# Patient Record
Sex: MALE | Race: WHITE | Employment: UNEMPLOYED | URBAN - METROPOLITAN AREA
[De-identification: names, ages, dates, MRNs, and addresses within clinical notes are randomized per-mention and may not be internally consistent; named-entity substitution may affect disease eponyms.]

---

## 2019-03-08 ENCOUNTER — HOSPITAL ENCOUNTER (INPATIENT)
Age: 24
LOS: 5 days | Discharge: HOME | DRG: 885 | End: 2019-03-13
Attending: EMERGENCY MEDICINE | Admitting: PSYCHIATRY & NEUROLOGY
Payer: COMMERCIAL

## 2019-03-08 VITALS
WEIGHT: 140 LBS | HEIGHT: 73 IN | DIASTOLIC BLOOD PRESSURE: 83 MMHG | BODY MASS INDEX: 18.55 KG/M2 | OXYGEN SATURATION: 99 % | TEMPERATURE: 98.8 F | HEART RATE: 115 BPM | RESPIRATION RATE: 16 BRPM | SYSTOLIC BLOOD PRESSURE: 139 MMHG

## 2019-03-08 DIAGNOSIS — F48.9 MENTAL HEALTH PROBLEM: Primary | ICD-10-CM

## 2019-03-08 PROBLEM — F29 PSYCHOSIS (HCC): Status: ACTIVE | Noted: 2019-03-08

## 2019-03-08 LAB
ACETAMINOPHEN LEVEL: <5 MCG/ML (ref 10–30)
ALBUMIN SERPL-MCNC: 4.9 G/DL (ref 3.5–5.2)
ALP BLD-CCNC: 83 U/L (ref 40–129)
ALT SERPL-CCNC: 46 U/L (ref 0–40)
AMPHETAMINE SCREEN, URINE: POSITIVE
ANION GAP SERPL CALCULATED.3IONS-SCNC: 14 MMOL/L (ref 7–16)
AST SERPL-CCNC: 23 U/L (ref 0–39)
BARBITURATE SCREEN URINE: NOT DETECTED
BASOPHILS ABSOLUTE: 0.06 E9/L (ref 0–0.2)
BASOPHILS RELATIVE PERCENT: 0.5 % (ref 0–2)
BENZODIAZEPINE SCREEN, URINE: NOT DETECTED
BILIRUB SERPL-MCNC: 0.4 MG/DL (ref 0–1.2)
BILIRUBIN URINE: NEGATIVE
BLOOD, URINE: NEGATIVE
BUN BLDV-MCNC: 15 MG/DL (ref 6–20)
CALCIUM SERPL-MCNC: 9.5 MG/DL (ref 8.6–10.2)
CANNABINOID SCREEN URINE: POSITIVE
CHLORIDE BLD-SCNC: 100 MMOL/L (ref 98–107)
CLARITY: CLEAR
CO2: 26 MMOL/L (ref 22–29)
COCAINE METABOLITE SCREEN URINE: POSITIVE
COLOR: YELLOW
CREAT SERPL-MCNC: 0.9 MG/DL (ref 0.7–1.2)
EOSINOPHILS ABSOLUTE: 0.52 E9/L (ref 0.05–0.5)
EOSINOPHILS RELATIVE PERCENT: 4.5 % (ref 0–6)
ETHANOL: <10 MG/DL (ref 0–0.08)
GFR AFRICAN AMERICAN: >60
GFR NON-AFRICAN AMERICAN: >60 ML/MIN/1.73
GLUCOSE BLD-MCNC: 119 MG/DL (ref 74–99)
GLUCOSE URINE: NEGATIVE MG/DL
HCT VFR BLD CALC: 48.1 % (ref 37–54)
HEMOGLOBIN: 16.3 G/DL (ref 12.5–16.5)
IMMATURE GRANULOCYTES #: 0.1 E9/L
IMMATURE GRANULOCYTES %: 0.9 % (ref 0–5)
KETONES, URINE: NEGATIVE MG/DL
LEUKOCYTE ESTERASE, URINE: NEGATIVE
LYMPHOCYTES ABSOLUTE: 3.1 E9/L (ref 1.5–4)
LYMPHOCYTES RELATIVE PERCENT: 27.1 % (ref 20–42)
MCH RBC QN AUTO: 31.6 PG (ref 26–35)
MCHC RBC AUTO-ENTMCNC: 33.9 % (ref 32–34.5)
MCV RBC AUTO: 93.2 FL (ref 80–99.9)
METHADONE SCREEN, URINE: NOT DETECTED
MONOCYTES ABSOLUTE: 0.99 E9/L (ref 0.1–0.95)
MONOCYTES RELATIVE PERCENT: 8.7 % (ref 2–12)
NEUTROPHILS ABSOLUTE: 6.67 E9/L (ref 1.8–7.3)
NEUTROPHILS RELATIVE PERCENT: 58.3 % (ref 43–80)
NITRITE, URINE: NEGATIVE
OPIATE SCREEN URINE: NOT DETECTED
PDW BLD-RTO: 13.1 FL (ref 11.5–15)
PH UA: 7 (ref 5–9)
PHENCYCLIDINE SCREEN URINE: NOT DETECTED
PLATELET # BLD: 326 E9/L (ref 130–450)
PMV BLD AUTO: 9.9 FL (ref 7–12)
POTASSIUM SERPL-SCNC: 3.2 MMOL/L (ref 3.5–5)
PROPOXYPHENE SCREEN: NOT DETECTED
PROTEIN UA: NEGATIVE MG/DL
RBC # BLD: 5.16 E12/L (ref 3.8–5.8)
SALICYLATE, SERUM: <0.3 MG/DL (ref 0–30)
SODIUM BLD-SCNC: 140 MMOL/L (ref 132–146)
SPECIFIC GRAVITY UA: 1.01 (ref 1–1.03)
T4 FREE: 1.28 NG/DL (ref 0.93–1.7)
TOTAL PROTEIN: 7.8 G/DL (ref 6.4–8.3)
TRICYCLIC ANTIDEPRESSANTS SCREEN SERUM: NEGATIVE NG/ML
TSH SERPL DL<=0.05 MIU/L-ACNC: 1.22 UIU/ML (ref 0.27–4.2)
UROBILINOGEN, URINE: 0.2 E.U./DL
WBC # BLD: 11.4 E9/L (ref 4.5–11.5)

## 2019-03-08 PROCEDURE — 1240000000 HC EMOTIONAL WELLNESS R&B

## 2019-03-08 PROCEDURE — 6370000000 HC RX 637 (ALT 250 FOR IP): Performed by: PSYCHIATRY & NEUROLOGY

## 2019-03-08 PROCEDURE — 6360000002 HC RX W HCPCS: Performed by: PSYCHIATRY & NEUROLOGY

## 2019-03-08 PROCEDURE — 80307 DRUG TEST PRSMV CHEM ANLYZR: CPT

## 2019-03-08 PROCEDURE — 81003 URINALYSIS AUTO W/O SCOPE: CPT

## 2019-03-08 PROCEDURE — 85025 COMPLETE CBC W/AUTO DIFF WBC: CPT

## 2019-03-08 PROCEDURE — G0480 DRUG TEST DEF 1-7 CLASSES: HCPCS

## 2019-03-08 PROCEDURE — 6360000002 HC RX W HCPCS

## 2019-03-08 PROCEDURE — 80053 COMPREHEN METABOLIC PANEL: CPT

## 2019-03-08 PROCEDURE — 84443 ASSAY THYROID STIM HORMONE: CPT

## 2019-03-08 PROCEDURE — 99285 EMERGENCY DEPT VISIT HI MDM: CPT

## 2019-03-08 PROCEDURE — 84439 ASSAY OF FREE THYROXINE: CPT

## 2019-03-08 PROCEDURE — 36415 COLL VENOUS BLD VENIPUNCTURE: CPT

## 2019-03-08 RX ORDER — LORAZEPAM 2 MG/ML
2 INJECTION INTRAMUSCULAR EVERY 6 HOURS PRN
Status: DISCONTINUED | OUTPATIENT
Start: 2019-03-08 | End: 2019-03-13 | Stop reason: HOSPADM

## 2019-03-08 RX ORDER — NICOTINE 21 MG/24HR
1 PATCH, TRANSDERMAL 24 HOURS TRANSDERMAL DAILY
Status: DISCONTINUED | OUTPATIENT
Start: 2019-03-08 | End: 2019-03-13 | Stop reason: HOSPADM

## 2019-03-08 RX ORDER — LORAZEPAM 2 MG/ML
INJECTION INTRAMUSCULAR
Status: COMPLETED
Start: 2019-03-08 | End: 2019-03-08

## 2019-03-08 RX ORDER — ACETAMINOPHEN 325 MG/1
650 TABLET ORAL EVERY 4 HOURS PRN
Status: DISCONTINUED | OUTPATIENT
Start: 2019-03-08 | End: 2019-03-13 | Stop reason: HOSPADM

## 2019-03-08 RX ORDER — MAGNESIUM HYDROXIDE/ALUMINUM HYDROXICE/SIMETHICONE 120; 1200; 1200 MG/30ML; MG/30ML; MG/30ML
30 SUSPENSION ORAL PRN
Status: DISCONTINUED | OUTPATIENT
Start: 2019-03-08 | End: 2019-03-13 | Stop reason: HOSPADM

## 2019-03-08 RX ORDER — HYDROXYZINE PAMOATE 50 MG/1
50 CAPSULE ORAL EVERY 6 HOURS PRN
Status: DISCONTINUED | OUTPATIENT
Start: 2019-03-08 | End: 2019-03-13 | Stop reason: HOSPADM

## 2019-03-08 RX ORDER — TRAZODONE HYDROCHLORIDE 50 MG/1
50 TABLET ORAL NIGHTLY PRN
Status: DISCONTINUED | OUTPATIENT
Start: 2019-03-08 | End: 2019-03-13 | Stop reason: HOSPADM

## 2019-03-08 RX ORDER — OLANZAPINE 10 MG/1
10 TABLET ORAL
Status: DISPENSED | OUTPATIENT
Start: 2019-03-08 | End: 2019-03-08

## 2019-03-08 RX ORDER — BENZTROPINE MESYLATE 1 MG/ML
2 INJECTION INTRAMUSCULAR; INTRAVENOUS 2 TIMES DAILY PRN
Status: DISCONTINUED | OUTPATIENT
Start: 2019-03-08 | End: 2019-03-13 | Stop reason: HOSPADM

## 2019-03-08 RX ORDER — HALOPERIDOL 5 MG/ML
10 INJECTION INTRAMUSCULAR EVERY 6 HOURS PRN
Status: DISCONTINUED | OUTPATIENT
Start: 2019-03-08 | End: 2019-03-13 | Stop reason: HOSPADM

## 2019-03-08 RX ADMIN — LORAZEPAM 2 MG: 2 INJECTION INTRAMUSCULAR; INTRAVENOUS at 23:56

## 2019-03-08 RX ADMIN — BENZTROPINE MESYLATE 2 MG: 1 INJECTION INTRAMUSCULAR; INTRAVENOUS at 23:54

## 2019-03-08 RX ADMIN — HALOPERIDOL LACTATE 10 MG: 5 INJECTION, SOLUTION INTRAMUSCULAR at 23:55

## 2019-03-08 RX ADMIN — ACETAMINOPHEN 650 MG: 325 TABLET, FILM COATED ORAL at 21:09

## 2019-03-08 ASSESSMENT — SLEEP AND FATIGUE QUESTIONNAIRES
SLEEP PATTERN: DIFFICULTY FALLING ASLEEP
DIFFICULTY ARISING: YES
DO YOU HAVE DIFFICULTY SLEEPING: YES
RESTFUL SLEEP: NO
DIFFICULTY STAYING ASLEEP: YES
AVERAGE NUMBER OF SLEEP HOURS: 3
DO YOU USE A SLEEP AID: YES
DIFFICULTY FALLING ASLEEP: YES

## 2019-03-08 ASSESSMENT — ENCOUNTER SYMPTOMS
SHORTNESS OF BREATH: 0
COUGH: 0
BACK PAIN: 0
ABDOMINAL PAIN: 0

## 2019-03-08 ASSESSMENT — PAIN SCALES - GENERAL: PAINLEVEL_OUTOF10: 8

## 2019-03-08 ASSESSMENT — LIFESTYLE VARIABLES: HISTORY_ALCOHOL_USE: NO

## 2019-03-09 PROBLEM — F29 PSYCHOSIS (HCC): Status: RESOLVED | Noted: 2019-03-08 | Resolved: 2019-03-09

## 2019-03-09 PROBLEM — F23 ACUTE PSYCHOSIS (HCC): Status: ACTIVE | Noted: 2019-03-09

## 2019-03-09 PROCEDURE — 99222 1ST HOSP IP/OBS MODERATE 55: CPT | Performed by: NURSE PRACTITIONER

## 2019-03-09 PROCEDURE — 1240000000 HC EMOTIONAL WELLNESS R&B

## 2019-03-09 RX ORDER — DIVALPROEX SODIUM 250 MG/1
250 TABLET, DELAYED RELEASE ORAL EVERY 8 HOURS SCHEDULED
Status: DISCONTINUED | OUTPATIENT
Start: 2019-03-09 | End: 2019-03-11

## 2019-03-10 PROCEDURE — 99231 SBSQ HOSP IP/OBS SF/LOW 25: CPT | Performed by: NURSE PRACTITIONER

## 2019-03-10 PROCEDURE — 6360000002 HC RX W HCPCS: Performed by: PSYCHIATRY & NEUROLOGY

## 2019-03-10 PROCEDURE — 1240000000 HC EMOTIONAL WELLNESS R&B

## 2019-03-10 RX ORDER — PALIPERIDONE 3 MG/1
3 TABLET, EXTENDED RELEASE ORAL DAILY
Status: DISCONTINUED | OUTPATIENT
Start: 2019-03-10 | End: 2019-03-13 | Stop reason: HOSPADM

## 2019-03-10 RX ADMIN — BENZTROPINE MESYLATE 2 MG: 1 INJECTION INTRAMUSCULAR; INTRAVENOUS at 20:20

## 2019-03-10 RX ADMIN — HALOPERIDOL LACTATE 10 MG: 5 INJECTION, SOLUTION INTRAMUSCULAR at 20:20

## 2019-03-10 RX ADMIN — LORAZEPAM 2 MG: 2 INJECTION INTRAMUSCULAR; INTRAVENOUS at 20:20

## 2019-03-10 ASSESSMENT — SLEEP AND FATIGUE QUESTIONNAIRES
DIFFICULTY STAYING ASLEEP: YES
DO YOU USE A SLEEP AID: YES
DIFFICULTY ARISING: YES
SLEEP PATTERN: DIFFICULTY FALLING ASLEEP
RESTFUL SLEEP: NO
DIFFICULTY FALLING ASLEEP: YES
DO YOU HAVE DIFFICULTY SLEEPING: YES
AVERAGE NUMBER OF SLEEP HOURS: 3

## 2019-03-11 PROCEDURE — 1240000000 HC EMOTIONAL WELLNESS R&B

## 2019-03-11 PROCEDURE — 6370000000 HC RX 637 (ALT 250 FOR IP): Performed by: PSYCHIATRY & NEUROLOGY

## 2019-03-11 PROCEDURE — 99231 SBSQ HOSP IP/OBS SF/LOW 25: CPT | Performed by: NURSE PRACTITIONER

## 2019-03-11 PROCEDURE — 6360000002 HC RX W HCPCS: Performed by: PSYCHIATRY & NEUROLOGY

## 2019-03-11 RX ORDER — DIVALPROEX SODIUM 250 MG/1
500 TABLET, DELAYED RELEASE ORAL 3 TIMES DAILY
Status: DISCONTINUED | OUTPATIENT
Start: 2019-03-11 | End: 2019-03-13 | Stop reason: HOSPADM

## 2019-03-11 RX ADMIN — LORAZEPAM 2 MG: 2 INJECTION INTRAMUSCULAR; INTRAVENOUS at 23:45

## 2019-03-11 RX ADMIN — TRAZODONE HYDROCHLORIDE 50 MG: 50 TABLET ORAL at 21:41

## 2019-03-11 RX ADMIN — HALOPERIDOL LACTATE 10 MG: 5 INJECTION, SOLUTION INTRAMUSCULAR at 23:45

## 2019-03-11 RX ADMIN — BENZTROPINE MESYLATE 2 MG: 1 INJECTION INTRAMUSCULAR; INTRAVENOUS at 23:45

## 2019-03-12 PROCEDURE — 1240000000 HC EMOTIONAL WELLNESS R&B

## 2019-03-12 PROCEDURE — 6360000002 HC RX W HCPCS: Performed by: PSYCHIATRY & NEUROLOGY

## 2019-03-12 PROCEDURE — 99231 SBSQ HOSP IP/OBS SF/LOW 25: CPT | Performed by: NURSE PRACTITIONER

## 2019-03-12 RX ADMIN — LORAZEPAM 2 MG: 2 INJECTION INTRAMUSCULAR; INTRAVENOUS at 16:07

## 2019-03-12 RX ADMIN — HALOPERIDOL LACTATE 10 MG: 5 INJECTION, SOLUTION INTRAMUSCULAR at 16:07

## 2019-03-12 ASSESSMENT — PAIN SCALES - GENERAL
PAINLEVEL_OUTOF10: 0
PAINLEVEL_OUTOF10: 0

## 2019-03-13 LAB
EKG ATRIAL RATE: 114 BPM
EKG P AXIS: 78 DEGREES
EKG P-R INTERVAL: 172 MS
EKG Q-T INTERVAL: 308 MS
EKG QRS DURATION: 82 MS
EKG QTC CALCULATION (BAZETT): 424 MS
EKG R AXIS: 121 DEGREES
EKG T AXIS: 60 DEGREES
EKG VENTRICULAR RATE: 114 BPM

## 2019-03-13 PROCEDURE — 99238 HOSP IP/OBS DSCHRG MGMT 30/<: CPT | Performed by: NURSE PRACTITIONER

## 2019-03-13 ASSESSMENT — PAIN SCALES - GENERAL
PAINLEVEL_OUTOF10: 0
PAINLEVEL_OUTOF10: 0

## 2019-03-15 LAB — COCAINE, CONFIRM, URINE: >1000 NG/ML

## 2019-03-16 LAB
AMPHETAMINES, URINE: 131 NG/ML
CANNABINOIDS CONF, URINE: >500 NG/ML
METHAMPHETAMINE, URINE: 5615 NG/ML
METHYLENEDIOXYAMPHETAMINE, UR: <200 NG/ML
METHYLENEDIOXYETHYLAMPHETAMINE, UR: <200 NG/ML
METHYLENEDIOXYMETHAMPHETAMINE, UR: <200 NG/ML

## 2019-04-18 ENCOUNTER — HOSPITAL ENCOUNTER (OUTPATIENT)
Age: 24
Discharge: HOME | End: 2019-04-18

## 2019-04-18 LAB
ALBUMIN SERPL-MCNC: 4.7 G/DL (ref 3.5–5.2)
ALP BLD-CCNC: 80 U/L (ref 40–129)
ALT SERPL-CCNC: 33 U/L (ref 0–40)
AMPHETAMINE SCREEN, URINE: NOT DETECTED
ANION GAP SERPL CALCULATED.3IONS-SCNC: 12 MMOL/L (ref 7–16)
AST SERPL-CCNC: 20 U/L (ref 0–39)
BARBITURATE SCREEN URINE: NOT DETECTED
BASOPHILS ABSOLUTE: 0.07 E9/L (ref 0–0.2)
BASOPHILS RELATIVE PERCENT: 0.7 % (ref 0–2)
BENZODIAZEPINE SCREEN, URINE: NOT DETECTED
BILIRUB SERPL-MCNC: 0.5 MG/DL (ref 0–1.2)
BILIRUBIN DIRECT: <0.2 MG/DL (ref 0–0.3)
BILIRUBIN, INDIRECT: NORMAL MG/DL (ref 0–1)
BUN BLDV-MCNC: 12 MG/DL (ref 6–20)
CALCIUM SERPL-MCNC: 9.4 MG/DL (ref 8.6–10.2)
CANNABINOID SCREEN URINE: NOT DETECTED
CHLORIDE BLD-SCNC: 104 MMOL/L (ref 98–107)
CHOLESTEROL, TOTAL: 163 MG/DL (ref 0–199)
CO2: 26 MMOL/L (ref 22–29)
COCAINE METABOLITE SCREEN URINE: NOT DETECTED
CREAT SERPL-MCNC: 0.8 MG/DL (ref 0.7–1.2)
EOSINOPHILS ABSOLUTE: 0.8 E9/L (ref 0.05–0.5)
EOSINOPHILS RELATIVE PERCENT: 8.3 % (ref 0–6)
GFR AFRICAN AMERICAN: >60
GFR NON-AFRICAN AMERICAN: >60 ML/MIN/1.73
GLUCOSE BLD-MCNC: 91 MG/DL (ref 74–99)
HCT VFR BLD CALC: 45.2 % (ref 37–54)
HDLC SERPL-MCNC: 54 MG/DL
HEMOGLOBIN: 14.9 G/DL (ref 12.5–16.5)
IMMATURE GRANULOCYTES #: 0.08 E9/L
IMMATURE GRANULOCYTES %: 0.8 % (ref 0–5)
LDL CHOLESTEROL CALCULATED: 89 MG/DL (ref 0–99)
LYMPHOCYTES ABSOLUTE: 2.59 E9/L (ref 1.5–4)
LYMPHOCYTES RELATIVE PERCENT: 27 % (ref 20–42)
MCH RBC QN AUTO: 32 PG (ref 26–35)
MCHC RBC AUTO-ENTMCNC: 33 % (ref 32–34.5)
MCV RBC AUTO: 97 FL (ref 80–99.9)
METHADONE SCREEN, URINE: NOT DETECTED
MONOCYTES ABSOLUTE: 0.82 E9/L (ref 0.1–0.95)
MONOCYTES RELATIVE PERCENT: 8.6 % (ref 2–12)
NEUTROPHILS ABSOLUTE: 5.23 E9/L (ref 1.8–7.3)
NEUTROPHILS RELATIVE PERCENT: 54.6 % (ref 43–80)
OPIATE SCREEN URINE: NOT DETECTED
PDW BLD-RTO: 13.4 FL (ref 11.5–15)
PHENCYCLIDINE SCREEN URINE: NOT DETECTED
PHOSPHORUS: 3.2 MG/DL (ref 2.5–4.5)
PLATELET # BLD: 247 E9/L (ref 130–450)
PMV BLD AUTO: 10.1 FL (ref 7–12)
POTASSIUM SERPL-SCNC: 4.2 MMOL/L (ref 3.5–5)
PROPOXYPHENE SCREEN: NOT DETECTED
RBC # BLD: 4.66 E12/L (ref 3.8–5.8)
SODIUM BLD-SCNC: 142 MMOL/L (ref 132–146)
T4 FREE: 1.21 NG/DL (ref 0.93–1.7)
TOTAL PROTEIN: 7.1 G/DL (ref 6.4–8.3)
TRIGL SERPL-MCNC: 98 MG/DL (ref 0–149)
TSH SERPL DL<=0.05 MIU/L-ACNC: 1.24 UIU/ML (ref 0.27–4.2)
VLDLC SERPL CALC-MCNC: 20 MG/DL
WBC # BLD: 9.6 E9/L (ref 4.5–11.5)

## 2019-04-18 PROCEDURE — 84155 ASSAY OF PROTEIN SERUM: CPT

## 2019-04-18 PROCEDURE — 84443 ASSAY THYROID STIM HORMONE: CPT

## 2019-04-18 PROCEDURE — 82247 BILIRUBIN TOTAL: CPT

## 2019-04-18 PROCEDURE — 84075 ASSAY ALKALINE PHOSPHATASE: CPT

## 2019-04-18 PROCEDURE — 80069 RENAL FUNCTION PANEL: CPT

## 2019-04-18 PROCEDURE — 85025 COMPLETE CBC W/AUTO DIFF WBC: CPT

## 2019-04-18 PROCEDURE — 84439 ASSAY OF FREE THYROXINE: CPT

## 2019-04-18 PROCEDURE — 80307 DRUG TEST PRSMV CHEM ANLYZR: CPT

## 2019-04-18 PROCEDURE — 82248 BILIRUBIN DIRECT: CPT

## 2019-04-18 PROCEDURE — 80061 LIPID PANEL: CPT

## 2019-04-18 PROCEDURE — 84450 TRANSFERASE (AST) (SGOT): CPT

## 2019-04-18 PROCEDURE — 36415 COLL VENOUS BLD VENIPUNCTURE: CPT

## 2019-04-18 PROCEDURE — 84460 ALANINE AMINO (ALT) (SGPT): CPT

## 2019-07-30 ENCOUNTER — APPOINTMENT (OUTPATIENT)
Dept: ULTRASOUND IMAGING | Age: 24
End: 2019-07-30
Payer: COMMERCIAL

## 2019-07-30 ENCOUNTER — APPOINTMENT (OUTPATIENT)
Dept: CT IMAGING | Age: 24
End: 2019-07-30
Payer: COMMERCIAL

## 2019-07-30 ENCOUNTER — HOSPITAL ENCOUNTER (EMERGENCY)
Age: 24
Discharge: HOME | End: 2019-07-30
Attending: EMERGENCY MEDICINE
Payer: COMMERCIAL

## 2019-07-30 VITALS
RESPIRATION RATE: 16 BRPM | DIASTOLIC BLOOD PRESSURE: 85 MMHG | HEIGHT: 74 IN | BODY MASS INDEX: 23.1 KG/M2 | SYSTOLIC BLOOD PRESSURE: 140 MMHG | HEART RATE: 94 BPM | WEIGHT: 180 LBS | TEMPERATURE: 98.3 F | OXYGEN SATURATION: 97 %

## 2019-07-30 DIAGNOSIS — R10.11 RIGHT UPPER QUADRANT ABDOMINAL PAIN: Primary | ICD-10-CM

## 2019-07-30 DIAGNOSIS — R11.2 NAUSEA VOMITING AND DIARRHEA: ICD-10-CM

## 2019-07-30 DIAGNOSIS — R19.7 NAUSEA VOMITING AND DIARRHEA: ICD-10-CM

## 2019-07-30 LAB
ALBUMIN SERPL-MCNC: 5.1 G/DL (ref 3.5–5.2)
ALP BLD-CCNC: 71 U/L (ref 40–129)
ALT SERPL-CCNC: 27 U/L (ref 0–40)
ANION GAP SERPL CALCULATED.3IONS-SCNC: 16 MMOL/L (ref 7–16)
AST SERPL-CCNC: 18 U/L (ref 0–39)
BASOPHILS ABSOLUTE: 0.08 E9/L (ref 0–0.2)
BASOPHILS RELATIVE PERCENT: 0.7 % (ref 0–2)
BILIRUB SERPL-MCNC: 0.6 MG/DL (ref 0–1.2)
BILIRUBIN URINE: NEGATIVE
BLOOD, URINE: NEGATIVE
BUN BLDV-MCNC: 10 MG/DL (ref 6–20)
CALCIUM SERPL-MCNC: 10.1 MG/DL (ref 8.6–10.2)
CHLORIDE BLD-SCNC: 102 MMOL/L (ref 98–107)
CLARITY: CLEAR
CO2: 21 MMOL/L (ref 22–29)
COLOR: YELLOW
CREAT SERPL-MCNC: 0.9 MG/DL (ref 0.7–1.2)
EOSINOPHILS ABSOLUTE: 0.21 E9/L (ref 0.05–0.5)
EOSINOPHILS RELATIVE PERCENT: 1.7 % (ref 0–6)
GFR AFRICAN AMERICAN: >60
GFR NON-AFRICAN AMERICAN: >60 ML/MIN/1.73
GLUCOSE BLD-MCNC: 87 MG/DL (ref 74–99)
GLUCOSE URINE: NEGATIVE MG/DL
HCT VFR BLD CALC: 50.1 % (ref 37–54)
HEMOGLOBIN: 17.3 G/DL (ref 12.5–16.5)
IMMATURE GRANULOCYTES #: 0.06 E9/L
IMMATURE GRANULOCYTES %: 0.5 % (ref 0–5)
KETONES, URINE: >=80 MG/DL
LACTIC ACID: 1.4 MMOL/L (ref 0.5–2.2)
LEUKOCYTE ESTERASE, URINE: NEGATIVE
LIPASE: 19 U/L (ref 13–60)
LYMPHOCYTES ABSOLUTE: 2.41 E9/L (ref 1.5–4)
LYMPHOCYTES RELATIVE PERCENT: 19.7 % (ref 20–42)
MCH RBC QN AUTO: 31.5 PG (ref 26–35)
MCHC RBC AUTO-ENTMCNC: 34.5 % (ref 32–34.5)
MCV RBC AUTO: 91.1 FL (ref 80–99.9)
MONOCYTES ABSOLUTE: 0.95 E9/L (ref 0.1–0.95)
MONOCYTES RELATIVE PERCENT: 7.8 % (ref 2–12)
NEUTROPHILS ABSOLUTE: 8.53 E9/L (ref 1.8–7.3)
NEUTROPHILS RELATIVE PERCENT: 69.6 % (ref 43–80)
NITRITE, URINE: NEGATIVE
PDW BLD-RTO: 11.9 FL (ref 11.5–15)
PH UA: 7 (ref 5–9)
PLATELET # BLD: 342 E9/L (ref 130–450)
PMV BLD AUTO: 10.2 FL (ref 7–12)
POTASSIUM REFLEX MAGNESIUM: 3.9 MMOL/L (ref 3.5–5)
PROTEIN UA: NEGATIVE MG/DL
RBC # BLD: 5.5 E12/L (ref 3.8–5.8)
SODIUM BLD-SCNC: 139 MMOL/L (ref 132–146)
SPECIFIC GRAVITY UA: 1.02 (ref 1–1.03)
TOTAL PROTEIN: 8.1 G/DL (ref 6.4–8.3)
UROBILINOGEN, URINE: 0.2 E.U./DL
WBC # BLD: 12.2 E9/L (ref 4.5–11.5)

## 2019-07-30 PROCEDURE — 96365 THER/PROPH/DIAG IV INF INIT: CPT

## 2019-07-30 PROCEDURE — 83690 ASSAY OF LIPASE: CPT

## 2019-07-30 PROCEDURE — 99284 EMERGENCY DEPT VISIT MOD MDM: CPT

## 2019-07-30 PROCEDURE — 80053 COMPREHEN METABOLIC PANEL: CPT

## 2019-07-30 PROCEDURE — 96366 THER/PROPH/DIAG IV INF ADDON: CPT

## 2019-07-30 PROCEDURE — 83605 ASSAY OF LACTIC ACID: CPT

## 2019-07-30 PROCEDURE — 2580000003 HC RX 258: Performed by: EMERGENCY MEDICINE

## 2019-07-30 PROCEDURE — 96375 TX/PRO/DX INJ NEW DRUG ADDON: CPT

## 2019-07-30 PROCEDURE — 6360000004 HC RX CONTRAST MEDICATION: Performed by: RADIOLOGY

## 2019-07-30 PROCEDURE — 76705 ECHO EXAM OF ABDOMEN: CPT

## 2019-07-30 PROCEDURE — 81003 URINALYSIS AUTO W/O SCOPE: CPT

## 2019-07-30 PROCEDURE — 2500000003 HC RX 250 WO HCPCS: Performed by: EMERGENCY MEDICINE

## 2019-07-30 PROCEDURE — 74177 CT ABD & PELVIS W/CONTRAST: CPT

## 2019-07-30 PROCEDURE — 85025 COMPLETE CBC W/AUTO DIFF WBC: CPT

## 2019-07-30 PROCEDURE — 96376 TX/PRO/DX INJ SAME DRUG ADON: CPT

## 2019-07-30 PROCEDURE — 6360000002 HC RX W HCPCS: Performed by: EMERGENCY MEDICINE

## 2019-07-30 RX ORDER — ONDANSETRON 2 MG/ML
4 INJECTION INTRAMUSCULAR; INTRAVENOUS ONCE
Status: COMPLETED | OUTPATIENT
Start: 2019-07-30 | End: 2019-07-30

## 2019-07-30 RX ORDER — FAMOTIDINE 20 MG/1
20 TABLET, FILM COATED ORAL 2 TIMES DAILY
Qty: 28 TABLET | Refills: 0 | Status: SHIPPED | OUTPATIENT
Start: 2019-07-30 | End: 2019-08-02 | Stop reason: ALTCHOICE

## 2019-07-30 RX ORDER — ONDANSETRON 2 MG/ML
4 INJECTION INTRAMUSCULAR; INTRAVENOUS ONCE
Status: DISCONTINUED | OUTPATIENT
Start: 2019-07-30 | End: 2019-07-30

## 2019-07-30 RX ORDER — PROMETHAZINE HYDROCHLORIDE 25 MG/ML
12.5 INJECTION, SOLUTION INTRAMUSCULAR; INTRAVENOUS ONCE
Status: DISCONTINUED | OUTPATIENT
Start: 2019-07-30 | End: 2019-07-30 | Stop reason: HOSPADM

## 2019-07-30 RX ORDER — SODIUM CHLORIDE 0.9 % (FLUSH) 0.9 %
SYRINGE (ML) INJECTION
Status: DISCONTINUED
Start: 2019-07-30 | End: 2019-07-30 | Stop reason: HOSPADM

## 2019-07-30 RX ORDER — MORPHINE SULFATE 4 MG/ML
8 INJECTION, SOLUTION INTRAMUSCULAR; INTRAVENOUS ONCE
Status: COMPLETED | OUTPATIENT
Start: 2019-07-30 | End: 2019-07-30

## 2019-07-30 RX ORDER — ONDANSETRON 4 MG/1
8 TABLET, ORALLY DISINTEGRATING ORAL EVERY 8 HOURS PRN
Qty: 20 TABLET | Refills: 0 | Status: SHIPPED | OUTPATIENT
Start: 2019-07-30 | End: 2019-11-06 | Stop reason: ALTCHOICE

## 2019-07-30 RX ORDER — FENTANYL CITRATE 50 UG/ML
50 INJECTION, SOLUTION INTRAMUSCULAR; INTRAVENOUS ONCE
Status: DISCONTINUED | OUTPATIENT
Start: 2019-07-30 | End: 2019-07-30

## 2019-07-30 RX ORDER — 0.9 % SODIUM CHLORIDE 0.9 %
1000 INTRAVENOUS SOLUTION INTRAVENOUS ONCE
Status: COMPLETED | OUTPATIENT
Start: 2019-07-30 | End: 2019-07-30

## 2019-07-30 RX ADMIN — IOPAMIDOL 110 ML: 755 INJECTION, SOLUTION INTRAVENOUS at 17:12

## 2019-07-30 RX ADMIN — MORPHINE SULFATE 4 MG: 4 INJECTION INTRAVENOUS at 17:29

## 2019-07-30 RX ADMIN — ONDANSETRON 4 MG: 2 INJECTION INTRAMUSCULAR; INTRAVENOUS at 17:28

## 2019-07-30 RX ADMIN — FAMOTIDINE 20 MG: 10 INJECTION, SOLUTION INTRAVENOUS at 15:44

## 2019-07-30 RX ADMIN — ONDANSETRON 4 MG: 2 INJECTION INTRAMUSCULAR; INTRAVENOUS at 15:44

## 2019-07-30 RX ADMIN — MORPHINE SULFATE 4 MG: 4 INJECTION INTRAVENOUS at 15:44

## 2019-07-30 RX ADMIN — SODIUM CHLORIDE 1000 ML: 9 INJECTION, SOLUTION INTRAVENOUS at 15:44

## 2019-07-30 ASSESSMENT — PAIN SCALES - GENERAL
PAINLEVEL_OUTOF10: 2
PAINLEVEL_OUTOF10: 5
PAINLEVEL_OUTOF10: 5
PAINLEVEL_OUTOF10: 2

## 2019-07-30 ASSESSMENT — ENCOUNTER SYMPTOMS
EYE REDNESS: 0
DIARRHEA: 1
SHORTNESS OF BREATH: 0
EYE PAIN: 0
ABDOMINAL PAIN: 1
EYE DISCHARGE: 0
VOMITING: 1
BACK PAIN: 0
SORE THROAT: 0
COUGH: 0
SINUS PRESSURE: 0
NAUSEA: 1
WHEEZING: 0

## 2019-07-30 ASSESSMENT — PAIN - FUNCTIONAL ASSESSMENT: PAIN_FUNCTIONAL_ASSESSMENT: PREVENTS OR INTERFERES SOME ACTIVE ACTIVITIES AND ADLS

## 2019-07-30 ASSESSMENT — PAIN DESCRIPTION - LOCATION: LOCATION: ABDOMEN

## 2019-07-30 ASSESSMENT — PAIN DESCRIPTION - FREQUENCY: FREQUENCY: INTERMITTENT

## 2019-07-30 ASSESSMENT — PAIN DESCRIPTION - ORIENTATION: ORIENTATION: RIGHT;LEFT;LOWER;UPPER

## 2019-07-30 ASSESSMENT — PAIN DESCRIPTION - PAIN TYPE: TYPE: ACUTE PAIN

## 2019-07-30 ASSESSMENT — PAIN DESCRIPTION - DESCRIPTORS: DESCRIPTORS: SHARP;PRESSURE

## 2019-07-30 NOTE — ED NOTES
Reviewed discharge instructions with patient, discussed medications and addressed all patient and family questions/concerns. Pt verbalizes understanding.        Amy Carrizales RN  07/30/19 5047

## 2019-07-30 NOTE — ED PROVIDER NOTES
42-year-old male presented to the emergency department with primary complaint of abdominal pain nausea, and vomiting. Patient states symptoms been present for the last month, however progressively worse over the last 3 days. Emesis is nonbloody and nonbilious in nature, and contains the contents of previous meals. Abdominal pain is sharp and achy in nature, 8 out of 10 in severity, located in the right upper quadrant, however noted diffusely. This abdominal pain is slightly better with Tylenol, worse with emesis. The pain does not radiate. He notes occasional subjective chills denies fevers. He also notes associated foul-smelling floaty diarrhea which has been progressively worse since onset of symptoms. Abdominal surgical history is negative. Patient denies any drugs, alcohol. The history is provided by the patient. Review of Systems   Constitutional: Positive for chills. Negative for fever. HENT: Negative for ear pain, sinus pressure and sore throat. Eyes: Negative for pain, discharge and redness. Respiratory: Negative for cough, shortness of breath and wheezing. Cardiovascular: Negative for chest pain. Gastrointestinal: Positive for abdominal pain, diarrhea, nausea and vomiting. Genitourinary: Negative for dysuria and frequency. Musculoskeletal: Negative for arthralgias and back pain. Skin: Negative for rash and wound. Neurological: Negative for weakness and headaches. Hematological: Negative for adenopathy. All other systems reviewed and are negative. Physical Exam   Constitutional: He is oriented to person, place, and time. He appears well-developed and well-nourished. Laying supine, appears fatigued   HENT:   Head: Normocephalic and atraumatic. Right Ear: External ear normal.   Left Ear: External ear normal.   Tongue is dry, notable for some mild fasciculations   Eyes: Pupils are equal, round, and reactive to light. Neck: Normal range of motion.  Neck and agree with the past medical, family and social history unless otherwise noted. I have discussed this patient in detail with the resident and provided the instruction and education regarding the evidence-based evaluation and treatment of right upper quadrant abdominal pain and diarrhea. History: 10year-old male presents emergency department with abdominal pain and diarrhea for the last 3 days. States and states it has worsened over the last 3 days. Became intolerable today prompting him to come to the evaluation. He does state improvement with bowel movements but still states the right upper quadrant is exquisitely tender. Denies fevers or chills states no chest pain or shortness of breath no problems urinating. My findings: Boris Love is a 21 y.o. male whom is in moderate distress. Physical exam reveals generalized abdominal pain but worse right upper quadrant tenderness with positive Edmonds sign on my examination. Otherwise reassuring vitals reassuring otherwise physical exam with non-tachycardic heart no murmurs or rubs, lungs clear to auscultation bilaterally. My plan: Symptomatic and supportive care. Labs, RUQ ultrasound    Electronically signed by Elver Cohen DO on 7/30/19 at 4:03 PM          [CF]   5921 Spoke with patient, and patient's parents discussed findings and negative ultrasound. Patient states his nausea, as well as pain is returning. Will attempt intramuscular Phenergan, and fentanyl. CT imaging ordered to evaluate for any other acute intra-abdominal process. [KS]      ED Course User Index  [CF] Elver Cohen DO  [KS] Gissel Small DO         5:55 PM  I have spoken with the patient and discussed todays results, in addition to providing specific details for the plan of care and counseling regarding the diagnosis and prognosis. Their questions are answered at this time and they are agreeable with the plan.  I discussed at length with them reasons for immediate

## 2019-08-02 ENCOUNTER — OFFICE VISIT (OUTPATIENT)
Dept: HEMATOLOGY | Age: 24
End: 2019-08-02
Payer: COMMERCIAL

## 2019-08-02 VITALS
OXYGEN SATURATION: 97 % | SYSTOLIC BLOOD PRESSURE: 116 MMHG | BODY MASS INDEX: 22.1 KG/M2 | HEIGHT: 74 IN | TEMPERATURE: 98.7 F | WEIGHT: 172.2 LBS | DIASTOLIC BLOOD PRESSURE: 71 MMHG | HEART RATE: 119 BPM

## 2019-08-02 DIAGNOSIS — R10.13 EPIGASTRIC PAIN: ICD-10-CM

## 2019-08-02 DIAGNOSIS — R19.7 DIARRHEA, UNSPECIFIED TYPE: ICD-10-CM

## 2019-08-02 DIAGNOSIS — R11.14 BILIOUS VOMITING WITH NAUSEA: Primary | ICD-10-CM

## 2019-08-02 PROCEDURE — 99204 OFFICE O/P NEW MOD 45 MIN: CPT | Performed by: TRANSPLANT SURGERY

## 2019-08-02 RX ORDER — PANTOPRAZOLE SODIUM 40 MG/1
40 TABLET, DELAYED RELEASE ORAL
Qty: 60 TABLET | Refills: 3 | Status: SHIPPED | OUTPATIENT
Start: 2019-08-02 | End: 2019-11-06 | Stop reason: ALTCHOICE

## 2019-08-02 ASSESSMENT — ENCOUNTER SYMPTOMS
EYE PAIN: 0
ABDOMINAL PAIN: 1
VOMITING: 1
BLOOD IN STOOL: 0
DIARRHEA: 1
NAUSEA: 1
SHORTNESS OF BREATH: 0
BACK PAIN: 0
PHOTOPHOBIA: 0
EYE DISCHARGE: 0
CONSTIPATION: 0

## 2019-08-02 NOTE — PROGRESS NOTES
Pulse 119   Temp 98.7 °F (37.1 °C) (Oral)   Ht 6' 2\" (1.88 m)   Wt 172 lb 3.2 oz (78.1 kg)   SpO2 97%   BMI 22.11 kg/m²       PSYCH: mood and affect normal, alert and oriented x 3  CONSTITUTIONAL: No apparent distress, comfortable  EYES: Sclera white, pupils equal round and reactive to light  ENMT:  Hearing normal, trachea midline, ears externally intact  LYMPH: no lympadenopathy in neck. Nolympadenopathy in groins  RESP: Breath sounds were clear and equal with no rales, wheezes, or rhonchi. Respiratory effort was normal with no retractions or use of accessory muscles. CV: Heart soundswere normal with a regular rate and rhythm. No pedal edema  GI/ Abdomen: Soft, nondistended, nontender, no guarding, no peritoneal signs     MSK: no clubbing/ no cyanosis/ gaitnormal       Assessment/Plan:  Epigastric pain, nausea, emesis, diarrhea - concerns for a gastric ulcer  - stop pepcid and start protonix bid  - discussed staying away from acidic foods  - informed consent: Patient and family were made aware of the risks, benefits, alternatives, and complications of a Esophagogastroduodenoscopy with possible biopsy and polypectomy   and wish to proceed with surgery. 39 Minutes of which greater than 50% was spent counseling or coordinating his care. Thank you for the consultation allowing me to take part in Mr. Tonja Roe care. Please send a copy of my note to Dr. Rajat Leon.  Leslie Garcia M.D.  8/2/2019  7:52 AM

## 2019-08-06 ENCOUNTER — TELEPHONE (OUTPATIENT)
Dept: HEMATOLOGY | Age: 24
End: 2019-08-06

## 2019-08-13 RX ORDER — PEDIATRIC MULTIVITAMIN NO.17
TABLET,CHEWABLE ORAL
COMMUNITY
End: 2019-11-06 | Stop reason: ALTCHOICE

## 2019-08-13 RX ORDER — LANOLIN ALCOHOL/MO/W.PET/CERES
3 CREAM (GRAM) TOPICAL NIGHTLY
COMMUNITY
End: 2019-11-06 | Stop reason: ALTCHOICE

## 2019-08-13 RX ORDER — PRAZOSIN HYDROCHLORIDE 2 MG/1
1 CAPSULE ORAL NIGHTLY
COMMUNITY
Start: 2019-07-25 | End: 2019-11-06 | Stop reason: ALTCHOICE

## 2019-08-13 NOTE — PROGRESS NOTES
make up or hair products on the day of surgery    [x] If not already done, you can expect a call from registration    [x] You can expect a call the business day prior to procedure to notify you if your arrival time changes    [x] If you receive a survey after surgery we would greatly appreciate your comments    [] Parent/guardian of a minor must accompany their child and remain on the premises  the entire time they are under our care     [] Pediatric patients may bring favorite toy, blanket or comfort item with them    [] A caregiver or family member must remain with the patient during their stay if they are mentally handicapped, have dementia, disoriented or unable to use a call light or would be a safety concern if left unattended    [x] Please notify surgeon if you develop any illness between now and time of surgery (cold, cough, sore throat, fever, nausea, vomiting) or any signs of infections  including skin, wounds, and dental.    [x]  The Outpatient Pharmacy is available to fill your prescription here on your day of surgery, ask your preop nurse for details    [] Other instructions  EDUCATIONAL MATERIALS PROVIDED:    [] PAT Preoperative Education Packet/Booklet     [] Medication List    [] Fluoroscopy Information Pamphlet    [] Transfusion bracelet applied with instructions    [] Joint replacement video reviewed    [] Shower with soap, lather and rinse well, and use CHG wipes provided the evening before surgery as instructed

## 2019-08-19 ENCOUNTER — HOSPITAL ENCOUNTER (OUTPATIENT)
Age: 24
Setting detail: OUTPATIENT SURGERY
Discharge: HOME | End: 2019-08-19
Attending: TRANSPLANT SURGERY | Admitting: TRANSPLANT SURGERY
Payer: COMMERCIAL

## 2019-08-19 ENCOUNTER — ANESTHESIA EVENT (OUTPATIENT)
Dept: ENDOSCOPY | Age: 24
End: 2019-08-19
Payer: COMMERCIAL

## 2019-08-19 ENCOUNTER — ANESTHESIA (OUTPATIENT)
Dept: ENDOSCOPY | Age: 24
End: 2019-08-19
Payer: COMMERCIAL

## 2019-08-19 VITALS
RESPIRATION RATE: 25 BRPM | DIASTOLIC BLOOD PRESSURE: 59 MMHG | OXYGEN SATURATION: 98 % | SYSTOLIC BLOOD PRESSURE: 101 MMHG

## 2019-08-19 VITALS
WEIGHT: 175 LBS | HEIGHT: 74 IN | SYSTOLIC BLOOD PRESSURE: 121 MMHG | BODY MASS INDEX: 22.46 KG/M2 | HEART RATE: 83 BPM | DIASTOLIC BLOOD PRESSURE: 66 MMHG | TEMPERATURE: 98 F | OXYGEN SATURATION: 97 % | RESPIRATION RATE: 20 BRPM

## 2019-08-19 PROBLEM — K29.70 GASTRITIS: Status: ACTIVE | Noted: 2019-08-19

## 2019-08-19 PROCEDURE — 3609012400 HC EGD TRANSORAL BIOPSY SINGLE/MULTIPLE: Performed by: TRANSPLANT SURGERY

## 2019-08-19 PROCEDURE — 6360000002 HC RX W HCPCS: Performed by: NURSE ANESTHETIST, CERTIFIED REGISTERED

## 2019-08-19 PROCEDURE — 7100000010 HC PHASE II RECOVERY - FIRST 15 MIN: Performed by: TRANSPLANT SURGERY

## 2019-08-19 PROCEDURE — 2709999900 HC NON-CHARGEABLE SUPPLY: Performed by: TRANSPLANT SURGERY

## 2019-08-19 PROCEDURE — 88305 TISSUE EXAM BY PATHOLOGIST: CPT

## 2019-08-19 PROCEDURE — 88342 IMHCHEM/IMCYTCHM 1ST ANTB: CPT

## 2019-08-19 PROCEDURE — 2580000003 HC RX 258: Performed by: NURSE ANESTHETIST, CERTIFIED REGISTERED

## 2019-08-19 PROCEDURE — 3700000000 HC ANESTHESIA ATTENDED CARE: Performed by: TRANSPLANT SURGERY

## 2019-08-19 PROCEDURE — 43239 EGD BIOPSY SINGLE/MULTIPLE: CPT | Performed by: TRANSPLANT SURGERY

## 2019-08-19 PROCEDURE — 7100000011 HC PHASE II RECOVERY - ADDTL 15 MIN: Performed by: TRANSPLANT SURGERY

## 2019-08-19 RX ORDER — SODIUM CHLORIDE 0.9 % (FLUSH) 0.9 %
10 SYRINGE (ML) INJECTION EVERY 12 HOURS SCHEDULED
Status: DISCONTINUED | OUTPATIENT
Start: 2019-08-19 | End: 2019-08-19 | Stop reason: HOSPADM

## 2019-08-19 RX ORDER — SODIUM CHLORIDE 0.9 % (FLUSH) 0.9 %
10 SYRINGE (ML) INJECTION PRN
Status: DISCONTINUED | OUTPATIENT
Start: 2019-08-19 | End: 2019-08-19 | Stop reason: HOSPADM

## 2019-08-19 RX ORDER — MIDAZOLAM HYDROCHLORIDE 1 MG/ML
INJECTION INTRAMUSCULAR; INTRAVENOUS PRN
Status: DISCONTINUED | OUTPATIENT
Start: 2019-08-19 | End: 2019-08-20 | Stop reason: SDUPTHER

## 2019-08-19 RX ORDER — SODIUM CHLORIDE 9 MG/ML
INJECTION, SOLUTION INTRAVENOUS CONTINUOUS PRN
Status: DISCONTINUED | OUTPATIENT
Start: 2019-08-19 | End: 2019-08-20 | Stop reason: SDUPTHER

## 2019-08-19 RX ORDER — PROPOFOL 10 MG/ML
INJECTION, EMULSION INTRAVENOUS PRN
Status: DISCONTINUED | OUTPATIENT
Start: 2019-08-19 | End: 2019-08-20 | Stop reason: SDUPTHER

## 2019-08-19 RX ADMIN — PROPOFOL 280 MG: 10 INJECTION, EMULSION INTRAVENOUS at 14:00

## 2019-08-19 RX ADMIN — MIDAZOLAM HYDROCHLORIDE 2 MG: 1 INJECTION, SOLUTION INTRAMUSCULAR; INTRAVENOUS at 13:59

## 2019-08-19 RX ADMIN — SODIUM CHLORIDE: 9 INJECTION, SOLUTION INTRAVENOUS at 13:30

## 2019-08-19 ASSESSMENT — PAIN SCALES - GENERAL
PAINLEVEL_OUTOF10: 0
PAINLEVEL_OUTOF10: 0

## 2019-08-19 ASSESSMENT — PAIN - FUNCTIONAL ASSESSMENT: PAIN_FUNCTIONAL_ASSESSMENT: 0-10

## 2019-08-19 ASSESSMENT — LIFESTYLE VARIABLES: SMOKING_STATUS: 1

## 2019-08-19 NOTE — H&P
Not on file   Occupational History    Not on file   Social Needs    Financial resource strain: Not on file    Food insecurity:       Worry: Not on file       Inability: Not on file    Transportation needs:       Medical: Not on file       Non-medical: Not on file   Tobacco Use    Smoking status: Current Some Day Smoker    Smokeless tobacco: Never Used   Substance and Sexual Activity    Alcohol use: No    Drug use: Yes       Types: Marijuana    Sexual activity: Not on file   Lifestyle    Physical activity:       Days per week: Not on file       Minutes per session: Not on file    Stress: Not on file   Relationships    Social connections:       Talks on phone: Not on file       Gets together: Not on file       Attends Caodaism service: Not on file       Active member of club or organization: Not on file       Attends meetings of clubs or organizations: Not on file       Relationship status: Not on file    Intimate partner violence:       Fear of current or ex partner: Not on file       Emotionally abused: Not on file       Physically abused: Not on file       Forced sexual activity: Not on file   Other Topics Concern    Not on file   Social History Narrative     ** Merged History Encounter **                  ROS:   Review of Systems   Constitutional: Negative for chills, diaphoresis and fever. HENT: Negative for congestion, ear discharge, ear pain, hearing loss, nosebleeds and tinnitus. Eyes: Negative for photophobia, pain and discharge. Respiratory: Negative for shortness of breath. Cardiovascular: Negative for palpitations and leg swelling. Gastrointestinal: Positive for abdominal pain, diarrhea, nausea and vomiting. Negative for blood in stool and constipation. Endocrine: Negative for polydipsia. Genitourinary: Negative for frequency, hematuria and urgency. Musculoskeletal: Negative for back pain and neck pain. Skin: Negative for rash.    Allergic/Immunologic: Negative for

## 2019-11-06 ENCOUNTER — OFFICE VISIT (OUTPATIENT)
Dept: FAMILY MEDICINE CLINIC | Age: 24
End: 2019-11-06
Payer: COMMERCIAL

## 2019-11-06 VITALS
HEART RATE: 115 BPM | SYSTOLIC BLOOD PRESSURE: 116 MMHG | OXYGEN SATURATION: 98 % | BODY MASS INDEX: 19.97 KG/M2 | TEMPERATURE: 99.2 F | WEIGHT: 155.6 LBS | HEIGHT: 74 IN | DIASTOLIC BLOOD PRESSURE: 74 MMHG | RESPIRATION RATE: 20 BRPM

## 2019-11-06 DIAGNOSIS — J02.9 SORE THROAT: ICD-10-CM

## 2019-11-06 DIAGNOSIS — R68.89 FLU-LIKE SYMPTOMS: ICD-10-CM

## 2019-11-06 DIAGNOSIS — M79.10 MYALGIA: Primary | ICD-10-CM

## 2019-11-06 LAB
INFLUENZA A ANTIBODY: NEGATIVE
INFLUENZA B ANTIBODY: NEGATIVE
S PYO AG THROAT QL: NORMAL

## 2019-11-06 PROCEDURE — 87804 INFLUENZA ASSAY W/OPTIC: CPT | Performed by: PHYSICIAN ASSISTANT

## 2019-11-06 PROCEDURE — 99213 OFFICE O/P EST LOW 20 MIN: CPT | Performed by: PHYSICIAN ASSISTANT

## 2019-11-06 PROCEDURE — 87880 STREP A ASSAY W/OPTIC: CPT | Performed by: PHYSICIAN ASSISTANT

## 2019-11-06 RX ORDER — ONDANSETRON 4 MG/1
4 TABLET, ORALLY DISINTEGRATING ORAL 3 TIMES DAILY PRN
Qty: 21 TABLET | Refills: 0 | Status: SHIPPED
Start: 2019-11-06 | End: 2020-05-25

## 2019-11-06 ASSESSMENT — ENCOUNTER SYMPTOMS
SORE THROAT: 1
VOMITING: 0
ABDOMINAL PAIN: 0
NAUSEA: 1
SHORTNESS OF BREATH: 0
COUGH: 1
PHOTOPHOBIA: 0
DIARRHEA: 0
BACK PAIN: 0

## 2019-11-07 ENCOUNTER — APPOINTMENT (OUTPATIENT)
Dept: GENERAL RADIOLOGY | Age: 24
DRG: 871 | End: 2019-11-07
Payer: COMMERCIAL

## 2019-11-07 ENCOUNTER — HOSPITAL ENCOUNTER (INPATIENT)
Age: 24
LOS: 7 days | Discharge: HOME | DRG: 871 | End: 2019-11-14
Attending: EMERGENCY MEDICINE | Admitting: INTERNAL MEDICINE
Payer: COMMERCIAL

## 2019-11-07 ENCOUNTER — HOSPITAL ENCOUNTER (EMERGENCY)
Age: 24
Discharge: HOME | DRG: 871 | End: 2019-11-07
Attending: EMERGENCY MEDICINE
Payer: COMMERCIAL

## 2019-11-07 VITALS
HEART RATE: 88 BPM | TEMPERATURE: 98.9 F | RESPIRATION RATE: 16 BRPM | WEIGHT: 160 LBS | BODY MASS INDEX: 20.53 KG/M2 | SYSTOLIC BLOOD PRESSURE: 112 MMHG | HEIGHT: 74 IN | OXYGEN SATURATION: 99 % | DIASTOLIC BLOOD PRESSURE: 74 MMHG

## 2019-11-07 DIAGNOSIS — J18.9 PNEUMONIA DUE TO ORGANISM: Primary | ICD-10-CM

## 2019-11-07 DIAGNOSIS — K29.00 ACUTE SUPERFICIAL GASTRITIS WITHOUT HEMORRHAGE: Primary | ICD-10-CM

## 2019-11-07 PROBLEM — F19.10 POLYSUBSTANCE ABUSE (HCC): Status: ACTIVE | Noted: 2019-11-07

## 2019-11-07 PROBLEM — J96.01 ACUTE RESPIRATORY FAILURE WITH HYPOXIA (HCC): Status: ACTIVE | Noted: 2019-11-07

## 2019-11-07 LAB
ALBUMIN SERPL-MCNC: 3.5 G/DL (ref 3.5–5.2)
ALP BLD-CCNC: 94 U/L (ref 40–129)
ALT SERPL-CCNC: 51 U/L (ref 0–40)
ANION GAP SERPL CALCULATED.3IONS-SCNC: 16 MMOL/L (ref 7–16)
AST SERPL-CCNC: 43 U/L (ref 0–39)
BASOPHILS ABSOLUTE: 0 E9/L (ref 0–0.2)
BASOPHILS RELATIVE PERCENT: 0 % (ref 0–2)
BILIRUB SERPL-MCNC: 0.7 MG/DL (ref 0–1.2)
BILIRUBIN URINE: NEGATIVE
BLOOD, URINE: NEGATIVE
BUN BLDV-MCNC: 7 MG/DL (ref 6–20)
CALCIUM SERPL-MCNC: 8.7 MG/DL (ref 8.6–10.2)
CHLORIDE BLD-SCNC: 96 MMOL/L (ref 98–107)
CLARITY: CLEAR
CO2: 23 MMOL/L (ref 22–29)
COLOR: YELLOW
CREAT SERPL-MCNC: 1 MG/DL (ref 0.7–1.2)
DOHLE BODIES: ABNORMAL
EOSINOPHILS ABSOLUTE: 0 E9/L (ref 0.05–0.5)
EOSINOPHILS RELATIVE PERCENT: 0 % (ref 0–6)
GFR AFRICAN AMERICAN: >60
GFR NON-AFRICAN AMERICAN: >60 ML/MIN/1.73
GLUCOSE BLD-MCNC: 132 MG/DL (ref 74–99)
GLUCOSE URINE: NEGATIVE MG/DL
HCT VFR BLD CALC: 36.2 % (ref 37–54)
HEMOGLOBIN: 12.2 G/DL (ref 12.5–16.5)
KETONES, URINE: 40 MG/DL
LACTIC ACID: 1.2 MMOL/L (ref 0.5–2.2)
LEUKOCYTE ESTERASE, URINE: NEGATIVE
LIPASE: 7 U/L (ref 13–60)
LYMPHOCYTES ABSOLUTE: 0.39 E9/L (ref 1.5–4)
LYMPHOCYTES RELATIVE PERCENT: 1.7 % (ref 20–42)
MAGNESIUM: 1.7 MG/DL (ref 1.6–2.6)
MCH RBC QN AUTO: 30.8 PG (ref 26–35)
MCHC RBC AUTO-ENTMCNC: 33.7 % (ref 32–34.5)
MCV RBC AUTO: 91.4 FL (ref 80–99.9)
MONO TEST: NEGATIVE
MONOCYTES ABSOLUTE: 0.2 E9/L (ref 0.1–0.95)
MONOCYTES RELATIVE PERCENT: 0.9 % (ref 2–12)
NEUTROPHILS ABSOLUTE: 18.92 E9/L (ref 1.8–7.3)
NEUTROPHILS RELATIVE PERCENT: 97.4 % (ref 43–80)
NITRITE, URINE: NEGATIVE
NUCLEATED RED BLOOD CELLS: 0 /100 WBC
PDW BLD-RTO: 12.2 FL (ref 11.5–15)
PH UA: 6 (ref 5–9)
PLATELET # BLD: 263 E9/L (ref 130–450)
PMV BLD AUTO: 10.7 FL (ref 7–12)
POTASSIUM REFLEX MAGNESIUM: 3.2 MMOL/L (ref 3.5–5)
PROTEIN UA: 100 MG/DL
RBC # BLD: 3.96 E12/L (ref 3.8–5.8)
RBC # BLD: NORMAL 10*6/UL
SODIUM BLD-SCNC: 135 MMOL/L (ref 132–146)
SPECIFIC GRAVITY UA: 1.02 (ref 1–1.03)
TOTAL PROTEIN: 7.4 G/DL (ref 6.4–8.3)
UROBILINOGEN, URINE: 0.2 E.U./DL
WBC # BLD: 19.5 E9/L (ref 4.5–11.5)

## 2019-11-07 PROCEDURE — 80053 COMPREHEN METABOLIC PANEL: CPT

## 2019-11-07 PROCEDURE — 87070 CULTURE OTHR SPECIMN AEROBIC: CPT

## 2019-11-07 PROCEDURE — 6360000002 HC RX W HCPCS: Performed by: EMERGENCY MEDICINE

## 2019-11-07 PROCEDURE — 96361 HYDRATE IV INFUSION ADD-ON: CPT

## 2019-11-07 PROCEDURE — 1200000000 HC SEMI PRIVATE

## 2019-11-07 PROCEDURE — 2580000003 HC RX 258: Performed by: INTERNAL MEDICINE

## 2019-11-07 PROCEDURE — 2580000003 HC RX 258: Performed by: EMERGENCY MEDICINE

## 2019-11-07 PROCEDURE — 6370000000 HC RX 637 (ALT 250 FOR IP): Performed by: INTERNAL MEDICINE

## 2019-11-07 PROCEDURE — 81001 URINALYSIS AUTO W/SCOPE: CPT

## 2019-11-07 PROCEDURE — 2500000003 HC RX 250 WO HCPCS: Performed by: EMERGENCY MEDICINE

## 2019-11-07 PROCEDURE — 96375 TX/PRO/DX INJ NEW DRUG ADDON: CPT

## 2019-11-07 PROCEDURE — 99222 1ST HOSP IP/OBS MODERATE 55: CPT | Performed by: INTERNAL MEDICINE

## 2019-11-07 PROCEDURE — 96374 THER/PROPH/DIAG INJ IV PUSH: CPT

## 2019-11-07 PROCEDURE — 83735 ASSAY OF MAGNESIUM: CPT

## 2019-11-07 PROCEDURE — 87450 HC DIRECT STREP B ANTIGEN: CPT

## 2019-11-07 PROCEDURE — 93005 ELECTROCARDIOGRAM TRACING: CPT | Performed by: EMERGENCY MEDICINE

## 2019-11-07 PROCEDURE — 80307 DRUG TEST PRSMV CHEM ANLYZR: CPT

## 2019-11-07 PROCEDURE — 0100U HC RESPIRPTHGN MULT REV TRANS & AMP PRB TECH 21 TRGT: CPT

## 2019-11-07 PROCEDURE — 6370000000 HC RX 637 (ALT 250 FOR IP): Performed by: EMERGENCY MEDICINE

## 2019-11-07 PROCEDURE — 83690 ASSAY OF LIPASE: CPT

## 2019-11-07 PROCEDURE — 96365 THER/PROPH/DIAG IV INF INIT: CPT

## 2019-11-07 PROCEDURE — 86308 HETEROPHILE ANTIBODY SCREEN: CPT

## 2019-11-07 PROCEDURE — 99285 EMERGENCY DEPT VISIT HI MDM: CPT

## 2019-11-07 PROCEDURE — 83605 ASSAY OF LACTIC ACID: CPT

## 2019-11-07 PROCEDURE — 6360000002 HC RX W HCPCS: Performed by: INTERNAL MEDICINE

## 2019-11-07 PROCEDURE — 99283 EMERGENCY DEPT VISIT LOW MDM: CPT

## 2019-11-07 PROCEDURE — 71045 X-RAY EXAM CHEST 1 VIEW: CPT

## 2019-11-07 PROCEDURE — 85025 COMPLETE CBC W/AUTO DIFF WBC: CPT

## 2019-11-07 PROCEDURE — 87206 SMEAR FLUORESCENT/ACID STAI: CPT

## 2019-11-07 RX ORDER — SODIUM CHLORIDE 0.9 % (FLUSH) 0.9 %
10 SYRINGE (ML) INJECTION PRN
Status: DISCONTINUED | OUTPATIENT
Start: 2019-11-07 | End: 2019-11-14 | Stop reason: HOSPADM

## 2019-11-07 RX ORDER — KETOROLAC TROMETHAMINE 30 MG/ML
15 INJECTION, SOLUTION INTRAMUSCULAR; INTRAVENOUS ONCE
Status: COMPLETED | OUTPATIENT
Start: 2019-11-07 | End: 2019-11-07

## 2019-11-07 RX ORDER — SUCRALFATE 1 G/1
1 TABLET ORAL 4 TIMES DAILY
Qty: 120 TABLET | Refills: 3 | Status: ON HOLD | OUTPATIENT
Start: 2019-11-07 | End: 2019-12-17 | Stop reason: HOSPADM

## 2019-11-07 RX ORDER — POTASSIUM CHLORIDE 20 MEQ/1
40 TABLET, EXTENDED RELEASE ORAL ONCE
Status: COMPLETED | OUTPATIENT
Start: 2019-11-07 | End: 2019-11-07

## 2019-11-07 RX ORDER — KETOROLAC TROMETHAMINE 30 MG/ML
15 INJECTION, SOLUTION INTRAMUSCULAR; INTRAVENOUS EVERY 6 HOURS
Status: COMPLETED | OUTPATIENT
Start: 2019-11-08 | End: 2019-11-08

## 2019-11-07 RX ORDER — LANOLIN ALCOHOL/MO/W.PET/CERES
3 CREAM (GRAM) TOPICAL NIGHTLY PRN
Status: DISCONTINUED | OUTPATIENT
Start: 2019-11-07 | End: 2019-11-14 | Stop reason: HOSPADM

## 2019-11-07 RX ORDER — LANOLIN ALCOHOL/MO/W.PET/CERES
5 CREAM (GRAM) TOPICAL NIGHTLY PRN
Status: ON HOLD | COMMUNITY
End: 2020-09-21 | Stop reason: HOSPADM

## 2019-11-07 RX ORDER — METHYLPREDNISOLONE SODIUM SUCCINATE 40 MG/ML
40 INJECTION, POWDER, LYOPHILIZED, FOR SOLUTION INTRAMUSCULAR; INTRAVENOUS ONCE
Status: COMPLETED | OUTPATIENT
Start: 2019-11-07 | End: 2019-11-07

## 2019-11-07 RX ORDER — 0.9 % SODIUM CHLORIDE 0.9 %
2000 INTRAVENOUS SOLUTION INTRAVENOUS ONCE
Status: COMPLETED | OUTPATIENT
Start: 2019-11-07 | End: 2019-11-07

## 2019-11-07 RX ORDER — ONDANSETRON 2 MG/ML
4 INJECTION INTRAMUSCULAR; INTRAVENOUS ONCE
Status: COMPLETED | OUTPATIENT
Start: 2019-11-07 | End: 2019-11-07

## 2019-11-07 RX ORDER — IPRATROPIUM BROMIDE AND ALBUTEROL SULFATE 2.5; .5 MG/3ML; MG/3ML
1 SOLUTION RESPIRATORY (INHALATION) EVERY 4 HOURS PRN
Status: DISCONTINUED | OUTPATIENT
Start: 2019-11-07 | End: 2019-11-14 | Stop reason: HOSPADM

## 2019-11-07 RX ORDER — 0.9 % SODIUM CHLORIDE 0.9 %
1000 INTRAVENOUS SOLUTION INTRAVENOUS ONCE
Status: COMPLETED | OUTPATIENT
Start: 2019-11-07 | End: 2019-11-08

## 2019-11-07 RX ORDER — 0.9 % SODIUM CHLORIDE 0.9 %
30 INTRAVENOUS SOLUTION INTRAVENOUS ONCE
Status: COMPLETED | OUTPATIENT
Start: 2019-11-07 | End: 2019-11-07

## 2019-11-07 RX ORDER — FAMOTIDINE 20 MG/1
20 TABLET, FILM COATED ORAL 2 TIMES DAILY
Qty: 14 TABLET | Refills: 0 | Status: ON HOLD | OUTPATIENT
Start: 2019-11-07 | End: 2019-12-17 | Stop reason: HOSPADM

## 2019-11-07 RX ORDER — DOXYCYCLINE HYCLATE 100 MG/1
100 CAPSULE ORAL EVERY 12 HOURS SCHEDULED
Status: DISCONTINUED | OUTPATIENT
Start: 2019-11-08 | End: 2019-11-09

## 2019-11-07 RX ORDER — ONDANSETRON 2 MG/ML
4 INJECTION INTRAMUSCULAR; INTRAVENOUS EVERY 6 HOURS PRN
Status: DISCONTINUED | OUTPATIENT
Start: 2019-11-07 | End: 2019-11-14 | Stop reason: HOSPADM

## 2019-11-07 RX ORDER — SODIUM CHLORIDE 0.9 % (FLUSH) 0.9 %
10 SYRINGE (ML) INJECTION EVERY 12 HOURS SCHEDULED
Status: DISCONTINUED | OUTPATIENT
Start: 2019-11-07 | End: 2019-11-14 | Stop reason: HOSPADM

## 2019-11-07 RX ORDER — ACETAMINOPHEN 325 MG/1
650 TABLET ORAL EVERY 4 HOURS PRN
Status: DISCONTINUED | OUTPATIENT
Start: 2019-11-07 | End: 2019-11-14 | Stop reason: HOSPADM

## 2019-11-07 RX ORDER — ACETAMINOPHEN 500 MG
1000 TABLET ORAL ONCE
Status: COMPLETED | OUTPATIENT
Start: 2019-11-07 | End: 2019-11-07

## 2019-11-07 RX ADMIN — FAMOTIDINE 20 MG: 10 INJECTION, SOLUTION INTRAVENOUS at 04:14

## 2019-11-07 RX ADMIN — KETOROLAC TROMETHAMINE 15 MG: 30 INJECTION, SOLUTION INTRAMUSCULAR; INTRAVENOUS at 20:13

## 2019-11-07 RX ADMIN — DOXYCYCLINE 100 MG: 100 INJECTION, POWDER, LYOPHILIZED, FOR SOLUTION INTRAVENOUS at 21:45

## 2019-11-07 RX ADMIN — SODIUM CHLORIDE 2109 ML: 9 INJECTION, SOLUTION INTRAVENOUS at 20:13

## 2019-11-07 RX ADMIN — ACETAMINOPHEN 1000 MG: 500 TABLET ORAL at 20:13

## 2019-11-07 RX ADMIN — SODIUM CHLORIDE 2000 ML: 9 INJECTION, SOLUTION INTRAVENOUS at 04:10

## 2019-11-07 RX ADMIN — KETOROLAC TROMETHAMINE 15 MG: 30 INJECTION, SOLUTION INTRAMUSCULAR; INTRAVENOUS at 04:14

## 2019-11-07 RX ADMIN — Medication 10 ML: at 22:33

## 2019-11-07 RX ADMIN — SODIUM CHLORIDE 1000 ML: 9 INJECTION, SOLUTION INTRAVENOUS at 22:35

## 2019-11-07 RX ADMIN — CEFTRIAXONE 2 G: 2 INJECTION, POWDER, FOR SOLUTION INTRAMUSCULAR; INTRAVENOUS at 20:37

## 2019-11-07 RX ADMIN — METHYLPREDNISOLONE SODIUM SUCCINATE 40 MG: 40 INJECTION, POWDER, LYOPHILIZED, FOR SOLUTION INTRAMUSCULAR; INTRAVENOUS at 22:35

## 2019-11-07 RX ADMIN — ONDANSETRON 4 MG: 2 INJECTION INTRAMUSCULAR; INTRAVENOUS at 04:12

## 2019-11-07 RX ADMIN — MELATONIN 3 MG ORAL TABLET 3 MG: 3 TABLET ORAL at 22:35

## 2019-11-07 RX ADMIN — ONDANSETRON 4 MG: 2 INJECTION INTRAMUSCULAR; INTRAVENOUS at 20:13

## 2019-11-07 RX ADMIN — POTASSIUM CHLORIDE 40 MEQ: 20 TABLET, EXTENDED RELEASE ORAL at 22:33

## 2019-11-07 ASSESSMENT — PAIN DESCRIPTION - FREQUENCY: FREQUENCY: INTERMITTENT

## 2019-11-07 ASSESSMENT — PAIN SCALES - GENERAL
PAINLEVEL_OUTOF10: 10
PAINLEVEL_OUTOF10: 9
PAINLEVEL_OUTOF10: 7
PAINLEVEL_OUTOF10: 6
PAINLEVEL_OUTOF10: 10
PAINLEVEL_OUTOF10: 6

## 2019-11-07 ASSESSMENT — PAIN DESCRIPTION - PAIN TYPE
TYPE: ACUTE PAIN

## 2019-11-07 ASSESSMENT — PAIN DESCRIPTION - PROGRESSION
CLINICAL_PROGRESSION: GRADUALLY IMPROVING
CLINICAL_PROGRESSION: NOT CHANGED

## 2019-11-07 ASSESSMENT — PAIN DESCRIPTION - LOCATION
LOCATION: ABDOMEN
LOCATION: GENERALIZED
LOCATION: GENERALIZED
LOCATION: ABDOMEN

## 2019-11-07 ASSESSMENT — PAIN DESCRIPTION - DESCRIPTORS: DESCRIPTORS: ACHING;DISCOMFORT

## 2019-11-07 ASSESSMENT — PAIN DESCRIPTION - ONSET: ONSET: ON-GOING

## 2019-11-08 ENCOUNTER — APPOINTMENT (OUTPATIENT)
Dept: CT IMAGING | Age: 24
DRG: 871 | End: 2019-11-08
Payer: COMMERCIAL

## 2019-11-08 ENCOUNTER — ANESTHESIA EVENT (OUTPATIENT)
Dept: ICU | Age: 24
End: 2019-11-08

## 2019-11-08 ENCOUNTER — APPOINTMENT (OUTPATIENT)
Dept: GENERAL RADIOLOGY | Age: 24
DRG: 871 | End: 2019-11-08
Payer: COMMERCIAL

## 2019-11-08 PROBLEM — K75.9 HEPATITIS: Status: ACTIVE | Noted: 2019-11-08

## 2019-11-08 PROBLEM — F23 ACUTE PSYCHOSIS (HCC): Status: RESOLVED | Noted: 2019-03-09 | Resolved: 2019-11-08

## 2019-11-08 LAB
AADO2: 215.2 MMHG
AADO2: 216.3 MMHG
AADO2: 563.9 MMHG
ADENOVIRUS BY PCR: NOT DETECTED
ALBUMIN SERPL-MCNC: 3.2 G/DL (ref 3.5–5.2)
ALBUMIN SERPL-MCNC: 3.3 G/DL (ref 3.5–5.2)
ALP BLD-CCNC: 130 U/L (ref 40–129)
ALP BLD-CCNC: 96 U/L (ref 40–129)
ALT SERPL-CCNC: 43 U/L (ref 0–40)
ALT SERPL-CCNC: 53 U/L (ref 0–40)
AMPHETAMINE SCREEN, URINE: NOT DETECTED
ANION GAP SERPL CALCULATED.3IONS-SCNC: 13 MMOL/L (ref 7–16)
ANION GAP SERPL CALCULATED.3IONS-SCNC: 14 MMOL/L (ref 7–16)
AST SERPL-CCNC: 38 U/L (ref 0–39)
AST SERPL-CCNC: 48 U/L (ref 0–39)
B.E.: -1.4 MMOL/L (ref -3–3)
B.E.: 0.1 MMOL/L (ref -3–3)
B.E.: 0.2 MMOL/L (ref -3–3)
BACTERIA: ABNORMAL /HPF
BARBITURATE SCREEN URINE: NOT DETECTED
BASOPHILS ABSOLUTE: 0 E9/L (ref 0–0.2)
BASOPHILS ABSOLUTE: 0.04 E9/L (ref 0–0.2)
BASOPHILS RELATIVE PERCENT: 0 % (ref 0–2)
BASOPHILS RELATIVE PERCENT: 0.2 % (ref 0–2)
BENZODIAZEPINE SCREEN, URINE: NOT DETECTED
BILIRUB SERPL-MCNC: 0.4 MG/DL (ref 0–1.2)
BILIRUB SERPL-MCNC: 0.5 MG/DL (ref 0–1.2)
BORDETELLA PARAPERTUSSIS BY PCR: NOT DETECTED
BORDETELLA PERTUSSIS BY PCR: NOT DETECTED
BUN BLDV-MCNC: 11 MG/DL (ref 6–20)
BUN BLDV-MCNC: 7 MG/DL (ref 6–20)
BURR CELLS: ABNORMAL
C-REACTIVE PROTEIN: 32.2 MG/DL (ref 0–0.4)
CALCIUM SERPL-MCNC: 8.2 MG/DL (ref 8.6–10.2)
CALCIUM SERPL-MCNC: 8.6 MG/DL (ref 8.6–10.2)
CANNABINOID SCREEN URINE: POSITIVE
CHLAMYDOPHILIA PNEUMONIAE BY PCR: NOT DETECTED
CHLORIDE BLD-SCNC: 100 MMOL/L (ref 98–107)
CHLORIDE BLD-SCNC: 101 MMOL/L (ref 98–107)
CO2: 23 MMOL/L (ref 22–29)
CO2: 23 MMOL/L (ref 22–29)
COCAINE METABOLITE SCREEN URINE: NOT DETECTED
COHB: 0.2 % (ref 0–1.5)
COHB: 0.5 % (ref 0–1.5)
COHB: 0.7 % (ref 0–1.5)
CORONAVIRUS 229E BY PCR: NOT DETECTED
CORONAVIRUS HKU1 BY PCR: NOT DETECTED
CORONAVIRUS NL63 BY PCR: NOT DETECTED
CORONAVIRUS OC43 BY PCR: NOT DETECTED
CREAT SERPL-MCNC: 0.8 MG/DL (ref 0.7–1.2)
CREAT SERPL-MCNC: 0.8 MG/DL (ref 0.7–1.2)
CRITICAL: ABNORMAL
DATE ANALYZED: ABNORMAL
DATE OF COLLECTION: ABNORMAL
DOHLE BODIES: ABNORMAL
EKG ATRIAL RATE: 123 BPM
EKG P AXIS: 50 DEGREES
EKG P-R INTERVAL: 154 MS
EKG Q-T INTERVAL: 302 MS
EKG QRS DURATION: 82 MS
EKG QTC CALCULATION (BAZETT): 432 MS
EKG R AXIS: 122 DEGREES
EKG T AXIS: 15 DEGREES
EKG VENTRICULAR RATE: 123 BPM
EOSINOPHILS ABSOLUTE: 0 E9/L (ref 0.05–0.5)
EOSINOPHILS ABSOLUTE: 0 E9/L (ref 0.05–0.5)
EOSINOPHILS RELATIVE PERCENT: 0 % (ref 0–6)
EOSINOPHILS RELATIVE PERCENT: 0 % (ref 0–6)
EPITHELIAL CELLS, UA: ABNORMAL /HPF
FENTANYL SCREEN, URINE: NOT DETECTED
FIO2: 100 %
FIO2: 100 %
FIO2: 60 %
GFR AFRICAN AMERICAN: >60
GFR AFRICAN AMERICAN: >60
GFR NON-AFRICAN AMERICAN: >60 ML/MIN/1.73
GFR NON-AFRICAN AMERICAN: >60 ML/MIN/1.73
GLUCOSE BLD-MCNC: 142 MG/DL (ref 74–99)
GLUCOSE BLD-MCNC: 166 MG/DL (ref 74–99)
HCO3: 23 MMOL/L (ref 22–26)
HCO3: 23.3 MMOL/L (ref 22–26)
HCO3: 25.3 MMOL/L (ref 22–26)
HCT VFR BLD CALC: 35.1 % (ref 37–54)
HCT VFR BLD CALC: 37.7 % (ref 37–54)
HEMOGLOBIN: 12 G/DL (ref 12.5–16.5)
HEMOGLOBIN: 12.6 G/DL (ref 12.5–16.5)
HHB: 0.2 % (ref 0–5)
HHB: 1.1 % (ref 0–5)
HHB: 2.3 % (ref 0–5)
HUMAN METAPNEUMOVIRUS BY PCR: NOT DETECTED
HUMAN RHINOVIRUS/ENTEROVIRUS BY PCR: NOT DETECTED
IMMATURE GRANULOCYTES #: 0.14 E9/L
IMMATURE GRANULOCYTES %: 0.7 % (ref 0–5)
INFLUENZA A BY PCR: NOT DETECTED
INFLUENZA B BY PCR: NOT DETECTED
L. PNEUMOPHILA SEROGP 1 UR AG: NORMAL
LAB: ABNORMAL
LACTIC ACID: 1.1 MMOL/L (ref 0.5–2.2)
LYMPHOCYTES ABSOLUTE: 0.34 E9/L (ref 1.5–4)
LYMPHOCYTES ABSOLUTE: 1.35 E9/L (ref 1.5–4)
LYMPHOCYTES RELATIVE PERCENT: 1.7 % (ref 20–42)
LYMPHOCYTES RELATIVE PERCENT: 5 % (ref 20–42)
Lab: ABNORMAL
MAGNESIUM: 2 MG/DL (ref 1.6–2.6)
MCH RBC QN AUTO: 31.5 PG (ref 26–35)
MCH RBC QN AUTO: 31.6 PG (ref 26–35)
MCHC RBC AUTO-ENTMCNC: 33.4 % (ref 32–34.5)
MCHC RBC AUTO-ENTMCNC: 34.2 % (ref 32–34.5)
MCV RBC AUTO: 92.4 FL (ref 80–99.9)
MCV RBC AUTO: 94.3 FL (ref 80–99.9)
METER GLUCOSE: 165 MG/DL (ref 74–99)
METHADONE SCREEN, URINE: NOT DETECTED
METHB: 0.2 % (ref 0–1.5)
METHB: 0.3 % (ref 0–1.5)
METHB: 0.4 % (ref 0–1.5)
MODE: ABNORMAL
MODE: AC
MODE: AC
MONOCYTES ABSOLUTE: 0.14 E9/L (ref 0.1–0.95)
MONOCYTES ABSOLUTE: 0.54 E9/L (ref 0.1–0.95)
MONOCYTES RELATIVE PERCENT: 0.7 % (ref 2–12)
MONOCYTES RELATIVE PERCENT: 2 % (ref 2–12)
MUCUS: PRESENT
MYCOPLASMA PNEUMONIAE BY PCR: NOT DETECTED
NEUTROPHILS ABSOLUTE: 18.98 E9/L (ref 1.8–7.3)
NEUTROPHILS ABSOLUTE: 25.11 E9/L (ref 1.8–7.3)
NEUTROPHILS RELATIVE PERCENT: 93 % (ref 43–80)
NEUTROPHILS RELATIVE PERCENT: 96.7 % (ref 43–80)
O2 CONTENT: 16.3 ML/DL
O2 CONTENT: 17.4 ML/DL
O2 CONTENT: 18.1 ML/DL
O2 SATURATION: 97.7 % (ref 92–98.5)
O2 SATURATION: 98.9 % (ref 92–98.5)
O2 SATURATION: 99.8 % (ref 92–98.5)
O2HB: 96.8 % (ref 94–97)
O2HB: 98.3 % (ref 94–97)
O2HB: 99 % (ref 94–97)
OPERATOR ID: 366
OPERATOR ID: 366
OPERATOR ID: ABNORMAL
OPIATE SCREEN URINE: NOT DETECTED
OXYCODONE URINE: NOT DETECTED
PARAINFLUENZA VIRUS 1 BY PCR: NOT DETECTED
PARAINFLUENZA VIRUS 2 BY PCR: NOT DETECTED
PARAINFLUENZA VIRUS 3 BY PCR: NOT DETECTED
PARAINFLUENZA VIRUS 4 BY PCR: NOT DETECTED
PATIENT TEMP: 37 C
PCO2: 31.7 MMHG (ref 35–45)
PCO2: 39.4 MMHG (ref 35–45)
PCO2: 43.6 MMHG (ref 35–45)
PDW BLD-RTO: 12.3 FL (ref 11.5–15)
PDW BLD-RTO: 12.6 FL (ref 11.5–15)
PEEP/CPAP: 10 CMH2O
PEEP/CPAP: 12 CMH2O
PFO2: 0.92 MMHG/%
PFO2: 2.57 MMHG/%
PFO2: 4.28 MMHG/%
PH BLOOD GAS: 7.38 (ref 7.35–7.45)
PH BLOOD GAS: 7.39 (ref 7.35–7.45)
PH BLOOD GAS: 7.48 (ref 7.35–7.45)
PHENCYCLIDINE SCREEN URINE: NOT DETECTED
PHOSPHORUS: 1.4 MG/DL (ref 2.5–4.5)
PLATELET # BLD: 259 E9/L (ref 130–450)
PLATELET # BLD: 283 E9/L (ref 130–450)
PMV BLD AUTO: 10.5 FL (ref 7–12)
PMV BLD AUTO: 10.6 FL (ref 7–12)
PO2: 154.3 MMHG (ref 60–100)
PO2: 428.1 MMHG (ref 60–100)
PO2: 92.4 MMHG (ref 60–100)
POIKILOCYTES: ABNORMAL
POTASSIUM SERPL-SCNC: 3.4 MMOL/L (ref 3.5–5)
POTASSIUM SERPL-SCNC: 4.1 MMOL/L (ref 3.5–5)
PROCALCITONIN: 0.32 NG/ML (ref 0–0.08)
RBC # BLD: 3.8 E12/L (ref 3.8–5.8)
RBC # BLD: 4 E12/L (ref 3.8–5.8)
RBC # BLD: NORMAL 10*6/UL
RBC UA: ABNORMAL /HPF (ref 0–2)
RESPIRATORY SYNCYTIAL VIRUS BY PCR: NOT DETECTED
RI(T): 139 %
RI(T): 51 %
RI(T): 610 %
RR MECHANICAL: 16 B/MIN
RR MECHANICAL: 16 B/MIN
SEDIMENTATION RATE, ERYTHROCYTE: 110 MM/HR (ref 0–15)
SODIUM BLD-SCNC: 136 MMOL/L (ref 132–146)
SODIUM BLD-SCNC: 138 MMOL/L (ref 132–146)
SOURCE, BLOOD GAS: ABNORMAL
STREP PNEUMONIAE ANTIGEN, URINE: NORMAL
THB: 11.6 G/DL (ref 11.5–16.5)
THB: 12.2 G/DL (ref 11.5–16.5)
THB: 12.7 G/DL (ref 11.5–16.5)
TIME ANALYZED: 1505
TIME ANALYZED: 1746
TIME ANALYZED: 1957
TOTAL PROTEIN: 7 G/DL (ref 6.4–8.3)
TOTAL PROTEIN: 7.2 G/DL (ref 6.4–8.3)
VT MECHANICAL: 450 ML
VT MECHANICAL: 450 ML
WBC # BLD: 19.6 E9/L (ref 4.5–11.5)
WBC # BLD: 27 E9/L (ref 4.5–11.5)
WBC UA: ABNORMAL /HPF (ref 0–5)

## 2019-11-08 PROCEDURE — 93010 ELECTROCARDIOGRAM REPORT: CPT | Performed by: INTERNAL MEDICINE

## 2019-11-08 PROCEDURE — 82103 ALPHA-1-ANTITRYPSIN TOTAL: CPT

## 2019-11-08 PROCEDURE — 82962 GLUCOSE BLOOD TEST: CPT

## 2019-11-08 PROCEDURE — 2500000003 HC RX 250 WO HCPCS

## 2019-11-08 PROCEDURE — 83605 ASSAY OF LACTIC ACID: CPT

## 2019-11-08 PROCEDURE — 82104 ALPHA-1-ANTITRYPSIN PHENO: CPT

## 2019-11-08 PROCEDURE — 86703 HIV-1/HIV-2 1 RESULT ANTBDY: CPT

## 2019-11-08 PROCEDURE — 6360000002 HC RX W HCPCS: Performed by: INTERNAL MEDICINE

## 2019-11-08 PROCEDURE — 84100 ASSAY OF PHOSPHORUS: CPT

## 2019-11-08 PROCEDURE — 82805 BLOOD GASES W/O2 SATURATION: CPT

## 2019-11-08 PROCEDURE — 94664 DEMO&/EVAL PT USE INHALER: CPT

## 2019-11-08 PROCEDURE — 85651 RBC SED RATE NONAUTOMATED: CPT

## 2019-11-08 PROCEDURE — 80053 COMPREHEN METABOLIC PANEL: CPT

## 2019-11-08 PROCEDURE — 0BH18EZ INSERTION OF ENDOTRACHEAL AIRWAY INTO TRACHEA, VIA NATURAL OR ARTIFICIAL OPENING ENDOSCOPIC: ICD-10-PCS | Performed by: INTERNAL MEDICINE

## 2019-11-08 PROCEDURE — 2500000003 HC RX 250 WO HCPCS: Performed by: STUDENT IN AN ORGANIZED HEALTH CARE EDUCATION/TRAINING PROGRAM

## 2019-11-08 PROCEDURE — 36415 COLL VENOUS BLD VENIPUNCTURE: CPT

## 2019-11-08 PROCEDURE — 99291 CRITICAL CARE FIRST HOUR: CPT | Performed by: INTERNAL MEDICINE

## 2019-11-08 PROCEDURE — 71045 X-RAY EXAM CHEST 1 VIEW: CPT

## 2019-11-08 PROCEDURE — 87040 BLOOD CULTURE FOR BACTERIA: CPT

## 2019-11-08 PROCEDURE — 83735 ASSAY OF MAGNESIUM: CPT

## 2019-11-08 PROCEDURE — 6360000002 HC RX W HCPCS

## 2019-11-08 PROCEDURE — 2580000003 HC RX 258: Performed by: INTERNAL MEDICINE

## 2019-11-08 PROCEDURE — 86140 C-REACTIVE PROTEIN: CPT

## 2019-11-08 PROCEDURE — 85025 COMPLETE CBC W/AUTO DIFF WBC: CPT

## 2019-11-08 PROCEDURE — 94640 AIRWAY INHALATION TREATMENT: CPT

## 2019-11-08 PROCEDURE — 6370000000 HC RX 637 (ALT 250 FOR IP): Performed by: INTERNAL MEDICINE

## 2019-11-08 PROCEDURE — 87081 CULTURE SCREEN ONLY: CPT

## 2019-11-08 PROCEDURE — 5A1945Z RESPIRATORY VENTILATION, 24-96 CONSECUTIVE HOURS: ICD-10-PCS | Performed by: INTERNAL MEDICINE

## 2019-11-08 PROCEDURE — 71275 CT ANGIOGRAPHY CHEST: CPT

## 2019-11-08 PROCEDURE — 2500000003 HC RX 250 WO HCPCS: Performed by: INTERNAL MEDICINE

## 2019-11-08 PROCEDURE — 2000000000 HC ICU R&B

## 2019-11-08 PROCEDURE — 84145 PROCALCITONIN (PCT): CPT

## 2019-11-08 PROCEDURE — 6360000004 HC RX CONTRAST MEDICATION: Performed by: RADIOLOGY

## 2019-11-08 PROCEDURE — 94002 VENT MGMT INPAT INIT DAY: CPT

## 2019-11-08 PROCEDURE — 6360000002 HC RX W HCPCS: Performed by: STUDENT IN AN ORGANIZED HEALTH CARE EDUCATION/TRAINING PROGRAM

## 2019-11-08 RX ORDER — PROPOFOL 10 MG/ML
10 INJECTION, EMULSION INTRAVENOUS
Status: DISCONTINUED | OUTPATIENT
Start: 2019-11-08 | End: 2019-11-11

## 2019-11-08 RX ORDER — ETOMIDATE 2 MG/ML
INJECTION INTRAVENOUS
Status: DISPENSED
Start: 2019-11-08 | End: 2019-11-09

## 2019-11-08 RX ORDER — MAGNESIUM SULFATE 1 G/100ML
1 INJECTION INTRAVENOUS PRN
Status: DISCONTINUED | OUTPATIENT
Start: 2019-11-08 | End: 2019-11-11

## 2019-11-08 RX ORDER — LORAZEPAM 2 MG/ML
0.5 INJECTION INTRAMUSCULAR EVERY 8 HOURS PRN
Status: DISCONTINUED | OUTPATIENT
Start: 2019-11-08 | End: 2019-11-14 | Stop reason: HOSPADM

## 2019-11-08 RX ORDER — PROPOFOL 10 MG/ML
INJECTION, EMULSION INTRAVENOUS
Status: COMPLETED
Start: 2019-11-08 | End: 2019-11-08

## 2019-11-08 RX ORDER — ETOMIDATE 2 MG/ML
INJECTION INTRAVENOUS
Status: COMPLETED
Start: 2019-11-08 | End: 2019-11-08

## 2019-11-08 RX ORDER — SODIUM CHLORIDE 0.9 % (FLUSH) 0.9 %
10 SYRINGE (ML) INJECTION PRN
Status: DISCONTINUED | OUTPATIENT
Start: 2019-11-08 | End: 2019-11-08 | Stop reason: SDUPTHER

## 2019-11-08 RX ORDER — FENTANYL CITRATE 50 UG/ML
25 INJECTION, SOLUTION INTRAMUSCULAR; INTRAVENOUS ONCE
Status: COMPLETED | OUTPATIENT
Start: 2019-11-08 | End: 2019-11-08

## 2019-11-08 RX ORDER — PANTOPRAZOLE SODIUM 40 MG/10ML
40 INJECTION, POWDER, LYOPHILIZED, FOR SOLUTION INTRAVENOUS DAILY
Status: DISCONTINUED | OUTPATIENT
Start: 2019-11-09 | End: 2019-11-11

## 2019-11-08 RX ORDER — MIDAZOLAM HYDROCHLORIDE 1 MG/ML
INJECTION INTRAMUSCULAR; INTRAVENOUS
Status: DISPENSED
Start: 2019-11-08 | End: 2019-11-09

## 2019-11-08 RX ORDER — ETOMIDATE 2 MG/ML
20 INJECTION INTRAVENOUS ONCE
Status: COMPLETED | OUTPATIENT
Start: 2019-11-08 | End: 2019-11-08

## 2019-11-08 RX ORDER — FENTANYL CITRATE 50 UG/ML
50 INJECTION, SOLUTION INTRAMUSCULAR; INTRAVENOUS ONCE
Status: COMPLETED | OUTPATIENT
Start: 2019-11-08 | End: 2019-11-08

## 2019-11-08 RX ORDER — METHYLPREDNISOLONE SODIUM SUCCINATE 40 MG/ML
40 INJECTION, POWDER, LYOPHILIZED, FOR SOLUTION INTRAMUSCULAR; INTRAVENOUS EVERY 8 HOURS
Status: DISCONTINUED | OUTPATIENT
Start: 2019-11-08 | End: 2019-11-08

## 2019-11-08 RX ORDER — POTASSIUM CHLORIDE 7.45 MG/ML
10 INJECTION INTRAVENOUS PRN
Status: DISCONTINUED | OUTPATIENT
Start: 2019-11-08 | End: 2019-11-11

## 2019-11-08 RX ORDER — VECURONIUM BROMIDE 1 MG/ML
INJECTION, POWDER, LYOPHILIZED, FOR SOLUTION INTRAVENOUS
Status: DISPENSED
Start: 2019-11-08 | End: 2019-11-09

## 2019-11-08 RX ORDER — SUCCINYLCHOLINE CHLORIDE 20 MG/ML
INJECTION INTRAMUSCULAR; INTRAVENOUS
Status: DISPENSED
Start: 2019-11-08 | End: 2019-11-09

## 2019-11-08 RX ORDER — MIDAZOLAM HYDROCHLORIDE 1 MG/ML
4 INJECTION INTRAMUSCULAR; INTRAVENOUS ONCE
Status: COMPLETED | OUTPATIENT
Start: 2019-11-08 | End: 2019-11-08

## 2019-11-08 RX ORDER — LORAZEPAM 2 MG/ML
0.5 INJECTION INTRAMUSCULAR EVERY 6 HOURS PRN
Status: DISCONTINUED | OUTPATIENT
Start: 2019-11-08 | End: 2019-11-08

## 2019-11-08 RX ORDER — METHYLPREDNISOLONE SODIUM SUCCINATE 125 MG/2ML
60 INJECTION, POWDER, LYOPHILIZED, FOR SOLUTION INTRAMUSCULAR; INTRAVENOUS EVERY 6 HOURS
Status: DISCONTINUED | OUTPATIENT
Start: 2019-11-08 | End: 2019-11-12

## 2019-11-08 RX ORDER — LEVOFLOXACIN 5 MG/ML
750 INJECTION, SOLUTION INTRAVENOUS EVERY 24 HOURS
Status: DISCONTINUED | OUTPATIENT
Start: 2019-11-08 | End: 2019-11-09

## 2019-11-08 RX ORDER — VECURONIUM BROMIDE 1 MG/ML
10 INJECTION, POWDER, LYOPHILIZED, FOR SOLUTION INTRAVENOUS ONCE
Status: COMPLETED | OUTPATIENT
Start: 2019-11-08 | End: 2019-11-08

## 2019-11-08 RX ORDER — SUCCINYLCHOLINE CHLORIDE 20 MG/ML
70 INJECTION INTRAMUSCULAR; INTRAVENOUS ONCE
Status: COMPLETED | OUTPATIENT
Start: 2019-11-08 | End: 2019-11-08

## 2019-11-08 RX ORDER — MIDAZOLAM HYDROCHLORIDE 1 MG/ML
2 INJECTION INTRAMUSCULAR; INTRAVENOUS ONCE
Status: COMPLETED | OUTPATIENT
Start: 2019-11-08 | End: 2019-11-08

## 2019-11-08 RX ORDER — FENTANYL CITRATE 50 UG/ML
INJECTION, SOLUTION INTRAMUSCULAR; INTRAVENOUS
Status: DISPENSED
Start: 2019-11-08 | End: 2019-11-09

## 2019-11-08 RX ORDER — SODIUM CHLORIDE, SODIUM LACTATE, POTASSIUM CHLORIDE, CALCIUM CHLORIDE 600; 310; 30; 20 MG/100ML; MG/100ML; MG/100ML; MG/100ML
INJECTION, SOLUTION INTRAVENOUS CONTINUOUS
Status: DISCONTINUED | OUTPATIENT
Start: 2019-11-08 | End: 2019-11-12

## 2019-11-08 RX ADMIN — ETOMIDATE 20 MG: 2 INJECTION INTRAVENOUS at 17:43

## 2019-11-08 RX ADMIN — ETOMIDATE 20 MG: 2 INJECTION, SOLUTION INTRAVENOUS at 17:43

## 2019-11-08 RX ADMIN — PROPOFOL 30 MCG/KG/MIN: 10 INJECTION, EMULSION INTRAVENOUS at 17:48

## 2019-11-08 RX ADMIN — IPRATROPIUM BROMIDE AND ALBUTEROL SULFATE 1 AMPULE: .5; 3 SOLUTION RESPIRATORY (INHALATION) at 02:07

## 2019-11-08 RX ADMIN — Medication 10 ML: at 21:23

## 2019-11-08 RX ADMIN — KETOROLAC TROMETHAMINE 15 MG: 30 INJECTION, SOLUTION INTRAMUSCULAR; INTRAVENOUS at 00:21

## 2019-11-08 RX ADMIN — FENTANYL CITRATE 25 MCG: 50 INJECTION, SOLUTION INTRAMUSCULAR; INTRAVENOUS at 17:45

## 2019-11-08 RX ADMIN — SODIUM CHLORIDE, POTASSIUM CHLORIDE, SODIUM LACTATE AND CALCIUM CHLORIDE: 600; 310; 30; 20 INJECTION, SOLUTION INTRAVENOUS at 16:23

## 2019-11-08 RX ADMIN — METHYLPREDNISOLONE SODIUM SUCCINATE 60 MG: 125 INJECTION, POWDER, LYOPHILIZED, FOR SOLUTION INTRAMUSCULAR; INTRAVENOUS at 21:23

## 2019-11-08 RX ADMIN — KETOROLAC TROMETHAMINE 15 MG: 30 INJECTION, SOLUTION INTRAMUSCULAR; INTRAVENOUS at 06:28

## 2019-11-08 RX ADMIN — MIDAZOLAM 2 MG/HR: 5 INJECTION INTRAMUSCULAR; INTRAVENOUS at 17:48

## 2019-11-08 RX ADMIN — SUCCINYLCHOLINE CHLORIDE 70 MG: 20 INJECTION, SOLUTION INTRAMUSCULAR; INTRAVENOUS at 17:44

## 2019-11-08 RX ADMIN — SODIUM CHLORIDE 2 MCG/KG/MIN: 9 INJECTION, SOLUTION INTRAVENOUS at 18:32

## 2019-11-08 RX ADMIN — IOPAMIDOL 85 ML: 755 INJECTION, SOLUTION INTRAVENOUS at 15:57

## 2019-11-08 RX ADMIN — Medication 10 ML: at 08:01

## 2019-11-08 RX ADMIN — POTASSIUM CHLORIDE 10 MEQ: 7.46 INJECTION, SOLUTION INTRAVENOUS at 22:35

## 2019-11-08 RX ADMIN — PROPOFOL 40 MCG/KG/MIN: 10 INJECTION, EMULSION INTRAVENOUS at 20:34

## 2019-11-08 RX ADMIN — METHYLPREDNISOLONE SODIUM SUCCINATE 40 MG: 40 INJECTION, POWDER, LYOPHILIZED, FOR SOLUTION INTRAMUSCULAR; INTRAVENOUS at 14:51

## 2019-11-08 RX ADMIN — VECURONIUM BROMIDE 10 MG: 1 INJECTION, POWDER, LYOPHILIZED, FOR SOLUTION INTRAVENOUS at 17:46

## 2019-11-08 RX ADMIN — MIDAZOLAM HYDROCHLORIDE 2 MG: 1 INJECTION, SOLUTION INTRAMUSCULAR; INTRAVENOUS at 17:47

## 2019-11-08 RX ADMIN — FENTANYL CITRATE 50 MCG: 50 INJECTION, SOLUTION INTRAMUSCULAR; INTRAVENOUS at 17:45

## 2019-11-08 RX ADMIN — DOXYCYCLINE HYCLATE 100 MG: 100 CAPSULE ORAL at 08:00

## 2019-11-08 RX ADMIN — POTASSIUM CHLORIDE 10 MEQ: 7.46 INJECTION, SOLUTION INTRAVENOUS at 20:39

## 2019-11-08 RX ADMIN — MIDAZOLAM HYDROCHLORIDE 4 MG: 1 INJECTION, SOLUTION INTRAMUSCULAR; INTRAVENOUS at 17:47

## 2019-11-08 RX ADMIN — LEVOFLOXACIN 750 MG: 5 INJECTION, SOLUTION INTRAVENOUS at 18:45

## 2019-11-08 RX ADMIN — SODIUM PHOSPHATE, MONOBASIC, MONOHYDRATE 23.64 MMOL: 276; 142 INJECTION, SOLUTION INTRAVENOUS at 20:40

## 2019-11-08 RX ADMIN — LORAZEPAM 0.5 MG: 2 INJECTION, SOLUTION INTRAMUSCULAR; INTRAVENOUS at 16:23

## 2019-11-08 RX ADMIN — Medication 50 MCG/HR: at 17:47

## 2019-11-08 RX ADMIN — Medication 200 MCG/HR: at 22:12

## 2019-11-08 RX ADMIN — POTASSIUM CHLORIDE 10 MEQ: 7.46 INJECTION, SOLUTION INTRAVENOUS at 21:48

## 2019-11-08 ASSESSMENT — PULMONARY FUNCTION TESTS
PIF_VALUE: 30
PIF_VALUE: 22
PIF_VALUE: 32
PIF_VALUE: 22
PIF_VALUE: 28
PIF_VALUE: 30
PIF_VALUE: 24
PIF_VALUE: 29
PIF_VALUE: 29

## 2019-11-08 ASSESSMENT — PAIN - FUNCTIONAL ASSESSMENT: PAIN_FUNCTIONAL_ASSESSMENT: PREVENTS OR INTERFERES SOME ACTIVE ACTIVITIES AND ADLS

## 2019-11-08 ASSESSMENT — PAIN DESCRIPTION - LOCATION
LOCATION: GENERALIZED
LOCATION: GENERALIZED

## 2019-11-08 ASSESSMENT — PAIN SCALES - GENERAL
PAINLEVEL_OUTOF10: 0
PAINLEVEL_OUTOF10: 0
PAINLEVEL_OUTOF10: 6
PAINLEVEL_OUTOF10: 7
PAINLEVEL_OUTOF10: 0
PAINLEVEL_OUTOF10: 6
PAINLEVEL_OUTOF10: 0

## 2019-11-08 ASSESSMENT — PAIN DESCRIPTION - PAIN TYPE
TYPE: ACUTE PAIN
TYPE: ACUTE PAIN

## 2019-11-08 ASSESSMENT — PAIN DESCRIPTION - PROGRESSION: CLINICAL_PROGRESSION: NOT CHANGED

## 2019-11-08 ASSESSMENT — PAIN DESCRIPTION - ONSET
ONSET: ON-GOING
ONSET: ON-GOING

## 2019-11-08 ASSESSMENT — LIFESTYLE VARIABLES: SMOKING_STATUS: 1

## 2019-11-08 ASSESSMENT — PAIN DESCRIPTION - DESCRIPTORS
DESCRIPTORS: DISCOMFORT
DESCRIPTORS: ACHING;DISCOMFORT

## 2019-11-08 ASSESSMENT — PAIN DESCRIPTION - FREQUENCY
FREQUENCY: CONTINUOUS
FREQUENCY: CONTINUOUS

## 2019-11-09 ENCOUNTER — APPOINTMENT (OUTPATIENT)
Dept: GENERAL RADIOLOGY | Age: 24
DRG: 871 | End: 2019-11-09
Payer: COMMERCIAL

## 2019-11-09 ENCOUNTER — ANESTHESIA (OUTPATIENT)
Dept: ICU | Age: 24
End: 2019-11-09

## 2019-11-09 LAB
AADO2: 187 MMHG
ANION GAP SERPL CALCULATED.3IONS-SCNC: 9 MMOL/L (ref 7–16)
B.E.: 3.7 MMOL/L (ref -3–3)
BASOPHILS ABSOLUTE: 0.04 E9/L (ref 0–0.2)
BASOPHILS RELATIVE PERCENT: 0.2 % (ref 0–2)
BUN BLDV-MCNC: 9 MG/DL (ref 6–20)
BURR CELLS: ABNORMAL
CALCIUM SERPL-MCNC: 8.5 MG/DL (ref 8.6–10.2)
CHLORIDE BLD-SCNC: 104 MMOL/L (ref 98–107)
CO2: 27 MMOL/L (ref 22–29)
COHB: 0.4 % (ref 0–1.5)
CREAT SERPL-MCNC: 0.6 MG/DL (ref 0.7–1.2)
CRITICAL: ABNORMAL
DATE ANALYZED: ABNORMAL
DATE OF COLLECTION: ABNORMAL
EOSINOPHILS ABSOLUTE: 0.01 E9/L (ref 0.05–0.5)
EOSINOPHILS RELATIVE PERCENT: 0 % (ref 0–6)
FIO2: 50 %
GFR AFRICAN AMERICAN: >60
GFR NON-AFRICAN AMERICAN: >60 ML/MIN/1.73
GLUCOSE BLD-MCNC: 170 MG/DL (ref 74–99)
HCO3: 30.3 MMOL/L (ref 22–26)
HCT VFR BLD CALC: 30.9 % (ref 37–54)
HEMOGLOBIN: 10.3 G/DL (ref 12.5–16.5)
HHB: 3 % (ref 0–5)
IMMATURE GRANULOCYTES #: 0.16 E9/L
IMMATURE GRANULOCYTES %: 0.6 % (ref 0–5)
LAB: ABNORMAL
LYMPHOCYTES ABSOLUTE: 0.53 E9/L (ref 1.5–4)
LYMPHOCYTES RELATIVE PERCENT: 2 % (ref 20–42)
Lab: ABNORMAL
MAGNESIUM: 2.4 MG/DL (ref 1.6–2.6)
MCH RBC QN AUTO: 32 PG (ref 26–35)
MCHC RBC AUTO-ENTMCNC: 33.3 % (ref 32–34.5)
MCV RBC AUTO: 96 FL (ref 80–99.9)
METER GLUCOSE: 126 MG/DL (ref 74–99)
METER GLUCOSE: 132 MG/DL (ref 74–99)
METHB: 0.2 % (ref 0–1.5)
MODE: AC
MONOCYTES ABSOLUTE: 0.27 E9/L (ref 0.1–0.95)
MONOCYTES RELATIVE PERCENT: 1 % (ref 2–12)
NEUTROPHILS ABSOLUTE: 24.94 E9/L (ref 1.8–7.3)
NEUTROPHILS RELATIVE PERCENT: 96.2 % (ref 43–80)
O2 CONTENT: 15.7 ML/DL
O2 SATURATION: 97 % (ref 92–98.5)
O2HB: 96.4 % (ref 94–97)
OPERATOR ID: 1661
PATIENT TEMP: 37 C
PCO2: 56.1 MMHG (ref 35–45)
PDW BLD-RTO: 12.7 FL (ref 11.5–15)
PEEP/CPAP: 10 CMH2O
PFO2: 1.88 MMHG/%
PH BLOOD GAS: 7.35 (ref 7.35–7.45)
PHOSPHORUS: 3.7 MG/DL (ref 2.5–4.5)
PLATELET # BLD: 261 E9/L (ref 130–450)
PMV BLD AUTO: 10.6 FL (ref 7–12)
PO2: 93.9 MMHG (ref 60–100)
POIKILOCYTES: ABNORMAL
POTASSIUM REFLEX MAGNESIUM: 4.7 MMOL/L (ref 3.5–5)
RBC # BLD: 3.22 E12/L (ref 3.8–5.8)
RI(T): 199 %
RR MECHANICAL: 16 B/MIN
SODIUM BLD-SCNC: 140 MMOL/L (ref 132–146)
SOURCE, BLOOD GAS: ABNORMAL
THB: 11.5 G/DL (ref 11.5–16.5)
TIME ANALYZED: 526
VT MECHANICAL: 450 ML
WBC # BLD: 26 E9/L (ref 4.5–11.5)

## 2019-11-09 PROCEDURE — 85025 COMPLETE CBC W/AUTO DIFF WBC: CPT

## 2019-11-09 PROCEDURE — 83735 ASSAY OF MAGNESIUM: CPT

## 2019-11-09 PROCEDURE — 36415 COLL VENOUS BLD VENIPUNCTURE: CPT

## 2019-11-09 PROCEDURE — 71045 X-RAY EXAM CHEST 1 VIEW: CPT

## 2019-11-09 PROCEDURE — 6370000000 HC RX 637 (ALT 250 FOR IP)

## 2019-11-09 PROCEDURE — 6360000002 HC RX W HCPCS: Performed by: INTERNAL MEDICINE

## 2019-11-09 PROCEDURE — 2709999900 HC NON-CHARGEABLE SUPPLY: Performed by: INTERNAL MEDICINE

## 2019-11-09 PROCEDURE — 87281 PNEUMOCYSTIS CARINII AG IF: CPT

## 2019-11-09 PROCEDURE — C9113 INJ PANTOPRAZOLE SODIUM, VIA: HCPCS | Performed by: STUDENT IN AN ORGANIZED HEALTH CARE EDUCATION/TRAINING PROGRAM

## 2019-11-09 PROCEDURE — 88112 CYTOPATH CELL ENHANCE TECH: CPT

## 2019-11-09 PROCEDURE — 2500000003 HC RX 250 WO HCPCS: Performed by: INTERNAL MEDICINE

## 2019-11-09 PROCEDURE — 87070 CULTURE OTHR SPECIMN AEROBIC: CPT

## 2019-11-09 PROCEDURE — 87015 SPECIMEN INFECT AGNT CONCNTJ: CPT

## 2019-11-09 PROCEDURE — 80048 BASIC METABOLIC PNL TOTAL CA: CPT

## 2019-11-09 PROCEDURE — 87206 SMEAR FLUORESCENT/ACID STAI: CPT

## 2019-11-09 PROCEDURE — 87205 SMEAR GRAM STAIN: CPT

## 2019-11-09 PROCEDURE — 6360000002 HC RX W HCPCS: Performed by: STUDENT IN AN ORGANIZED HEALTH CARE EDUCATION/TRAINING PROGRAM

## 2019-11-09 PROCEDURE — 87116 MYCOBACTERIA CULTURE: CPT

## 2019-11-09 PROCEDURE — 2000000000 HC ICU R&B

## 2019-11-09 PROCEDURE — 2580000003 HC RX 258: Performed by: INTERNAL MEDICINE

## 2019-11-09 PROCEDURE — 2500000003 HC RX 250 WO HCPCS: Performed by: STUDENT IN AN ORGANIZED HEALTH CARE EDUCATION/TRAINING PROGRAM

## 2019-11-09 PROCEDURE — 82962 GLUCOSE BLOOD TEST: CPT

## 2019-11-09 PROCEDURE — 6370000000 HC RX 637 (ALT 250 FOR IP): Performed by: INTERNAL MEDICINE

## 2019-11-09 PROCEDURE — 87102 FUNGUS ISOLATION CULTURE: CPT

## 2019-11-09 PROCEDURE — 89051 BODY FLUID CELL COUNT: CPT

## 2019-11-09 PROCEDURE — 0B9H8ZX DRAINAGE OF LUNG LINGULA, VIA NATURAL OR ARTIFICIAL OPENING ENDOSCOPIC, DIAGNOSTIC: ICD-10-PCS | Performed by: INTERNAL MEDICINE

## 2019-11-09 PROCEDURE — 94003 VENT MGMT INPAT SUBQ DAY: CPT

## 2019-11-09 PROCEDURE — 84100 ASSAY OF PHOSPHORUS: CPT

## 2019-11-09 PROCEDURE — 82805 BLOOD GASES W/O2 SATURATION: CPT

## 2019-11-09 PROCEDURE — 99233 SBSQ HOSP IP/OBS HIGH 50: CPT | Performed by: INTERNAL MEDICINE

## 2019-11-09 PROCEDURE — 2580000003 HC RX 258: Performed by: STUDENT IN AN ORGANIZED HEALTH CARE EDUCATION/TRAINING PROGRAM

## 2019-11-09 PROCEDURE — 3609010800 HC BRONCHOSCOPY ALVEOLAR LAVAGE: Performed by: INTERNAL MEDICINE

## 2019-11-09 PROCEDURE — 51702 INSERT TEMP BLADDER CATH: CPT

## 2019-11-09 RX ORDER — DIMETHICONE, OXYBENZONE, AND PADIMATE O 2; 2.5; 6.6 G/100G; G/100G; G/100G
STICK TOPICAL
Status: COMPLETED
Start: 2019-11-09 | End: 2019-11-09

## 2019-11-09 RX ORDER — CHLORHEXIDINE GLUCONATE 0.12 MG/ML
15 RINSE ORAL 2 TIMES DAILY
Status: DISCONTINUED | OUTPATIENT
Start: 2019-11-09 | End: 2019-11-11

## 2019-11-09 RX ADMIN — CHLORHEXIDINE GLUCONATE 15 ML: 1.2 RINSE ORAL at 07:47

## 2019-11-09 RX ADMIN — PIPERACILLIN SODIUM AND TAZOBACTAM SODIUM 3.38 G: 3; .375 INJECTION, POWDER, LYOPHILIZED, FOR SOLUTION INTRAVENOUS at 20:30

## 2019-11-09 RX ADMIN — SODIUM CHLORIDE 3 MCG/KG/MIN: 9 INJECTION, SOLUTION INTRAVENOUS at 08:07

## 2019-11-09 RX ADMIN — METHYLPREDNISOLONE SODIUM SUCCINATE 60 MG: 125 INJECTION, POWDER, LYOPHILIZED, FOR SOLUTION INTRAMUSCULAR; INTRAVENOUS at 20:30

## 2019-11-09 RX ADMIN — POTASSIUM CHLORIDE 10 MEQ: 7.46 INJECTION, SOLUTION INTRAVENOUS at 00:00

## 2019-11-09 RX ADMIN — Medication 200 MCG/HR: at 12:32

## 2019-11-09 RX ADMIN — Medication 200 MCG/HR: at 21:00

## 2019-11-09 RX ADMIN — PIPERACILLIN SODIUM AND TAZOBACTAM SODIUM 3.38 G: 3; .375 INJECTION, POWDER, LYOPHILIZED, FOR SOLUTION INTRAVENOUS at 12:36

## 2019-11-09 RX ADMIN — PROPOFOL 40 MCG/KG/MIN: 10 INJECTION, EMULSION INTRAVENOUS at 13:41

## 2019-11-09 RX ADMIN — Medication 200 MCG/HR: at 07:04

## 2019-11-09 RX ADMIN — SODIUM CHLORIDE, POTASSIUM CHLORIDE, SODIUM LACTATE AND CALCIUM CHLORIDE: 600; 310; 30; 20 INJECTION, SOLUTION INTRAVENOUS at 16:58

## 2019-11-09 RX ADMIN — Medication 10 ML: at 07:49

## 2019-11-09 RX ADMIN — SODIUM CHLORIDE 3.5 MCG/KG/MIN: 9 INJECTION, SOLUTION INTRAVENOUS at 22:22

## 2019-11-09 RX ADMIN — Medication 10 ML: at 20:30

## 2019-11-09 RX ADMIN — Medication 200 MCG/HR: at 14:50

## 2019-11-09 RX ADMIN — Medication 200 MCG/HR: at 18:10

## 2019-11-09 RX ADMIN — Medication: at 11:33

## 2019-11-09 RX ADMIN — ENOXAPARIN SODIUM 40 MG: 40 INJECTION SUBCUTANEOUS at 07:48

## 2019-11-09 RX ADMIN — Medication 200 MCG/HR: at 09:45

## 2019-11-09 RX ADMIN — PANTOPRAZOLE SODIUM 40 MG: 40 INJECTION, POWDER, LYOPHILIZED, FOR SOLUTION INTRAVENOUS at 07:48

## 2019-11-09 RX ADMIN — Medication 175 MCG/HR: at 04:00

## 2019-11-09 RX ADMIN — MIDAZOLAM 3 MG/HR: 5 INJECTION INTRAMUSCULAR; INTRAVENOUS at 16:57

## 2019-11-09 RX ADMIN — PROPOFOL 40 MCG/KG/MIN: 10 INJECTION, EMULSION INTRAVENOUS at 06:02

## 2019-11-09 RX ADMIN — Medication 175 MCG/HR: at 01:30

## 2019-11-09 RX ADMIN — METHYLPREDNISOLONE SODIUM SUCCINATE 60 MG: 125 INJECTION, POWDER, LYOPHILIZED, FOR SOLUTION INTRAMUSCULAR; INTRAVENOUS at 07:47

## 2019-11-09 RX ADMIN — PROPOFOL 40 MCG/KG/MIN: 10 INJECTION, EMULSION INTRAVENOUS at 08:09

## 2019-11-09 RX ADMIN — CHLORHEXIDINE GLUCONATE 15 ML: 1.2 RINSE ORAL at 20:30

## 2019-11-09 RX ADMIN — Medication 200 MCG/HR: at 23:44

## 2019-11-09 RX ADMIN — METHYLPREDNISOLONE SODIUM SUCCINATE 60 MG: 125 INJECTION, POWDER, LYOPHILIZED, FOR SOLUTION INTRAMUSCULAR; INTRAVENOUS at 03:00

## 2019-11-09 RX ADMIN — PROPOFOL 40 MCG/KG/MIN: 10 INJECTION, EMULSION INTRAVENOUS at 21:16

## 2019-11-09 RX ADMIN — METHYLPREDNISOLONE SODIUM SUCCINATE 60 MG: 125 INJECTION, POWDER, LYOPHILIZED, FOR SOLUTION INTRAMUSCULAR; INTRAVENOUS at 15:00

## 2019-11-09 ASSESSMENT — PAIN SCALES - GENERAL
PAINLEVEL_OUTOF10: 0

## 2019-11-09 ASSESSMENT — PULMONARY FUNCTION TESTS
PIF_VALUE: 23
PIF_VALUE: 21
PIF_VALUE: 19
PIF_VALUE: 23
PIF_VALUE: 21
PIF_VALUE: 22
PIF_VALUE: 23
PIF_VALUE: 22
PIF_VALUE: 22
PIF_VALUE: 21
PIF_VALUE: 22
PIF_VALUE: 22
PIF_VALUE: 21
PIF_VALUE: 20
PIF_VALUE: 21
PIF_VALUE: 21
PIF_VALUE: 22
PIF_VALUE: 22
PIF_VALUE: 20
PIF_VALUE: 21
PIF_VALUE: 23
PIF_VALUE: 21
PIF_VALUE: 22
PIF_VALUE: 21
PIF_VALUE: 21
PIF_VALUE: 20
PIF_VALUE: 21
PIF_VALUE: 21
PIF_VALUE: 23
PIF_VALUE: 22
PIF_VALUE: 22

## 2019-11-10 ENCOUNTER — APPOINTMENT (OUTPATIENT)
Dept: GENERAL RADIOLOGY | Age: 24
DRG: 871 | End: 2019-11-10
Payer: COMMERCIAL

## 2019-11-10 LAB
AADO2: 118.5 MMHG
ANION GAP SERPL CALCULATED.3IONS-SCNC: 9 MMOL/L (ref 7–16)
APPEARANCE FLUID: NORMAL
B.E.: 4.4 MMOL/L (ref -3–3)
BASOPHILS ABSOLUTE: 0.05 E9/L (ref 0–0.2)
BASOPHILS RELATIVE PERCENT: 0.2 % (ref 0–2)
BUN BLDV-MCNC: 13 MG/DL (ref 6–20)
CALCIUM SERPL-MCNC: 8.5 MG/DL (ref 8.6–10.2)
CELL COUNT FLUID TYPE: NORMAL
CHLORIDE BLD-SCNC: 106 MMOL/L (ref 98–107)
CO2: 28 MMOL/L (ref 22–29)
COHB: 0.5 % (ref 0–1.5)
COLOR FLUID: NORMAL
CREAT SERPL-MCNC: 0.7 MG/DL (ref 0.7–1.2)
CRITICAL: ABNORMAL
CULTURE, RESPIRATORY: NORMAL
DATE ANALYZED: ABNORMAL
DATE OF COLLECTION: ABNORMAL
EOSINOPHIL FLUID: 1 %
EOSINOPHILS ABSOLUTE: 0.01 E9/L (ref 0.05–0.5)
EOSINOPHILS RELATIVE PERCENT: 0 % (ref 0–6)
FIO2: 40 %
GFR AFRICAN AMERICAN: >60
GFR NON-AFRICAN AMERICAN: >60 ML/MIN/1.73
GLUCOSE BLD-MCNC: 152 MG/DL (ref 74–99)
HCO3: 30.3 MMOL/L (ref 22–26)
HCT VFR BLD CALC: 32 % (ref 37–54)
HEMOGLOBIN: 10.3 G/DL (ref 12.5–16.5)
HHB: 2.5 % (ref 0–5)
IMMATURE GRANULOCYTES #: 0.26 E9/L
IMMATURE GRANULOCYTES %: 0.9 % (ref 0–5)
LAB: ABNORMAL
LYMPHOCYTES ABSOLUTE: 0.76 E9/L (ref 1.5–4)
LYMPHOCYTES RELATIVE PERCENT: 2.7 % (ref 20–42)
LYMPHOCYTES, BODY FLUID: 3 %
Lab: ABNORMAL
MAGNESIUM: 2.5 MG/DL (ref 1.6–2.6)
MCH RBC QN AUTO: 31.7 PG (ref 26–35)
MCHC RBC AUTO-ENTMCNC: 32.2 % (ref 32–34.5)
MCV RBC AUTO: 98.5 FL (ref 80–99.9)
METER GLUCOSE: 136 MG/DL (ref 74–99)
METER GLUCOSE: 138 MG/DL (ref 74–99)
METER GLUCOSE: 148 MG/DL (ref 74–99)
METHB: 0 % (ref 0–1.5)
MODE: AC
MONOCYTE, FLUID: 12 %
MONOCYTES ABSOLUTE: 0.38 E9/L (ref 0.1–0.95)
MONOCYTES RELATIVE PERCENT: 1.4 % (ref 2–12)
MRSA CULTURE ONLY: NORMAL
NEUTROPHIL, FLUID: 84 %
NEUTROPHILS ABSOLUTE: 26.25 E9/L (ref 1.8–7.3)
NEUTROPHILS RELATIVE PERCENT: 94.8 % (ref 43–80)
NUCLEATED CELLS FLUID: 916 /UL
O2 CONTENT: 15.9 ML/DL
O2 SATURATION: 97.5 % (ref 92–98.5)
O2HB: 97 % (ref 94–97)
OPERATOR ID: 2485
PATIENT TEMP: 37 C
PCO2: 51.4 MMHG (ref 35–45)
PDW BLD-RTO: 13.1 FL (ref 11.5–15)
PEEP/CPAP: 8 CMH2O
PFO2: 2.44 MMHG/%
PH BLOOD GAS: 7.39 (ref 7.35–7.45)
PHOSPHORUS: 3.9 MG/DL (ref 2.5–4.5)
PLATELET # BLD: 325 E9/L (ref 130–450)
PMV BLD AUTO: 10.6 FL (ref 7–12)
PNEUMOCYSTIS DFA: NORMAL
PO2: 97.6 MMHG (ref 60–100)
POTASSIUM REFLEX MAGNESIUM: 4.6 MMOL/L (ref 3.5–5)
RBC # BLD: 3.25 E12/L (ref 3.8–5.8)
RBC FLUID: <2000 /UL
RI(T): 121 %
RR MECHANICAL: 16 B/MIN
SMEAR, RESPIRATORY: NORMAL
SODIUM BLD-SCNC: 143 MMOL/L (ref 132–146)
SOURCE, BLOOD GAS: ABNORMAL
THB: 11.6 G/DL (ref 11.5–16.5)
TIME ANALYZED: 542
VT MECHANICAL: 450 ML
WBC # BLD: 27.7 E9/L (ref 4.5–11.5)

## 2019-11-10 PROCEDURE — 2000000000 HC ICU R&B

## 2019-11-10 PROCEDURE — 80048 BASIC METABOLIC PNL TOTAL CA: CPT

## 2019-11-10 PROCEDURE — C9113 INJ PANTOPRAZOLE SODIUM, VIA: HCPCS | Performed by: STUDENT IN AN ORGANIZED HEALTH CARE EDUCATION/TRAINING PROGRAM

## 2019-11-10 PROCEDURE — 6370000000 HC RX 637 (ALT 250 FOR IP)

## 2019-11-10 PROCEDURE — 83735 ASSAY OF MAGNESIUM: CPT

## 2019-11-10 PROCEDURE — 6360000002 HC RX W HCPCS: Performed by: INTERNAL MEDICINE

## 2019-11-10 PROCEDURE — 82805 BLOOD GASES W/O2 SATURATION: CPT

## 2019-11-10 PROCEDURE — 82962 GLUCOSE BLOOD TEST: CPT

## 2019-11-10 PROCEDURE — 2580000003 HC RX 258: Performed by: INTERNAL MEDICINE

## 2019-11-10 PROCEDURE — 99233 SBSQ HOSP IP/OBS HIGH 50: CPT | Performed by: INTERNAL MEDICINE

## 2019-11-10 PROCEDURE — 2500000003 HC RX 250 WO HCPCS: Performed by: STUDENT IN AN ORGANIZED HEALTH CARE EDUCATION/TRAINING PROGRAM

## 2019-11-10 PROCEDURE — 85025 COMPLETE CBC W/AUTO DIFF WBC: CPT

## 2019-11-10 PROCEDURE — 36415 COLL VENOUS BLD VENIPUNCTURE: CPT

## 2019-11-10 PROCEDURE — 94003 VENT MGMT INPAT SUBQ DAY: CPT

## 2019-11-10 PROCEDURE — 6370000000 HC RX 637 (ALT 250 FOR IP): Performed by: INTERNAL MEDICINE

## 2019-11-10 PROCEDURE — 6360000002 HC RX W HCPCS: Performed by: STUDENT IN AN ORGANIZED HEALTH CARE EDUCATION/TRAINING PROGRAM

## 2019-11-10 PROCEDURE — 84100 ASSAY OF PHOSPHORUS: CPT

## 2019-11-10 PROCEDURE — 71045 X-RAY EXAM CHEST 1 VIEW: CPT

## 2019-11-10 RX ORDER — DIMETHICONE, OXYBENZONE, AND PADIMATE O 2; 2.5; 6.6 G/100G; G/100G; G/100G
STICK TOPICAL
Status: COMPLETED
Start: 2019-11-10 | End: 2019-11-10

## 2019-11-10 RX ADMIN — PIPERACILLIN SODIUM AND TAZOBACTAM SODIUM 3.38 G: 3; .375 INJECTION, POWDER, LYOPHILIZED, FOR SOLUTION INTRAVENOUS at 12:21

## 2019-11-10 RX ADMIN — METHYLPREDNISOLONE SODIUM SUCCINATE 60 MG: 125 INJECTION, POWDER, LYOPHILIZED, FOR SOLUTION INTRAMUSCULAR; INTRAVENOUS at 02:23

## 2019-11-10 RX ADMIN — Medication 10 ML: at 20:14

## 2019-11-10 RX ADMIN — PROPOFOL 45 MCG/KG/MIN: 10 INJECTION, EMULSION INTRAVENOUS at 02:23

## 2019-11-10 RX ADMIN — Medication 200 MCG/HR: at 02:14

## 2019-11-10 RX ADMIN — PROPOFOL 45 MCG/KG/MIN: 10 INJECTION, EMULSION INTRAVENOUS at 08:06

## 2019-11-10 RX ADMIN — PANTOPRAZOLE SODIUM 40 MG: 40 INJECTION, POWDER, LYOPHILIZED, FOR SOLUTION INTRAVENOUS at 08:07

## 2019-11-10 RX ADMIN — INSULIN LISPRO 1 UNITS: 100 INJECTION, SOLUTION INTRAVENOUS; SUBCUTANEOUS at 00:36

## 2019-11-10 RX ADMIN — Medication 200 MCG/HR: at 08:05

## 2019-11-10 RX ADMIN — INSULIN LISPRO 1 UNITS: 100 INJECTION, SOLUTION INTRAVENOUS; SUBCUTANEOUS at 06:51

## 2019-11-10 RX ADMIN — CHLORHEXIDINE GLUCONATE 15 ML: 1.2 RINSE ORAL at 08:06

## 2019-11-10 RX ADMIN — METHYLPREDNISOLONE SODIUM SUCCINATE 60 MG: 125 INJECTION, POWDER, LYOPHILIZED, FOR SOLUTION INTRAMUSCULAR; INTRAVENOUS at 15:12

## 2019-11-10 RX ADMIN — METHYLPREDNISOLONE SODIUM SUCCINATE 60 MG: 125 INJECTION, POWDER, LYOPHILIZED, FOR SOLUTION INTRAMUSCULAR; INTRAVENOUS at 08:07

## 2019-11-10 RX ADMIN — Medication 200 MCG/HR: at 04:30

## 2019-11-10 RX ADMIN — Medication 10 ML: at 08:07

## 2019-11-10 RX ADMIN — SODIUM CHLORIDE, POTASSIUM CHLORIDE, SODIUM LACTATE AND CALCIUM CHLORIDE: 600; 310; 30; 20 INJECTION, SOLUTION INTRAVENOUS at 20:16

## 2019-11-10 RX ADMIN — Medication: at 15:16

## 2019-11-10 RX ADMIN — PIPERACILLIN SODIUM AND TAZOBACTAM SODIUM 3.38 G: 3; .375 INJECTION, POWDER, LYOPHILIZED, FOR SOLUTION INTRAVENOUS at 20:15

## 2019-11-10 RX ADMIN — ENOXAPARIN SODIUM 40 MG: 40 INJECTION SUBCUTANEOUS at 08:06

## 2019-11-10 RX ADMIN — Medication 200 MCG/HR: at 10:43

## 2019-11-10 RX ADMIN — METHYLPREDNISOLONE SODIUM SUCCINATE 60 MG: 125 INJECTION, POWDER, LYOPHILIZED, FOR SOLUTION INTRAMUSCULAR; INTRAVENOUS at 20:14

## 2019-11-10 RX ADMIN — PIPERACILLIN SODIUM AND TAZOBACTAM SODIUM 3.38 G: 3; .375 INJECTION, POWDER, LYOPHILIZED, FOR SOLUTION INTRAVENOUS at 04:30

## 2019-11-10 ASSESSMENT — PULMONARY FUNCTION TESTS
PIF_VALUE: 20
PIF_VALUE: 21
PIF_VALUE: 20
PIF_VALUE: 20
PIF_VALUE: 21
PIF_VALUE: 21
PIF_VALUE: 20
PIF_VALUE: 21
PIF_VALUE: 20
PIF_VALUE: 20
PIF_VALUE: 19
PIF_VALUE: 21

## 2019-11-10 ASSESSMENT — PAIN SCALES - GENERAL
PAINLEVEL_OUTOF10: 0

## 2019-11-11 ENCOUNTER — APPOINTMENT (OUTPATIENT)
Dept: GENERAL RADIOLOGY | Age: 24
DRG: 871 | End: 2019-11-11
Payer: COMMERCIAL

## 2019-11-11 LAB
ANION GAP SERPL CALCULATED.3IONS-SCNC: 11 MMOL/L (ref 7–16)
B.E.: 4.3 MMOL/L (ref -3–3)
BASOPHILS ABSOLUTE: 0.22 E9/L (ref 0–0.2)
BASOPHILS RELATIVE PERCENT: 0.8 % (ref 0–2)
BUN BLDV-MCNC: 13 MG/DL (ref 6–20)
CALCIUM SERPL-MCNC: 8.6 MG/DL (ref 8.6–10.2)
CHLORIDE BLD-SCNC: 100 MMOL/L (ref 98–107)
CO2: 28 MMOL/L (ref 22–29)
COHB: 0.6 % (ref 0–1.5)
CREAT SERPL-MCNC: 0.6 MG/DL (ref 0.7–1.2)
CRITICAL: ABNORMAL
CULTURE, RESPIRATORY: NORMAL
DATE ANALYZED: ABNORMAL
DATE OF COLLECTION: ABNORMAL
EOSINOPHILS ABSOLUTE: 0 E9/L (ref 0.05–0.5)
EOSINOPHILS RELATIVE PERCENT: 0 % (ref 0–6)
GFR AFRICAN AMERICAN: >60
GFR NON-AFRICAN AMERICAN: >60 ML/MIN/1.73
GLUCOSE BLD-MCNC: 164 MG/DL (ref 74–99)
HCO3: 27.7 MMOL/L (ref 22–26)
HCT VFR BLD CALC: 34.2 % (ref 37–54)
HEMOGLOBIN: 11.2 G/DL (ref 12.5–16.5)
HHB: 3.1 % (ref 0–5)
LAB: ABNORMAL
LYMPHOCYTES ABSOLUTE: 0.81 E9/L (ref 1.5–4)
LYMPHOCYTES RELATIVE PERCENT: 2.6 % (ref 20–42)
Lab: ABNORMAL
MAGNESIUM: 2.3 MG/DL (ref 1.6–2.6)
MCH RBC QN AUTO: 31.4 PG (ref 26–35)
MCHC RBC AUTO-ENTMCNC: 32.7 % (ref 32–34.5)
MCV RBC AUTO: 95.8 FL (ref 80–99.9)
METER GLUCOSE: 151 MG/DL (ref 74–99)
METHB: 0.2 % (ref 0–1.5)
MODE: ABNORMAL
MONOCYTES ABSOLUTE: 0.27 E9/L (ref 0.1–0.95)
MONOCYTES RELATIVE PERCENT: 0.9 % (ref 2–12)
NEUTROPHILS ABSOLUTE: 25.82 E9/L (ref 1.8–7.3)
NEUTROPHILS RELATIVE PERCENT: 95.7 % (ref 43–80)
NUCLEATED RED BLOOD CELLS: 0 /100 WBC
O2 CONTENT: 16.4 ML/DL
O2 SATURATION: 96.9 % (ref 92–98.5)
O2HB: 96.1 % (ref 94–97)
OPERATOR ID: 2485
PATIENT TEMP: 37 C
PCO2: 37.2 MMHG (ref 35–45)
PDW BLD-RTO: 12.6 FL (ref 11.5–15)
PH BLOOD GAS: 7.49 (ref 7.35–7.45)
PHOSPHORUS: 2.4 MG/DL (ref 2.5–4.5)
PLATELET # BLD: 367 E9/L (ref 130–450)
PMV BLD AUTO: 10.2 FL (ref 7–12)
PO2: 82.2 MMHG (ref 60–100)
POTASSIUM REFLEX MAGNESIUM: 3.6 MMOL/L (ref 3.5–5)
RBC # BLD: 3.57 E12/L (ref 3.8–5.8)
SMEAR, RESPIRATORY: NORMAL
SODIUM BLD-SCNC: 139 MMOL/L (ref 132–146)
SOURCE, BLOOD GAS: ABNORMAL
THB: 12.1 G/DL (ref 11.5–16.5)
TIME ANALYZED: 529
TOXIC GRANULATION: ABNORMAL
WBC # BLD: 26.9 E9/L (ref 4.5–11.5)

## 2019-11-11 PROCEDURE — 6370000000 HC RX 637 (ALT 250 FOR IP): Performed by: INTERNAL MEDICINE

## 2019-11-11 PROCEDURE — 82805 BLOOD GASES W/O2 SATURATION: CPT

## 2019-11-11 PROCEDURE — C9113 INJ PANTOPRAZOLE SODIUM, VIA: HCPCS | Performed by: INTERNAL MEDICINE

## 2019-11-11 PROCEDURE — 84100 ASSAY OF PHOSPHORUS: CPT

## 2019-11-11 PROCEDURE — 2580000003 HC RX 258: Performed by: INTERNAL MEDICINE

## 2019-11-11 PROCEDURE — 80048 BASIC METABOLIC PNL TOTAL CA: CPT

## 2019-11-11 PROCEDURE — 6360000002 HC RX W HCPCS: Performed by: INTERNAL MEDICINE

## 2019-11-11 PROCEDURE — 82962 GLUCOSE BLOOD TEST: CPT

## 2019-11-11 PROCEDURE — 85025 COMPLETE CBC W/AUTO DIFF WBC: CPT

## 2019-11-11 PROCEDURE — 71045 X-RAY EXAM CHEST 1 VIEW: CPT

## 2019-11-11 PROCEDURE — 94640 AIRWAY INHALATION TREATMENT: CPT

## 2019-11-11 PROCEDURE — 2000000000 HC ICU R&B

## 2019-11-11 PROCEDURE — 94669 MECHANICAL CHEST WALL OSCILL: CPT

## 2019-11-11 PROCEDURE — 2500000003 HC RX 250 WO HCPCS: Performed by: INTERNAL MEDICINE

## 2019-11-11 PROCEDURE — 83735 ASSAY OF MAGNESIUM: CPT

## 2019-11-11 PROCEDURE — 2580000003 HC RX 258

## 2019-11-11 PROCEDURE — 2700000000 HC OXYGEN THERAPY PER DAY

## 2019-11-11 PROCEDURE — 36415 COLL VENOUS BLD VENIPUNCTURE: CPT

## 2019-11-11 PROCEDURE — 99233 SBSQ HOSP IP/OBS HIGH 50: CPT | Performed by: INTERNAL MEDICINE

## 2019-11-11 RX ORDER — ACETAMINOPHEN 160 MG/5ML
650 SOLUTION ORAL EVERY 6 HOURS PRN
Status: DISCONTINUED | OUTPATIENT
Start: 2019-11-11 | End: 2019-11-14 | Stop reason: HOSPADM

## 2019-11-11 RX ORDER — PANTOPRAZOLE SODIUM 40 MG/10ML
40 INJECTION, POWDER, LYOPHILIZED, FOR SOLUTION INTRAVENOUS DAILY
Status: DISCONTINUED | OUTPATIENT
Start: 2019-11-11 | End: 2019-11-12

## 2019-11-11 RX ORDER — ALBUTEROL SULFATE 2.5 MG/3ML
2.5 SOLUTION RESPIRATORY (INHALATION) 4 TIMES DAILY
Status: DISCONTINUED | OUTPATIENT
Start: 2019-11-11 | End: 2019-11-14 | Stop reason: HOSPADM

## 2019-11-11 RX ORDER — PANTOPRAZOLE SODIUM 40 MG/1
40 TABLET, DELAYED RELEASE ORAL
Status: DISCONTINUED | OUTPATIENT
Start: 2019-11-11 | End: 2019-11-11

## 2019-11-11 RX ORDER — POTASSIUM CHLORIDE 20 MEQ/1
40 TABLET, EXTENDED RELEASE ORAL ONCE
Status: DISCONTINUED | OUTPATIENT
Start: 2019-11-11 | End: 2019-11-11

## 2019-11-11 RX ADMIN — ALBUTEROL SULFATE 2.5 MG: 2.5 SOLUTION RESPIRATORY (INHALATION) at 11:51

## 2019-11-11 RX ADMIN — METHYLPREDNISOLONE SODIUM SUCCINATE 60 MG: 125 INJECTION, POWDER, LYOPHILIZED, FOR SOLUTION INTRAMUSCULAR; INTRAVENOUS at 17:41

## 2019-11-11 RX ADMIN — PIPERACILLIN SODIUM AND TAZOBACTAM SODIUM 3.38 G: 3; .375 INJECTION, POWDER, LYOPHILIZED, FOR SOLUTION INTRAVENOUS at 12:52

## 2019-11-11 RX ADMIN — ENOXAPARIN SODIUM 40 MG: 40 INJECTION SUBCUTANEOUS at 10:30

## 2019-11-11 RX ADMIN — SODIUM CHLORIDE, POTASSIUM CHLORIDE, SODIUM LACTATE AND CALCIUM CHLORIDE: 600; 310; 30; 20 INJECTION, SOLUTION INTRAVENOUS at 21:09

## 2019-11-11 RX ADMIN — MELATONIN 3 MG ORAL TABLET 3 MG: 3 TABLET ORAL at 20:57

## 2019-11-11 RX ADMIN — PIPERACILLIN SODIUM AND TAZOBACTAM SODIUM 3.38 G: 3; .375 INJECTION, POWDER, LYOPHILIZED, FOR SOLUTION INTRAVENOUS at 20:57

## 2019-11-11 RX ADMIN — ACETAMINOPHEN 650 MG: 650 SOLUTION ORAL at 19:02

## 2019-11-11 RX ADMIN — Medication 10 ML: at 10:31

## 2019-11-11 RX ADMIN — PANTOPRAZOLE SODIUM 40 MG: 40 INJECTION, POWDER, LYOPHILIZED, FOR SOLUTION INTRAVENOUS at 12:52

## 2019-11-11 RX ADMIN — PIPERACILLIN SODIUM AND TAZOBACTAM SODIUM 3.38 G: 3; .375 INJECTION, POWDER, LYOPHILIZED, FOR SOLUTION INTRAVENOUS at 04:25

## 2019-11-11 RX ADMIN — ALBUTEROL SULFATE 2.5 MG: 2.5 SOLUTION RESPIRATORY (INHALATION) at 20:27

## 2019-11-11 RX ADMIN — ACETAMINOPHEN 650 MG: 650 SOLUTION ORAL at 01:45

## 2019-11-11 RX ADMIN — METHYLPREDNISOLONE SODIUM SUCCINATE 60 MG: 125 INJECTION, POWDER, LYOPHILIZED, FOR SOLUTION INTRAMUSCULAR; INTRAVENOUS at 20:58

## 2019-11-11 RX ADMIN — WATER 2 ML: 1 INJECTION INTRAMUSCULAR; INTRAVENOUS; SUBCUTANEOUS at 20:58

## 2019-11-11 RX ADMIN — METHYLPREDNISOLONE SODIUM SUCCINATE 60 MG: 125 INJECTION, POWDER, LYOPHILIZED, FOR SOLUTION INTRAMUSCULAR; INTRAVENOUS at 03:16

## 2019-11-11 RX ADMIN — METHYLPREDNISOLONE SODIUM SUCCINATE 60 MG: 125 INJECTION, POWDER, LYOPHILIZED, FOR SOLUTION INTRAMUSCULAR; INTRAVENOUS at 10:31

## 2019-11-11 RX ADMIN — INSULIN LISPRO 1 UNITS: 100 INJECTION, SOLUTION INTRAVENOUS; SUBCUTANEOUS at 06:26

## 2019-11-11 RX ADMIN — INSULIN LISPRO 1 UNITS: 100 INJECTION, SOLUTION INTRAVENOUS; SUBCUTANEOUS at 00:40

## 2019-11-11 RX ADMIN — Medication 10 ML: at 20:58

## 2019-11-11 RX ADMIN — ALBUTEROL SULFATE 2.5 MG: 2.5 SOLUTION RESPIRATORY (INHALATION) at 16:04

## 2019-11-11 RX ADMIN — POTASSIUM PHOSPHATE, MONOBASIC AND POTASSIUM PHOSPHATE, DIBASIC 15 MMOL: 224; 236 INJECTION, SOLUTION, CONCENTRATE INTRAVENOUS at 12:52

## 2019-11-11 RX ADMIN — SODIUM CHLORIDE, POTASSIUM CHLORIDE, SODIUM LACTATE AND CALCIUM CHLORIDE: 600; 310; 30; 20 INJECTION, SOLUTION INTRAVENOUS at 10:30

## 2019-11-11 ASSESSMENT — PAIN SCALES - GENERAL
PAINLEVEL_OUTOF10: 0

## 2019-11-12 ENCOUNTER — APPOINTMENT (OUTPATIENT)
Dept: GENERAL RADIOLOGY | Age: 24
DRG: 871 | End: 2019-11-12
Payer: COMMERCIAL

## 2019-11-12 LAB
ALPHA-1 ANTITRYPSIN PHENOTYPE: ABNORMAL
ALPHA-1 ANTITRYPSIN: 358 MG/DL (ref 90–200)
ANION GAP SERPL CALCULATED.3IONS-SCNC: 12 MMOL/L (ref 7–16)
BASOPHILS ABSOLUTE: 0.05 E9/L (ref 0–0.2)
BASOPHILS RELATIVE PERCENT: 0.2 % (ref 0–2)
BUN BLDV-MCNC: 12 MG/DL (ref 6–20)
CALCIUM SERPL-MCNC: 8.5 MG/DL (ref 8.6–10.2)
CHLORIDE BLD-SCNC: 103 MMOL/L (ref 98–107)
CO2: 25 MMOL/L (ref 22–29)
CREAT SERPL-MCNC: 0.6 MG/DL (ref 0.7–1.2)
EOSINOPHILS ABSOLUTE: 0.04 E9/L (ref 0.05–0.5)
EOSINOPHILS RELATIVE PERCENT: 0.2 % (ref 0–6)
GFR AFRICAN AMERICAN: >60
GFR NON-AFRICAN AMERICAN: >60 ML/MIN/1.73
GLUCOSE BLD-MCNC: 174 MG/DL (ref 74–99)
HCT VFR BLD CALC: 34.6 % (ref 37–54)
HEMOGLOBIN: 11.4 G/DL (ref 12.5–16.5)
HYPOCHROMIA: ABNORMAL
IMMATURE GRANULOCYTES #: 0.96 E9/L
IMMATURE GRANULOCYTES %: 4.2 % (ref 0–5)
LYMPHOCYTES ABSOLUTE: 0.85 E9/L (ref 1.5–4)
LYMPHOCYTES RELATIVE PERCENT: 3.7 % (ref 20–42)
MAGNESIUM: 2.4 MG/DL (ref 1.6–2.6)
MCH RBC QN AUTO: 31 PG (ref 26–35)
MCHC RBC AUTO-ENTMCNC: 32.9 % (ref 32–34.5)
MCV RBC AUTO: 94 FL (ref 80–99.9)
MONOCYTES ABSOLUTE: 0.22 E9/L (ref 0.1–0.95)
MONOCYTES RELATIVE PERCENT: 1 % (ref 2–12)
NEUTROPHILS ABSOLUTE: 20.93 E9/L (ref 1.8–7.3)
NEUTROPHILS RELATIVE PERCENT: 90.7 % (ref 43–80)
PDW BLD-RTO: 12.5 FL (ref 11.5–15)
PHOSPHORUS: 3.2 MG/DL (ref 2.5–4.5)
PLATELET # BLD: 453 E9/L (ref 130–450)
PMV BLD AUTO: 10.2 FL (ref 7–12)
POLYCHROMASIA: ABNORMAL
POTASSIUM SERPL-SCNC: 4.1 MMOL/L (ref 3.5–5)
RBC # BLD: 3.68 E12/L (ref 3.8–5.8)
SODIUM BLD-SCNC: 140 MMOL/L (ref 132–146)
WBC # BLD: 23.1 E9/L (ref 4.5–11.5)

## 2019-11-12 PROCEDURE — 84100 ASSAY OF PHOSPHORUS: CPT

## 2019-11-12 PROCEDURE — 94669 MECHANICAL CHEST WALL OSCILL: CPT

## 2019-11-12 PROCEDURE — 2700000000 HC OXYGEN THERAPY PER DAY

## 2019-11-12 PROCEDURE — 6360000002 HC RX W HCPCS: Performed by: INTERNAL MEDICINE

## 2019-11-12 PROCEDURE — C9113 INJ PANTOPRAZOLE SODIUM, VIA: HCPCS | Performed by: INTERNAL MEDICINE

## 2019-11-12 PROCEDURE — 2580000003 HC RX 258: Performed by: INTERNAL MEDICINE

## 2019-11-12 PROCEDURE — 80048 BASIC METABOLIC PNL TOTAL CA: CPT

## 2019-11-12 PROCEDURE — 36415 COLL VENOUS BLD VENIPUNCTURE: CPT

## 2019-11-12 PROCEDURE — 85025 COMPLETE CBC W/AUTO DIFF WBC: CPT

## 2019-11-12 PROCEDURE — 2580000003 HC RX 258

## 2019-11-12 PROCEDURE — 94640 AIRWAY INHALATION TREATMENT: CPT

## 2019-11-12 PROCEDURE — 94761 N-INVAS EAR/PLS OXIMETRY MLT: CPT

## 2019-11-12 PROCEDURE — 71045 X-RAY EXAM CHEST 1 VIEW: CPT

## 2019-11-12 PROCEDURE — 83735 ASSAY OF MAGNESIUM: CPT

## 2019-11-12 PROCEDURE — 1200000000 HC SEMI PRIVATE

## 2019-11-12 PROCEDURE — 99232 SBSQ HOSP IP/OBS MODERATE 35: CPT | Performed by: INTERNAL MEDICINE

## 2019-11-12 RX ORDER — METHYLPREDNISOLONE SODIUM SUCCINATE 40 MG/ML
40 INJECTION, POWDER, LYOPHILIZED, FOR SOLUTION INTRAMUSCULAR; INTRAVENOUS EVERY 6 HOURS
Status: DISCONTINUED | OUTPATIENT
Start: 2019-11-12 | End: 2019-11-13

## 2019-11-12 RX ORDER — PANTOPRAZOLE SODIUM 40 MG/1
40 TABLET, DELAYED RELEASE ORAL
Status: DISCONTINUED | OUTPATIENT
Start: 2019-11-13 | End: 2019-11-14 | Stop reason: HOSPADM

## 2019-11-12 RX ADMIN — WATER 2 ML: 1 INJECTION INTRAMUSCULAR; INTRAVENOUS; SUBCUTANEOUS at 03:56

## 2019-11-12 RX ADMIN — ALBUTEROL SULFATE 2.5 MG: 2.5 SOLUTION RESPIRATORY (INHALATION) at 12:40

## 2019-11-12 RX ADMIN — PIPERACILLIN SODIUM AND TAZOBACTAM SODIUM 3.38 G: 3; .375 INJECTION, POWDER, LYOPHILIZED, FOR SOLUTION INTRAVENOUS at 04:06

## 2019-11-12 RX ADMIN — Medication 10 ML: at 21:00

## 2019-11-12 RX ADMIN — METHYLPREDNISOLONE SODIUM SUCCINATE 40 MG: 40 INJECTION, POWDER, LYOPHILIZED, FOR SOLUTION INTRAMUSCULAR; INTRAVENOUS at 14:27

## 2019-11-12 RX ADMIN — PIPERACILLIN SODIUM AND TAZOBACTAM SODIUM 3.38 G: 3; .375 INJECTION, POWDER, LYOPHILIZED, FOR SOLUTION INTRAVENOUS at 21:41

## 2019-11-12 RX ADMIN — PANTOPRAZOLE SODIUM 40 MG: 40 INJECTION, POWDER, LYOPHILIZED, FOR SOLUTION INTRAVENOUS at 08:11

## 2019-11-12 RX ADMIN — METHYLPREDNISOLONE SODIUM SUCCINATE 40 MG: 40 INJECTION, POWDER, LYOPHILIZED, FOR SOLUTION INTRAMUSCULAR; INTRAVENOUS at 21:41

## 2019-11-12 RX ADMIN — ENOXAPARIN SODIUM 40 MG: 40 INJECTION SUBCUTANEOUS at 08:11

## 2019-11-12 RX ADMIN — ALBUTEROL SULFATE 2.5 MG: 2.5 SOLUTION RESPIRATORY (INHALATION) at 09:11

## 2019-11-12 RX ADMIN — WATER 2 ML: 1 INJECTION INTRAMUSCULAR; INTRAVENOUS; SUBCUTANEOUS at 08:13

## 2019-11-12 RX ADMIN — METHYLPREDNISOLONE SODIUM SUCCINATE 60 MG: 125 INJECTION, POWDER, LYOPHILIZED, FOR SOLUTION INTRAMUSCULAR; INTRAVENOUS at 03:56

## 2019-11-12 RX ADMIN — ALBUTEROL SULFATE 2.5 MG: 2.5 SOLUTION RESPIRATORY (INHALATION) at 22:00

## 2019-11-12 RX ADMIN — PIPERACILLIN SODIUM AND TAZOBACTAM SODIUM 3.38 G: 3; .375 INJECTION, POWDER, LYOPHILIZED, FOR SOLUTION INTRAVENOUS at 13:30

## 2019-11-12 RX ADMIN — METHYLPREDNISOLONE SODIUM SUCCINATE 60 MG: 125 INJECTION, POWDER, LYOPHILIZED, FOR SOLUTION INTRAMUSCULAR; INTRAVENOUS at 08:11

## 2019-11-12 RX ADMIN — ALBUTEROL SULFATE 2.5 MG: 2.5 SOLUTION RESPIRATORY (INHALATION) at 17:12

## 2019-11-12 RX ADMIN — Medication 10 ML: at 08:11

## 2019-11-12 ASSESSMENT — PAIN SCALES - GENERAL
PAINLEVEL_OUTOF10: 0

## 2019-11-13 ENCOUNTER — APPOINTMENT (OUTPATIENT)
Dept: GENERAL RADIOLOGY | Age: 24
DRG: 871 | End: 2019-11-13
Payer: COMMERCIAL

## 2019-11-13 PROBLEM — E43 SEVERE PROTEIN-CALORIE MALNUTRITION (HCC): Chronic | Status: ACTIVE | Noted: 2019-11-13

## 2019-11-13 LAB
ANION GAP SERPL CALCULATED.3IONS-SCNC: 11 MMOL/L (ref 7–16)
BASOPHILS ABSOLUTE: 0.07 E9/L (ref 0–0.2)
BASOPHILS RELATIVE PERCENT: 0.3 % (ref 0–2)
BLOOD CULTURE, ROUTINE: NORMAL
BUN BLDV-MCNC: 12 MG/DL (ref 6–20)
CALCIUM SERPL-MCNC: 8.8 MG/DL (ref 8.6–10.2)
CHLORIDE BLD-SCNC: 101 MMOL/L (ref 98–107)
CO2: 23 MMOL/L (ref 22–29)
CREAT SERPL-MCNC: 0.6 MG/DL (ref 0.7–1.2)
CULTURE, BLOOD 2: NORMAL
EOSINOPHILS ABSOLUTE: 0.13 E9/L (ref 0.05–0.5)
EOSINOPHILS RELATIVE PERCENT: 0.6 % (ref 0–6)
GFR AFRICAN AMERICAN: >60
GFR NON-AFRICAN AMERICAN: >60 ML/MIN/1.73
GLUCOSE BLD-MCNC: 153 MG/DL (ref 74–99)
HCT VFR BLD CALC: 35.6 % (ref 37–54)
HEMOGLOBIN: 12 G/DL (ref 12.5–16.5)
IMMATURE GRANULOCYTES #: 1.06 E9/L
IMMATURE GRANULOCYTES %: 4.6 % (ref 0–5)
LYMPHOCYTES ABSOLUTE: 1.31 E9/L (ref 1.5–4)
LYMPHOCYTES RELATIVE PERCENT: 5.7 % (ref 20–42)
MAGNESIUM: 2.2 MG/DL (ref 1.6–2.6)
MCH RBC QN AUTO: 31.3 PG (ref 26–35)
MCHC RBC AUTO-ENTMCNC: 33.7 % (ref 32–34.5)
MCV RBC AUTO: 92.7 FL (ref 80–99.9)
MONOCYTES ABSOLUTE: 0.48 E9/L (ref 0.1–0.95)
MONOCYTES RELATIVE PERCENT: 2.1 % (ref 2–12)
NEUTROPHILS ABSOLUTE: 20.12 E9/L (ref 1.8–7.3)
NEUTROPHILS RELATIVE PERCENT: 86.7 % (ref 43–80)
PDW BLD-RTO: 12.4 FL (ref 11.5–15)
PHOSPHORUS: 4 MG/DL (ref 2.5–4.5)
PLATELET # BLD: 537 E9/L (ref 130–450)
PMV BLD AUTO: 9.9 FL (ref 7–12)
POTASSIUM SERPL-SCNC: 4.1 MMOL/L (ref 3.5–5)
RBC # BLD: 3.84 E12/L (ref 3.8–5.8)
SODIUM BLD-SCNC: 135 MMOL/L (ref 132–146)
WBC # BLD: 23.2 E9/L (ref 4.5–11.5)

## 2019-11-13 PROCEDURE — 6360000002 HC RX W HCPCS: Performed by: INTERNAL MEDICINE

## 2019-11-13 PROCEDURE — 2700000000 HC OXYGEN THERAPY PER DAY

## 2019-11-13 PROCEDURE — 2580000003 HC RX 258: Performed by: INTERNAL MEDICINE

## 2019-11-13 PROCEDURE — 71045 X-RAY EXAM CHEST 1 VIEW: CPT

## 2019-11-13 PROCEDURE — 83735 ASSAY OF MAGNESIUM: CPT

## 2019-11-13 PROCEDURE — 94761 N-INVAS EAR/PLS OXIMETRY MLT: CPT

## 2019-11-13 PROCEDURE — 36415 COLL VENOUS BLD VENIPUNCTURE: CPT

## 2019-11-13 PROCEDURE — 99232 SBSQ HOSP IP/OBS MODERATE 35: CPT | Performed by: INTERNAL MEDICINE

## 2019-11-13 PROCEDURE — 1200000000 HC SEMI PRIVATE

## 2019-11-13 PROCEDURE — 6360000002 HC RX W HCPCS: Performed by: NURSE PRACTITIONER

## 2019-11-13 PROCEDURE — 80048 BASIC METABOLIC PNL TOTAL CA: CPT

## 2019-11-13 PROCEDURE — 6370000000 HC RX 637 (ALT 250 FOR IP): Performed by: INTERNAL MEDICINE

## 2019-11-13 PROCEDURE — 94669 MECHANICAL CHEST WALL OSCILL: CPT

## 2019-11-13 PROCEDURE — 85025 COMPLETE CBC W/AUTO DIFF WBC: CPT

## 2019-11-13 PROCEDURE — 94640 AIRWAY INHALATION TREATMENT: CPT

## 2019-11-13 PROCEDURE — 84100 ASSAY OF PHOSPHORUS: CPT

## 2019-11-13 PROCEDURE — APPSS30 APP SPLIT SHARED TIME 16-30 MINUTES: Performed by: NURSE PRACTITIONER

## 2019-11-13 RX ORDER — METHYLPREDNISOLONE SODIUM SUCCINATE 40 MG/ML
40 INJECTION, POWDER, LYOPHILIZED, FOR SOLUTION INTRAMUSCULAR; INTRAVENOUS EVERY 8 HOURS
Status: DISCONTINUED | OUTPATIENT
Start: 2019-11-13 | End: 2019-11-14

## 2019-11-13 RX ADMIN — ENOXAPARIN SODIUM 40 MG: 40 INJECTION SUBCUTANEOUS at 08:15

## 2019-11-13 RX ADMIN — METHYLPREDNISOLONE SODIUM SUCCINATE 40 MG: 40 INJECTION, POWDER, LYOPHILIZED, FOR SOLUTION INTRAMUSCULAR; INTRAVENOUS at 08:15

## 2019-11-13 RX ADMIN — PANTOPRAZOLE SODIUM 40 MG: 40 TABLET, DELAYED RELEASE ORAL at 06:45

## 2019-11-13 RX ADMIN — PIPERACILLIN SODIUM AND TAZOBACTAM SODIUM 3.38 G: 3; .375 INJECTION, POWDER, LYOPHILIZED, FOR SOLUTION INTRAVENOUS at 04:51

## 2019-11-13 RX ADMIN — ALBUTEROL SULFATE 2.5 MG: 2.5 SOLUTION RESPIRATORY (INHALATION) at 12:11

## 2019-11-13 RX ADMIN — ALBUTEROL SULFATE 2.5 MG: 2.5 SOLUTION RESPIRATORY (INHALATION) at 19:10

## 2019-11-13 RX ADMIN — Medication 10 ML: at 20:40

## 2019-11-13 RX ADMIN — ALBUTEROL SULFATE 2.5 MG: 2.5 SOLUTION RESPIRATORY (INHALATION) at 08:36

## 2019-11-13 RX ADMIN — PIPERACILLIN SODIUM AND TAZOBACTAM SODIUM 3.38 G: 3; .375 INJECTION, POWDER, LYOPHILIZED, FOR SOLUTION INTRAVENOUS at 12:27

## 2019-11-13 RX ADMIN — METHYLPREDNISOLONE SODIUM SUCCINATE 40 MG: 40 INJECTION, POWDER, LYOPHILIZED, FOR SOLUTION INTRAMUSCULAR; INTRAVENOUS at 15:46

## 2019-11-13 RX ADMIN — METHYLPREDNISOLONE SODIUM SUCCINATE 40 MG: 40 INJECTION, POWDER, LYOPHILIZED, FOR SOLUTION INTRAMUSCULAR; INTRAVENOUS at 03:37

## 2019-11-13 RX ADMIN — Medication 10 ML: at 08:15

## 2019-11-13 RX ADMIN — PIPERACILLIN SODIUM AND TAZOBACTAM SODIUM 3.38 G: 3; .375 INJECTION, POWDER, LYOPHILIZED, FOR SOLUTION INTRAVENOUS at 20:40

## 2019-11-13 RX ADMIN — ALBUTEROL SULFATE 2.5 MG: 2.5 SOLUTION RESPIRATORY (INHALATION) at 15:15

## 2019-11-13 ASSESSMENT — PAIN SCALES - GENERAL
PAINLEVEL_OUTOF10: 0
PAINLEVEL_OUTOF10: 0

## 2019-11-14 VITALS
RESPIRATION RATE: 18 BRPM | OXYGEN SATURATION: 92 % | HEIGHT: 74 IN | TEMPERATURE: 97.9 F | WEIGHT: 150 LBS | HEART RATE: 89 BPM | BODY MASS INDEX: 19.25 KG/M2 | SYSTOLIC BLOOD PRESSURE: 125 MMHG | DIASTOLIC BLOOD PRESSURE: 76 MMHG

## 2019-11-14 LAB
ANION GAP SERPL CALCULATED.3IONS-SCNC: 12 MMOL/L (ref 7–16)
ATYPICAL LYMPHOCYTE RELATIVE PERCENT: 0.9 % (ref 0–4)
BASOPHILS ABSOLUTE: 0 E9/L (ref 0–0.2)
BASOPHILS RELATIVE PERCENT: 0 % (ref 0–2)
BUN BLDV-MCNC: 15 MG/DL (ref 6–20)
CALCIUM SERPL-MCNC: 9.1 MG/DL (ref 8.6–10.2)
CHLORIDE BLD-SCNC: 102 MMOL/L (ref 98–107)
CO2: 22 MMOL/L (ref 22–29)
CREAT SERPL-MCNC: 0.8 MG/DL (ref 0.7–1.2)
EOSINOPHILS ABSOLUTE: 0.22 E9/L (ref 0.05–0.5)
EOSINOPHILS RELATIVE PERCENT: 0.9 % (ref 0–6)
GFR AFRICAN AMERICAN: >60
GFR NON-AFRICAN AMERICAN: >60 ML/MIN/1.73
GLUCOSE BLD-MCNC: 130 MG/DL (ref 74–99)
HCT VFR BLD CALC: 43.2 % (ref 37–54)
HEMOGLOBIN: 14.1 G/DL (ref 12.5–16.5)
LYMPHOCYTES ABSOLUTE: 1.25 E9/L (ref 1.5–4)
LYMPHOCYTES RELATIVE PERCENT: 4.3 % (ref 20–42)
MAGNESIUM: 2.5 MG/DL (ref 1.6–2.6)
MCH RBC QN AUTO: 30.5 PG (ref 26–35)
MCHC RBC AUTO-ENTMCNC: 32.6 % (ref 32–34.5)
MCV RBC AUTO: 93.5 FL (ref 80–99.9)
MONOCYTES ABSOLUTE: 1 E9/L (ref 0.1–0.95)
MONOCYTES RELATIVE PERCENT: 3.5 % (ref 2–12)
NEUTROPHILS ABSOLUTE: 22.41 E9/L (ref 1.8–7.3)
NEUTROPHILS RELATIVE PERCENT: 90.4 % (ref 43–80)
NUCLEATED RED BLOOD CELLS: 0 /100 WBC
PDW BLD-RTO: 12.5 FL (ref 11.5–15)
PHOSPHORUS: 4.2 MG/DL (ref 2.5–4.5)
PLATELET # BLD: 683 E9/L (ref 130–450)
PMV BLD AUTO: 9.8 FL (ref 7–12)
POLYCHROMASIA: ABNORMAL
POTASSIUM SERPL-SCNC: 4.5 MMOL/L (ref 3.5–5)
RBC # BLD: 4.62 E12/L (ref 3.8–5.8)
SODIUM BLD-SCNC: 136 MMOL/L (ref 132–146)
WBC # BLD: 24.9 E9/L (ref 4.5–11.5)

## 2019-11-14 PROCEDURE — 99239 HOSP IP/OBS DSCHRG MGMT >30: CPT | Performed by: INTERNAL MEDICINE

## 2019-11-14 PROCEDURE — 6370000000 HC RX 637 (ALT 250 FOR IP): Performed by: INTERNAL MEDICINE

## 2019-11-14 PROCEDURE — 6360000002 HC RX W HCPCS: Performed by: INTERNAL MEDICINE

## 2019-11-14 PROCEDURE — 80048 BASIC METABOLIC PNL TOTAL CA: CPT

## 2019-11-14 PROCEDURE — APPSS30 APP SPLIT SHARED TIME 16-30 MINUTES: Performed by: NURSE PRACTITIONER

## 2019-11-14 PROCEDURE — 94669 MECHANICAL CHEST WALL OSCILL: CPT

## 2019-11-14 PROCEDURE — 85025 COMPLETE CBC W/AUTO DIFF WBC: CPT

## 2019-11-14 PROCEDURE — 2700000000 HC OXYGEN THERAPY PER DAY

## 2019-11-14 PROCEDURE — 94640 AIRWAY INHALATION TREATMENT: CPT

## 2019-11-14 PROCEDURE — 36415 COLL VENOUS BLD VENIPUNCTURE: CPT

## 2019-11-14 PROCEDURE — 2580000003 HC RX 258: Performed by: INTERNAL MEDICINE

## 2019-11-14 PROCEDURE — 83735 ASSAY OF MAGNESIUM: CPT

## 2019-11-14 PROCEDURE — 84100 ASSAY OF PHOSPHORUS: CPT

## 2019-11-14 PROCEDURE — 6360000002 HC RX W HCPCS: Performed by: NURSE PRACTITIONER

## 2019-11-14 PROCEDURE — APPSS45 APP SPLIT SHARED TIME 31-45 MINUTES: Performed by: NURSE PRACTITIONER

## 2019-11-14 RX ORDER — ALBUTEROL SULFATE 90 UG/1
2 AEROSOL, METERED RESPIRATORY (INHALATION) EVERY 6 HOURS PRN
Qty: 1 INHALER | Refills: 0 | Status: SHIPPED | OUTPATIENT
Start: 2019-11-14 | End: 2020-05-25

## 2019-11-14 RX ORDER — PREDNISONE 10 MG/1
40 TABLET ORAL DAILY
Qty: 12 TABLET | Refills: 0 | Status: SHIPPED | OUTPATIENT
Start: 2019-11-14 | End: 2019-11-17

## 2019-11-14 RX ORDER — AMOXICILLIN AND CLAVULANATE POTASSIUM 875; 125 MG/1; MG/1
1 TABLET, FILM COATED ORAL 2 TIMES DAILY
Qty: 14 TABLET | Refills: 0 | Status: CANCELLED | OUTPATIENT
Start: 2019-11-14 | End: 2019-11-19

## 2019-11-14 RX ORDER — LEVOFLOXACIN 500 MG/1
500 TABLET, FILM COATED ORAL DAILY
Qty: 7 TABLET | Refills: 0 | Status: SHIPPED | OUTPATIENT
Start: 2019-11-14 | End: 2019-11-19

## 2019-11-14 RX ORDER — METHYLPREDNISOLONE SODIUM SUCCINATE 40 MG/ML
40 INJECTION, POWDER, LYOPHILIZED, FOR SOLUTION INTRAMUSCULAR; INTRAVENOUS EVERY 12 HOURS
Status: DISCONTINUED | OUTPATIENT
Start: 2019-11-14 | End: 2019-11-14 | Stop reason: HOSPADM

## 2019-11-14 RX ADMIN — PANTOPRAZOLE SODIUM 40 MG: 40 TABLET, DELAYED RELEASE ORAL at 05:43

## 2019-11-14 RX ADMIN — METHYLPREDNISOLONE SODIUM SUCCINATE 40 MG: 40 INJECTION, POWDER, LYOPHILIZED, FOR SOLUTION INTRAMUSCULAR; INTRAVENOUS at 00:22

## 2019-11-14 RX ADMIN — ALBUTEROL SULFATE 2.5 MG: 2.5 SOLUTION RESPIRATORY (INHALATION) at 11:40

## 2019-11-14 RX ADMIN — ALBUTEROL SULFATE 2.5 MG: 2.5 SOLUTION RESPIRATORY (INHALATION) at 15:33

## 2019-11-14 RX ADMIN — PIPERACILLIN SODIUM AND TAZOBACTAM SODIUM 3.38 G: 3; .375 INJECTION, POWDER, LYOPHILIZED, FOR SOLUTION INTRAVENOUS at 12:59

## 2019-11-14 RX ADMIN — ALBUTEROL SULFATE 2.5 MG: 2.5 SOLUTION RESPIRATORY (INHALATION) at 07:57

## 2019-11-14 RX ADMIN — PIPERACILLIN SODIUM AND TAZOBACTAM SODIUM 3.38 G: 3; .375 INJECTION, POWDER, LYOPHILIZED, FOR SOLUTION INTRAVENOUS at 04:52

## 2019-11-14 RX ADMIN — Medication 10 ML: at 09:00

## 2019-11-14 RX ADMIN — METHYLPREDNISOLONE SODIUM SUCCINATE 40 MG: 40 INJECTION, POWDER, LYOPHILIZED, FOR SOLUTION INTRAMUSCULAR; INTRAVENOUS at 08:13

## 2019-11-14 ASSESSMENT — PAIN SCALES - GENERAL: PAINLEVEL_OUTOF10: 0

## 2019-11-16 LAB — HIV-1 AND HIV-2 ANTIBODIES: NORMAL

## 2019-12-11 ENCOUNTER — HOSPITAL ENCOUNTER (INPATIENT)
Age: 24
LOS: 6 days | Discharge: PSYCHIATRIC OR SUBSTANCE USE FACILITY | DRG: 885 | End: 2019-12-17
Attending: EMERGENCY MEDICINE | Admitting: PSYCHIATRY & NEUROLOGY
Payer: COMMERCIAL

## 2019-12-11 DIAGNOSIS — R45.851 DEPRESSION WITH SUICIDAL IDEATION: Primary | ICD-10-CM

## 2019-12-11 DIAGNOSIS — F32.A DEPRESSION WITH SUICIDAL IDEATION: Primary | ICD-10-CM

## 2019-12-11 LAB
ACETAMINOPHEN LEVEL: <5 MCG/ML (ref 10–30)
ALBUMIN SERPL-MCNC: 4.6 G/DL (ref 3.5–5.2)
ALP BLD-CCNC: 76 U/L (ref 40–129)
ALT SERPL-CCNC: 17 U/L (ref 0–40)
AMPHETAMINE SCREEN, URINE: NOT DETECTED
ANION GAP SERPL CALCULATED.3IONS-SCNC: 12 MMOL/L (ref 7–16)
AST SERPL-CCNC: 17 U/L (ref 0–39)
BACTERIA: ABNORMAL /HPF
BARBITURATE SCREEN URINE: NOT DETECTED
BASOPHILS ABSOLUTE: 0.04 E9/L (ref 0–0.2)
BASOPHILS RELATIVE PERCENT: 0.4 % (ref 0–2)
BENZODIAZEPINE SCREEN, URINE: NOT DETECTED
BILIRUB SERPL-MCNC: 0.3 MG/DL (ref 0–1.2)
BILIRUBIN URINE: NEGATIVE
BLOOD, URINE: NORMAL
BUN BLDV-MCNC: 14 MG/DL (ref 6–20)
CALCIUM SERPL-MCNC: 9.5 MG/DL (ref 8.6–10.2)
CANNABINOID SCREEN URINE: POSITIVE
CASTS 2: ABNORMAL /LPF
CASTS: ABNORMAL /LPF
CHLORIDE BLD-SCNC: 102 MMOL/L (ref 98–107)
CLARITY: CLEAR
CO2: 26 MMOL/L (ref 22–29)
COCAINE METABOLITE SCREEN URINE: NOT DETECTED
COLOR: YELLOW
CREAT SERPL-MCNC: 0.9 MG/DL (ref 0.7–1.2)
EOSINOPHILS ABSOLUTE: 0.27 E9/L (ref 0.05–0.5)
EOSINOPHILS RELATIVE PERCENT: 2.8 % (ref 0–6)
ETHANOL: <10 MG/DL (ref 0–0.08)
FENTANYL SCREEN, URINE: NOT DETECTED
GFR AFRICAN AMERICAN: >60
GFR NON-AFRICAN AMERICAN: >60 ML/MIN/1.73
GLUCOSE BLD-MCNC: 122 MG/DL (ref 74–99)
GLUCOSE URINE: NEGATIVE MG/DL
HCT VFR BLD CALC: 46.4 % (ref 37–54)
HEMOGLOBIN: 15 G/DL (ref 12.5–16.5)
IMMATURE GRANULOCYTES #: 0.05 E9/L
IMMATURE GRANULOCYTES %: 0.5 % (ref 0–5)
KETONES, URINE: NEGATIVE MG/DL
LEUKOCYTE ESTERASE, URINE: NEGATIVE
LYMPHOCYTES ABSOLUTE: 2.11 E9/L (ref 1.5–4)
LYMPHOCYTES RELATIVE PERCENT: 22.3 % (ref 20–42)
Lab: ABNORMAL
MCH RBC QN AUTO: 29.9 PG (ref 26–35)
MCHC RBC AUTO-ENTMCNC: 32.3 % (ref 32–34.5)
MCV RBC AUTO: 92.6 FL (ref 80–99.9)
METHADONE SCREEN, URINE: NOT DETECTED
MONOCYTES ABSOLUTE: 0.88 E9/L (ref 0.1–0.95)
MONOCYTES RELATIVE PERCENT: 9.3 % (ref 2–12)
NEUTROPHILS ABSOLUTE: 6.13 E9/L (ref 1.8–7.3)
NEUTROPHILS RELATIVE PERCENT: 64.7 % (ref 43–80)
NITRITE, URINE: NEGATIVE
OPIATE SCREEN URINE: NOT DETECTED
OXYCODONE URINE: NOT DETECTED
PDW BLD-RTO: 13 FL (ref 11.5–15)
PH UA: 5.5 (ref 5–9)
PHENCYCLIDINE SCREEN URINE: NOT DETECTED
PLATELET # BLD: 272 E9/L (ref 130–450)
PMV BLD AUTO: 10 FL (ref 7–12)
POTASSIUM SERPL-SCNC: 3.9 MMOL/L (ref 3.5–5)
PROTEIN UA: NEGATIVE MG/DL
RBC # BLD: 5.01 E12/L (ref 3.8–5.8)
RBC UA: ABNORMAL /HPF (ref 0–2)
SALICYLATE, SERUM: <0.3 MG/DL (ref 0–30)
SODIUM BLD-SCNC: 140 MMOL/L (ref 132–146)
SPECIFIC GRAVITY UA: >=1.03 (ref 1–1.03)
TOTAL PROTEIN: 7.6 G/DL (ref 6.4–8.3)
TRICYCLIC ANTIDEPRESSANTS SCREEN SERUM: NEGATIVE NG/ML
UROBILINOGEN, URINE: 0.2 E.U./DL
WBC # BLD: 9.5 E9/L (ref 4.5–11.5)
WBC UA: ABNORMAL /HPF (ref 0–5)

## 2019-12-11 PROCEDURE — 6370000000 HC RX 637 (ALT 250 FOR IP): Performed by: PSYCHIATRY & NEUROLOGY

## 2019-12-11 PROCEDURE — 81001 URINALYSIS AUTO W/SCOPE: CPT

## 2019-12-11 PROCEDURE — 80053 COMPREHEN METABOLIC PANEL: CPT

## 2019-12-11 PROCEDURE — 83036 HEMOGLOBIN GLYCOSYLATED A1C: CPT

## 2019-12-11 PROCEDURE — 85025 COMPLETE CBC W/AUTO DIFF WBC: CPT

## 2019-12-11 PROCEDURE — 36415 COLL VENOUS BLD VENIPUNCTURE: CPT

## 2019-12-11 PROCEDURE — 1240000000 HC EMOTIONAL WELLNESS R&B

## 2019-12-11 PROCEDURE — 80307 DRUG TEST PRSMV CHEM ANLYZR: CPT

## 2019-12-11 PROCEDURE — 93005 ELECTROCARDIOGRAM TRACING: CPT | Performed by: EMERGENCY MEDICINE

## 2019-12-11 PROCEDURE — G0480 DRUG TEST DEF 1-7 CLASSES: HCPCS

## 2019-12-11 PROCEDURE — 99291 CRITICAL CARE FIRST HOUR: CPT

## 2019-12-11 PROCEDURE — 80061 LIPID PANEL: CPT

## 2019-12-11 RX ORDER — MAGNESIUM HYDROXIDE/ALUMINUM HYDROXICE/SIMETHICONE 120; 1200; 1200 MG/30ML; MG/30ML; MG/30ML
30 SUSPENSION ORAL PRN
Status: DISCONTINUED | OUTPATIENT
Start: 2019-12-11 | End: 2019-12-17 | Stop reason: HOSPADM

## 2019-12-11 RX ORDER — BENZTROPINE MESYLATE 1 MG/ML
2 INJECTION INTRAMUSCULAR; INTRAVENOUS 2 TIMES DAILY PRN
Status: DISCONTINUED | OUTPATIENT
Start: 2019-12-11 | End: 2019-12-17 | Stop reason: HOSPADM

## 2019-12-11 RX ORDER — HYDROXYZINE PAMOATE 25 MG/1
50 CAPSULE ORAL 3 TIMES DAILY PRN
Status: DISCONTINUED | OUTPATIENT
Start: 2019-12-11 | End: 2019-12-17 | Stop reason: HOSPADM

## 2019-12-11 RX ORDER — NICOTINE 21 MG/24HR
1 PATCH, TRANSDERMAL 24 HOURS TRANSDERMAL DAILY
Status: DISCONTINUED | OUTPATIENT
Start: 2019-12-12 | End: 2019-12-17 | Stop reason: HOSPADM

## 2019-12-11 RX ORDER — OLANZAPINE 5 MG/1
5 TABLET ORAL EVERY 4 HOURS PRN
Status: DISCONTINUED | OUTPATIENT
Start: 2019-12-11 | End: 2019-12-17 | Stop reason: HOSPADM

## 2019-12-11 RX ORDER — TRAZODONE HYDROCHLORIDE 50 MG/1
50 TABLET ORAL NIGHTLY PRN
Status: DISCONTINUED | OUTPATIENT
Start: 2019-12-12 | End: 2019-12-17 | Stop reason: HOSPADM

## 2019-12-11 RX ORDER — ACETAMINOPHEN 325 MG/1
650 TABLET ORAL EVERY 4 HOURS PRN
Status: DISCONTINUED | OUTPATIENT
Start: 2019-12-11 | End: 2019-12-17 | Stop reason: HOSPADM

## 2019-12-11 RX ORDER — OLANZAPINE 10 MG/1
10 INJECTION, POWDER, LYOPHILIZED, FOR SOLUTION INTRAMUSCULAR EVERY 4 HOURS PRN
Status: DISCONTINUED | OUTPATIENT
Start: 2019-12-11 | End: 2019-12-17 | Stop reason: HOSPADM

## 2019-12-11 RX ADMIN — ACETAMINOPHEN 650 MG: 325 TABLET, FILM COATED ORAL at 23:45

## 2019-12-11 ASSESSMENT — SLEEP AND FATIGUE QUESTIONNAIRES
DO YOU HAVE DIFFICULTY SLEEPING: YES
SLEEP PATTERN: DIFFICULTY FALLING ASLEEP
DO YOU USE A SLEEP AID: YES
AVERAGE NUMBER OF SLEEP HOURS: 7
DIFFICULTY STAYING ASLEEP: YES
RESTFUL SLEEP: YES
DIFFICULTY ARISING: NO
DIFFICULTY FALLING ASLEEP: YES

## 2019-12-11 ASSESSMENT — PAIN DESCRIPTION - DESCRIPTORS: DESCRIPTORS: ACHING

## 2019-12-11 ASSESSMENT — PAIN DESCRIPTION - FREQUENCY: FREQUENCY: CONTINUOUS

## 2019-12-11 ASSESSMENT — PAIN SCALES - GENERAL
PAINLEVEL_OUTOF10: 6
PAINLEVEL_OUTOF10: 5

## 2019-12-11 ASSESSMENT — PAIN DESCRIPTION - ONSET: ONSET: UNABLE TO TELL

## 2019-12-11 ASSESSMENT — LIFESTYLE VARIABLES: HISTORY_ALCOHOL_USE: NO

## 2019-12-11 ASSESSMENT — PATIENT HEALTH QUESTIONNAIRE - PHQ9
SUM OF ALL RESPONSES TO PHQ QUESTIONS 1-9: 12
SUM OF ALL RESPONSES TO PHQ QUESTIONS 1-9: 12

## 2019-12-11 ASSESSMENT — PAIN DESCRIPTION - LOCATION: LOCATION: HEAD

## 2019-12-12 PROBLEM — F33.2 SEVERE EPISODE OF RECURRENT MAJOR DEPRESSIVE DISORDER, WITHOUT PSYCHOTIC FEATURES (HCC): Status: ACTIVE | Noted: 2019-12-12

## 2019-12-12 LAB
CHOLESTEROL, TOTAL: 173 MG/DL (ref 0–199)
EKG ATRIAL RATE: 91 BPM
EKG P AXIS: 64 DEGREES
EKG P-R INTERVAL: 166 MS
EKG Q-T INTERVAL: 328 MS
EKG QRS DURATION: 84 MS
EKG QTC CALCULATION (BAZETT): 403 MS
EKG R AXIS: 110 DEGREES
EKG T AXIS: 53 DEGREES
EKG VENTRICULAR RATE: 91 BPM
HBA1C MFR BLD: 5.2 % (ref 4–5.6)
HDLC SERPL-MCNC: 42 MG/DL
LDL CHOLESTEROL CALCULATED: 104 MG/DL (ref 0–99)
TRIGL SERPL-MCNC: 134 MG/DL (ref 0–149)
VLDLC SERPL CALC-MCNC: 27 MG/DL

## 2019-12-12 PROCEDURE — 93010 ELECTROCARDIOGRAM REPORT: CPT | Performed by: INTERNAL MEDICINE

## 2019-12-12 PROCEDURE — 99221 1ST HOSP IP/OBS SF/LOW 40: CPT | Performed by: PSYCHIATRY & NEUROLOGY

## 2019-12-12 PROCEDURE — 99222 1ST HOSP IP/OBS MODERATE 55: CPT | Performed by: NURSE PRACTITIONER

## 2019-12-12 PROCEDURE — 6370000000 HC RX 637 (ALT 250 FOR IP): Performed by: NURSE PRACTITIONER

## 2019-12-12 PROCEDURE — 1240000000 HC EMOTIONAL WELLNESS R&B

## 2019-12-12 RX ORDER — PALIPERIDONE 3 MG/1
3 TABLET, EXTENDED RELEASE ORAL DAILY
Status: DISCONTINUED | OUTPATIENT
Start: 2019-12-12 | End: 2019-12-16

## 2019-12-12 RX ORDER — ALBUTEROL SULFATE 90 UG/1
2 AEROSOL, METERED RESPIRATORY (INHALATION) EVERY 6 HOURS PRN
Status: DISCONTINUED | OUTPATIENT
Start: 2019-12-12 | End: 2019-12-17 | Stop reason: HOSPADM

## 2019-12-12 RX ORDER — DIVALPROEX SODIUM 250 MG/1
500 TABLET, DELAYED RELEASE ORAL EVERY 12 HOURS SCHEDULED
Status: DISCONTINUED | OUTPATIENT
Start: 2019-12-12 | End: 2019-12-17 | Stop reason: HOSPADM

## 2019-12-12 RX ORDER — ONDANSETRON 4 MG/1
4 TABLET, ORALLY DISINTEGRATING ORAL 3 TIMES DAILY PRN
Status: DISCONTINUED | OUTPATIENT
Start: 2019-12-12 | End: 2019-12-17 | Stop reason: HOSPADM

## 2019-12-12 RX ORDER — LANOLIN ALCOHOL/MO/W.PET/CERES
6 CREAM (GRAM) TOPICAL NIGHTLY PRN
Status: DISCONTINUED | OUTPATIENT
Start: 2019-12-12 | End: 2019-12-17 | Stop reason: HOSPADM

## 2019-12-12 RX ADMIN — PALIPERIDONE 3 MG: 3 TABLET, EXTENDED RELEASE ORAL at 13:08

## 2019-12-12 RX ADMIN — MELATONIN 3 MG ORAL TABLET 6 MG: 3 TABLET ORAL at 20:57

## 2019-12-12 ASSESSMENT — PAIN DESCRIPTION - FREQUENCY: FREQUENCY: CONTINUOUS

## 2019-12-12 ASSESSMENT — SLEEP AND FATIGUE QUESTIONNAIRES
DIFFICULTY STAYING ASLEEP: NO
DO YOU USE A SLEEP AID: YES
DIFFICULTY ARISING: NO
DIFFICULTY FALLING ASLEEP: NO
RESTFUL SLEEP: YES
AVERAGE NUMBER OF SLEEP HOURS: 8
DO YOU HAVE DIFFICULTY SLEEPING: NO
SLEEP PATTERN: NIGHTMARES/TERRORS;DISTURBED/INTERRUPTED SLEEP

## 2019-12-12 ASSESSMENT — PAIN DESCRIPTION - DESCRIPTORS: DESCRIPTORS: ACHING;THROBBING

## 2019-12-12 ASSESSMENT — PAIN DESCRIPTION - PAIN TYPE: TYPE: ACUTE PAIN

## 2019-12-12 ASSESSMENT — PAIN DESCRIPTION - LOCATION: LOCATION: HEAD

## 2019-12-12 ASSESSMENT — PAIN SCALES - GENERAL
PAINLEVEL_OUTOF10: 0
PAINLEVEL_OUTOF10: 5

## 2019-12-12 ASSESSMENT — LIFESTYLE VARIABLES: HISTORY_ALCOHOL_USE: YES

## 2019-12-13 PROCEDURE — 1240000000 HC EMOTIONAL WELLNESS R&B

## 2019-12-13 PROCEDURE — 99231 SBSQ HOSP IP/OBS SF/LOW 25: CPT | Performed by: PSYCHIATRY & NEUROLOGY

## 2019-12-13 PROCEDURE — 6370000000 HC RX 637 (ALT 250 FOR IP): Performed by: PSYCHIATRY & NEUROLOGY

## 2019-12-13 PROCEDURE — 6370000000 HC RX 637 (ALT 250 FOR IP): Performed by: NURSE PRACTITIONER

## 2019-12-13 RX ADMIN — MELATONIN 3 MG ORAL TABLET 6 MG: 3 TABLET ORAL at 21:08

## 2019-12-13 RX ADMIN — ACETAMINOPHEN 650 MG: 325 TABLET, FILM COATED ORAL at 20:40

## 2019-12-13 ASSESSMENT — PAIN SCALES - GENERAL
PAINLEVEL_OUTOF10: 6
PAINLEVEL_OUTOF10: 0

## 2019-12-14 PROCEDURE — 99254 IP/OBS CNSLTJ NEW/EST MOD 60: CPT | Performed by: PSYCHIATRY & NEUROLOGY

## 2019-12-14 PROCEDURE — 1240000000 HC EMOTIONAL WELLNESS R&B

## 2019-12-14 PROCEDURE — 6370000000 HC RX 637 (ALT 250 FOR IP): Performed by: PSYCHIATRY & NEUROLOGY

## 2019-12-14 PROCEDURE — 6370000000 HC RX 637 (ALT 250 FOR IP): Performed by: NURSE PRACTITIONER

## 2019-12-14 PROCEDURE — 99231 SBSQ HOSP IP/OBS SF/LOW 25: CPT | Performed by: PSYCHIATRY & NEUROLOGY

## 2019-12-14 RX ORDER — NORTRIPTYLINE HYDROCHLORIDE 25 MG/1
25 CAPSULE ORAL NIGHTLY
Status: DISCONTINUED | OUTPATIENT
Start: 2019-12-21 | End: 2019-12-17 | Stop reason: HOSPADM

## 2019-12-14 RX ORDER — NORTRIPTYLINE HYDROCHLORIDE 10 MG/1
10 CAPSULE ORAL NIGHTLY
Status: DISCONTINUED | OUTPATIENT
Start: 2019-12-14 | End: 2019-12-17 | Stop reason: HOSPADM

## 2019-12-14 RX ADMIN — DIVALPROEX SODIUM 500 MG: 250 TABLET, DELAYED RELEASE ORAL at 21:58

## 2019-12-14 RX ADMIN — MELATONIN 3 MG ORAL TABLET 6 MG: 3 TABLET ORAL at 21:59

## 2019-12-14 RX ADMIN — NORTRIPTYLINE HYDROCHLORIDE 10 MG: 10 CAPSULE ORAL at 21:58

## 2019-12-14 RX ADMIN — PALIPERIDONE 3 MG: 3 TABLET, EXTENDED RELEASE ORAL at 14:18

## 2019-12-14 ASSESSMENT — PAIN SCALES - GENERAL: PAINLEVEL_OUTOF10: 0

## 2019-12-15 PROCEDURE — 99231 SBSQ HOSP IP/OBS SF/LOW 25: CPT | Performed by: PSYCHIATRY & NEUROLOGY

## 2019-12-15 PROCEDURE — 6370000000 HC RX 637 (ALT 250 FOR IP): Performed by: NURSE PRACTITIONER

## 2019-12-15 PROCEDURE — 1240000000 HC EMOTIONAL WELLNESS R&B

## 2019-12-15 PROCEDURE — 6370000000 HC RX 637 (ALT 250 FOR IP): Performed by: PSYCHIATRY & NEUROLOGY

## 2019-12-15 RX ADMIN — MELATONIN 3 MG ORAL TABLET 6 MG: 3 TABLET ORAL at 21:24

## 2019-12-15 RX ADMIN — DIVALPROEX SODIUM 500 MG: 250 TABLET, DELAYED RELEASE ORAL at 09:25

## 2019-12-15 RX ADMIN — DIVALPROEX SODIUM 500 MG: 250 TABLET, DELAYED RELEASE ORAL at 21:24

## 2019-12-15 RX ADMIN — PALIPERIDONE 3 MG: 3 TABLET, EXTENDED RELEASE ORAL at 09:25

## 2019-12-15 RX ADMIN — NORTRIPTYLINE HYDROCHLORIDE 10 MG: 10 CAPSULE ORAL at 21:24

## 2019-12-15 ASSESSMENT — PAIN SCALES - GENERAL
PAINLEVEL_OUTOF10: 0
PAINLEVEL_OUTOF10: 0

## 2019-12-16 LAB
FUNGUS (MYCOLOGY) CULTURE: NORMAL
FUNGUS STAIN: NORMAL

## 2019-12-16 PROCEDURE — 99232 SBSQ HOSP IP/OBS MODERATE 35: CPT | Performed by: PSYCHIATRY & NEUROLOGY

## 2019-12-16 PROCEDURE — 6370000000 HC RX 637 (ALT 250 FOR IP): Performed by: NURSE PRACTITIONER

## 2019-12-16 PROCEDURE — 6370000000 HC RX 637 (ALT 250 FOR IP): Performed by: PSYCHIATRY & NEUROLOGY

## 2019-12-16 PROCEDURE — 1240000000 HC EMOTIONAL WELLNESS R&B

## 2019-12-16 RX ORDER — PALIPERIDONE 6 MG/1
6 TABLET, EXTENDED RELEASE ORAL DAILY
Status: DISCONTINUED | OUTPATIENT
Start: 2019-12-17 | End: 2019-12-17 | Stop reason: HOSPADM

## 2019-12-16 RX ADMIN — DIVALPROEX SODIUM 500 MG: 250 TABLET, DELAYED RELEASE ORAL at 21:02

## 2019-12-16 RX ADMIN — PALIPERIDONE 3 MG: 3 TABLET, EXTENDED RELEASE ORAL at 09:14

## 2019-12-16 RX ADMIN — NORTRIPTYLINE HYDROCHLORIDE 10 MG: 10 CAPSULE ORAL at 21:02

## 2019-12-16 RX ADMIN — DIVALPROEX SODIUM 500 MG: 250 TABLET, DELAYED RELEASE ORAL at 09:13

## 2019-12-16 RX ADMIN — MELATONIN 3 MG ORAL TABLET 6 MG: 3 TABLET ORAL at 21:02

## 2019-12-16 ASSESSMENT — PAIN SCALES - GENERAL
PAINLEVEL_OUTOF10: 0

## 2019-12-17 VITALS
HEIGHT: 74 IN | RESPIRATION RATE: 14 BRPM | DIASTOLIC BLOOD PRESSURE: 69 MMHG | WEIGHT: 155 LBS | OXYGEN SATURATION: 98 % | TEMPERATURE: 98.1 F | BODY MASS INDEX: 19.89 KG/M2 | HEART RATE: 94 BPM | SYSTOLIC BLOOD PRESSURE: 104 MMHG

## 2019-12-17 PROCEDURE — 6370000000 HC RX 637 (ALT 250 FOR IP): Performed by: NURSE PRACTITIONER

## 2019-12-17 PROCEDURE — 99238 HOSP IP/OBS DSCHRG MGMT 30/<: CPT | Performed by: NURSE PRACTITIONER

## 2019-12-17 RX ORDER — NICOTINE 21 MG/24HR
1 PATCH, TRANSDERMAL 24 HOURS TRANSDERMAL DAILY
Qty: 30 PATCH | Refills: 0 | Status: SHIPPED | OUTPATIENT
Start: 2019-12-17 | End: 2020-05-25

## 2019-12-17 RX ORDER — DIVALPROEX SODIUM 500 MG/1
500 TABLET, DELAYED RELEASE ORAL EVERY 12 HOURS SCHEDULED
Qty: 60 TABLET | Refills: 0 | Status: SHIPPED | OUTPATIENT
Start: 2019-12-17 | End: 2020-05-25

## 2019-12-17 RX ORDER — NORTRIPTYLINE HYDROCHLORIDE 25 MG/1
25 CAPSULE ORAL NIGHTLY
Qty: 30 CAPSULE | Refills: 0 | Status: SHIPPED | OUTPATIENT
Start: 2019-12-21 | End: 2020-05-25

## 2019-12-17 RX ORDER — NORTRIPTYLINE HYDROCHLORIDE 10 MG/1
10 CAPSULE ORAL NIGHTLY
Qty: 30 CAPSULE | Refills: 0 | Status: SHIPPED | OUTPATIENT
Start: 2019-12-17 | End: 2020-05-25

## 2019-12-17 RX ORDER — PALIPERIDONE 6 MG/1
6 TABLET, EXTENDED RELEASE ORAL DAILY
Qty: 30 TABLET | Refills: 0 | Status: SHIPPED | OUTPATIENT
Start: 2019-12-17 | End: 2020-05-25

## 2019-12-17 RX ADMIN — DIVALPROEX SODIUM 500 MG: 250 TABLET, DELAYED RELEASE ORAL at 09:00

## 2019-12-17 RX ADMIN — PALIPERIDONE 6 MG: 6 TABLET, EXTENDED RELEASE ORAL at 09:00

## 2019-12-17 ASSESSMENT — PAIN SCALES - GENERAL
PAINLEVEL_OUTOF10: 0

## 2019-12-31 LAB
AFB CULTURE (MYCOBACTERIA): NORMAL
AFB SMEAR: NORMAL

## 2020-05-24 ENCOUNTER — HOSPITAL ENCOUNTER (EMERGENCY)
Age: 25
Discharge: PSYCHIATRIC OR SUBSTANCE USE FACILITY | End: 2020-05-25
Attending: EMERGENCY MEDICINE
Payer: COMMERCIAL

## 2020-05-24 LAB
ALBUMIN SERPL-MCNC: 5.1 G/DL (ref 3.5–5.2)
ALP BLD-CCNC: 66 U/L (ref 40–129)
ALT SERPL-CCNC: 15 U/L (ref 0–40)
AMPHETAMINE SCREEN, URINE: NOT DETECTED
ANION GAP SERPL CALCULATED.3IONS-SCNC: 9 MMOL/L (ref 7–16)
AST SERPL-CCNC: 14 U/L (ref 0–39)
BARBITURATE SCREEN URINE: NOT DETECTED
BASOPHILS ABSOLUTE: 0.07 E9/L (ref 0–0.2)
BASOPHILS RELATIVE PERCENT: 0.6 % (ref 0–2)
BENZODIAZEPINE SCREEN, URINE: NOT DETECTED
BILIRUB SERPL-MCNC: 0.3 MG/DL (ref 0–1.2)
BUN BLDV-MCNC: 15 MG/DL (ref 6–20)
CALCIUM SERPL-MCNC: 9.8 MG/DL (ref 8.6–10.2)
CANNABINOID SCREEN URINE: POSITIVE
CHLORIDE BLD-SCNC: 102 MMOL/L (ref 98–107)
CO2: 26 MMOL/L (ref 22–29)
COCAINE METABOLITE SCREEN URINE: NOT DETECTED
CREAT SERPL-MCNC: 0.9 MG/DL (ref 0.7–1.2)
EOSINOPHILS ABSOLUTE: 0.7 E9/L (ref 0.05–0.5)
EOSINOPHILS RELATIVE PERCENT: 6.3 % (ref 0–6)
FENTANYL SCREEN, URINE: NOT DETECTED
GFR AFRICAN AMERICAN: >60
GFR NON-AFRICAN AMERICAN: >60 ML/MIN/1.73
GLUCOSE BLD-MCNC: 87 MG/DL (ref 74–99)
HCT VFR BLD CALC: 46.6 % (ref 37–54)
HEMOGLOBIN: 16.2 G/DL (ref 12.5–16.5)
IMMATURE GRANULOCYTES #: 0.04 E9/L
IMMATURE GRANULOCYTES %: 0.4 % (ref 0–5)
LYMPHOCYTES ABSOLUTE: 3.48 E9/L (ref 1.5–4)
LYMPHOCYTES RELATIVE PERCENT: 31.4 % (ref 20–42)
Lab: ABNORMAL
MCH RBC QN AUTO: 31.9 PG (ref 26–35)
MCHC RBC AUTO-ENTMCNC: 34.8 % (ref 32–34.5)
MCV RBC AUTO: 91.7 FL (ref 80–99.9)
METHADONE SCREEN, URINE: NOT DETECTED
MONOCYTES ABSOLUTE: 1.05 E9/L (ref 0.1–0.95)
MONOCYTES RELATIVE PERCENT: 9.5 % (ref 2–12)
NEUTROPHILS ABSOLUTE: 5.75 E9/L (ref 1.8–7.3)
NEUTROPHILS RELATIVE PERCENT: 51.8 % (ref 43–80)
OPIATE SCREEN URINE: NOT DETECTED
OXYCODONE URINE: NOT DETECTED
PDW BLD-RTO: 11.7 FL (ref 11.5–15)
PHENCYCLIDINE SCREEN URINE: NOT DETECTED
PLATELET # BLD: 284 E9/L (ref 130–450)
PMV BLD AUTO: 10.4 FL (ref 7–12)
POTASSIUM REFLEX MAGNESIUM: 4.1 MMOL/L (ref 3.5–5)
RBC # BLD: 5.08 E12/L (ref 3.8–5.8)
SARS-COV-2, NAAT: NOT DETECTED
SODIUM BLD-SCNC: 137 MMOL/L (ref 132–146)
TOTAL PROTEIN: 8.1 G/DL (ref 6.4–8.3)
WBC # BLD: 11.1 E9/L (ref 4.5–11.5)

## 2020-05-24 PROCEDURE — 93005 ELECTROCARDIOGRAM TRACING: CPT | Performed by: EMERGENCY MEDICINE

## 2020-05-24 PROCEDURE — 85025 COMPLETE CBC W/AUTO DIFF WBC: CPT

## 2020-05-24 PROCEDURE — 80053 COMPREHEN METABOLIC PANEL: CPT

## 2020-05-24 PROCEDURE — U0002 COVID-19 LAB TEST NON-CDC: HCPCS

## 2020-05-24 PROCEDURE — 80307 DRUG TEST PRSMV CHEM ANLYZR: CPT

## 2020-05-24 PROCEDURE — 99285 EMERGENCY DEPT VISIT HI MDM: CPT

## 2020-05-24 PROCEDURE — G0480 DRUG TEST DEF 1-7 CLASSES: HCPCS

## 2020-05-24 RX ORDER — LANOLIN ALCOHOL/MO/W.PET/CERES
3 CREAM (GRAM) TOPICAL ONCE
Status: COMPLETED | OUTPATIENT
Start: 2020-05-24 | End: 2020-05-25

## 2020-05-24 NOTE — ED PROVIDER NOTES
ng/mL    FENTANYL SCREEN, URINE NOT DETECTED Negative <1 ng/mL    Drug Screen Comment: see below    Serum Drug Screen   Result Value Ref Range    Ethanol Lvl <10 mg/dL    Acetaminophen Level <5.0 (L) 10.0 - 64.5 mcg/mL    Salicylate, Serum <4.2 0.0 - 30.0 mg/dL   COVID-19   Result Value Ref Range    SARS-CoV-2, NAAT Not Detected Not Detected   EKG 12 Lead   Result Value Ref Range    Ventricular Rate 79 BPM    Atrial Rate 79 BPM    P-R Interval 172 ms    QRS Duration 84 ms    Q-T Interval 348 ms    QTc Calculation (Bazett) 399 ms    P Axis 71 degrees    R Axis 114 degrees    T Axis 45 degrees       RADIOLOGY:  Interpreted by Radiologist.  No orders to display       ------------------------- NURSING NOTES AND VITALS REVIEWED ---------------------------   The nursing notes within the ED encounter and vital signs as below have been reviewed. /77   Pulse 109   Temp 98.4 °F (36.9 °C) (Bladder)   Resp 14   Ht 6' 2\" (1.88 m)   Wt 180 lb (81.6 kg)   SpO2 98%   BMI 23.11 kg/m²   Oxygen Saturation Interpretation: Normal      ---------------------------------------------------PHYSICAL EXAM--------------------------------------      Constitutional/General: Alert and oriented x3, well appearing, non toxic in NAD  Head: Normocephalic and atraumatic  Eyes: PERRL, EOMI  Mouth: Oropharynx clear, handling secretions, no trismus  Neck: Supple, full ROM,   Pulmonary: Lungs clear to auscultation bilaterally, no wheezes, rales, or rhonchi. Not in respiratory distress  Cardiovascular:  Regular rate and rhythm, no murmurs, gallops, or rubs. 2+ distal pulses  Abdomen: Soft, non tender, non distended,   Extremities: Moves all extremities x 4. Warm and well perfused  Skin: warm and dry without rash  Neurologic: GCS 15,  Psych: Flat Affect. + SI. No plan.  No HI.      ------------------------------ ED COURSE/MEDICAL DECISION MAKING----------------------  Medications - No data to display      Medical Decision Making/ED COURSE:   Patient is a 21year-old male presenting with SI. He was hemodynamically stable and nontoxic on evaluation. Medically screening exam unremarkable. Admitting to SI. Medical screening labs ordered. I reviewed labs. There are no acute findings. Drug screen positive for marijuana. Patient pink slipped. ED Course as of May 24 2201   Tanja Curling May 24, 2020   2013 EKG: This EKG is signed and interpreted by me. Rate: 79  Rhythm: Sinus  Interpretation: NSR, righ axis deviation, normal [place] interval, normal QRS, normal QTc  Comparison: no previous EKG available      [JA]   2139 Patient medically clear for psych eval    [JA]      ED Course User Index  [JA] Jt Brown MD           Counseling: The emergency provider has spoken with the patient and discussed todays results, in addition to providing specific details for the plan of care and counseling regarding the diagnosis and prognosis. Questions are answered at this time and they are agreeable with the plan.      --------------------------------- IMPRESSION AND DISPOSITION ---------------------------------    IMPRESSION  1. Suicidal ideation        DISPOSITION  Disposition: Patient is medically clear for psych eval  Patient condition is stable      NOTE: This report was transcribed using voice recognition software.  Every effort was made to ensure accuracy; however, inadvertent computerized transcription errors may be present       Jt Brown MD  05/24/20 2201

## 2020-05-25 VITALS
SYSTOLIC BLOOD PRESSURE: 114 MMHG | TEMPERATURE: 98 F | RESPIRATION RATE: 14 BRPM | DIASTOLIC BLOOD PRESSURE: 68 MMHG | HEART RATE: 57 BPM | WEIGHT: 180 LBS | OXYGEN SATURATION: 99 % | HEIGHT: 74 IN | BODY MASS INDEX: 23.1 KG/M2

## 2020-05-25 LAB
ACETAMINOPHEN LEVEL: <5 MCG/ML (ref 10–30)
EKG ATRIAL RATE: 79 BPM
EKG P AXIS: 71 DEGREES
EKG P-R INTERVAL: 172 MS
EKG Q-T INTERVAL: 348 MS
EKG QRS DURATION: 84 MS
EKG QTC CALCULATION (BAZETT): 399 MS
EKG R AXIS: 114 DEGREES
EKG T AXIS: 45 DEGREES
EKG VENTRICULAR RATE: 79 BPM
ETHANOL: <10 MG/DL (ref 0–0.08)
SALICYLATE, SERUM: <0.3 MG/DL (ref 0–30)
TRICYCLIC ANTIDEPRESSANTS SCREEN SERUM: NEGATIVE NG/ML

## 2020-05-25 PROCEDURE — 93010 ELECTROCARDIOGRAM REPORT: CPT | Performed by: INTERNAL MEDICINE

## 2020-05-25 PROCEDURE — 6370000000 HC RX 637 (ALT 250 FOR IP): Performed by: EMERGENCY MEDICINE

## 2020-05-25 RX ADMIN — MELATONIN 3 MG ORAL TABLET 3 MG: 3 TABLET ORAL at 00:53

## 2020-06-24 ENCOUNTER — OFFICE VISIT (OUTPATIENT)
Dept: FAMILY MEDICINE CLINIC | Age: 25
End: 2020-06-24
Payer: COMMERCIAL

## 2020-06-24 VITALS
BODY MASS INDEX: 23.05 KG/M2 | HEART RATE: 97 BPM | WEIGHT: 179.6 LBS | HEIGHT: 74 IN | DIASTOLIC BLOOD PRESSURE: 68 MMHG | TEMPERATURE: 97.4 F | SYSTOLIC BLOOD PRESSURE: 112 MMHG | OXYGEN SATURATION: 98 %

## 2020-06-24 PROCEDURE — 99214 OFFICE O/P EST MOD 30 MIN: CPT | Performed by: FAMILY MEDICINE

## 2020-06-24 RX ORDER — TIZANIDINE 4 MG/1
4 TABLET ORAL NIGHTLY PRN
Qty: 10 TABLET | Refills: 0 | Status: SHIPPED
Start: 2020-06-24 | End: 2020-08-13

## 2020-06-24 RX ORDER — ALBUTEROL SULFATE 90 UG/1
2 AEROSOL, METERED RESPIRATORY (INHALATION) 4 TIMES DAILY PRN
Qty: 1 INHALER | Refills: 5 | Status: ON HOLD
Start: 2020-06-24 | End: 2021-06-02 | Stop reason: SDUPTHER

## 2020-06-24 ASSESSMENT — ENCOUNTER SYMPTOMS
BACK PAIN: 1
BLOOD IN STOOL: 0
SHORTNESS OF BREATH: 0
WHEEZING: 0
CONSTIPATION: 0
TROUBLE SWALLOWING: 0
EYE REDNESS: 0
COUGH: 0
DIARRHEA: 0
ABDOMINAL PAIN: 0
SINUS PAIN: 0
SORE THROAT: 0
PHOTOPHOBIA: 0
VOMITING: 0

## 2020-06-24 NOTE — PROGRESS NOTES
into the lungs 4 times daily as needed for Wheezing, Disp: 1 Inhaler, Rfl: 5    melatonin 3 MG TABS tablet, Take 5 mg by mouth nightly as needed, Disp: , Rfl:   Allergies   Allergen Reactions    Peanut-Containing Drug Products     Lorabid [Loracarbef]        Past Medical History:   Diagnosis Date    Anxiety     Asthma     no meds currently- mostly exercise induced    Claustrophobia     confined spaces with locked doors    Depression     Epigastric pain     Nausea     Nut allergy      Past Surgical History:   Procedure Laterality Date    BRONCHOSCOPY N/A 11/9/2019    BRONCHOSCOPY ALVEOLAR LAVAGE performed by Luis Arrington MD at [facility] 27 N/A 8/19/2019    EGD BIOPSY performed by Amadou Horowitz MD at [facility] 21       No family history on file. Social History     Tobacco History     Smoking Status  Never Smoker    Smokeless Tobacco Use  Never Used          Alcohol History     Alcohol Use Status  No          Drug Use     Drug Use Status  Not Currently Types  Marijuana Comment  occassionally last use last week          Sexual Activity     Sexually Active  Not Currently Partners  Female              OBJECTIVE  Vitals:    06/24/20 1502   BP: 112/68   Site: Right Upper Arm   Position: Sitting   Pulse: 97   Temp: 97.4 °F (36.3 °C)   TempSrc: Temporal   SpO2: 98%   Weight: 179 lb 9.6 oz (81.5 kg)   Height: 6' 2\" (1.88 m)        Body mass index is 23.06 kg/m². Patient is normal weight    Orders Placed This Encounter   Procedures    XR LUMBAR SPINE (MIN 4 VIEWS)     Standing Status:   Future     Number of Occurrences:   1     Standing Expiration Date:   6/24/2021        EXAM   Physical Exam  Vitals signs and nursing note reviewed. Constitutional:       Appearance: Normal appearance. He is well-developed and normal weight.    HENT:      Right Ear: Tympanic membrane, ear canal and external ear normal.      Left Ear: Tympanic membrane, ear canal and external ear normal.      Nose: Nose normal.      Mouth/Throat:      Pharynx: Oropharynx is clear. Eyes:      General: No scleral icterus. Conjunctiva/sclera: Conjunctivae normal.      Pupils: Pupils are equal, round, and reactive to light. Neck:      Musculoskeletal: Normal range of motion and neck supple. Thyroid: No thyroid mass or thyromegaly. Vascular: No JVD. Trachea: Trachea normal.   Cardiovascular:      Rate and Rhythm: Normal rate and regular rhythm. Pulses: Normal pulses. Heart sounds: Normal heart sounds. No murmur. No gallop. Pulmonary:      Effort: Pulmonary effort is normal.      Breath sounds: Normal breath sounds. No wheezing, rhonchi or rales. Abdominal:      General: Bowel sounds are normal. There is no distension. Palpations: Abdomen is soft. There is no mass. Tenderness: There is no abdominal tenderness. There is no guarding. Musculoskeletal: Normal range of motion. General: No swelling or tenderness. Right lower leg: No edema. Left lower leg: No edema. Comments: Some PVMS right paravertebral muscles. Negative radicular signs   Lymphadenopathy:      Cervical: No cervical adenopathy. Skin:     General: Skin is warm and dry. Coloration: Skin is not jaundiced. Findings: No bruising or rash. Comments: No needle marks   Neurological:      General: No focal deficit present. Mental Status: He is alert and oriented to person, place, and time. Sensory: No sensory deficit. Motor: No weakness or abnormal muscle tone. Coordination: Coordination normal.   Psychiatric:         Mood and Affect: Mood normal.         Behavior: Behavior normal.      Comments: Suspect some issues with drug use. Mostly marijuana according to his drug screens. Judgement questionable           Sharon Tyler was seen today for back pain.     Diagnoses and all orders for this visit:    Severe episode of recurrent major depressive disorder, without psychotic features ([facility] [address]  Has been hospitilized twice for depression in past year. Drug use may or may not be contributor. Asked if would like to see Dr. Yenny Loya, said no and he doubted Dr. 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 Was intubated for 5 or 6 days in November due to viral pneumonia, uknown organism    Chronic right-sided low back pain without sciatica  -     tiZANidine (ZANAFLEX) 4 MG tablet; Take 1 tablet by mouth nightly as needed (back pain)  -     XR LUMBAR SPINE (MIN 4 VIEWS); Future. Continue with chiropractor, Cannabinoid oil probably ok    Mild intermittent asthma without complication  -     albuterol sulfate HFA (VENTOLIN HFA) 108 (90 Base) MCG/ACT inhaler; Inhale 2 puffs into the lungs 4 times daily as needed for Wheezing          No follow-ups on file. Electronically signed by Svetlana Davis MD on 6/24/20 at 3:46 PM EDT    I have personally reviewed and updated the chief complaint, HPI, Past Medical, Family and Social History, as well as the above Review of Systems.

## 2020-08-13 ENCOUNTER — OFFICE VISIT (OUTPATIENT)
Dept: FAMILY MEDICINE CLINIC | Age: 25
End: 2020-08-13
Payer: COMMERCIAL

## 2020-08-13 VITALS
DIASTOLIC BLOOD PRESSURE: 74 MMHG | WEIGHT: 165.2 LBS | HEART RATE: 76 BPM | HEIGHT: 74 IN | SYSTOLIC BLOOD PRESSURE: 128 MMHG | OXYGEN SATURATION: 97 % | TEMPERATURE: 98 F | BODY MASS INDEX: 21.2 KG/M2

## 2020-08-13 PROCEDURE — 90715 TDAP VACCINE 7 YRS/> IM: CPT | Performed by: INTERNAL MEDICINE

## 2020-08-13 PROCEDURE — 90471 IMMUNIZATION ADMIN: CPT | Performed by: INTERNAL MEDICINE

## 2020-08-13 PROCEDURE — 99213 OFFICE O/P EST LOW 20 MIN: CPT | Performed by: INTERNAL MEDICINE

## 2020-08-13 RX ORDER — TIZANIDINE 4 MG/1
4 TABLET ORAL NIGHTLY PRN
Qty: 20 TABLET | Refills: 0 | Status: ON HOLD | OUTPATIENT
Start: 2020-08-13 | End: 2020-09-21 | Stop reason: HOSPADM

## 2020-08-13 RX ORDER — ALBUTEROL SULFATE 90 UG/1
2 AEROSOL, METERED RESPIRATORY (INHALATION) 4 TIMES DAILY PRN
Qty: 3 INHALER | Refills: 1 | Status: ON HOLD
Start: 2020-08-13 | End: 2021-06-02 | Stop reason: HOSPADM

## 2020-08-14 ASSESSMENT — ENCOUNTER SYMPTOMS
WHEEZING: 0
COUGH: 0
SHORTNESS OF BREATH: 0
BACK PAIN: 1

## 2020-08-14 NOTE — PROGRESS NOTES
[id number] [facility]     20  Tesha Egan : 1995 Sex: male  Age: 31 y.o. Chief Complaint   Patient presents with    Hand Injury     left hand, jabbed it while working       HPI    Patient presents today to express care complaining of a puncture wound secondary to an AL. States he cleaned the wound out. This was in the thenar eminence area right hand. He has good range of motion and good . No sensory deficit. States he is does not think he is up-to-date with his tetanus and Tdap will be given today. States he also saw his PCP Dr. Valorie Cruz recently for some back pain and follow-up of his asthma and prescriptions were sent to the pharmacy which he was unable to  at that time because he was out of town. He is asking for limited supply of these as well. Review of Systems   Constitutional: Negative for chills and fever. Respiratory: Negative for cough, shortness of breath and wheezing. Cardiovascular: Negative for chest pain. Musculoskeletal: Positive for back pain. Right hand discomfort related to recent injury. Skin:        Puncture wound right thenar eminence area   Neurological: Negative for weakness and numbness. REST OF PERTINENT ROS GONE OVER AND WAS NEGATIVE.                Current Outpatient Medications:     albuterol sulfate HFA (VENTOLIN HFA) 108 (90 Base) MCG/ACT inhaler, Inhale 2 puffs into the lungs 4 times daily as needed for Wheezing, Disp: 3 Inhaler, Rfl: 1    tiZANidine (ZANAFLEX) 4 MG tablet, Take 1 tablet by mouth nightly as needed (back pain), Disp: 20 tablet, Rfl: 0    albuterol sulfate HFA (VENTOLIN HFA) 108 (90 Base) MCG/ACT inhaler, Inhale 2 puffs into the lungs 4 times daily as needed for Wheezing, Disp: 1 Inhaler, Rfl: 5    melatonin 3 MG TABS tablet, Take 5 mg by mouth nightly as needed, Disp: , Rfl:   Allergies   Allergen Reactions    Peanut-Containing Drug Products     Lorabid [Loracarbef]        Past Medical History: Diagnosis Date    Anxiety     Asthma     no meds currently- mostly exercise induced    Claustrophobia     confined spaces with locked doors    Depression     Epigastric pain     Nausea     Nut allergy      Past Surgical History:   Procedure Laterality Date    BRONCHOSCOPY N/A 11/9/2019    BRONCHOSCOPY ALVEOLAR LAVAGE performed by Darnell Galo MD at [address] [place] [address] N/A 8/19/2019    EGD BIOPSY performed by Tate Choe MD at [place] [address]       No family history on file.   Social History     Socioeconomic History    Marital status: Single     Spouse name: Not on file    Number of children: 0    Years of education: 15    Highest education level: Not on file   Occupational History    Occupation:      Employer: [employer] Financial resource strain: Not on file    Food insecurity     Worry: Not on file     Inability: Not on file    Transportation needs     Medical: Not on file     Non-medical: Not on file   Tobacco Use    Smoking status: Never Smoker    Smokeless tobacco: Never Used   Substance and Sexual Activity    Alcohol use: No    Drug use: Not Currently     Types: Marijuana     Comment: occassionally last use last week    Sexual activity: Not Currently     Partners: Female   Lifestyle    Physical activity     Days per week: Not on file     Minutes per session: Not on file    Stress: Not on file   Relationships    Social connections     Talks on phone: Not on file     Gets together: Not on file     Attends Congregation service: Not on file     Active member of club or organization: Not on file     Attends meetings of clubs or organizations: Not on file     Relationship status: Not on file    Intimate partner violence     Fear of current or ex partner: Not on file     Emotionally abused: Not on file     Physically abused: Not on file     Forced sexual activity: Not on file   Other Topics Concern    Not on file   Social History Narrative    ** Merged History Encounter **            Vitals:    08/13/20 1328   BP: 128/74   Pulse: 76   Temp: 98 °F (36.7 °C)   TempSrc: Temporal   SpO2: 97%   Weight: 165 lb 3.2 oz (74.9 kg)   Height: 6' 2\" (1.88 m)       Physical Exam  Vitals signs and nursing note reviewed. Constitutional:       General: He is not in acute distress. Neck:      Musculoskeletal: Normal range of motion and neck supple. Cardiovascular:      Rate and Rhythm: Normal rate and regular rhythm. Pulmonary:      Effort: No respiratory distress. Breath sounds: No wheezing, rhonchi or rales. Musculoskeletal: Normal range of motion. General: Tenderness present. Comments: Right hand   Skin:     General: Skin is warm and dry. Comments: Small puncture wound right hand thenar eminence area. Neurological:      General: No focal deficit present. Mental Status: He is alert and oriented to person, place, and time. Psychiatric:         Mood and Affect: Mood normal.         Behavior: Behavior normal.         Thought Content: Thought content normal.         Judgment: Judgment normal.             Assessment and Plan:  Cruzito Gonzalez was seen today for hand injury. Diagnoses and all orders for this visit:    Puncture wound    Mild intermittent asthma without complication    Chronic low back pain without sciatica, unspecified back pain laterality    Other orders  -     albuterol sulfate HFA (VENTOLIN HFA) 108 (90 Base) MCG/ACT inhaler; Inhale 2 puffs into the lungs 4 times daily as needed for Wheezing  -     tiZANidine (ZANAFLEX) 4 MG tablet; Take 1 tablet by mouth nightly as needed (back pain)  -     Tdap (age 6y and older) IM ([address])    Plan: Area was cleaned Tdap was given. I asked him to watch for signs of infection. Follow-up with PCP. Limited supply of Zanaflex and Ventolin inhaler. Notify us with problems in the interim. Return keep appt.     Seen By:  John Loving MD      *Document was created using voice recognition software. Note was reviewed however may contain grammatical errors.

## 2020-09-09 ENCOUNTER — HOSPITAL ENCOUNTER (INPATIENT)
Age: 25
LOS: 12 days | Discharge: HOME | DRG: 885 | End: 2020-09-21
Attending: EMERGENCY MEDICINE | Admitting: PSYCHIATRY & NEUROLOGY
Payer: COMMERCIAL

## 2020-09-09 PROBLEM — F33.3 DEPRESSION, MAJOR, RECURRENT, SEVERE WITH PSYCHOSIS (HCC): Status: ACTIVE | Noted: 2020-09-09

## 2020-09-09 LAB
ACETAMINOPHEN LEVEL: <5 MCG/ML (ref 10–30)
ALBUMIN SERPL-MCNC: 4.9 G/DL (ref 3.5–5.2)
ALP BLD-CCNC: 69 U/L (ref 40–129)
ALT SERPL-CCNC: 13 U/L (ref 0–40)
AMPHETAMINE SCREEN, URINE: NOT DETECTED
ANION GAP SERPL CALCULATED.3IONS-SCNC: 15 MMOL/L (ref 7–16)
AST SERPL-CCNC: 14 U/L (ref 0–39)
BACTERIA: ABNORMAL /HPF
BARBITURATE SCREEN URINE: NOT DETECTED
BASOPHILS ABSOLUTE: 0.05 E9/L (ref 0–0.2)
BASOPHILS RELATIVE PERCENT: 0.5 % (ref 0–2)
BENZODIAZEPINE SCREEN, URINE: NOT DETECTED
BILIRUB SERPL-MCNC: 0.6 MG/DL (ref 0–1.2)
BILIRUBIN URINE: ABNORMAL
BLOOD, URINE: ABNORMAL
BUN BLDV-MCNC: 16 MG/DL (ref 6–20)
CALCIUM SERPL-MCNC: 9.8 MG/DL (ref 8.6–10.2)
CANNABINOID SCREEN URINE: POSITIVE
CHLORIDE BLD-SCNC: 101 MMOL/L (ref 98–107)
CLARITY: CLEAR
CO2: 23 MMOL/L (ref 22–29)
COCAINE METABOLITE SCREEN URINE: NOT DETECTED
COLOR: YELLOW
CREAT SERPL-MCNC: 1 MG/DL (ref 0.7–1.2)
EOSINOPHILS ABSOLUTE: 0.1 E9/L (ref 0.05–0.5)
EOSINOPHILS RELATIVE PERCENT: 1 % (ref 0–6)
EPITHELIAL CELLS, UA: ABNORMAL /HPF
ETHANOL: <10 MG/DL (ref 0–0.08)
FENTANYL SCREEN, URINE: NOT DETECTED
GFR AFRICAN AMERICAN: >60
GFR NON-AFRICAN AMERICAN: >60 ML/MIN/1.73
GLUCOSE BLD-MCNC: 103 MG/DL (ref 74–99)
GLUCOSE URINE: NEGATIVE MG/DL
HCT VFR BLD CALC: 46.9 % (ref 37–54)
HEMOGLOBIN: 15.9 G/DL (ref 12.5–16.5)
IMMATURE GRANULOCYTES #: 0.03 E9/L
IMMATURE GRANULOCYTES %: 0.3 % (ref 0–5)
KETONES, URINE: 40 MG/DL
LEUKOCYTE ESTERASE, URINE: NEGATIVE
LYMPHOCYTES ABSOLUTE: 2.49 E9/L (ref 1.5–4)
LYMPHOCYTES RELATIVE PERCENT: 25.1 % (ref 20–42)
Lab: ABNORMAL
MAGNESIUM: 2.1 MG/DL (ref 1.6–2.6)
MCH RBC QN AUTO: 30.5 PG (ref 26–35)
MCHC RBC AUTO-ENTMCNC: 33.9 % (ref 32–34.5)
MCV RBC AUTO: 89.8 FL (ref 80–99.9)
METHADONE SCREEN, URINE: NOT DETECTED
MONOCYTES ABSOLUTE: 0.93 E9/L (ref 0.1–0.95)
MONOCYTES RELATIVE PERCENT: 9.4 % (ref 2–12)
MUCUS: PRESENT /LPF
NEUTROPHILS ABSOLUTE: 6.31 E9/L (ref 1.8–7.3)
NEUTROPHILS RELATIVE PERCENT: 63.7 % (ref 43–80)
NITRITE, URINE: NEGATIVE
OPIATE SCREEN URINE: NOT DETECTED
OXYCODONE URINE: NOT DETECTED
PDW BLD-RTO: 12 FL (ref 11.5–15)
PH UA: 5.5 (ref 5–9)
PHENCYCLIDINE SCREEN URINE: NOT DETECTED
PLATELET # BLD: 293 E9/L (ref 130–450)
PMV BLD AUTO: 10.4 FL (ref 7–12)
POTASSIUM REFLEX MAGNESIUM: 3.5 MMOL/L (ref 3.5–5)
PROTEIN UA: NEGATIVE MG/DL
RBC # BLD: 5.22 E12/L (ref 3.8–5.8)
RBC UA: ABNORMAL /HPF (ref 0–2)
SALICYLATE, SERUM: <0.3 MG/DL (ref 0–30)
SARS-COV-2, NAAT: NOT DETECTED
SODIUM BLD-SCNC: 139 MMOL/L (ref 132–146)
SPECIFIC GRAVITY UA: >=1.03 (ref 1–1.03)
TOTAL PROTEIN: 7.7 G/DL (ref 6.4–8.3)
TRICYCLIC ANTIDEPRESSANTS SCREEN SERUM: NEGATIVE NG/ML
UROBILINOGEN, URINE: 0.2 E.U./DL
WBC # BLD: 9.9 E9/L (ref 4.5–11.5)
WBC UA: ABNORMAL /HPF (ref 0–5)

## 2020-09-09 PROCEDURE — 93005 ELECTROCARDIOGRAM TRACING: CPT | Performed by: EMERGENCY MEDICINE

## 2020-09-09 PROCEDURE — 81001 URINALYSIS AUTO W/SCOPE: CPT

## 2020-09-09 PROCEDURE — 6370000000 HC RX 637 (ALT 250 FOR IP): Performed by: PSYCHIATRY & NEUROLOGY

## 2020-09-09 PROCEDURE — 99285 EMERGENCY DEPT VISIT HI MDM: CPT

## 2020-09-09 PROCEDURE — G0480 DRUG TEST DEF 1-7 CLASSES: HCPCS

## 2020-09-09 PROCEDURE — 83735 ASSAY OF MAGNESIUM: CPT

## 2020-09-09 PROCEDURE — 1240000000 HC EMOTIONAL WELLNESS R&B

## 2020-09-09 PROCEDURE — 80307 DRUG TEST PRSMV CHEM ANLYZR: CPT

## 2020-09-09 PROCEDURE — 80053 COMPREHEN METABOLIC PANEL: CPT

## 2020-09-09 PROCEDURE — 85025 COMPLETE CBC W/AUTO DIFF WBC: CPT

## 2020-09-09 PROCEDURE — U0002 COVID-19 LAB TEST NON-CDC: HCPCS

## 2020-09-09 RX ORDER — DIVALPROEX SODIUM 250 MG/1
1000 TABLET, DELAYED RELEASE ORAL ONCE
Status: COMPLETED | OUTPATIENT
Start: 2020-09-09 | End: 2020-09-09

## 2020-09-09 RX ADMIN — DIVALPROEX SODIUM 1000 MG: 250 TABLET, DELAYED RELEASE ORAL at 21:52
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
## 2020-09-09 NOTE — ED NOTES
Upon triaging pt pt becomes very sleepy needed constant directions to answr questions.   Pt very confused at times requesting water and easily falls back to sleep dr husain informed and on site pts pupils 3mm Radha Garcia RN  09/09/20 4908

## 2020-09-10 LAB
EKG ATRIAL RATE: 107 BPM
EKG P AXIS: 75 DEGREES
EKG P-R INTERVAL: 146 MS
EKG Q-T INTERVAL: 326 MS
EKG QRS DURATION: 82 MS
EKG QTC CALCULATION (BAZETT): 435 MS
EKG R AXIS: 138 DEGREES
EKG T AXIS: 36 DEGREES
EKG VENTRICULAR RATE: 107 BPM

## 2020-09-10 PROCEDURE — 6370000000 HC RX 637 (ALT 250 FOR IP): Performed by: NURSE PRACTITIONER

## 2020-09-10 PROCEDURE — 1240000000 HC EMOTIONAL WELLNESS R&B

## 2020-09-10 PROCEDURE — 93010 ELECTROCARDIOGRAM REPORT: CPT | Performed by: INTERNAL MEDICINE

## 2020-09-10 PROCEDURE — 99222 1ST HOSP IP/OBS MODERATE 55: CPT | Performed by: NURSE PRACTITIONER

## 2020-09-10 PROCEDURE — 6370000000 HC RX 637 (ALT 250 FOR IP): Performed by: PSYCHIATRY & NEUROLOGY

## 2020-09-10 RX ORDER — MAGNESIUM HYDROXIDE/ALUMINUM HYDROXICE/SIMETHICONE 120; 1200; 1200 MG/30ML; MG/30ML; MG/30ML
30 SUSPENSION ORAL PRN
Status: DISCONTINUED | OUTPATIENT
Start: 2020-09-10 | End: 2020-09-21 | Stop reason: HOSPADM

## 2020-09-10 RX ORDER — HALOPERIDOL 5 MG/ML
5 INJECTION INTRAMUSCULAR EVERY 6 HOURS PRN
Status: DISCONTINUED | OUTPATIENT
Start: 2020-09-10 | End: 2020-09-21 | Stop reason: HOSPADM

## 2020-09-10 RX ORDER — HYDROXYZINE PAMOATE 50 MG/1
50 CAPSULE ORAL 3 TIMES DAILY PRN
Status: DISCONTINUED | OUTPATIENT
Start: 2020-09-10 | End: 2020-09-21 | Stop reason: HOSPADM

## 2020-09-10 RX ORDER — HALOPERIDOL 5 MG
5 TABLET ORAL EVERY 6 HOURS PRN
Status: DISCONTINUED | OUTPATIENT
Start: 2020-09-10 | End: 2020-09-21 | Stop reason: HOSPADM

## 2020-09-10 RX ORDER — ACETAMINOPHEN 325 MG/1
650 TABLET ORAL EVERY 6 HOURS PRN
Status: DISCONTINUED | OUTPATIENT
Start: 2020-09-10 | End: 2020-09-21 | Stop reason: HOSPADM

## 2020-09-10 RX ORDER — DIVALPROEX SODIUM 500 MG/1
500 TABLET, DELAYED RELEASE ORAL EVERY 12 HOURS SCHEDULED
Status: DISCONTINUED | OUTPATIENT
Start: 2020-09-10 | End: 2020-09-14

## 2020-09-10 RX ORDER — OLANZAPINE 10 MG/1
10 TABLET ORAL NIGHTLY
Status: DISCONTINUED | OUTPATIENT
Start: 2020-09-10 | End: 2020-09-12

## 2020-09-10 RX ORDER — TRAZODONE HYDROCHLORIDE 50 MG/1
50 TABLET ORAL NIGHTLY PRN
Status: DISCONTINUED | OUTPATIENT
Start: 2020-09-10 | End: 2020-09-21 | Stop reason: HOSPADM

## 2020-09-10 RX ADMIN — OLANZAPINE 10 MG: 10 TABLET, FILM COATED ORAL at 21:42

## 2020-09-10 RX ADMIN — DIVALPROEX SODIUM 500 MG: 500 TABLET, DELAYED RELEASE ORAL at 09:59

## 2020-09-10 RX ADMIN — DIVALPROEX SODIUM 500 MG: 500 TABLET, DELAYED RELEASE ORAL at 21:41

## 2020-09-10 ASSESSMENT — SLEEP AND FATIGUE QUESTIONNAIRES
RESTFUL SLEEP: NO
DIFFICULTY ARISING: NO
DIFFICULTY ARISING: NO
DO YOU HAVE DIFFICULTY SLEEPING: YES
DIFFICULTY FALLING ASLEEP: NO
AVERAGE NUMBER OF SLEEP HOURS: 8
DIFFICULTY STAYING ASLEEP: YES
DIFFICULTY FALLING ASLEEP: YES
DIFFICULTY STAYING ASLEEP: NO
DO YOU USE A SLEEP AID: YES
RESTFUL SLEEP: YES
SLEEP PATTERN: DIFFICULTY FALLING ASLEEP

## 2020-09-10 ASSESSMENT — PAIN - FUNCTIONAL ASSESSMENT: PAIN_FUNCTIONAL_ASSESSMENT: 0-10

## 2020-09-10 ASSESSMENT — PATIENT HEALTH QUESTIONNAIRE - PHQ9: SUM OF ALL RESPONSES TO PHQ QUESTIONS 1-9: 0

## 2020-09-10 ASSESSMENT — PAIN SCALES - GENERAL: PAINLEVEL_OUTOF10: 0

## 2020-09-10 ASSESSMENT — LIFESTYLE VARIABLES
HISTORY_ALCOHOL_USE: COMMENT
HISTORY_ALCOHOL_USE: NO

## 2020-09-10 NOTE — BH NOTE
[address] [place] [address]  Initial Interdisciplinary Treatment Plan NOTE    Review Date & Time: 9-10-20  1000 am    Patient was not  in treatment team    Admission Type:   Admission Type: Involuntary    Reason for admission:  Reason for Admission: Patient closed his eyes and would not answer the question. Estimated Length of Stay Update:  3-5 days  Estimated Discharge Date Update: 3-5 days    PATIENT STRENGTHS:  Patient Strengths Strengths: Communication  Patient Strengths and Limitations:Limitations: Tendency to isolate self  Addictive Behavior:Addictive Behavior  In the past 3 months, have you felt or has someone told you that you have a problem with:  : None  Do you have a history of Chemical Use?: No  Do you have a history of Alcohol Use?: No  Do you have a history of Street Drug Abuse?: No  Histroy of Prescripton Drug Abuse?: No  Medical Problems:  Past Medical History:   Diagnosis Date    Anxiety     Asthma     no meds currently- mostly exercise induced    Claustrophobia     confined spaces with locked doors    Depression     Epigastric pain     Nausea     Nut allergy        EDUCATION:   Learner Progress Toward Treatment Goals: Reviewed results and recommendations of this team and Reviewed group plan and strategies    Method: Small group    Outcome: Verbalized understanding and Demonstrated Understanding    PATIENT GOALS: \"to get with my family\" pr pt. PLAN/TREATMENT RECOMMENDATIONS UPDATE: pt to begin medication regimen and assess pt outcome. GOALS UPDATE:   Time frame for Short-Term Goals: Daily re assessment.      Silke Stephens RN

## 2020-09-10 NOTE — PROGRESS NOTES
Patient woke up around 0530 and had a large amount of emesis. Patient declined any treatment stating \"I am ok\".

## 2020-09-10 NOTE — BH NOTE
Pt is stable and alert. Pt denies suicidal or homicidal ideations. Pt denies hallucinations. Pt cooperative. Will follow and monitor.
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
## 2020-09-10 NOTE — ED NOTES
Patient rocking back and forth in the bed. When asked why he was doing this, patient doesn't answer and lies prone in the bed to prevent it. Patient presents with thought blocking. States he only took one muscle relaxer today, but it's only the second time he has ever taken one. He also admits to using his medical marijuana today.         Ted Grimes RN  09/09/20 2028

## 2020-09-10 NOTE — PLAN OF CARE
Problem: Suicide risk  Goal: Provide patient with safe environment  Description: Provide patient with safe environment  9/10/2020 1955 by Chad Moe RN  Outcome: Met This Shift     Problem: Depressive Behavior With or Without Suicide Precautions:  Goal: Ability to disclose and discuss suicidal ideas will improve  Description: Ability to disclose and discuss suicidal ideas will improve  Outcome: Met This Shift     Problem: Depressive Behavior With or Without Suicide Precautions:  Goal: Able to verbalize and/or display a decrease in depressive symptoms  Description: Able to verbalize and/or display a decrease in depressive symptoms  Outcome: Not Met This Shift

## 2020-09-10 NOTE — PLAN OF CARE
Pt is stable and alert. Pt denies suicidal or homicidal ideations. Pt denies hallucinations. Negative distress, pt pleasant. Pt reports goal \"to get back with my family. Will follow and monitor.

## 2020-09-10 NOTE — CARE COORDINATION
Spoke with Mertie Osgood who reports that he is open with them. His  is Sera Cunningham, and he does meet with Dr. Cori Galicia but she does not prescribe him any medications. He has a medical marijuana card not sure who is his prescriber is and takes melatonin (OTC) for sleep. Gisele Lorenz is his -Arnie Barajas will contact the PO and provide an update.    Electronically signed by Ed Rojas, [facility] on 9/10/2020 at 9:14 AM

## 2020-09-11 PROCEDURE — 6370000000 HC RX 637 (ALT 250 FOR IP): Performed by: PSYCHIATRY & NEUROLOGY

## 2020-09-11 PROCEDURE — 99232 SBSQ HOSP IP/OBS MODERATE 35: CPT | Performed by: NURSE PRACTITIONER

## 2020-09-11 PROCEDURE — 1240000000 HC EMOTIONAL WELLNESS R&B

## 2020-09-11 PROCEDURE — 6370000000 HC RX 637 (ALT 250 FOR IP): Performed by: NURSE PRACTITIONER

## 2020-09-11 RX ADMIN — TRAZODONE HYDROCHLORIDE 50 MG: 50 TABLET ORAL at 22:34

## 2020-09-11 RX ADMIN — DIVALPROEX SODIUM 500 MG: 500 TABLET, DELAYED RELEASE ORAL at 09:42

## 2020-09-11 RX ADMIN — OLANZAPINE 10 MG: 10 TABLET, FILM COATED ORAL at 22:33

## 2020-09-11 ASSESSMENT — PAIN SCALES - GENERAL
PAINLEVEL_OUTOF10: 0
PAINLEVEL_OUTOF10: 0

## 2020-09-11 NOTE — BH NOTE
Pt is stable and alert. Pt denies suicidal or homicidal ideations. Pt denies hallucinations. Pt without other distress. Will follow and monitor.

## 2020-09-11 NOTE — PROGRESS NOTES
BEHAVIORAL HEALTH FOLLOW-UP NOTE     9/11/2020     Patient was seen and examined in person, Chart reviewed   Patient's case discussed with staff/team    Chief Complaint: Not offering any conversation    Interim History: Patient lying in his bed he is guarded and paranoid. He offers little to no conversation. Denies all symptoms does not command of his room does not attend groups does not socialize with peers. Denies SI/HI intent or plan denies auditory visualizations appears internally preoccupied    Appetite:   [x] Normal/Unchanged  [] Increased  [] Decreased      Sleep:       [x] Normal/Unchanged  [] Fair       [] Poor              Energy:    [x] Normal/Unchanged  [] Increased  [] Decreased        SI [] Present  [x] Absent    HI  []Present  [x] Absent     Aggression:  [] yes  [x] no    Patient is [x] able  [] unable to CONTRACT FOR SAFETY     PAST MEDICAL/PSYCHIATRIC HISTORY:   Past Medical History:   Diagnosis Date    Anxiety     Asthma     no meds currently- mostly exercise induced    Claustrophobia     confined spaces with locked doors    Depression     Epigastric pain     Nausea     Nut allergy        FAMILY/SOCIAL HISTORY:  History reviewed. No pertinent family history.   Social History     Socioeconomic History    Marital status: Single     Spouse name: Not on file    Number of children: 0    Years of education: 15    Highest education level: Not on file   Occupational History    Occupation: [employer]     Employer: [employer] Financial resource strain: Not on file    Food insecurity     Worry: Not on file     Inability: Not on file    Transportation needs     Medical: Not on file     Non-medical: Not on file   Tobacco Use    Smoking status: Never Smoker    Smokeless tobacco: Never Used   Substance and Sexual Activity    Alcohol use: No    Drug use: Not Currently     Types: Marijuana     Comment: states has a medical card    Sexual activity: Not Currently Partners: Female   Lifestyle    Physical activity     Days per week: Not on file     Minutes per session: Not on file    Stress: Not on file   Relationships    Social connections     Talks on phone: Not on file     Gets together: Not on file     Attends Adventist service: Not on file     Active member of club or organization: Not on file     Attends meetings of clubs or organizations: Not on file     Relationship status: Not on file    Intimate partner violence     Fear of current or ex partner: Not on file     Emotionally abused: Not on file     Physically abused: Not on file     Forced sexual activity: Not on file   Other Topics Concern    Not on file   Social History Narrative    ** Merged History Encounter **                ROS:  [x] All negative/unchanged except if checked.  Explain positive(checked items) below:  [] Constitutional  [] Eyes  [] Ear/Nose/Mouth/Throat  [] Respiratory  [] CV  [] GI  []   [] Musculoskeletal  [] Skin/Breast  [] Neurological  [] Endocrine  [] Heme/Lymph  [] Allergic/Immunologic    Explanation:     MEDICATIONS:    Current Facility-Administered Medications:     acetaminophen (TYLENOL) tablet 650 mg, 650 mg, Oral, Q6H PRN, Hernando Og MD    hydrOXYzine (VISTARIL) capsule 50 mg, 50 mg, Oral, TID PRN, Hernando Og MD    haloperidol lactate (HALDOL) injection 5 mg, 5 mg, Intramuscular, Q6H PRN **OR** haloperidol (HALDOL) tablet 5 mg, 5 mg, Oral, Q6H PRN, Hernando Og MD    traZODone (DESYREL) tablet 50 mg, 50 mg, Oral, Nightly PRN, Hernando Og MD    magnesium hydroxide (MILK OF MAGNESIA) 400 MG/5ML suspension 30 mL, 30 mL, Oral, Daily PRN, Hernando Og MD    aluminum & magnesium hydroxide-simethicone (MAALOX) 200-200-20 MG/5ML suspension 30 mL, 30 mL, Oral, PRN, Hernando Og MD    divalproex (DEPAKOTE) DR tablet 500 mg, 500 mg, Oral, 2 times per day, Hernando Og MD, 500 mg at 09/11/20 0942    OLANZapine (ZYPREXA) tablet 10 mg, 10 mg, Oral, Nightly, Italo Sandy, APRN - CNP, 10 mg at 09/10/20 2142      Examination:  BP (!) 101/54   Pulse 70   Temp 96.3 °F (35.7 °C) (Temporal)   Resp 17   Ht 6' (1.829 m)   Wt 170 lb (77.1 kg)   SpO2 97%   BMI 23.06 kg/m²   Gait - steady  Medication side effects(SE): Denies    Mental Status Examination:    Level of consciousness:  within normal limits   Appearance:  fair grooming and fair hygiene  Behavior/Motor:  no abnormalities noted  Attitude toward examiner:  guarded  Speech:  monotone and poverty of speech   Mood: constricted  Affect:  blunted and flat  Thought processes:  poverty of thought   Thought content: Appears paranoid internally preoccupied denies SI/HI intent or plan  Cognition:  oriented to person, place, and time   Concentration distractible  Insight poor   Judgement poor     ASSESSMENT:   Patient symptoms are:  [] Well controlled  [] Improving  [] Worsening  [x] No change      Diagnosis:   Principal Problem:    Acute psychosis ([facility]  Active Problems:    Polysubstance abuse ([facility].)  Resolved Problems:    * No resolved hospital problems. *      LABS:    Recent Labs     09/09/20  1844   WBC 9.9   HGB 15.9        Recent Labs     09/09/20  1844      K 3.5      CO2 23   BUN 16   CREATININE 1.0   GLUCOSE 103*     Recent Labs     09/09/20  1844   BILITOT 0.6   ALKPHOS 69   AST 14   ALT 13     Lab Results   Component Value Date    LABAMPH NOT DETECTED 09/09/2020    BARBSCNU NOT DETECTED 09/09/2020    LABBENZ NOT DETECTED 09/09/2020    LABMETH NOT DETECTED 09/09/2020    OPIATESCREENURINE NOT DETECTED 09/09/2020    PHENCYCLIDINESCREENURINE NOT DETECTED 09/09/2020    ETOH <10 09/09/2020     Lab Results   Component Value Date    TSH 1.240 04/18/2019     No results found for: LITHIUM  No results found for: VALPROATE, CBMZ        Treatment Plan:  Reviewed current Medications with the patient.    Risks, benefits, side effects, drug-to-drug interactions and alternatives to treatment were discussed. Collateral information:   CD evaluation  Encourage patient to attend group and other milieu activities.   Discharge planning discussed with the patient and treatment team.    Continue Depakote 500 mg twice daily for mood stabilization  Continue Zyprexa 10 mg at bedtime for psychosis and mood    PSYCHOTHERAPY/COUNSELING:  [x] Therapeutic interview  [x] Supportive  [] CBT  [] Ongoing  [] Other    [x] Patient continues to need, on a daily basis, active treatment furnished directly by or requiring the supervision of inpatient psychiatric personnel      Anticipated Length of stay: 5 to 7 days based on stability            Electronically signed by KELVIN Chaves CNP on 1/43/8421 at 4:08 PM

## 2020-09-12 PROCEDURE — 99231 SBSQ HOSP IP/OBS SF/LOW 25: CPT | Performed by: NURSE PRACTITIONER

## 2020-09-12 PROCEDURE — 1240000000 HC EMOTIONAL WELLNESS R&B

## 2020-09-12 RX ORDER — OLANZAPINE 5 MG/1
15 TABLET ORAL NIGHTLY
Status: DISCONTINUED | OUTPATIENT
Start: 2020-09-12 | End: 2020-09-14

## 2020-09-12 ASSESSMENT — PAIN SCALES - GENERAL: PAINLEVEL_OUTOF10: 0

## 2020-09-12 NOTE — PLAN OF CARE
Problem: Suicide risk  Goal: Provide patient with safe environment  Description: Provide patient with safe environment  9/12/2020 1136 by Vicki Magana RN  Outcome: Ongoing  9/12/2020 0355 by Roger Mcdaniel RN  Outcome: Met This Shift  9/12/2020 0047 by Juli Navarro RN  Outcome: Ongoing     Problem: Depressive Behavior With or Without Suicide Precautions:  Goal: Able to verbalize acceptance of life and situations over which he or she has no control  Description: Able to verbalize acceptance of life and situations over which he or she has no control  Outcome: Ongoing  Goal: Able to verbalize and/or display a decrease in depressive symptoms  Description: Able to verbalize and/or display a decrease in depressive symptoms  9/12/2020 1136 by Vicki Magana RN  Outcome: Ongoing  9/12/2020 0355 by Roger Mcdaniel RN  Outcome: Ongoing  9/12/2020 0047 by Juli Navarro RN  Outcome: Ongoing  Goal: Ability to disclose and discuss suicidal ideas will improve  Description: Ability to disclose and discuss suicidal ideas will improve  Outcome: Ongoing  Goal: Able to verbalize support systems  Description: Able to verbalize support systems  Outcome: Ongoing  Goal: Absence of self-harm  Description: Absence of self-harm  9/12/2020 1136 by Vicki Magana RN  Outcome: Ongoing  9/12/2020 0355 by Roger Mcdaniel RN  Outcome: Met This Shift

## 2020-09-12 NOTE — PLAN OF CARE
Problem: Depressive Behavior With or Without Suicide Precautions:  Goal: Able to verbalize acceptance of life and situations over which he or she has no control  Description: Able to verbalize acceptance of life and situations over which he or she has no control  9/12/2020 1745 by More Kelley RN  Outcome: Ongoing  9/12/2020 1136 by Prudencio Lewis RN  Outcome: Ongoing     Problem: Depressive Behavior With or Without Suicide Precautions:  Goal: Able to verbalize and/or display a decrease in depressive symptoms  Description: Able to verbalize and/or display a decrease in depressive symptoms  9/12/2020 1745 by More Kelley RN  Outcome: Ongoing  9/12/2020 1136 by Prudencio Lewis RN  Outcome: Ongoing  9/12/2020 0355 by Andrea Elena RN  Outcome: Ongoing     Problem: Depressive Behavior With or Without Suicide Precautions:  Goal: Ability to disclose and discuss suicidal ideas will improve  Description: Ability to disclose and discuss suicidal ideas will improve  9/12/2020 1745 by More Kelley RN  Outcome: Ongoing  9/12/2020 1136 by Prudencio Lewis RN  Outcome: Ongoing     Pt denies suicidal ideations, homicidal ideations and hallucinations. Pt isolative to room. Encouraged to attend groups and be medication compliant. Pt stated \"I know what meds work for me. I know what I can get out of groups. \" Pt has poor judgment. Complains of racing thoughts. Anxious. Restless. Will continue to monitor.

## 2020-09-12 NOTE — PLAN OF CARE
Charissa Tay isolating in bed much of the gibson. Awakened to speak about some fav music videos. Agreed eventually to take zyprexa though declined depakote. Speaks of being more comfortable to a prev med regime. Limited elaboration. States he'll d/w team in am however. Denies si, hi.    Coherent, lucid conversation though limited this gibson.     No untoward beh noted

## 2020-09-12 NOTE — PROGRESS NOTES
Patient isolates to his room, is guarded and preoccupied. Patient has poor eye contact, denies thoughts to harm self or others, denies hallucinations. Patient was encouraged to attend groups. Patient refused his scheduled dose of Depakote. Behavior in control.  Appetite good

## 2020-09-12 NOTE — PROGRESS NOTES
Patient declined scheduled dose of Depakote this morning, patient stated \"all I want is my melatonin and my medical mariajuana. \"  RN aware.

## 2020-09-12 NOTE — PROGRESS NOTES
BEHAVIORAL HEALTH FOLLOW-UP NOTE     9/12/2020     Patient was seen and examined in person, Chart reviewed   Patient's case discussed with staff/team    Chief Complaint: Not offering any conversation    Interim History: Patient seen lying in his room. He does not answer any questions. I asked him to start attending groups and he refused to talk after that. He appears internally preoccupied. He has no insight and judgment to hospitalization need for treatment      Appetite:   [x] Normal/Unchanged  [] Increased  [] Decreased      Sleep:       [x] Normal/Unchanged  [] Fair       [] Poor              Energy:    [x] Normal/Unchanged  [] Increased  [] Decreased        SI [] Present  [x] Absent    HI  []Present  [x] Absent     Aggression:  [] yes  [x] no    Patient is [x] able  [] unable to CONTRACT FOR SAFETY     PAST MEDICAL/PSYCHIATRIC HISTORY:   Past Medical History:   Diagnosis Date    Anxiety     Asthma     no meds currently- mostly exercise induced    Claustrophobia     confined spaces with locked doors    Depression     Epigastric pain     Nausea     Nut allergy        FAMILY/SOCIAL HISTORY:  History reviewed. No pertinent family history.   Social History     Socioeconomic History    Marital status: Single     Spouse name: Not on file    Number of children: 0    Years of education: 15    Highest education level: Not on file   Occupational History    Occupation:      Employer: [id number] Financial resource strain: Not on file    Food insecurity     Worry: Not on file     Inability: Not on file    Transportation needs     Medical: Not on file     Non-medical: Not on file   Tobacco Use    Smoking status: Never Smoker    Smokeless tobacco: Never Used   Substance and Sexual Activity    Alcohol use: No    Drug use: Not Currently     Types: Marijuana     Comment: states has a medical card    Sexual activity: Not Currently     Partners: Female   Lifestyle    Physical activity     Days per week: Not on file     Minutes per session: Not on file    Stress: Not on file   Relationships    Social connections     Talks on phone: Not on file     Gets together: Not on file     Attends Sabianist service: Not on file     Active member of club or organization: Not on file     Attends meetings of clubs or organizations: Not on file     Relationship status: Not on file    Intimate partner violence     Fear of current or ex partner: Not on file     Emotionally abused: Not on file     Physically abused: Not on file     Forced sexual activity: Not on file   Other Topics Concern    Not on file   Social History Narrative    ** Merged History Encounter **                ROS:  [x] All negative/unchanged except if checked.  Explain positive(checked items) below:  [] Constitutional  [] Eyes  [] Ear/Nose/Mouth/Throat  [] Respiratory  [] CV  [] GI  []   [] Musculoskeletal  [] Skin/Breast  [] Neurological  [] Endocrine  [] Heme/Lymph  [] Allergic/Immunologic    Explanation:     MEDICATIONS:    Current Facility-Administered Medications:     acetaminophen (TYLENOL) tablet 650 mg, 650 mg, Oral, Q6H PRN, Becky Cardona MD    hydrOXYzine (VISTARIL) capsule 50 mg, 50 mg, Oral, TID PRN, Becky Cardona MD    haloperidol lactate (HALDOL) injection 5 mg, 5 mg, Intramuscular, Q6H PRN **OR** haloperidol (HALDOL) tablet 5 mg, 5 mg, Oral, Q6H PRN, Becky Cardona MD    traZODone (DESYREL) tablet 50 mg, 50 mg, Oral, Nightly PRN, Becky Cardona MD, 50 mg at 09/11/20 2234    magnesium hydroxide (MILK OF MAGNESIA) 400 MG/5ML suspension 30 mL, 30 mL, Oral, Daily PRN, Becky Cardona MD    aluminum & magnesium hydroxide-simethicone (MAALOX) 200-200-20 MG/5ML suspension 30 mL, 30 mL, Oral, PRN, Becky Cardona MD    divalproex (DEPAKOTE) DR tablet 500 mg, 500 mg, Oral, 2 times per day, Becky Cardona MD, 500 mg at 09/11/20 0942    OLANZapine (ZYPREXA) tablet 10 mg, 10 mg, Oral, Nightly, Lashanda Arina, APRN - CNP, 10 mg at 09/11/20 2233      Examination:  /63   Pulse 63   Temp 97.8 °F (36.6 °C) (Temporal)   Resp 16   Ht 6' (1.829 m)   Wt 170 lb (77.1 kg)   SpO2 98%   BMI 23.06 kg/m²   Gait - steady  Medication side effects(SE): Denies    Mental Status Examination:    Level of consciousness:  within normal limits   Appearance:  fair grooming and fair hygiene  Behavior/Motor:  no abnormalities noted  Attitude toward examiner:  guarded  Speech:  monotone and poverty of speech   Mood: constricted  Affect:  blunted and flat  Thought processes:  poverty of thought   Thought content: Appears paranoid internally preoccupied denies SI/HI intent or plan  Cognition:  oriented to person, place, and time   Concentration distractible  Insight poor   Judgement poor     ASSESSMENT:   Patient symptoms are:  [] Well controlled  [] Improving  [] Worsening  [x] No change      Diagnosis:   Principal Problem:    Acute psychosis ([facility]  Active Problems:    Polysubstance abuse ([facility].)  Resolved Problems:    * No resolved hospital problems. *      LABS:    Recent Labs     09/09/20  1844   WBC 9.9   HGB 15.9        Recent Labs     09/09/20  1844      K 3.5      CO2 23   BUN 16   CREATININE 1.0   GLUCOSE 103*     Recent Labs     09/09/20  1844   BILITOT 0.6   ALKPHOS 69   AST 14   ALT 13     Lab Results   Component Value Date    LABAMPH NOT DETECTED 09/09/2020    BARBSCNU NOT DETECTED 09/09/2020    LABBENZ NOT DETECTED 09/09/2020    LABMETH NOT DETECTED 09/09/2020    OPIATESCREENURINE NOT DETECTED 09/09/2020    PHENCYCLIDINESCREENURINE NOT DETECTED 09/09/2020    ETOH <10 09/09/2020     Lab Results   Component Value Date    TSH 1.240 04/18/2019     No results found for: LITHIUM  No results found for: VALPROATE, CBMZ        Treatment Plan:  Reviewed current Medications with the patient.    Risks, benefits, side effects, drug-to-drug interactions and alternatives to treatment were discussed. Collateral information:   CD evaluation  Encourage patient to attend group and other milieu activities.   Discharge planning discussed with the patient and treatment team.    Continue Depakote 500 mg twice daily for mood stabilization  Increase Zyprexa 15 mg at bedtime for psychosis and mood    PSYCHOTHERAPY/COUNSELING:  [x] Therapeutic interview  [x] Supportive  [] CBT  [] Ongoing  [] Other    [x] Patient continues to need, on a daily basis, active treatment furnished directly by or requiring the supervision of inpatient psychiatric personnel      Anticipated Length of stay: 5 to 7 days based on stability            Electronically signed by KELVIN Miguel CNP on 9/95/3592 at 3:01 PM
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
## 2020-09-13 PROCEDURE — 99231 SBSQ HOSP IP/OBS SF/LOW 25: CPT | Performed by: NURSE PRACTITIONER

## 2020-09-13 PROCEDURE — 1240000000 HC EMOTIONAL WELLNESS R&B

## 2020-09-13 ASSESSMENT — PAIN SCALES - GENERAL
PAINLEVEL_OUTOF10: 0
PAINLEVEL_OUTOF10: 0
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
## 2020-09-13 NOTE — PLAN OF CARE
Pt. Denies SI, HI, and hallucinations this shift. Pt. Denies physical complaints currently. Pt. Is evasive and appears anxious.

## 2020-09-13 NOTE — PLAN OF CARE
Patient is resting quietly in bed with eyes closed at this time. No signs of distress or discomfort noted. No PRN medications given thus far. Safety needs met. No unit problems reported. Will continue to observe and support.      Problem: Suicide risk  Goal: Provide patient with safe environment  Description: Provide patient with safe environment  Outcome: Met This Shift     Problem: Depressive Behavior With or Without Suicide Precautions:  Goal: Able to verbalize acceptance of life and situations over which he or she has no control  Description: Able to verbalize acceptance of life and situations over which he or she has no control  9/13/2020 0412 by Jarvis Rinaldi RN  Outcome: Ongoing  9/12/2020 1745 by Patrick Mccann RN  Outcome: Ongoing     Problem: Depressive Behavior With or Without Suicide Precautions:  Goal: Able to verbalize and/or display a decrease in depressive symptoms  Description: Able to verbalize and/or display a decrease in depressive symptoms  9/13/2020 0412 by Jarvis Rinaldi RN  Outcome: Ongoing  9/12/2020 1745 by Patrick Mccann RN  Outcome: Ongoing

## 2020-09-14 PROCEDURE — 99232 SBSQ HOSP IP/OBS MODERATE 35: CPT | Performed by: NURSE PRACTITIONER

## 2020-09-14 PROCEDURE — 1240000000 HC EMOTIONAL WELLNESS R&B

## 2020-09-14 RX ORDER — DIVALPROEX SODIUM 250 MG/1
250 TABLET, DELAYED RELEASE ORAL EVERY 12 HOURS SCHEDULED
Status: DISCONTINUED | OUTPATIENT
Start: 2020-09-14 | End: 2020-09-17

## 2020-09-14 ASSESSMENT — PAIN SCALES - GENERAL
PAINLEVEL_OUTOF10: 0
PAINLEVEL_OUTOF10: 0

## 2020-09-14 NOTE — PLAN OF CARE
Mood is improved   denies SI/HI but does admit to SI that come and go in times of stress     denies hallucinations    pleasant on approach   attended treatment team   doesn't want rehab because he doesn't feel that drugs are his problem    states he will consider taking medications but in the past didn't like the way they made him feel  attending groups   out and social   will continue to monitor

## 2020-09-14 NOTE — GROUP NOTE
Group Therapy Note    Date: 9/14/2020    Group Start Time: 1010  Group End Time: 1050  Group Topic: Psychoeducation    [facility]    Kiel Burch, KELTONS        Group Therapy Note      Type of Group: Psychoeducation  Patient's Goal:  patient will be able to identify steps to finding balance in one life. Notes: pleasant and engaged in group able to share appropriately. Status After Intervention:  Improved    Participation Level:  Active Listener and Interactive    Participation Quality: Appropriate, Attentive, Sharing, and Supportive      Speech:  normal     Thought Process/Content: Logical      Affective Functioning: Congruent      Mood: euthymic      Level of consciousness:  Alert, Oriented x4, and Attentive      Response to Learning: Able to verbalize/acknowledge new learning, Able to retain information, and Progressing to goal      Endings: None Reported    Modes of Intervention: Education, Support, Socialization, Exploration, and Problem-solving      Discipline Responsible: Psychoeducational Specialist      Signature:  Davon Hernández

## 2020-09-15 PROCEDURE — 99232 SBSQ HOSP IP/OBS MODERATE 35: CPT | Performed by: NURSE PRACTITIONER

## 2020-09-15 PROCEDURE — 1240000000 HC EMOTIONAL WELLNESS R&B

## 2020-09-15 PROCEDURE — 6370000000 HC RX 637 (ALT 250 FOR IP): Performed by: NURSE PRACTITIONER

## 2020-09-15 RX ADMIN — DIVALPROEX SODIUM 250 MG: 250 TABLET, DELAYED RELEASE ORAL at 20:48

## 2020-09-15 ASSESSMENT — PAIN SCALES - GENERAL
PAINLEVEL_OUTOF10: 0
PAINLEVEL_OUTOF10: 0
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
## 2020-09-15 NOTE — GROUP NOTE
Group Therapy Note    Date: 9/15/2020    Group Start Time: 1125  Group End Time: 4443  Group Topic: Cognitive Skills    [facility]    BRIGITTE López, [facility]    Number of participants: 14  Type of group: Cognitive Skills  Mode of intervention: Education, Support, Socialization, Exploration, Clarifying, and Problem-solving  Topic: 10 ways to forgive self  Objective: To identify ways to forgive self, let go and move on. Group Therapy Note          Notes: Pt was an active participant in cognitive skills group focusing on identifying ways to forgive self. Pt was able to share personal information and provide supportive feedback to group members. Status After Intervention:  Improved    Participation Level:  Active Listener and Interactive    Participation Quality: Appropriate, Attentive, Sharing and Supportive      Speech:  normal      Thought Process/Content: Logical      Affective Functioning: Congruent      Mood: euthymic      Level of consciousness:  Alert, Oriented x4 and Attentive      Response to Learning: Progressing to goal      Endings: None Reported    Modes of Intervention: Education, Support, Socialization, Exploration, Clarifying and Problem-solving      Discipline Responsible: /Counselor      Signature:  BRIGITTE López [place]

## 2020-09-15 NOTE — CARE COORDINATION
Navigator spoke with Maricruz Crane who reports that Dodie Kraft has issued a bench warrant for clients arrest.  This is a result of  conversation with his P.O. conversation with client's PO and family. Electronically signed by Drake Jones [facility] on 9/15/2020 at 9:44 AM     Spoke with [facility] Asst Chief. [address] [place] [address] does not have a contract with [facility] department for police holds, therefore the hospital cannot contact the [facility] department at time of discharge. Client is not a police hold. Navigator followed up with Maricruz Crane, RN, and  NP and reported same.    Electronically signed by Drake Jones [facility] on 9/15/2020 at 10:20 AM

## 2020-09-15 NOTE — GROUP NOTE
Group Therapy Note    Date: 9/15/2020    Group Start Time: 1000  Group End Time: 1030  Group Topic: Psychoeducation    [facility]    Keren Boateng, CTRS        Group Therapy Note    Number of participants: 13  Type of group: Psychoeducation  Mode of intervention: Education, Support, Socialization, Exploration, Clarifying, and Problem-solving  Topic: Gratitude  Objective: Pt will identify 3 ways to maintain an attitude of gratitude in recovery. Patient's Goal:  \"To color\"     Notes:  Pt was interactive during group sharing 3 ways to maintain an attitude of gratitude in recovery. Pt gave support and feedback to others. Status After Intervention:  Improved    Participation Level:  Active Listener and Interactive    Participation Quality: Appropriate, Attentive, Sharing and Supportive      Speech:  normal      Thought Process/Content: Logical      Affective Functioning: Congruent      Mood: euthymic      Level of consciousness:  Alert, Oriented x4 and Attentive      Response to Learning: Able to verbalize current knowledge/experience, Able to verbalize/acknowledge new learning, Able to retain information, Capable of insight, Able to change behavior and Progressing to goal      Endings: None Reported    Modes of Intervention: Education, Support, Socialization, Exploration, Clarifying, Problem-solving and Activity

## 2020-09-15 NOTE — PROGRESS NOTES
Out using phone redirected for getting loud on the phone then in room wayne tells me he is ok and he will take depakote tonight denies any SI HI or onofre emotional support given

## 2020-09-16 PROBLEM — F60.89 CLUSTER B PERSONALITY DISORDER (HCC): Status: ACTIVE | Noted: 2020-09-16

## 2020-09-16 PROCEDURE — 6370000000 HC RX 637 (ALT 250 FOR IP): Performed by: NURSE PRACTITIONER

## 2020-09-16 PROCEDURE — 99232 SBSQ HOSP IP/OBS MODERATE 35: CPT | Performed by: NURSE PRACTITIONER

## 2020-09-16 PROCEDURE — 1240000000 HC EMOTIONAL WELLNESS R&B

## 2020-09-16 PROCEDURE — 6370000000 HC RX 637 (ALT 250 FOR IP): Performed by: PSYCHIATRY & NEUROLOGY

## 2020-09-16 RX ADMIN — DIVALPROEX SODIUM 250 MG: 250 TABLET, DELAYED RELEASE ORAL at 09:09

## 2020-09-16 RX ADMIN — ACETAMINOPHEN 650 MG: 325 TABLET, FILM COATED ORAL at 18:00

## 2020-09-16 RX ADMIN — MAGNESIUM HYDROXIDE/ALUMINUM HYDROXICE/SIMETHICONE 30 ML: 120; 1200; 1200 SUSPENSION ORAL at 18:00

## 2020-09-16 RX ADMIN — DIVALPROEX SODIUM 250 MG: 250 TABLET, DELAYED RELEASE ORAL at 21:29

## 2020-09-16 ASSESSMENT — PAIN SCALES - GENERAL
PAINLEVEL_OUTOF10: 7
PAINLEVEL_OUTOF10: 3

## 2020-09-16 NOTE — PROGRESS NOTES
Patient states SI at times but feels they are getting better. No plan. Denies HI, and Hallucinations at this time. Will continue to monitor.

## 2020-09-16 NOTE — GROUP NOTE
Group Therapy Note    Date: 9/16/2020    Group Start Time: 1120  Group End Time: 1200  Group Topic: Cognitive Skills    [facility]        Group Therapy Note    Attendees: 11         Patient's Goal:  To identify protective factors and ways to use coping skills    Notes:  Pt participated and made positive connections during group    Status After Intervention:  Improved    Participation Level:  Active Listener and Interactive    Participation Quality: Appropriate, Attentive and Supportive      Speech:  normal      Thought Process/Content: Logical      Affective Functioning: Congruent      Mood: euthymic      Level of consciousness:  Preoccupied      Response to Learning: Able to verbalize current knowledge/experience and Able to verbalize/acknowledge new learning      Endings: None Reported    Modes of Intervention: Education, Support, Socialization and Exploration      Discipline Responsible: /Counselor      Signature:  GABBI Roach

## 2020-09-16 NOTE — GROUP NOTE
Group Therapy Note    Date: 9/16/2020    Group Start Time: 1310  Group End Time: 2478  Group Topic: Cognitive Skills    [facility]    BRIGITTE Mchugh LSW        Group Therapy Note    Attendees: 14         Patient's Goal:  To participate in grounding techniques. Notes: Pt was actively engaged in group discussion and activity. Status After Intervention:  Unchanged    Participation Level:  Active Listener and Interactive    Participation Quality: Appropriate, Attentive and Sharing      Speech:  normal      Thought Process/Content: Logical  Linear      Affective Functioning: Congruent      Mood: euthymic      Level of consciousness:  Alert, Oriented x4 and Attentive      Response to Learning: Able to verbalize current knowledge/experience, Able to verbalize/acknowledge new learning and Able to retain information      Endings: None Reported    Modes of Intervention: Education, Support, Socialization, Exploration, Clarifying, Problem-solving and Activity      Discipline Responsible: /Counselor      Signature:  BRIGITTE Mchugh LSW

## 2020-09-16 NOTE — PROGRESS NOTES
[address] [place] [address] Interdisciplinary Treatment Plan Note     Review Date & Time: 9/16/20 1000    Patient was not in treatment team.    Admission Type:   Admission Type: Involuntary    Reason for admission:  Reason for Admission: Patient closed his eyes and would not answer the question. Estimated Length of Stay Update:  3-5 days   Estimated Discharge Date Update: 3-5 days    PATIENT STRENGTHS:  Patient Strengths:Strengths: Communication  Patient Strengths and Limitations:Limitations: Multiple barriers to leisure interests, Difficulty problem solving/relies on others to help solve problems, Tendency to isolate self  Addictive Behavior:Addictive Behavior  In the past 3 months, have you felt or has someone told you that you have a problem with:  : None  Do you have a history of Chemical Use?: No  Do you have a history of Alcohol Use?: No  Do you have a history of Street Drug Abuse?: No  Histroy of Prescripton Drug Abuse?: No  Medical Problems:   Past Medical History:   Diagnosis Date    Anxiety     Asthma     no meds currently- mostly exercise induced    Claustrophobia     confined spaces with locked doors    Depression     Epigastric pain     Nausea     Nut allergy        Risk:  Fall RiskTotal: 55  Moses Scale Moses Scale Score: 22  BVC Total: 0  Change in scores none .  Changes to plan of Care  none    Status EXAM:   Status and Exam  Normal: No  Facial Expression: Avoids Gaze, Worried, Sad  Affect: Blunt  Level of Consciousness: Alert  Mood:Normal: No  Mood: Suspicious, Sad, Anxious  Motor Activity:Normal: No  Motor Activity: Decreased  Interview Behavior: Uncooperative/Withdrawn  Preception: Elton to Person, Elton to Time, Elton to Place, Elton to Situation  Attention:Normal: No  Attention: Distractible  Thought Processes: Circumstantial  Thought Content:Normal: No  Thought Content: Poverty of Content  Hallucinations: None  Delusions: No  Memory:Normal: No  Memory: Confabulation, Poor Recent, Poor Remote  Insight and Judgment: No  Insight and Judgment: Poor Judgment, Poor Insight, Unmotivated  Present Suicidal Ideation: Yes  Present Homicidal Ideation: No    Daily Assessment Last Entry:   Daily Sleep (WDL): Within Defined Limits         Patient Currently in Pain: No  Daily Nutrition (WDL): Within Defined Limits    Patient Monitoring:  Frequency of Checks: 4 times per hour, close    Psychiatric Symptoms:   Depression Symptoms  Depression Symptoms: No problems reported or observed. Anxiety Symptoms  Anxiety Symptoms: Generalized  Krystle Symptoms  Krystle Symptoms: Poor judgment     Psychosis Symptoms  Hallucination Type: No problems reported or observed. Delusion Type: No problems reported or observed. Suicide Risk CSSR-S:  1) Within the past month, have you wished you were dead or wished you could go to sleep and not wake up? : Yes  2) Have you actually had any thoughts of killing yourself? : Yes  3) Have you been thinking about how you might kill yourself? : Yes  5) Have you started to work out or worked out the details of how to kill yourself?  Do you intend to carry out this plan? : Yes  6) Have you ever done anything, started to do anything, or prepared to do anything to end your life?: No  Change in Result none Change in Plan of care none    EDUCATION:   Learner Progress Toward Treatment Goals: Reviewed results and recommendations of this team    Method: Individual    Outcome: Verbalized understanding    PATIENT GOALS: Get my laundry done    PLAN/TREATMENT RECOMMENDATIONS UPDATE: continue current treatment plan    GOALS UPDATE:  Time frame for Short-Term Goals: 3-5 days      Zhou Rowell RN

## 2020-09-16 NOTE — GROUP NOTE
Group Therapy Note    Date: 9/16/2020    Group Start Time: 8318  Group End Time: 1100  Group Topic: Psychoeducation    [facility]    [place], [place]                                                                        Group Therapy Note    Date: 9/16/2020    Wellness Binder Information  Module Name: self esteem self worth   Patient's Goal: patient will be able to identify steps to increase ones own self worth and self esteem thruout the day. Notes: patient pleasant and engaged in group . Status After Intervention:  Improved    Participation Level:  Active Listener and Interactive    Participation Quality: Appropriate, Attentive, Sharing, and Supportive      Speech:  normal       Thought Process/Content: Logical      Affective Functioning: Congruent      Mood: euthymic      Level of consciousness:  Alert, Oriented x4, and Attentive      Response to Learning: Able to verbalize/acknowledge new learning, Able to retain information, and Progressing to goal      Endings: None Reported    Modes of Intervention: Education, Support, Socialization, Exploration, and Problem-solving      Discipline Responsible: Psychoeducational Specialist      Signature:  Emma Caro

## 2020-09-16 NOTE — PROGRESS NOTES
BEHAVIORAL HEALTH FOLLOW-UP NOTE     9/16/2020    Patient personally seen and examined by me mental status examination performed. Agree the below assessment by the nurse practitioner student. Patient seen in his room today where he continues to report passive suicidal ideations. He shows very poor limited insight and judgment into his hospitalization need for treatment and into the circumstances regarding his hospitalization specifically his drug use. He has no motivation to attend rehab or to get any sort of drug or alcohol treatment. Eye contact is fair speech is normal rate and tone. His mood is \"I am depressed. \"  Affect is mood incongruent appropriate pleasant. Thought processes linear without flight of ideas or loose associations. He denies any auditory visualizations delusions or other perceptual normalities. He endorses passive suicidal ideations denies any homicidal ideations intent or plan he is impulsive with adequate his current function was at baseline his insight judgment is poor he is alert oriented time place and person. Patient was seen and examined in person, Chart reviewed   Patient's case discussed with staff/team    Chief Complaint: Fleeting suicidal ideations    Interim History: Patient seen in his room. Report passive suicidal ideations. States \"Life just keeps getting worse and worse. \" He reports that he talked to his dad on the phone last night and became upset because he was told he cannot return home. Emotional support offered. He is out on the unit today and attending some groups. He still shows no interest in any inpatient drug treatment program.  He still shows poor insight judgment hospitalization need for treatment. Denies HI intent or plan. Denies auditory and visual hallucinations. Patient has been compliant with taking depakote today.      Appetite:   [x] Normal/Unchanged  [] Increased  [] Decreased      Sleep:       [x] Normal/Unchanged  [] Fair       [] Poor Energy:    [x] Normal/Unchanged  [] Increased  [] Decreased        SI [x] Present  [] Absent    HI  []Present  [x] Absent     Aggression:  [] yes  [x] no    Patient is [x] able  [] unable to CONTRACT FOR SAFETY     PAST MEDICAL/PSYCHIATRIC HISTORY:   Past Medical History:   Diagnosis Date    Anxiety     Asthma     no meds currently- mostly exercise induced    Claustrophobia     confined spaces with locked doors    Depression     Epigastric pain     Nausea     Nut allergy        FAMILY/SOCIAL HISTORY:  History reviewed. No pertinent family history.   Social History     Socioeconomic History    Marital status: Single     Spouse name: Not on file    Number of children: 0    Years of education: 15    Highest education level: Not on file   Occupational History    Occupation:      Employer: [employer] Financial resource strain: Not on file    Food insecurity     Worry: Not on file     Inability: Not on file    Transportation needs     Medical: Not on file     Non-medical: Not on file   Tobacco Use    Smoking status: Never Smoker    Smokeless tobacco: Never Used   Substance and Sexual Activity    Alcohol use: No    Drug use: Not Currently     Types: Marijuana     Comment: states has a medical card    Sexual activity: Not Currently     Partners: Female   Lifestyle    Physical activity     Days per week: Not on file     Minutes per session: Not on file    Stress: Not on file   Relationships    Social connections     Talks on phone: Not on file     Gets together: Not on file     Attends Yazdanism service: Not on file     Active member of club or organization: Not on file     Attends meetings of clubs or organizations: Not on file     Relationship status: Not on file    Intimate partner violence     Fear of current or ex partner: Not on file     Emotionally abused: Not on file     Physically abused: Not on file     Forced sexual activity: Not on file   Other Topics Concern    Not on file   Social History Narrative    ** Merged History Encounter **                ROS:  [x] All negative/unchanged except if checked.  Explain positive(checked items) below:  [] Constitutional  [] Eyes  [] Ear/Nose/Mouth/Throat  [] Respiratory  [] CV  [] GI  []   [] Musculoskeletal  [] Skin/Breast  [] Neurological  [] Endocrine  [] Heme/Lymph  [] Allergic/Immunologic    Explanation:     MEDICATIONS:    Current Facility-Administered Medications:     divalproex (DEPAKOTE) DR tablet 250 mg, 250 mg, Oral, 2 times per day, Jorge Queen, APRN - CNP, 748 mg at 09/16/20 0909    acetaminophen (TYLENOL) tablet 650 mg, 650 mg, Oral, Q6H PRN, Dearl Cranker, MD    hydrOXYzine (VISTARIL) capsule 50 mg, 50 mg, Oral, TID PRN, Dearl Cranker, MD    haloperidol lactate (HALDOL) injection 5 mg, 5 mg, Intramuscular, Q6H PRN **OR** haloperidol (HALDOL) tablet 5 mg, 5 mg, Oral, Q6H PRN, Dearl Cranker, MD    traZODone (DESYREL) tablet 50 mg, 50 mg, Oral, Nightly PRN, Dearl Cranker, MD, 50 mg at 09/11/20 2234    magnesium hydroxide (MILK OF MAGNESIA) 400 MG/5ML suspension 30 mL, 30 mL, Oral, Daily PRN, Dearl Cranker, MD    aluminum & magnesium hydroxide-simethicone (MAALOX) 200-200-20 MG/5ML suspension 30 mL, 30 mL, Oral, PRN, Dearl Cranker, MD      Examination:  /73   Pulse 60   Temp 97.4 °F (36.3 °C) (Oral)   Resp 16   Ht 6' (1.829 m)   Wt 170 lb (77.1 kg)   SpO2 98%   BMI 23.06 kg/m²   Gait - steady  Medication side effects(SE): Denies    Mental Status Examination:    Level of consciousness:  within normal limits   Appearance:  fair grooming and fair hygiene  Behavior/Motor:  no abnormalities noted  Attitude toward examiner:  guarded  Speech:  monotone and poverty of speech   Mood: constricted  Affect:  blunted and flat  Thought processes: Linear without flight of ideas or loose associations  Thought content: Guarded denies auditory visual hallucinations endorses passive SI denies HI. Cognition:  oriented to person, place, and time   Concentration distractible  Insight poor   Judgement poor     ASSESSMENT:   Patient symptoms are:  [] Well controlled  [x] Improving  [] Worsening  [] No change      Diagnosis:   Principal Problem:    Acute psychosis ([facility]  Active Problems:    Polysubstance abuse ([facility]    Cluster B personality disorder ([facility]  Resolved Problems:    * No resolved hospital problems. *      LABS:    No results for input(s): WBC, HGB, PLT in the last 72 hours. No results for input(s): NA, K, CL, CO2, BUN, CREATININE, GLUCOSE in the last 72 hours. No results for input(s): BILITOT, ALKPHOS, AST, ALT in the last 72 hours. Lab Results   Component Value Date    LABAMPH NOT DETECTED 09/09/2020    BARBSCNU NOT DETECTED 09/09/2020    LABBENZ NOT DETECTED 09/09/2020    LABMETH NOT DETECTED 09/09/2020    OPIATESCREENURINE NOT DETECTED 09/09/2020    PHENCYCLIDINESCREENURINE NOT DETECTED 09/09/2020    ETOH <10 09/09/2020     Lab Results   Component Value Date    TSH 1.240 04/18/2019     No results found for: LITHIUM  No results found for: VALPROATE, CBMZ        Treatment Plan:  Reviewed current Medications with the patient. Risks, benefits, side effects, drug-to-drug interactions and alternatives to treatment were discussed. Collateral information:   CD evaluation  Encourage patient to attend group and other milieu activities.   Discharge planning discussed with the patient and treatment team.    Continue Depakote 250 mg twice daily for mood stabilization    PSYCHOTHERAPY/COUNSELING:  [x] Therapeutic interview  [x] Supportive  [] CBT  [] Ongoing  [] Other    [x] Patient continues to need, on a daily basis, active treatment furnished directly by or requiring the supervision of inpatient psychiatric personnel      Anticipated Length of stay: 5 to 7 days based on stability            Electronically signed by Lauren Lombardo on 9/16/2020 at 1:59 PM

## 2020-09-16 NOTE — PROGRESS NOTES
[facility] FOLLOW-UP NOTE     9/15/2020     Patient was seen and examined in person, Chart reviewed   Patient's case discussed with staff/team    Chief Complaint: Still reporting fleeting suicidal ideations    Interim History: Patient is up on the unit today. He is attending some groups. He is still reporting passive suicidal ideations. He still shows no interest in any inpatient drug treatment program.  He still shows poor insight judgment hospitalization need for treatment. Is been refusing medications. Appetite:   [x] Normal/Unchanged  [] Increased  [] Decreased      Sleep:       [x] Normal/Unchanged  [] Fair       [] Poor              Energy:    [x] Normal/Unchanged  [] Increased  [] Decreased        SI [x] Present  [] Absent    HI  []Present  [x] Absent     Aggression:  [] yes  [x] no    Patient is [x] able  [] unable to CONTRACT FOR SAFETY     PAST MEDICAL/PSYCHIATRIC HISTORY:   Past Medical History:   Diagnosis Date    Anxiety     Asthma     no meds currently- mostly exercise induced    Claustrophobia     confined spaces with locked doors    Depression     Epigastric pain     Nausea     Nut allergy        FAMILY/SOCIAL HISTORY:  History reviewed. No pertinent family history.   Social History     Socioeconomic History    Marital status: Single     Spouse name: Not on file    Number of children: 0    Years of education: 15    Highest education level: Not on file   Occupational History    Occupation:      Employer: [id number] Financial resource strain: Not on file    Food insecurity     Worry: Not on file     Inability: Not on file    Transportation needs     Medical: Not on file     Non-medical: Not on file   Tobacco Use    Smoking status: Never Smoker    Smokeless tobacco: Never Used   Substance and Sexual Activity    Alcohol use: No    Drug use: Not Currently     Types: Marijuana     Comment: states has a medical card    Sexual activity: Not Currently     Partners: Female   Lifestyle    Physical activity     Days per week: Not on file     Minutes per session: Not on file    Stress: Not on file   Relationships    Social connections     Talks on phone: Not on file     Gets together: Not on file     Attends Gnosticism service: Not on file     Active member of club or organization: Not on file     Attends meetings of clubs or organizations: Not on file     Relationship status: Not on file    Intimate partner violence     Fear of current or ex partner: Not on file     Emotionally abused: Not on file     Physically abused: Not on file     Forced sexual activity: Not on file   Other Topics Concern    Not on file   Social History Narrative    ** Merged History Encounter **                ROS:  [x] All negative/unchanged except if checked.  Explain positive(checked items) below:  [] Constitutional  [] Eyes  [] Ear/Nose/Mouth/Throat  [] Respiratory  [] CV  [] GI  []   [] Musculoskeletal  [] Skin/Breast  [] Neurological  [] Endocrine  [] Heme/Lymph  [] Allergic/Immunologic    Explanation:     MEDICATIONS:    Current Facility-Administered Medications:     divalproex (DEPAKOTE) DR tablet 250 mg, 250 mg, Oral, 2 times per day, KELVIN Murillo - CNP    acetaminophen (TYLENOL) tablet 650 mg, 650 mg, Oral, Q6H PRN, Rafaela Stern MD    hydrOXYzine (VISTARIL) capsule 50 mg, 50 mg, Oral, TID PRN, Rafaela Stern MD    haloperidol lactate (HALDOL) injection 5 mg, 5 mg, Intramuscular, Q6H PRN **OR** haloperidol (HALDOL) tablet 5 mg, 5 mg, Oral, Q6H PRN, Rafaela Stern MD    traZODone (DESYREL) tablet 50 mg, 50 mg, Oral, Nightly PRN, Rafaela Stern MD, 50 mg at 09/11/20 2234    magnesium hydroxide (MILK OF MAGNESIA) 400 MG/5ML suspension 30 mL, 30 mL, Oral, Daily PRN, Rafaela Stern MD    aluminum & magnesium hydroxide-simethicone (MAALOX) 200-200-20 MG/5ML suspension 30 mL, 30 mL, Oral, PRN, Rafaela Stern MD      Examination:  BP 128/87   Pulse 77   Temp 97.8 °F (36.6 °C)   Resp 14   Ht 6' (1.829 m)   Wt 170 lb (77.1 kg)   SpO2 98%   BMI 23.06 kg/m²   Gait - steady  Medication side effects(SE): Denies    Mental Status Examination:    Level of consciousness:  within normal limits   Appearance:  fair grooming and fair hygiene  Behavior/Motor:  no abnormalities noted  Attitude toward examiner:  guarded  Speech:  monotone and poverty of speech   Mood: constricted  Affect:  blunted and flat  Thought processes: Linear without flight of ideas or loose associations  Thought content: Guarded denies auditory visual hallucinations endorses passive SI denies HI. Cognition:  oriented to person, place, and time   Concentration distractible  Insight poor   Judgement poor     ASSESSMENT:   Patient symptoms are:  [] Well controlled  [] Improving  [] Worsening  [x] No change      Diagnosis:   Principal Problem:    Acute psychosis ([facility]  Active Problems:    Polysubstance abuse ([facility]  Resolved Problems:    * No resolved hospital problems. *      LABS:    No results for input(s): WBC, HGB, PLT in the last 72 hours. No results for input(s): NA, K, CL, CO2, BUN, CREATININE, GLUCOSE in the last 72 hours. No results for input(s): BILITOT, ALKPHOS, AST, ALT in the last 72 hours. Lab Results   Component Value Date    LABAMPH NOT DETECTED 09/09/2020    BARBSCNU NOT DETECTED 09/09/2020    LABBENZ NOT DETECTED 09/09/2020    LABMETH NOT DETECTED 09/09/2020    OPIATESCREENURINE NOT DETECTED 09/09/2020    PHENCYCLIDINESCREENURINE NOT DETECTED 09/09/2020    ETOH <10 09/09/2020     Lab Results   Component Value Date    TSH 1.240 04/18/2019     No results found for: LITHIUM  No results found for: VALPROATE, CBMZ        Treatment Plan:  Reviewed current Medications with the patient. Risks, benefits, side effects, drug-to-drug interactions and alternatives to treatment were discussed.   Collateral information:   CD evaluation  Encourage patient to attend group and other milieu activities.   Discharge planning discussed with the patient and treatment team.    Continue Depakote 250 mg twice daily for mood stabilization    PSYCHOTHERAPY/COUNSELING:  [x] Therapeutic interview  [x] Supportive  [] CBT  [] Ongoing  [] Other    [x] Patient continues to need, on a daily basis, active treatment furnished directly by or requiring the supervision of inpatient psychiatric personnel      Anticipated Length of stay: 5 to 7 days based on stability            Electronically signed by KELVIN Lema CNP on 8/62/7906 at 8:21 PM

## 2020-09-17 PROCEDURE — 1240000000 HC EMOTIONAL WELLNESS R&B

## 2020-09-17 PROCEDURE — 99232 SBSQ HOSP IP/OBS MODERATE 35: CPT | Performed by: NURSE PRACTITIONER

## 2020-09-17 PROCEDURE — 6370000000 HC RX 637 (ALT 250 FOR IP): Performed by: NURSE PRACTITIONER

## 2020-09-17 RX ORDER — OLANZAPINE 2.5 MG/1
2.5 TABLET ORAL NIGHTLY
Status: DISCONTINUED | OUTPATIENT
Start: 2020-09-17 | End: 2020-09-21 | Stop reason: HOSPADM

## 2020-09-17 RX ORDER — DIVALPROEX SODIUM 500 MG/1
500 TABLET, EXTENDED RELEASE ORAL DAILY
Status: DISCONTINUED | OUTPATIENT
Start: 2020-09-17 | End: 2020-09-18

## 2020-09-17 RX ADMIN — OLANZAPINE 2.5 MG: 2.5 TABLET, FILM COATED ORAL at 21:43

## 2020-09-17 RX ADMIN — DIVALPROEX SODIUM 500 MG: 500 TABLET, EXTENDED RELEASE ORAL at 18:09

## 2020-09-17 ASSESSMENT — PAIN SCALES - GENERAL
PAINLEVEL_OUTOF10: 0
PAINLEVEL_OUTOF10: 0

## 2020-09-17 NOTE — PROGRESS NOTES
BEHAVIORAL HEALTH FOLLOW-UP NOTE     9/17/2020    Patient was seen and examined in person, Chart reviewed   Patient's case discussed with staff/team    Chief Complaint: Fleeting suicidal ideations    Interim History: Patient up on the unit states he only wants to take the Depakote at night because he feels too tired during the day taking it. He is agreeable to take Zyprexa low-dose at night as well. He states his mood is \"pretty good. \"  But continues to report suicidal ideations continues to ruminate regarding the fact that he is homeless. Appetite:   [x] Normal/Unchanged  [] Increased  [] Decreased      Sleep:       [x] Normal/Unchanged  [] Fair       [] Poor              Energy:    [x] Normal/Unchanged  [] Increased  [] Decreased        SI [x] Present  [] Absent    HI  []Present  [x] Absent     Aggression:  [] yes  [x] no    Patient is [x] able  [] unable to CONTRACT FOR SAFETY     PAST MEDICAL/PSYCHIATRIC HISTORY:   Past Medical History:   Diagnosis Date    Anxiety     Asthma     no meds currently- mostly exercise induced    Claustrophobia     confined spaces with locked doors    Depression     Epigastric pain     Nausea     Nut allergy        FAMILY/SOCIAL HISTORY:  History reviewed. No pertinent family history.   Social History     Socioeconomic History    Marital status: Single     Spouse name: Not on file    Number of children: 0    Years of education: 15    Highest education level: Not on file   Occupational History    Occupation:      Employer: [employer] Financial resource strain: Not on file    Food insecurity     Worry: Not on file     Inability: Not on file    Transportation needs     Medical: Not on file     Non-medical: Not on file   Tobacco Use    Smoking status: Never Smoker    Smokeless tobacco: Never Used   Substance and Sexual Activity    Alcohol use: No    Drug use: Not Currently     Types: Marijuana     Comment: [facility] has a medical card   [facility] 30 mL, 30 mL, Oral, PRN, Dick Herrera MD, 30 mL at 09/16/20 1800      Examination:  /63   Pulse 52   Temp 99 °F (37.2 °C) (Temporal)   Resp 14   Ht 6' (1.829 m)   Wt 170 lb (77.1 kg)   SpO2 97%   BMI 23.06 kg/m²   Gait - steady  Medication side effects(SE): Denies    Mental Status Examination:    Level of consciousness:  within normal limits   Appearance:  fair grooming and fair hygiene  Behavior/Motor:  no abnormalities noted  Attitude toward examiner:  guarded  Speech:  monotone and poverty of speech   Mood: constricted  Affect:  blunted and flat  Thought processes: Linear without flight of ideas or loose associations  Thought content: Guarded denies auditory visual hallucinations endorses passive SI denies HI. Cognition:  oriented to person, place, and time   Concentration distractible  Insight poor   Judgement poor     ASSESSMENT:   Patient symptoms are:  [] Well controlled  [x] Improving  [] Worsening  [] No change      Diagnosis:   Principal Problem:    Acute psychosis ([facility]  Active Problems:    Polysubstance abuse ([facility]    Cluster B personality disorder ([facility]  Resolved Problems:    * No resolved hospital problems. *      LABS:    No results for input(s): WBC, HGB, PLT in the last 72 hours. No results for input(s): NA, K, CL, CO2, BUN, CREATININE, GLUCOSE in the last 72 hours. No results for input(s): BILITOT, ALKPHOS, AST, ALT in the last 72 hours. Lab Results   Component Value Date    LABAMPH NOT DETECTED 09/09/2020    BARBSCNU NOT DETECTED 09/09/2020    LABBENZ NOT DETECTED 09/09/2020    LABMETH NOT DETECTED 09/09/2020    OPIATESCREENURINE NOT DETECTED 09/09/2020    PHENCYCLIDINESCREENURINE NOT DETECTED 09/09/2020    ETOH <10 09/09/2020     Lab Results   Component Value Date    TSH 1.240 04/18/2019     No results found for: LITHIUM  No results found for: VALPROATE, CBMZ        Treatment Plan:  Reviewed current Medications with the patient.    Risks, benefits, side effects, drug-to-drug

## 2020-09-17 NOTE — GROUP NOTE
Group Therapy Note    Date: 9/17/2020    Group Start Time: 1120  Group End Time: 3813  Group Topic: Cognitive Skills    [facility]    JESUS Thomas        Group Therapy Note    Attendees: 8         Patient's Goal:  Pt will be able to identify characteristics of healthy vs unhealthy relationships. Notes:  Pt was active in class discussion. Status After Intervention:  Improved    Participation Level:  Active Listener and Interactive    Participation Quality: Appropriate, Attentive, Sharing and Supportive      Speech:  normal      Thought Process/Content: Logical      Affective Functioning: Blunted      Mood: depressed      Level of consciousness:  Alert, Oriented x4 and Attentive      Response to Learning: Able to verbalize current knowledge/experience, Able to verbalize/acknowledge new learning and Progressing to goal      Endings: None Reported    Modes of Intervention: Education, Support, Socialization and Exploration      Discipline Responsible: /Counselor      Signature:  JESUS Dillon

## 2020-09-17 NOTE — GROUP NOTE
Group Therapy Note    Date: 9/17/2020    Group Start Time: 1010  Group End Time: 0826  Group Topic: Psychoeducation    [facility] [address]        Group Therapy Note    Attendees: 10         Patient's Goal:  Get some exercise    Notes: Active and engaged during stress bingo activity. Able to identify stress reliever to apply today. Status After Intervention:  Improved    Participation Level:  Active Listener and Interactive    Participation Quality: Appropriate, Attentive and Sharing      Speech:  normal      Thought Process/Content: Logical      Affective Functioning: Congruent      Mood: euthymic      Level of consciousness:  Alert and Attentive      Response to Learning: Progressing to goal      Endings: None Reported    Modes of Intervention: Education, Support, Socialization and Exploration      Discipline Responsible: Psychoeducational Specialist      Signature:  Sheldon Price

## 2020-09-17 NOTE — GROUP NOTE
Group Therapy Note    Date: 9/17/2020    Group Start Time: 0120  Group End Time: 0200  Group Topic: Cognitive Skills    [facility]    GABBI Nuno        Group Therapy Note    Attendees: 7         Patient's Goal:  To identify cognitive distortions and ways to identify healthy thinking patterns    Notes:  Pt participated and made positive connections    Status After Intervention:  Improved    Participation Level:  Active Listener and Interactive    Participation Quality: Appropriate, Attentive and Sharing      Speech:  normal      Thought Process/Content: Logical      Affective Functioning: Congruent      Mood: euthymic      Level of consciousness:  Oriented x4      Response to Learning: Able to verbalize current knowledge/experience and Able to verbalize/acknowledge new learning      Endings: None Reported    Modes of Intervention: Support, Socialization and Exploration      Discipline Responsible: /Counselor      Signature:  GABBI Nuno

## 2020-09-18 PROCEDURE — 99232 SBSQ HOSP IP/OBS MODERATE 35: CPT | Performed by: NURSE PRACTITIONER

## 2020-09-18 PROCEDURE — 1240000000 HC EMOTIONAL WELLNESS R&B

## 2020-09-18 RX ORDER — DIVALPROEX SODIUM 250 MG/1
250 TABLET, EXTENDED RELEASE ORAL DAILY
Status: DISCONTINUED | OUTPATIENT
Start: 2020-09-19 | End: 2020-09-19

## 2020-09-18 ASSESSMENT — PAIN SCALES - GENERAL
PAINLEVEL_OUTOF10: 0
PAINLEVEL_OUTOF10: 0

## 2020-09-18 NOTE — CARE COORDINATION
SW note: d/c planning: SW called and spoke with ANDRY Washington and Abhishek Donovan from [facility]. ANDRY Washington, states that she would pick him up upon d/c to take him to nursing home and that he would spend at least 1 month in nursing home. She states plan would be then to have him go to crisis unit and then moises most likely. She states that she spoke with Abhishek Donovan,  from [facility] who informed her that per nurse, pt was making suicidal thoughts. Suhas Nuñez said nurse told him per NP note that plan would be to keep him another 5-7 days since pt is making suicidal comments. SW stated that I don't think we would keep him 5-7 days but would probably keep him until Mon. SW asked re: them placing pt on suicide watch at nursing home and she stated that most likely they would just send him back to hospital for further evaluation and sw again brought up that pt is saying that to prevent from having to go to nursing home and doesn't like the fact that he will be homeless and doesn't like options being presented to him. Suhas Nuñez from [facility] agrees that he is being manipulative but asks that he not be d/c today or weekend but that csn would be agreeable to d/c on mon. Per Suhas Nuñez, pt had been in [facility] until he was kicked out d/t noncompliance and than pt was set up with  Milvia Rogers.

## 2020-09-18 NOTE — PLAN OF CARE
Patient presents this morning agitated. Peculiarly enough, he did not mention he was suicidal UNTIL the mention of his discharge plan, which is to senior care. So, patient was asked regarding his suicidal thoughts and he admits that he is suicidal, no plan, and adds that he would commit suicide with any method necessary. He does not present anxious or depressed until the mention of discharge plan occurs, which then his behavior and mood shift to somber, worried, saddened, and concerned. When patient is out on the unit he presents calm, pleasant, and relaxed watching television. When approached for medication, he told the LPN, \"fuck off,\" and refused. Patient presents with poor judgement. Additionally, patient presents with antisocial qualities. Irritability present. Motivation is poor. Will monitor closely.

## 2020-09-18 NOTE — PROGRESS NOTES
Patient came up to the nurses' station and reported that he wanted his own room, prior to this he had not presented anxious, irritated, or restless on the floor. Then all of a sudden when reporting he needed his own room he became anxious, irritated, and restless. Patient then said \"I do not know what I am going to do tonight if I do not have my own room, I may get aggressive. \" Patient behaviors are consistent for him trying to get his way as he has displayed no aggressive behaviors during admission.

## 2020-09-18 NOTE — PROGRESS NOTES
BEHAVIORAL HEALTH FOLLOW-UP NOTE     9/18/2020    Patient was seen and examined in person, Chart reviewed   Patient's case discussed with staff/team    Chief Complaint: \" The medicine was too strong. \"    Interim History: Patient seen in his room today. He states the medicine is \"too strong. \"  He is relaxed resting in bed. He does not endorse any suicidal or homicidal ideations intent or plan until you talk about discharge. He has made statements to social work that if he is not guaranteed to have his own room on discharge he will commit suicide however on the unit he is relaxed watching TV socializing with peers eating well sleeping well with no neurovegetative signs of depression. He only makes his threats when talking about his discharge planning and his homelessness situation. He has made threats to nursing staff that if he does not get his own room \"he will not know what he will do. \"  He denies auditory visualizations and there are no overt or covert signs psychosis. Appetite:   [x] Normal/Unchanged  [] Increased  [] Decreased      Sleep:       [x] Normal/Unchanged  [] Fair       [] Poor              Energy:    [x] Normal/Unchanged  [] Increased  [] Decreased        SI [x] Present when discussing discharge[] Absent    HI  []Present  [x] Absent     Aggression:  [] yes  [x] no    Patient is [x] able  [] unable to CONTRACT FOR SAFETY     PAST MEDICAL/PSYCHIATRIC HISTORY:   Past Medical History:   Diagnosis Date    Anxiety     Asthma     no meds currently- mostly exercise induced    Claustrophobia     confined spaces with locked doors    Depression     Epigastric pain     Nausea     Nut allergy        FAMILY/SOCIAL HISTORY:  History reviewed. No pertinent family history.   Social History     Socioeconomic History    Marital status: Single     Spouse name: Not on file    Number of children: 0    Years of education: 15    Highest education level: Not on file   Occupational History    Occupation:      Employer: [employer]   Social Needs    Financial resource strain: Not on file    Food insecurity     Worry: Not on file     Inability: Not on file    Transportation needs     Medical: Not on file     Non-medical: Not on file   Tobacco Use    Smoking status: Never Smoker    Smokeless tobacco: Never Used   Substance and Sexual Activity    Alcohol use: No    Drug use: Not Currently     Types: Marijuana     Comment: states has a medical card    Sexual activity: Not Currently     Partners: Female   Lifestyle    Physical activity     Days per week: Not on file     Minutes per session: Not on file    Stress: Not on file   Relationships    Social connections     Talks on phone: Not on file     Gets together: Not on file     Attends Cheondoism service: Not on file     Active member of club or organization: Not on file     Attends meetings of clubs or organizations: Not on file     Relationship status: Not on file    Intimate partner violence     Fear of current or ex partner: Not on file     Emotionally abused: Not on file     Physically abused: Not on file     Forced sexual activity: Not on file   Other Topics Concern    Not on file   Social History Narrative    ** Merged History Encounter **                ROS:  [x] All negative/unchanged except if checked.  Explain positive(checked items) below:  [] Constitutional  [] Eyes  [] Ear/Nose/Mouth/Throat  [] Respiratory  [] CV  [] GI  []   [] Musculoskeletal  [] Skin/Breast  [] Neurological  [] Endocrine  [] Heme/Lymph  [] Allergic/Immunologic    Explanation:     MEDICATIONS:    Current Facility-Administered Medications:     [START ON 9/19/2020] divalproex (DEPAKOTE ER) extended release tablet 250 mg, 250 mg, Oral, Daily, KELVIN Payne CNP    OLANZapine (ZYPREXA) tablet 2.5 mg, 2.5 mg, Oral, Nightly, KELVIN Payne CNP, 2.5 mg at 09/17/20 2140    acetaminophen (TYLENOL) tablet 650 mg, 650 mg, Oral, Q6H PRN, Katarina Chung MD, 650 mg at 09/16/20 1800    hydrOXYzine (VISTARIL) capsule 50 mg, 50 mg, Oral, TID PRN, Milton Scott MD    haloperidol lactate (HALDOL) injection 5 mg, 5 mg, Intramuscular, Q6H PRN **OR** haloperidol (HALDOL) tablet 5 mg, 5 mg, Oral, Q6H PRN, Milton Scott MD    traZODone (DESYREL) tablet 50 mg, 50 mg, Oral, Nightly PRN, Milton Scott MD, 50 mg at 09/11/20 2234    magnesium hydroxide (MILK OF MAGNESIA) 400 MG/5ML suspension 30 mL, 30 mL, Oral, Daily PRN, Milton Scott MD    aluminum & magnesium hydroxide-simethicone (MAALOX) 200-200-20 MG/5ML suspension 30 mL, 30 mL, Oral, PRN, Milton Scott MD, 30 mL at 09/16/20 1800      Examination:  /63   Pulse 52   Temp 99 °F (37.2 °C) (Temporal)   Resp 14   Ht 6' (1.829 m)   Wt 170 lb (77.1 kg)   SpO2 97%   BMI 23.06 kg/m²   Gait - steady  Medication side effects(SE): Denies    Mental Status Examination:    Level of consciousness:  within normal limits   Appearance:  fair grooming and fair hygiene  Behavior/Motor:  no abnormalities noted  Attitude toward examiner:  guarded  Speech:  monotone and poverty of speech   Mood: constricted  Affect:  blunted and flat  Thought processes: Linear without flight of ideas or loose associations  Thought content: Guarded denies auditory visual hallucinations endorses passive SI only when talking about discharge denies HI. Cognition:  oriented to person, place, and time   Concentration distractible  Insight poor   Judgement poor     ASSESSMENT:   Patient symptoms are:  [] Well controlled  [x] Improving  [] Worsening  [] No change      Diagnosis:   Principal Problem:    Acute psychosis ([facility]  Active Problems:    Polysubstance abuse ([facility]    Cluster B personality disorder ([facility]  Resolved Problems:    * No resolved hospital problems. *      LABS:    No results for input(s): WBC, HGB, PLT in the last 72 hours. No results for input(s): NA, K, CL, CO2, BUN, CREATININE, GLUCOSE in the last 72 hours.   No results for input(s): BILITOT, ALKPHOS, AST, ALT in the last 72 hours. Lab Results   Component Value Date    LABAMPH NOT DETECTED 09/09/2020    BARBSCNU NOT DETECTED 09/09/2020    LABBENZ NOT DETECTED 09/09/2020    LABMETH NOT DETECTED 09/09/2020    OPIATESCREENURINE NOT DETECTED 09/09/2020    PHENCYCLIDINESCREENURINE NOT DETECTED 09/09/2020    ETOH <10 09/09/2020     Lab Results   Component Value Date    TSH 1.240 04/18/2019     No results found for: LITHIUM  No results found for: VALPROATE, CBMZ        Treatment Plan:  Reviewed current Medications with the patient. Risks, benefits, side effects, drug-to-drug interactions and alternatives to treatment were discussed. Collateral information:   CD evaluation  Encourage patient to attend group and other milieu activities.   Discharge planning discussed with the patient and treatment team.    Change to Depakote  mg nightly for mood stabilization  Continue Zyprexa 2.5 mg at bedtime for mood and psychosis    PSYCHOTHERAPY/COUNSELING:  [x] Therapeutic interview  [x] Supportive  [] CBT  [] Ongoing  [] Other    [x] Patient continues to need, on a daily basis, active treatment furnished directly by or requiring the supervision of inpatient psychiatric personnel      Anticipated Length of stay: 5 to 7 days based on stability            Electronically signed by KELVIN Urias CNP on 0/89/4575 at 2:09 PM

## 2020-09-19 PROCEDURE — 6370000000 HC RX 637 (ALT 250 FOR IP): Performed by: NURSE PRACTITIONER

## 2020-09-19 PROCEDURE — 1240000000 HC EMOTIONAL WELLNESS R&B

## 2020-09-19 PROCEDURE — 99232 SBSQ HOSP IP/OBS MODERATE 35: CPT | Performed by: NURSE PRACTITIONER

## 2020-09-19 RX ORDER — DIVALPROEX SODIUM 250 MG/1
250 TABLET, EXTENDED RELEASE ORAL NIGHTLY
Status: DISCONTINUED | OUTPATIENT
Start: 2020-09-20 | End: 2020-09-21 | Stop reason: HOSPADM

## 2020-09-19 RX ADMIN — DIVALPROEX SODIUM 250 MG: 250 TABLET, EXTENDED RELEASE ORAL at 21:27

## 2020-09-19 ASSESSMENT — PAIN SCALES - GENERAL
PAINLEVEL_OUTOF10: 0

## 2020-09-19 NOTE — PROGRESS NOTES
BEHAVIORAL HEALTH FOLLOW-UP NOTE     9/19/2020    Patient was seen and examined in person, Chart reviewed   Patient's case discussed with staff/team    Chief Complaint: Denies SI/HI intent or plan    Interim History: Patient out of the unit relaxed social slight peers. He denies SI/HI intent or plan but then states \"I would not really hurt anybody if I can get my own room. \"  He is not making any suicidal statements or any suicidal gestures while in the unit. He denies any auditory visualizations or no overt overt signs psychosis. He does not show any impulsive behavior. He wants to take the Depakote ER only at night. And he refused the Zyprexa he states that he has been on this medication the past and it put weight on him. Appetite:   [x] Normal/Unchanged  [] Increased  [] Decreased      Sleep:       [x] Normal/Unchanged  [] Fair       [] Poor              Energy:    [x] Normal/Unchanged  [] Increased  [] Decreased        SI [x] Present when discussing discharge[] Absent    HI  []Present  [x] Absent     Aggression:  [] yes  [x] no    Patient is [x] able  [] unable to CONTRACT FOR SAFETY     PAST MEDICAL/PSYCHIATRIC HISTORY:   Past Medical History:   Diagnosis Date    Anxiety     Asthma     no meds currently- mostly exercise induced    Claustrophobia     confined spaces with locked doors    Depression     Epigastric pain     Nausea     Nut allergy        FAMILY/SOCIAL HISTORY:  History reviewed. No pertinent family history.   Social History     Socioeconomic History    Marital status: Single     Spouse name: Not on file    Number of children: 0    Years of education: 15    Highest education level: Not on file   Occupational History    Occupation:      Employer: [employer] Financial resource strain: Not on file    Food insecurity     Worry: Not on file     Inability: Not on file    Transportation needs     Medical: Not on file     Non-medical: Not on file   Tobacco Use    Smoking status: Never Smoker    Smokeless tobacco: Never Used   Substance and Sexual Activity    Alcohol use: No    Drug use: Not Currently     Types: Marijuana     Comment: states has a medical card    Sexual activity: Not Currently     Partners: Female   Lifestyle    Physical activity     Days per week: Not on file     Minutes per session: Not on file    Stress: Not on file   Relationships    Social connections     Talks on phone: Not on file     Gets together: Not on file     Attends Yazidism service: Not on file     Active member of club or organization: Not on file     Attends meetings of clubs or organizations: Not on file     Relationship status: Not on file    Intimate partner violence     Fear of current or ex partner: Not on file     Emotionally abused: Not on file     Physically abused: Not on file     Forced sexual activity: Not on file   Other Topics Concern    Not on file   Social History Narrative    ** Merged History Encounter **                ROS:  [x] All negative/unchanged except if checked.  Explain positive(checked items) below:  [] Constitutional  [] Eyes  [] Ear/Nose/Mouth/Throat  [] Respiratory  [] CV  [] GI  []   [] Musculoskeletal  [] Skin/Breast  [] Neurological  [] Endocrine  [] Heme/Lymph  [] Allergic/Immunologic    Explanation:     MEDICATIONS:    Current Facility-Administered Medications:     divalproex (DEPAKOTE ER) extended release tablet 250 mg, 250 mg, Oral, Daily, KELVIN Payne CNP    OLANZapine (ZYPREXA) tablet 2.5 mg, 2.5 mg, Oral, Nightly, KELVIN Payne CNP, 2.5 mg at 09/17/20 2143    acetaminophen (TYLENOL) tablet 650 mg, 650 mg, Oral, Q6H PRN, Treva Garcia MD, 650 mg at 09/16/20 1800    hydrOXYzine (VISTARIL) capsule 50 mg, 50 mg, Oral, TID PRN, Treva Garcia MD    haloperidol lactate (HALDOL) injection 5 mg, 5 mg, Intramuscular, Q6H PRN **OR** haloperidol (HALDOL) tablet 5 mg, 5 mg, Oral, Q6H PRN, MD Sandra Rowland traZODone (DESYREL) tablet 50 mg, 50 mg, Oral, Nightly PRN, Nancy Hahn MD, 50 mg at 09/11/20 2234    magnesium hydroxide (MILK OF MAGNESIA) 400 MG/5ML suspension 30 mL, 30 mL, Oral, Daily PRN, Nancy Hahn MD    aluminum & magnesium hydroxide-simethicone (MAALOX) 200-200-20 MG/5ML suspension 30 mL, 30 mL, Oral, PRN, Nancy Hahn MD, 30 mL at 09/16/20 1800      Examination:  /60   Pulse 58   Temp 97.2 °F (36.2 °C) (Temporal)   Resp 12   Ht 6' (1.829 m)   Wt 170 lb (77.1 kg)   SpO2 97%   BMI 23.06 kg/m²   Gait - steady  Medication side effects(SE): Denies    Mental Status Examination:    Level of consciousness:  within normal limits   Appearance:  fair grooming and fair hygiene  Behavior/Motor:  no abnormalities noted  Attitude toward examiner:  guarded  Speech:  monotone and poverty of speech   Mood: constricted  Affect:  blunted and flat  Thought processes: Linear without flight of ideas or loose associations  Thought content: Guarded denies auditory visual hallucinations endorses passive SI only when talking about discharge denies HI. Cognition:  oriented to person, place, and time   Concentration distractible  Insight poor   Judgement poor     ASSESSMENT:   Patient symptoms are:  [] Well controlled  [x] Improving  [] Worsening  [] No change      Diagnosis:   Principal Problem:    Acute psychosis ([facility]  Active Problems:    Polysubstance abuse ([facility]    Cluster B personality disorder ([facility]  Resolved Problems:    * No resolved hospital problems. *      LABS:    No results for input(s): WBC, HGB, PLT in the last 72 hours. No results for input(s): NA, K, CL, CO2, BUN, CREATININE, GLUCOSE in the last 72 hours. No results for input(s): BILITOT, ALKPHOS, AST, ALT in the last 72 hours.   Lab Results   Component Value Date    LABAMPH NOT DETECTED 09/09/2020    BARBSCNU NOT DETECTED 09/09/2020    LABBENZ NOT DETECTED 09/09/2020    LABMETH NOT DETECTED 09/09/2020    OPIATESCREENURINE NOT DETECTED 09/09/2020    PHENCYCLIDINESCREENURINE NOT DETECTED 09/09/2020    ETOH <10 09/09/2020     Lab Results   Component Value Date    TSH 1.240 04/18/2019     No results found for: LITHIUM  No results found for: VALPROATE, CBMZ        Treatment Plan:  Reviewed current Medications with the patient. Risks, benefits, side effects, drug-to-drug interactions and alternatives to treatment were discussed. Collateral information:   CD evaluation  Encourage patient to attend group and other milieu activities.   Discharge planning discussed with the patient and treatment team.    Change to Depakote  mg nightly for mood stabilization  Continue Zyprexa 2.5 mg at bedtime for mood and psychosis    PSYCHOTHERAPY/COUNSELING:  [x] Therapeutic interview  [x] Supportive  [] CBT  [] Ongoing  [] Other    [x] Patient continues to need, on a daily basis, active treatment furnished directly by or requiring the supervision of inpatient psychiatric personnel      Anticipated Length of stay: 5 to 7 days based on stability            Electronically signed by KELVIN Price CNP on 4/33/0406 at 9:16 AM

## 2020-09-19 NOTE — GROUP NOTE
Group Therapy Note    Date: 9/19/2020    Group Start Time: 1000  Group End Time: 1050  Group Topic: Psychoeducation    [facility] [address]        Group Therapy Note    Attendees: 16           Patient's Goal:  Manage my anger appropriately, and get a private room    Notes: Active and engaged during goal setting group. Pleasant and social, sharing with peers. Status After Intervention:  Improved    Participation Level:  Active Listener and Interactive    Participation Quality: Appropriate, Attentive and Sharing      Speech:  normal      Thought Process/Content: Logical      Affective Functioning: Congruent      Mood: euthymic      Level of consciousness:  Alert and Attentive      Response to Learning: Capable of insight, Able to change behavior and Progressing to goal      Endings: None Reported    Modes of Intervention: Education, Support, Socialization and Exploration      Discipline Responsible: Psychoeducational Specialist      Signature:  Juliane Burt, [address]

## 2020-09-19 NOTE — PROGRESS NOTES
Patient was isolative to his room. Verbalizes fleeting SI , , Take anything I can get,but does verbalize know can not get anything out of here, contracts for safety. Patient was irritable , says do to they screwed up my depakote, \"I only take at night and their trying to give me 500 mg , I only take 250 mg at night it helps with sleep and then I don't have to take the melaton, I take during the day makes me a zombie. That zyprexa their trying to give I took before and it did nothing but made me gain weight. \"  \"I threw up after she came in and gave me my medications tonight. \"  Denies thoughts of hurting others or hearing voices or seeing shadows. \"my anxiety is 8 out 10 do to I don't know what I'm going to do for money when I get out of here. My depression is 8 out of 10 also do to the unknown after discharge. \"  States sleep is ok and appetite is normal.  Patient does not attend groups. Safety rounds continue.

## 2020-09-19 NOTE — PROGRESS NOTES
Patient was found in a empty semi room. When directed to return to his own room. Sarcastic and swearing.

## 2020-09-19 NOTE — PROGRESS NOTES
Group Therapy Note     Date: 9/19/2020     Group Start Time: 0110  Group End Time: 0140  Group Topic: Cognitive Skills     [facility]    BRIGITTE Paul LSW          Group Therapy Note     Attendees: 14           Patient's Goal:  Pt will be able to identify healthy self-care tips, and how to utilize them in the community     Notes:  Pt was active in class discussion.      Status After Intervention:  Improved     Participation Level:  Active Listener and Interactive     Participation Quality: Appropriate, Attentive, Sharing and Supportive        Speech:  normal        Thought Process/Content: Logical        Affective Functioning: Blunted        Mood: depressed        Level of consciousness:  Alert, Oriented x4 and Attentive        Response to Learning: Able to verbalize current knowledge/experience, Able to verbalize/acknowledge new learning and Progressing to goal        Endings: None Reported     Modes of Intervention: Education, Support, Socialization and Exploration        Discipline Responsible: /Counselor        Signature:  BRIGITTE Paul LSW

## 2020-09-20 PROCEDURE — 99231 SBSQ HOSP IP/OBS SF/LOW 25: CPT | Performed by: NURSE PRACTITIONER

## 2020-09-20 PROCEDURE — 6370000000 HC RX 637 (ALT 250 FOR IP): Performed by: NURSE PRACTITIONER

## 2020-09-20 PROCEDURE — 1240000000 HC EMOTIONAL WELLNESS R&B

## 2020-09-20 RX ADMIN — DIVALPROEX SODIUM 250 MG: 250 TABLET, EXTENDED RELEASE ORAL at 20:48

## 2020-09-20 ASSESSMENT — PAIN SCALES - GENERAL
PAINLEVEL_OUTOF10: 0
PAINLEVEL_OUTOF10: 0

## 2020-09-20 ASSESSMENT — PAIN - FUNCTIONAL ASSESSMENT: PAIN_FUNCTIONAL_ASSESSMENT: 0-10

## 2020-09-20 NOTE — PROGRESS NOTES
Group Therapy Note     Date: 9/20/2020     Group Start Time: 11:00  Group End Time: 11:30  Group Topic: Cognitive Skills     [facility] [address], BRIGITTE, ALVAREZW           Group Therapy Note     Attendees: 14           Patient's Goal:  Pt will be able to identify triggers and learn to adapt coping skills.      Notes:  Pt was active in class discussion.      Status After Intervention:  Improved     Participation Level:  Active Listener and Interactive     Participation Quality: Appropriate, Attentive, Sharing and Supportive        Speech:  normal        Thought Process/Content: Logical        Affective Functioning: Blunted        Mood: depressed        Level of consciousness:  Alert, Oriented x4 and Attentive        Response to Learning: Able to verbalize current knowledge/experience, Able to verbalize/acknowledge new learning and Progressing to goal        Endings: None Reported     Modes of Intervention: Education, Support, Socialization and Exploration        Discipline Responsible: /Counselor        Signature: BRIGITTE Tellez LSW

## 2020-09-20 NOTE — PROGRESS NOTES
Was advised by another nurse. Patient was in day room and  mentioned to RN out in day room,was advised to ask RN sitting behind desk. Patient stated that he had ask that nurse before for something and she told him to ask his nurse. Patient threw pitcher.

## 2020-09-20 NOTE — PROGRESS NOTES
BEHAVIORAL HEALTH FOLLOW-UP NOTE     9/20/2020    Patient was seen and examined in person, Chart reviewed   Patient's case discussed with staff/team    Chief Complaint: \" The ER Depakote works much better. \"    Interim History: Patient out of the unit relaxed social with select peers. He denies SI/HI intent or plan but then states he is attending groups. He states that he is learning a lot from the groups. He is not making any suicidal statements or any suicidal gestures while on the unit. He denies any auditory visualizations or no overt overt signs psychosis. He does not show any impulsive behavior. H he again refused the Zyprexa and states he will only take Seroquel. Appetite:   [x] Normal/Unchanged  [] Increased  [] Decreased      Sleep:       [x] Normal/Unchanged  [] Fair       [] Poor              Energy:    [x] Normal/Unchanged  [] Increased  [] Decreased        SI [x] Present when discussing discharge[] Absent    HI  []Present  [x] Absent     Aggression:  [] yes  [x] no    Patient is [x] able  [] unable to CONTRACT FOR SAFETY     PAST MEDICAL/PSYCHIATRIC HISTORY:   Past Medical History:   Diagnosis Date    Anxiety     Asthma     no meds currently- mostly exercise induced    Claustrophobia     confined spaces with locked doors    Depression     Epigastric pain     Nausea     Nut allergy        FAMILY/SOCIAL HISTORY:  History reviewed. No pertinent family history.   Social History     Socioeconomic History    Marital status: Single     Spouse name: Not on file    Number of children: 0    Years of education: 15    Highest education level: Not on file   Occupational History    Occupation:      Employer: [email or web address] Financial resource strain: Not on file    Food insecurity     Worry: Not on file     Inability: Not on file    Transportation needs     Medical: Not on file     Non-medical: Not on file   Tobacco Use    Smoking status: Never Smoker    Smokeless tobacco: Never Used   Substance and Sexual Activity    Alcohol use: No    Drug use: Not Currently     Types: Marijuana     Comment: states has a medical card    Sexual activity: Not Currently     Partners: Female   Lifestyle    Physical activity     Days per week: Not on file     Minutes per session: Not on file    Stress: Not on file   Relationships    Social connections     Talks on phone: Not on file     Gets together: Not on file     Attends Yazidi service: Not on file     Active member of club or organization: Not on file     Attends meetings of clubs or organizations: Not on file     Relationship status: Not on file    Intimate partner violence     Fear of current or ex partner: Not on file     Emotionally abused: Not on file     Physically abused: Not on file     Forced sexual activity: Not on file   Other Topics Concern    Not on file   Social History Narrative    ** Merged History Encounter **                ROS:  [x] All negative/unchanged except if checked.  Explain positive(checked items) below:  [] Constitutional  [] Eyes  [] Ear/Nose/Mouth/Throat  [] Respiratory  [] CV  [] GI  []   [] Musculoskeletal  [] Skin/Breast  [] Neurological  [] Endocrine  [] Heme/Lymph  [] Allergic/Immunologic    Explanation:     MEDICATIONS:    Current Facility-Administered Medications:     divalproex (DEPAKOTE ER) extended release tablet 250 mg, 250 mg, Oral, Nightly, KELVIN Payne - CNP    OLANZapine (ZYPREXA) tablet 2.5 mg, 2.5 mg, Oral, Nightly, KELVIN Olmos - CNP, 2.5 mg at 09/17/20 2143    acetaminophen (TYLENOL) tablet 650 mg, 650 mg, Oral, Q6H PRN, Lindsey Sims MD, 650 mg at 09/16/20 1800    hydrOXYzine (VISTARIL) capsule 50 mg, 50 mg, Oral, TID PRN, Lindsey Sims MD    haloperidol lactate (HALDOL) injection 5 mg, 5 mg, Intramuscular, Q6H PRN **OR** haloperidol (HALDOL) tablet 5 mg, 5 mg, Oral, Q6H PRN, Lindsey Sims MD    traZODone (DESYREL) tablet 50 mg, 50 mg, Oral, Nightly PRN, Dick Herrera MD, 50 mg at 09/11/20 2234    magnesium hydroxide (MILK OF MAGNESIA) 400 MG/5ML suspension 30 mL, 30 mL, Oral, Daily PRN, Dick Herrera MD    aluminum & magnesium hydroxide-simethicone (MAALOX) 200-200-20 MG/5ML suspension 30 mL, 30 mL, Oral, PRN, Dick Herrera MD, 30 mL at 09/16/20 1800      Examination:  BP (!) 109/57   Pulse 55   Temp 97.7 °F (36.5 °C) (Temporal)   Resp 14   Ht 6' (1.829 m)   Wt 170 lb (77.1 kg)   SpO2 99%   BMI 23.06 kg/m²   Gait - steady  Medication side effects(SE): Denies    Mental Status Examination:    Level of consciousness:  within normal limits   Appearance:  fair grooming and fair hygiene  Behavior/Motor:  no abnormalities noted  Attitude toward examiner:  guarded  Speech:  monotone and poverty of speech   Mood: constricted  Affect:  blunted and flat  Thought processes: Linear without flight of ideas or loose associations  Thought content: Guarded denies auditory visual hallucinations endorses passive SI only when talking about discharge denies HI. Cognition:  oriented to person, place, and time   Concentration distractible  Insight poor   Judgement poor     ASSESSMENT:   Patient symptoms are:  [] Well controlled  [x] Improving  [] Worsening  [] No change      Diagnosis:   Principal Problem:    Acute psychosis ([facility]  Active Problems:    Polysubstance abuse ([facility]    Cluster B personality disorder ([facility]  Resolved Problems:    * No resolved hospital problems. *      LABS:    No results for input(s): WBC, HGB, PLT in the last 72 hours. No results for input(s): NA, K, CL, CO2, BUN, CREATININE, GLUCOSE in the last 72 hours. No results for input(s): BILITOT, ALKPHOS, AST, ALT in the last 72 hours.   Lab Results   Component Value Date    LABAMPH NOT DETECTED 09/09/2020    BARBSCNU NOT DETECTED 09/09/2020    LABBENZ NOT DETECTED 09/09/2020    LABMETH NOT DETECTED 09/09/2020    OPIATESCREENURINE NOT DETECTED 09/09/2020 PHENCYCLIDINESCREENURINE NOT DETECTED 09/09/2020    ETOH <10 09/09/2020     Lab Results   Component Value Date    TSH 1.240 04/18/2019     No results found for: LITHIUM  No results found for: VALPROATE, CBMZ        Treatment Plan:  Reviewed current Medications with the patient. Risks, benefits, side effects, drug-to-drug interactions and alternatives to treatment were discussed. Collateral information:   CD evaluation  Encourage patient to attend group and other milieu activities.   Discharge planning discussed with the patient and treatment team.    Change to Depakote  mg nightly for mood stabilization  Continue Zyprexa 2.5 mg at bedtime for mood and psychosis    PSYCHOTHERAPY/COUNSELING:  [x] Therapeutic interview  [x] Supportive  [] CBT  [] Ongoing  [] Other    [x] Patient continues to need, on a daily basis, active treatment furnished directly by or requiring the supervision of inpatient psychiatric personnel      Anticipated Length of stay: 5 to 7 days based on stability            Electronically signed by KELVIN Mercado CNP on 0/39/6441 at 9:08 AM

## 2020-09-20 NOTE — GROUP NOTE
Group Therapy Note    Date: 9/20/2020    Group Start Time: 1000  Group End Time: 0426  Group Topic: Psychoeducation    [facility] [address]        Group Therapy Note    Attendees: 16       Notes: Active and engaged during discussion on positive steps to well being. Pleasant and social with peers. Status After Intervention:  Improved    Participation Level:  Active Listener and Interactive    Participation Quality: Appropriate, Attentive and Sharing      Speech:  normal      Thought Process/Content: Logical      Affective Functioning: Congruent      Mood: euthymic      Level of consciousness:  Alert and Attentive      Response to Learning: Progressing to goal      Endings: None Reported    Modes of Intervention: Education, Support, Socialization and Exploration      Discipline Responsible: Psychoeducational Specialist      Signature:  Sheldon Ornelas Price

## 2020-09-20 NOTE — CARE COORDINATION
Pt continues to be friendly and cooperative when interacting with this . Pt out on the unit, and social with select peers. Attending groups. Pt seems to be situationialy suicidal, only stating he is suicidal when discharge is mentioned.  will need to pick this pt up when discharged. Will continue to assist as needed.

## 2020-09-21 VITALS
RESPIRATION RATE: 12 BRPM | HEART RATE: 51 BPM | HEIGHT: 72 IN | DIASTOLIC BLOOD PRESSURE: 65 MMHG | WEIGHT: 170 LBS | SYSTOLIC BLOOD PRESSURE: 122 MMHG | OXYGEN SATURATION: 99 % | TEMPERATURE: 97.2 F | BODY MASS INDEX: 23.03 KG/M2

## 2020-09-21 PROCEDURE — 99238 HOSP IP/OBS DSCHRG MGMT 30/<: CPT | Performed by: NURSE PRACTITIONER

## 2020-09-21 RX ORDER — DIVALPROEX SODIUM 250 MG/1
250 TABLET, EXTENDED RELEASE ORAL NIGHTLY
Qty: 30 TABLET | Refills: 0 | Status: ON HOLD
Start: 2020-09-21 | End: 2021-06-02 | Stop reason: HOSPADM

## 2020-09-21 ASSESSMENT — PAIN SCALES - GENERAL
PAINLEVEL_OUTOF10: 0

## 2020-09-21 NOTE — CARE COORDINATION
In order to ensure appropriate transition and discharge planning is in place, the following documents have been transmitted to [facility] , as the new outpatient provider:     The d/c diagnosis was transmitted to the next care provider   The reason for hospitalization was transmitted to the next care provider   The d/c medications (dosage and indication) were transmitted to the next care provider    The continuing care plan was transmitted to the next care provider

## 2020-09-21 NOTE — CARE COORDINATION
NILA note: d/c planning: NILA called and let CSN know that pt will be d/c today to . NILA notified Xaveir Angeles, . She states that a us Meghann Stammer will accompany her for estimated  time of noon. She states that long term will fill his meds so we are not to fill meds here. NILA notified [employer] and [employer].

## 2020-09-21 NOTE — PROGRESS NOTES
Patient presents brightened and improved. Patient reports that his anxiety and depression are minimal. Patient denies SI, HI, and AVH. Patient is in good control and presents with no signs of distress. He is medication compliant. Patient discharged. Will begin discharge process.

## 2020-09-21 NOTE — PROGRESS NOTES
Patient was out on unit, social with peers. Verbalizes that he still experiences fleeting suicidal thoughts, States plan to take what ever can, contracts for safety, but verbalizes the suicide is decreasing drastically. Denies HI or AVH. . States anxiety and depression 7 out of 10 do to his live is a vicious cycle, in and out of retirement do to probations violation. \"I smoke THC ,but now have medical marijuana. Uncertainty about future, not being in good standing with family. Stating appetite is good. Verbalizes sleep has improved with Depakote being changed to night. Patient still refuses Zyprexa, but is taking Depakote and did attend groups. Safety rounds continue.

## 2020-09-21 NOTE — PROGRESS NOTES
[facility]  Discharge Note    Pt discharged with followings belongings:   Dentures: None  Vision - Corrective Lenses: None  Hearing Aid: None  Jewelry: None  Clothing: Footwear, Pants, Shirt  Were All Patient Medications Collected?: Not Applicable  Other Valuables: Other (Comment)(ID)   Valuables sent home with patient. Valuables retrieved from safe, Security envelope number:  [id number] and returned to patient. Patient education on aftercare instructions: yes  Information faxed to next level of care by social work Patient verbalize understanding of AVS:  yes.     Status EXAM upon discharge:  Status and Exam  Normal: Yes(pt. sleeping)  Facial Expression: Expressionless  Affect: Unstable  Level of Consciousness: Alert  Mood:Normal: No  Mood: Depressed, Anxious, Suspicious  Motor Activity:Normal: Yes  Motor Activity: Increased  Interview Behavior: Cooperative, Uncooperative/Withdrawn, Irritable(refuses zyprexa)  Preception: [place] to Person, [place] to Time, [place] to Place, [place] to Situation  Attention:Normal: No  Attention: Distractible  Thought Processes: Circumstantial  Thought Content:Normal: No  Thought Content: Paranoia, Preoccupations  Hallucinations: None  Delusions: Yes  Delusions: Persecution  Memory:Normal: No  Memory: Poor Recent, Confabulation  Insight and Judgment: No  Insight and Judgment: Poor Insight, Unmotivated  Present Suicidal Ideation: Yes(\"always\")  Present Homicidal Ideation: No      Metabolic Screening:    Lab Results   Component Value Date    LABA1C 5.2 12/11/2019       Lab Results   Component Value Date    CHOL 173 12/11/2019    CHOL 163 04/18/2019     Lab Results   Component Value Date    TRIG 134 12/11/2019    TRIG 98 04/18/2019     Lab Results   Component Value Date    HDL 42 12/11/2019    HDL 54 04/18/2019     No components found for: [facility] EVALUATION AND TREATMENT [place]  Lab Results   Component Value Date    LABVLDL 27 12/11/2019    LABVLDL 20 04/18/2019       Bairon Gonsalez RN
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
## 2020-10-02 NOTE — ED NOTES
Received call from [facility]. Pt accepted . Will go to [facility]. Admitting doctor is Dr. Yuki Dillard.      Ant Ferrer, RN  05/25/20 5520 (3) no apparent problem

## 2021-02-25 NOTE — PROGRESS NOTES
Rested fair this shift no distress Instructions: This plan will send the code FBSD to the PM system.  DO NOT or CHANGE the price. Detail Level: Simple Price (Do Not Change): 0.00

## 2021-05-19 ENCOUNTER — HOSPITAL ENCOUNTER (INPATIENT)
Age: 26
LOS: 15 days | Discharge: HOME | DRG: 885 | End: 2021-06-03
Attending: EMERGENCY MEDICINE | Admitting: PSYCHIATRY & NEUROLOGY
Payer: COMMERCIAL

## 2021-05-19 DIAGNOSIS — F29 PSYCHOSIS, UNSPECIFIED PSYCHOSIS TYPE (HCC): Primary | ICD-10-CM

## 2021-05-19 DIAGNOSIS — J45.20 MILD INTERMITTENT ASTHMA WITHOUT COMPLICATION: ICD-10-CM

## 2021-05-19 LAB
ACETAMINOPHEN LEVEL: <5 MCG/ML (ref 10–30)
ALBUMIN SERPL-MCNC: 4.5 G/DL (ref 3.5–5.2)
ALP BLD-CCNC: 67 U/L (ref 40–129)
ALT SERPL-CCNC: 30 U/L (ref 0–40)
AMPHETAMINE SCREEN, URINE: POSITIVE
ANION GAP SERPL CALCULATED.3IONS-SCNC: 13 MMOL/L (ref 7–16)
AST SERPL-CCNC: 42 U/L (ref 0–39)
BARBITURATE SCREEN URINE: NOT DETECTED
BASOPHILS ABSOLUTE: 0.07 E9/L (ref 0–0.2)
BASOPHILS RELATIVE PERCENT: 0.7 % (ref 0–2)
BENZODIAZEPINE SCREEN, URINE: NOT DETECTED
BILIRUB SERPL-MCNC: 0.8 MG/DL (ref 0–1.2)
BUN BLDV-MCNC: 14 MG/DL (ref 6–20)
CALCIUM SERPL-MCNC: 9.9 MG/DL (ref 8.6–10.2)
CANNABINOID SCREEN URINE: POSITIVE
CHLORIDE BLD-SCNC: 99 MMOL/L (ref 98–107)
CO2: 25 MMOL/L (ref 22–29)
COCAINE METABOLITE SCREEN URINE: POSITIVE
CREAT SERPL-MCNC: 1.1 MG/DL (ref 0.7–1.2)
EKG ATRIAL RATE: 105 BPM
EKG P AXIS: 79 DEGREES
EKG P-R INTERVAL: 144 MS
EKG Q-T INTERVAL: 340 MS
EKG QRS DURATION: 80 MS
EKG QTC CALCULATION (BAZETT): 449 MS
EKG R AXIS: 144 DEGREES
EKG T AXIS: 59 DEGREES
EKG VENTRICULAR RATE: 105 BPM
EOSINOPHILS ABSOLUTE: 0.82 E9/L (ref 0.05–0.5)
EOSINOPHILS RELATIVE PERCENT: 8.1 % (ref 0–6)
ETHANOL: <10 MG/DL (ref 0–0.08)
FENTANYL SCREEN, URINE: NOT DETECTED
GFR AFRICAN AMERICAN: >60
GFR NON-AFRICAN AMERICAN: >60 ML/MIN/1.73
GLUCOSE BLD-MCNC: 89 MG/DL (ref 74–99)
HCT VFR BLD CALC: 48.5 % (ref 37–54)
HEMOGLOBIN: 16.7 G/DL (ref 12.5–16.5)
IMMATURE GRANULOCYTES #: 0.03 E9/L
IMMATURE GRANULOCYTES %: 0.3 % (ref 0–5)
INFLUENZA A: NOT DETECTED
INFLUENZA B: NOT DETECTED
LYMPHOCYTES ABSOLUTE: 2.11 E9/L (ref 1.5–4)
LYMPHOCYTES RELATIVE PERCENT: 21 % (ref 20–42)
Lab: ABNORMAL
MCH RBC QN AUTO: 31.3 PG (ref 26–35)
MCHC RBC AUTO-ENTMCNC: 34.4 % (ref 32–34.5)
MCV RBC AUTO: 91 FL (ref 80–99.9)
METHADONE SCREEN, URINE: NOT DETECTED
MONOCYTES ABSOLUTE: 1.04 E9/L (ref 0.1–0.95)
MONOCYTES RELATIVE PERCENT: 10.3 % (ref 2–12)
NEUTROPHILS ABSOLUTE: 6 E9/L (ref 1.8–7.3)
NEUTROPHILS RELATIVE PERCENT: 59.6 % (ref 43–80)
OPIATE SCREEN URINE: POSITIVE
OXYCODONE URINE: NOT DETECTED
PDW BLD-RTO: 12 FL (ref 11.5–15)
PHENCYCLIDINE SCREEN URINE: NOT DETECTED
PLATELET # BLD: 278 E9/L (ref 130–450)
PMV BLD AUTO: 10.9 FL (ref 7–12)
POTASSIUM SERPL-SCNC: 3.9 MMOL/L (ref 3.5–5)
RBC # BLD: 5.33 E12/L (ref 3.8–5.8)
SALICYLATE, SERUM: <0.3 MG/DL (ref 0–30)
SARS-COV-2 RNA, RT PCR: NOT DETECTED
SODIUM BLD-SCNC: 137 MMOL/L (ref 132–146)
TOTAL PROTEIN: 7.4 G/DL (ref 6.4–8.3)
TRICYCLIC ANTIDEPRESSANTS SCREEN SERUM: NEGATIVE NG/ML
WBC # BLD: 10.1 E9/L (ref 4.5–11.5)

## 2021-05-19 PROCEDURE — 80053 COMPREHEN METABOLIC PANEL: CPT

## 2021-05-19 PROCEDURE — 85025 COMPLETE CBC W/AUTO DIFF WBC: CPT

## 2021-05-19 PROCEDURE — 80307 DRUG TEST PRSMV CHEM ANLYZR: CPT

## 2021-05-19 PROCEDURE — 82077 ASSAY SPEC XCP UR&BREATH IA: CPT

## 2021-05-19 PROCEDURE — 1240000000 HC EMOTIONAL WELLNESS R&B

## 2021-05-19 PROCEDURE — 87636 SARSCOV2 & INF A&B AMP PRB: CPT

## 2021-05-19 PROCEDURE — 99285 EMERGENCY DEPT VISIT HI MDM: CPT

## 2021-05-19 PROCEDURE — 93005 ELECTROCARDIOGRAM TRACING: CPT | Performed by: EMERGENCY MEDICINE

## 2021-05-19 PROCEDURE — 80143 DRUG ASSAY ACETAMINOPHEN: CPT

## 2021-05-19 PROCEDURE — 80179 DRUG ASSAY SALICYLATE: CPT

## 2021-05-19 PROCEDURE — 93010 ELECTROCARDIOGRAM REPORT: CPT | Performed by: INTERNAL MEDICINE

## 2021-05-19 RX ORDER — ACETAMINOPHEN 325 MG/1
650 TABLET ORAL EVERY 6 HOURS PRN
Status: DISCONTINUED | OUTPATIENT
Start: 2021-05-19 | End: 2021-06-03 | Stop reason: HOSPADM

## 2021-05-19 RX ORDER — LORAZEPAM 2 MG/ML
2 INJECTION INTRAMUSCULAR EVERY 6 HOURS PRN
Status: DISCONTINUED | OUTPATIENT
Start: 2021-05-19 | End: 2021-06-03 | Stop reason: HOSPADM

## 2021-05-19 RX ORDER — HYDROXYZINE PAMOATE 50 MG/1
50 CAPSULE ORAL 3 TIMES DAILY PRN
Status: DISCONTINUED | OUTPATIENT
Start: 2021-05-19 | End: 2021-06-03 | Stop reason: HOSPADM

## 2021-05-19 RX ORDER — DIPHENHYDRAMINE HYDROCHLORIDE 50 MG/ML
50 INJECTION INTRAMUSCULAR; INTRAVENOUS EVERY 6 HOURS PRN
Status: DISCONTINUED | OUTPATIENT
Start: 2021-05-19 | End: 2021-06-03 | Stop reason: HOSPADM

## 2021-05-19 RX ORDER — MAGNESIUM HYDROXIDE/ALUMINUM HYDROXICE/SIMETHICONE 120; 1200; 1200 MG/30ML; MG/30ML; MG/30ML
30 SUSPENSION ORAL PRN
Status: DISCONTINUED | OUTPATIENT
Start: 2021-05-19 | End: 2021-06-03 | Stop reason: HOSPADM

## 2021-05-19 RX ORDER — TRAZODONE HYDROCHLORIDE 50 MG/1
50 TABLET ORAL NIGHTLY PRN
Status: DISCONTINUED | OUTPATIENT
Start: 2021-05-19 | End: 2021-06-03 | Stop reason: HOSPADM

## 2021-05-19 RX ORDER — HALOPERIDOL 5 MG/ML
5 INJECTION INTRAMUSCULAR EVERY 6 HOURS PRN
Status: DISCONTINUED | OUTPATIENT
Start: 2021-05-19 | End: 2021-06-03 | Stop reason: HOSPADM
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
## 2021-05-19 NOTE — PLAN OF CARE
Problem: Altered Mood, Psychotic Behavior:  Goal: Able to demonstrate trust by eating, participating in treatment and following staff's direction  Description: Able to demonstrate trust by eating, participating in treatment and following staff's direction  Outcome: Ongoing  Goal: Able to verbalize reality based thinking  Description: Able to verbalize reality based thinking  Outcome: Ongoing  Goal: Absence of self-harm  Description: Absence of self-harm  Outcome: Ongoing  Goal: Ability to achieve adequate nutritional intake will improve  Description: Ability to achieve adequate nutritional intake will improve  Outcome: Ongoing  Goal: Ability to interact with others will improve  Description: Ability to interact with others will improve  Outcome: Ongoing  Goal: Compliance with prescribed medication regimen will improve  Description: Compliance with prescribed medication regimen will improve  Outcome: Ongoing
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
## 2021-05-20 PROCEDURE — 99221 1ST HOSP IP/OBS SF/LOW 40: CPT | Performed by: NURSE PRACTITIONER

## 2021-05-20 PROCEDURE — 1240000000 HC EMOTIONAL WELLNESS R&B

## 2021-05-20 RX ORDER — DIVALPROEX SODIUM 250 MG/1
250 TABLET, EXTENDED RELEASE ORAL NIGHTLY
Status: DISCONTINUED | OUTPATIENT
Start: 2021-05-20 | End: 2021-05-27

## 2021-05-20 RX ORDER — ALBUTEROL SULFATE 2.5 MG/3ML
2.5 SOLUTION RESPIRATORY (INHALATION) 4 TIMES DAILY PRN
Status: DISCONTINUED | OUTPATIENT
Start: 2021-05-20 | End: 2021-06-03 | Stop reason: HOSPADM

## 2021-05-20 RX ORDER — OLANZAPINE 5 MG/1
5 TABLET, ORALLY DISINTEGRATING ORAL NIGHTLY
Status: DISCONTINUED | OUTPATIENT
Start: 2021-05-20 | End: 2021-05-27

## 2021-05-20 RX ORDER — OLANZAPINE 5 MG/1
5 TABLET ORAL NIGHTLY
Status: DISCONTINUED | OUTPATIENT
Start: 2021-05-20 | End: 2021-05-20

## 2021-05-20 ASSESSMENT — PAIN SCALES - GENERAL
PAINLEVEL_OUTOF10: 0
PAINLEVEL_OUTOF10: 0

## 2021-05-20 NOTE — CARE COORDINATION
SW made a 2nd attempt to complete assessment with pt. Pt was observed lying in his bed with eyes closed and not willing to speak with SW.

## 2021-05-20 NOTE — PROGRESS NOTES
Patient prompted to share goal for the day. Patient declined to answer put sheet over head. Will try again at later time.

## 2021-05-20 NOTE — PLAN OF CARE
Mood is improved this afternoon   out of room for meals then returns to bed    denies SI/HI   denies hallucinations  pleasant on approach not attending groups  will continue to monitor

## 2021-05-20 NOTE — PLAN OF CARE
[address] [place] [address]  Initial Interdisciplinary Treatment Plan NOTE    Review Date & Time: 5/20/21 1000    Patient was in treatment team    Admission Type:   Admission Type:  Involuntary    Reason for admission:  Reason for Admission: \"I'm tired\"      Estimated Length of Stay Update:  3-5 days  Estimated Discharge Date Update: 5-7 days    PATIENT STRENGTHS:  Patient Strengths    Patient Strengths and Limitations:   Addictive Behavior:   Medical Problems:  Past Medical History:   Diagnosis Date    Anxiety     Asthma     no meds currently- mostly exercise induced    Claustrophobia     confined spaces with locked doors    Depression     Epigastric pain     Nausea     Nut allergy        EDUCATION:   Learner Progress Toward Treatment Goals: Reviewed results and recommendations of this team    Method: Group    Outcome: Verbalized understanding    PATIENT GOALS: no    PLAN/TREATMENT RECOMMENDATIONS UPDATE:day 3    GOALS UPDATE:   Time frame for Short-Term Goals: 3-5 days    Neva Andrea RN
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
## 2021-05-20 NOTE — CARE COORDINATION
SW attempted to complete assessment with pt. Pt was observed laying in his bed with eyes closed. Pt placed a cover over his head when  This SW called his name and would not communicate.

## 2021-05-20 NOTE — PROGRESS NOTES
`[facility]al [facility]  Admission Note     Patient uncooperative with admission assessment. Refused to sign consents. Patient offers little to no conversation. Admission Type:   Admission Type:  Involuntary    Reason for admission:  Reason for Admission: \"I'm tired\"    PATIENT STRENGTHS:       Patient Strengths and Limitations:       Addictive Behavior:        Medical Problems:   Past Medical History:   Diagnosis Date    Anxiety     Asthma     no meds currently- mostly exercise induced    Claustrophobia     confined spaces with locked doors    Depression     Epigastric pain     Nausea     Nut allergy        Status EXAM:  Status and Exam  Normal: No  Facial Expression: Avoids Gaze  Affect: Blunt  Level of Consciousness: Other(see comment)  Mood:Normal: No  Mood: Other (Comment)  Motor Activity:Normal: No  Motor Activity: Decreased  Interview Behavior: Uncooperative/Withdrawn  Preception: Others(See Comment) (ERIKA)  Attention:Normal: No  Attention: Unable to Concentrate  Thought Processes: Other(See comment) (ERIKA)  Thought Content:Normal:  (ERIKA)  Thought Content:  (ERIKA)  Hallucinations:  (ERIKA)  Delusions:  (ERIKA)  Memory:Normal:  (ERIKA)  Insight and Judgment:  (ERIKA)  Present Suicidal Ideation: No  Present Homicidal Ideation: No    Tobacco Screening:  Practical Counseling, on admission, chucky X, if applicable and completed (first 3 are required if patient doesn't refuse):            ( )  Recognizing danger situations (included triggers and roadblocks)                    ( )  Coping skills (new ways to manage stress, exercise, relaxation techniques, changing routine, distraction)                                                           ( )  Basic information about quitting (benefits of quitting, techniques in how to quit, available resources  ( ) Referral for counseling faxed to [facility]                                           ( ) Patient refused counseling  ( ) Patient has not smoked in

## 2021-05-21 PROCEDURE — 99232 SBSQ HOSP IP/OBS MODERATE 35: CPT | Performed by: NURSE PRACTITIONER

## 2021-05-21 PROCEDURE — 1240000000 HC EMOTIONAL WELLNESS R&B

## 2021-05-21 ASSESSMENT — PAIN SCALES - GENERAL
PAINLEVEL_OUTOF10: 0
PAINLEVEL_OUTOF10: 0

## 2021-05-21 NOTE — PLAN OF CARE
Denies SI/HI  denies hallucinations  mood is irritable  isolative to room  yes and no answers but difficult to get pt to answer  refused to attend treatment team  did not attend groups  will continue to monitor
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
## 2021-05-21 NOTE — PROGRESS NOTES
Encounter **          Social Determinants of Health     Financial Resource Strain:     Difficulty of Paying Living Expenses:    Food Insecurity:     Worried About Running Out of Food in the Last Year:     [address] in the Last Year:    Transportation Needs:     Lack of Transportation (Medical):  Lack of Transportation (Non-Medical):    Physical Activity:     Days of Exercise per Week:     Minutes of Exercise per Session:    Stress:     Feeling of Stress :    Social Connections:     Frequency of Communication with Friends and Family:     Frequency of Social Gatherings with Friends and Family:     Attends Religion Services:     Active Member of Clubs or Organizations:     Attends Club or Organization Meetings:     Marital Status:    Intimate Partner Violence:     Fear of Current or Ex-Partner:     Emotionally Abused:     Physically Abused:     Sexually Abused:            ROS:  [x] All negative/unchanged except if checked.  Explain positive(checked items) below:  [] Constitutional  [] Eyes  [] Ear/Nose/Mouth/Throat  [] Respiratory  [] CV  [] GI  []   [] Musculoskeletal  [] Skin/Breast  [] Neurological  [] Endocrine  [] Heme/Lymph  [] Allergic/Immunologic    Explanation:     MEDICATIONS:    Current Facility-Administered Medications:     albuterol (PROVENTIL) nebulizer solution 2.5 mg, 2.5 mg, Nebulization, 4x Daily PRN, KELVIN Carroll - CNP    divalproex (DEPAKOTE ER) extended release tablet 250 mg, 250 mg, Oral, Nightly, KELVIN Payne - CNP    OLANZapine zydis (ZYPREXA) disintegrating tablet 5 mg, 5 mg, Oral, Nightly, KELVIN Payne - CNP    acetaminophen (TYLENOL) tablet 650 mg, 650 mg, Oral, Q6H PRN, Noe Martinez MD    magnesium hydroxide (MILK OF MAGNESIA) 400 MG/5ML suspension 30 mL, 30 mL, Oral, Daily PRN, Noe Martinez MD    aluminum & magnesium hydroxide-simethicone (MAALOX) 200-200-20 MG/5ML suspension 30 mL, 30 mL, Oral, PRN, Noe Martinez MD   hydrOXYzine (VISTARIL) capsule 50 mg, 50 mg, Oral, TID PRN, Teressa Peace MD    traZODone (DESYREL) tablet 50 mg, 50 mg, Oral, Nightly PRN, Teressa Peace MD    diphenhydrAMINE (BENADRYL) injection 50 mg, 50 mg, Intramuscular, Q6H PRN, Teressa Peace MD    LORazepam (ATIVAN) injection 2 mg, 2 mg, Intramuscular, Q6H PRN, Teressa Peace MD    haloperidol lactate (HALDOL) injection 5 mg, 5 mg, Intramuscular, Q6H PRN, Teressa Peace MD      Examination:  /62   Pulse 102   Temp 98.5 °F (36.9 °C) (Temporal)   Resp 14   SpO2 98%   Gait - steady  Medication side effects(SE): No    Mental Status Examination:    Level of consciousness:  within normal limits   Appearance:  fair grooming and fair hygiene  Behavior/Motor:  no abnormalities noted  Attitude toward examiner: Uncooperative  Speech: Normal rate rhythm and tone  Mood: \" I am fine. \"  Affect: Mood incongruent irritable easily agitated  Thought processes: Poverty of content  Thought content: Denies auditory visualizations delusions or perceptual normalities denies SI/HI intent or plan he appears to be internally stimulated guarded and paranoid  Cognition:  oriented to person, place, and time   Concentration intact  Insight poor   Judgement poor     ASSESSMENT:   Patient symptoms are:  [] Well controlled  [] Improving  [] Worsening  [x] No change      Diagnosis:   Principal Problem:    Acute psychosis ([facility]  Active Problems:    Polysubstance abuse ([facility]  Resolved Problems:    * No resolved hospital problems.  *      LABS:    Recent Labs     05/19/21  0812   WBC 10.1   HGB 16.7*        Recent Labs     05/19/21  0812      K 3.9   CL 99   CO2 25   BUN 14   CREATININE 1.1   GLUCOSE 89     Recent Labs     05/19/21  0812   BILITOT 0.8   ALKPHOS 67   AST 42*   ALT 30     Lab Results   Component Value Date    LABAMPH POSITIVE 05/19/2021    BARBSCNU NOT DETECTED 05/19/2021    LABBENZ NOT DETECTED 05/19/2021    LABMETH NOT DETECTED 05/19/2021    OPIATESCREENURINE POSITIVE 05/19/2021    PHENCYCLIDINESCREENURINE NOT DETECTED 05/19/2021    ETOH <10 05/19/2021     Lab Results   Component Value Date    TSH 1.240 04/18/2019     No results found for: LITHIUM  No results found for: VALPROATE, CBMZ      Treatment Plan:  Reviewed current Medications with the patient. Risks, benefits, side effects, drug-to-drug interactions and alternatives to treatment were discussed. Collateral information: CD evaluation  Encourage patient to attend group and other milieu activities.   Discharge planning discussed with the patient and treatment team.    Continue Depakote  mg at bedtime  Continue Zyprexa 5 mg at bedtime    PSYCHOTHERAPY/COUNSELING:  [x] Therapeutic interview  [x] Supportive  [] CBT  [] Ongoing  [] Other    [x] Patient continues to need, on a daily basis, active treatment furnished directly by or requiring the supervision of inpatient psychiatric personnel      Anticipated Length of stay: 3 to 7 days based on stability            Electronically signed by KELVIN Moss CNP on 8/37/0535 at 9:27 AM

## 2021-05-21 NOTE — PLAN OF CARE
[address] [place] [address]  Day 3 Interdisciplinary Treatment Plan NOTE    Review Date & Time: 5/21/21 1200    Patient was not in treatment team    Admission Type:   Admission Type: Involuntary    Reason for admission:  Reason for Admission: \"I'm tired\"  Estimated Length of Stay Update:  3-5 days  Estimated Discharge Date Update: 5-7 days    PATIENT STRENGTHS:  Patient Strengths Strengths: Connection to output provider  Patient Strengths and Limitations:Limitations: Unrealistic self-view, Tendency to isolate self, Difficulty problem solving/relies on others to help solve problems  Addictive Behavior:Addictive Behavior  In the past 3 months, have you felt or has someone told you that you have a problem with:  : None  Do you have a history of Chemical Use?:  (PT EVASIVE)  Do you have a history of Alcohol Use?:  (PT EVASIVE)  Do you have a history of Street Drug Abuse?:  (PT EVASIVE)  Histroy of Prescripton Drug Abuse?:  (PT EVASIVE)  Medical Problems:  Past Medical History:   Diagnosis Date    Anxiety     Asthma     no meds currently- mostly exercise induced    Claustrophobia     confined spaces with locked doors    Depression     Epigastric pain     Nausea     Nut allergy        Risk:  Fall RiskTotal: 73  Moses Scale Moses Scale Score: 22  BVC Total: 0  Change in scoresnone.  Changes to plan of Care none    Status EXAM:   Status and Exam  Normal: No  Facial Expression: Flat  Affect: Blunt  Level of Consciousness: Alert  Mood:Normal: No  Mood: Labile  Motor Activity:Normal: No  Motor Activity: Agitated  Interview Behavior: Evasive  Preception: [place] to Person, Jenene Lacer to Place, [place] to Time  Attention:Normal: No  Attention: Distractible  Thought Processes: Other(See comment)  Thought Content:Normal: No  Thought Content: Preoccupations  Hallucinations: None  Delusions: No  Memory:Normal: No  Memory: Confabulation  Insight and Judgment: No  Insight and Judgment: Poor Judgment, Poor Insight, Unmotivated,

## 2021-05-22 PROCEDURE — 1240000000 HC EMOTIONAL WELLNESS R&B

## 2021-05-22 PROCEDURE — 99231 SBSQ HOSP IP/OBS SF/LOW 25: CPT | Performed by: NURSE PRACTITIONER

## 2021-05-22 ASSESSMENT — PAIN SCALES - GENERAL: PAINLEVEL_OUTOF10: 0

## 2021-05-22 NOTE — PLAN OF CARE
Problem: Altered Mood, Psychotic Behavior:  Goal: Able to demonstrate trust by eating, participating in treatment and following staff's direction  Description: Able to demonstrate trust by eating, participating in treatment and following staff's direction  5/21/2021 2231 by Jamie Sanderson RN  Outcome: Ongoing     Problem: Altered Mood, Psychotic Behavior:  Goal: Able to verbalize reality based thinking  Description: Able to verbalize reality based thinking  5/21/2021 2231 by Jamie Sanderson RN  Outcome: Ongoing     Patient was withdrawn to his room. Out for snack and shower only. Did answer questions today. States that he does think he should take medications and only wants water and medical marijuana. Patient was educated on purposeful on medications. Behavior is remaining in control. Denies suicidal/homicidal ideations and hallucinations at this time. Purposeful rounding continued.

## 2021-05-22 NOTE — PLAN OF CARE
Patient denies SI, HI, and hallucinations this shift. Patient denies physical complaints currently. Patient is blunted, avoids eye contact, and is isolative to room.

## 2021-05-22 NOTE — PROGRESS NOTES
30 mL, Oral, PRN, Eugene Kimbrough MD    hydrOXYzine (VISTARIL) capsule 50 mg, 50 mg, Oral, TID PRN, Eugene Kimbrough MD    traZODone (DESYREL) tablet 50 mg, 50 mg, Oral, Nightly PRN, Eugene Kimbrough MD    diphenhydrAMINE (BENADRYL) injection 50 mg, 50 mg, Intramuscular, Q6H PRN, Eugene Kimbrough MD    LORazepam (ATIVAN) injection 2 mg, 2 mg, Intramuscular, Q6H PRN, Eugene Kimbrough MD    haloperidol lactate (HALDOL) injection 5 mg, 5 mg, Intramuscular, Q6H PRN, Eugene Kimbrough MD      Examination:  /62   Pulse 102   Temp 98.5 °F (36.9 °C) (Temporal)   Resp 14   SpO2 98%   Gait - steady  Medication side effects(SE): No    Mental Status Examination:    Level of consciousness:  within normal limits   Appearance:  fair grooming and fair hygiene  Behavior/Motor:  no abnormalities noted  Attitude toward examiner: Uncooperative  Speech: Normal rate rhythm and tone  Mood: \" I am fine. \"  Affect: Mood incongruent irritable easily agitated  Thought processes: Poverty of content  Thought content: Denies auditory visualizations delusions or perceptual normalities denies SI/HI intent or plan he appears to be internally stimulated guarded and paranoid  Cognition:  oriented to person, place, and time   Concentration intact  Insight poor   Judgement poor     ASSESSMENT:   Patient symptoms are:  [] Well controlled  [] Improving  [] Worsening  [x] No change      Diagnosis:   Principal Problem:    Acute psychosis ([facility]  Active Problems:    Polysubstance abuse ([facility]  Resolved Problems:    * No resolved hospital problems. *      LABS:    No results for input(s): WBC, HGB, PLT in the last 72 hours. No results for input(s): NA, K, CL, CO2, BUN, CREATININE, GLUCOSE in the last 72 hours. No results for input(s): BILITOT, ALKPHOS, AST, ALT in the last 72 hours.   Lab Results   Component Value Date    [address] POSITIVE 05/19/2021    BARBSCNU NOT DETECTED 05/19/2021    LABBENZ NOT DETECTED 05/19/2021    LABMETH NOT DETECTED 05/19/2021    OPIATESCREENURINE POSITIVE 05/19/2021    PHENCYCLIDINESCREENURINE NOT DETECTED 05/19/2021    ETOH <10 05/19/2021     Lab Results   Component Value Date    TSH 1.240 04/18/2019     No results found for: LITHIUM  No results found for: VALPROATE, CBMZ      Treatment Plan:  The patient's diagnosis, treatment plan, medication management were formulated after patient was seen directly by the attending physician and myself and all relevant documentation was reviewed. Reviewed current Medications with the patient. Risks, benefits, side effects, drug-to-drug interactions and alternatives to treatment were discussed. Collateral information: CD evaluation  Encourage patient to attend group and other milieu activities.   Discharge planning discussed with the patient and treatment team.    Continue Depakote  mg at bedtime  Continue Zyprexa 5 mg at bedtime    PSYCHOTHERAPY/COUNSELING:  [x] Therapeutic interview  [x] Supportive  [] CBT  [] Ongoing  [] Other    [x] Patient continues to need, on a daily basis, active treatment furnished directly by or requiring the supervision of inpatient psychiatric personnel      Anticipated Length of stay: 3 to 7 days based on stability            Electronically signed by KELVIN Kay CNP on 5/22/2021 at 1:54 PM

## 2021-05-23 PROCEDURE — 1240000000 HC EMOTIONAL WELLNESS R&B

## 2021-05-23 PROCEDURE — 99231 SBSQ HOSP IP/OBS SF/LOW 25: CPT | Performed by: NURSE PRACTITIONER

## 2021-05-23 ASSESSMENT — PAIN SCALES - GENERAL: PAINLEVEL_OUTOF10: 0

## 2021-05-23 NOTE — PLAN OF CARE
Patient denies SI, HI, and hallucinations this shift. Patient avoids eye contact, appears preoccupied and paranoid. Patient remains isolative to room and rocks back and fourth in bed but continues to deny physical complaints.

## 2021-05-23 NOTE — PROGRESS NOTES
BEHAVIORAL HEALTH FOLLOW-UP NOTE     5/23/2021     Patient was seen and examined in person, Chart reviewed   Patient's case discussed with staff/team    Chief Complaint: \"I do not care, I am not going to take the medicine. I have no interest in it. \"    Interim History: Patient is isolate to his room he does not attend groups does not socialize with peers. His lies in his bed stating he is \"all right. \"  He shows limited insight and judgment his hospitalization need for treatment. He offers no conversation does not make any eye contact is lying in his room flat and blunted appears guarded and internally stimulated per staff he is refusing medications. The patient continues to refuse medications and states that he is not going to take any medications. He appears very internally stimulated though he denies auditory or visual hallucinations. He is easily agitated. Appetite:   [x] Normal/Unchanged  [] Increased  [] Decreased      Sleep:       [x] Normal/Unchanged  [] Fair       [] Poor              Energy:    [x] Normal/Unchanged  [] Increased  [] Decreased        SI [] Present  [x] Absent    HI  []Present  [x] Absent     Aggression:  [] yes  [x] no    Patient is [x] able  [] unable to CONTRACT FOR SAFETY     PAST MEDICAL/PSYCHIATRIC HISTORY:   Past Medical History:   Diagnosis Date    Anxiety     Asthma     no meds currently- mostly exercise induced    Claustrophobia     confined spaces with locked doors    Depression     Epigastric pain     Nausea     Nut allergy        FAMILY/SOCIAL HISTORY:  No family history on file.   Social History     Socioeconomic History    Marital status: Single     Spouse name: Not on file    Number of children: 0    Years of education: 15    Highest education level: Not on file   Occupational History    Occupation:      Employer: [employer]   Tobacco Use    Smoking status: Never Smoker    Smokeless tobacco: Never Used   Vaping Use    Vaping Use: Never used Substance and Sexual Activity    Alcohol use: No    Drug use: Not Currently     Types: Marijuana     Comment: states has a medical card    Sexual activity: Not Currently     Partners: Female   Other Topics Concern    Not on file   Social History Narrative    ** Merged History Encounter **          Social Determinants of Health     Financial Resource Strain:     Difficulty of Paying Living Expenses:    Food Insecurity:     Worried About Running Out of Food in the Last Year:     Ran Out of Food in the Last Year:    Transportation Needs:     Lack of Transportation (Medical):  Lack of Transportation (Non-Medical):    Physical Activity:     Days of Exercise per Week:     Minutes of Exercise per Session:    Stress:     Feeling of Stress :    Social Connections:     Frequency of Communication with Friends and Family:     Frequency of Social Gatherings with Friends and Family:     Attends Mandaeism Services:     Active Member of Clubs or Organizations:     Attends Club or Organization Meetings:     Marital Status:    Intimate Partner Violence:     Fear of Current or Ex-Partner:     Emotionally Abused:     Physically Abused:     Sexually Abused:            ROS:  [x] All negative/unchanged except if checked.  Explain positive(checked items) below:  [] Constitutional  [] Eyes  [] Ear/Nose/Mouth/Throat  [] Respiratory  [] CV  [] GI  []   [] Musculoskeletal  [] Skin/Breast  [] Neurological  [] Endocrine  [] Heme/Lymph  [] Allergic/Immunologic    Explanation:     MEDICATIONS:    Current Facility-Administered Medications:     albuterol (PROVENTIL) nebulizer solution 2.5 mg, 2.5 mg, Nebulization, 4x Daily PRN, KELVIN Inman CNP    divalproex (DEPAKOTE ER) extended release tablet 250 mg, 250 mg, Oral, Nightly, KELVIN Payne CNP    OLANZapine zydis (ZYPREXA) disintegrating tablet 5 mg, 5 mg, Oral, Nightly, KELVIN Payne CNP    acetaminophen (TYLENOL) tablet 650 mg, 650 mg, Oral, Q6H PRN, Nils Gilliam MD    magnesium hydroxide (MILK OF MAGNESIA) 400 MG/5ML suspension 30 mL, 30 mL, Oral, Daily PRN, Nils Gilliam MD    aluminum & magnesium hydroxide-simethicone (MAALOX) 200-200-20 MG/5ML suspension 30 mL, 30 mL, Oral, PRN, Nils Gilliam MD    hydrOXYzine (VISTARIL) capsule 50 mg, 50 mg, Oral, TID PRN, Nils Gilliam MD    traZODone (DESYREL) tablet 50 mg, 50 mg, Oral, Nightly PRN, Nils Gilliam MD    diphenhydrAMINE (BENADRYL) injection 50 mg, 50 mg, Intramuscular, Q6H PRN, Nils Gilliam MD    LORazepam (ATIVAN) injection 2 mg, 2 mg, Intramuscular, Q6H PRN, Nils Gilliam MD    haloperidol lactate (HALDOL) injection 5 mg, 5 mg, Intramuscular, Q6H PRN, Nils Gilliam MD      Examination:  /62   Pulse 102   Temp 98.5 °F (36.9 °C) (Temporal)   Resp 14   SpO2 98%   Gait - steady  Medication side effects(SE): No    Mental Status Examination:    Level of consciousness:  within normal limits   Appearance:  fair grooming and fair hygiene  Behavior/Motor:  no abnormalities noted  Attitude toward examiner: Uncooperative  Speech: Normal rate rhythm and tone  Mood: \" I am fine. \"  Affect: Mood incongruent irritable easily agitated  Thought processes: Poverty of content  Thought content: Denies auditory visualizations delusions or perceptual normalities denies SI/HI intent or plan he appears to be internally stimulated guarded and paranoid  Cognition:  oriented to person, place, and time   Concentration intact  Insight poor   Judgement poor     ASSESSMENT:   Patient symptoms are:  [] Well controlled  [] Improving  [] Worsening  [x] No change      Diagnosis:   Principal Problem:    Acute psychosis ([facility]  Active Problems:    Polysubstance abuse ([facility]  Resolved Problems:    * No resolved hospital problems. *      LABS:    No results for input(s): WBC, HGB, PLT in the last 72 hours.   No results for input(s): NA, K, CL, CO2, BUN, CREATININE, GLUCOSE in the last 72 hours. No results for input(s): BILITOT, ALKPHOS, AST, ALT in the last 72 hours. Lab Results   Component Value Date    [address] POSITIVE 05/19/2021    BARBSCNU NOT DETECTED 05/19/2021    LABBENZ NOT DETECTED 05/19/2021    LABMETH NOT DETECTED 05/19/2021    OPIATESCREENURINE POSITIVE 05/19/2021    PHENCYCLIDINESCREENURINE NOT DETECTED 05/19/2021    ETOH <10 05/19/2021     Lab Results   Component Value Date    TSH 1.240 04/18/2019     No results found for: LITHIUM  No results found for: VALPROATE, CBMZ      Treatment Plan:  The patient's diagnosis, treatment plan, medication management were formulated after patient was seen directly by the attending physician and myself and all relevant documentation was reviewed. Reviewed current Medications with the patient. Risks, benefits, side effects, drug-to-drug interactions and alternatives to treatment were discussed. Collateral information: CD evaluation  Encourage patient to attend group and other milieu activities.   Discharge planning discussed with the patient and treatment team.    Continue Depakote  mg at bedtime  Continue Zyprexa 5 mg at bedtime    PSYCHOTHERAPY/COUNSELING:  [x] Therapeutic interview  [x] Supportive  [] CBT  [] Ongoing  [] Other    [x] Patient continues to need, on a daily basis, active treatment furnished directly by or requiring the supervision of inpatient psychiatric personnel      Anticipated Length of stay: 3 to 7 days based on stability            Electronically signed by KELVIN Mcgregor CNP on 5/23/2021 at 1:11 PM

## 2021-05-24 PROCEDURE — 99232 SBSQ HOSP IP/OBS MODERATE 35: CPT | Performed by: NURSE PRACTITIONER

## 2021-05-24 PROCEDURE — 1240000000 HC EMOTIONAL WELLNESS R&B

## 2021-05-24 ASSESSMENT — PAIN SCALES - GENERAL
PAINLEVEL_OUTOF10: 0

## 2021-05-24 NOTE — PROGRESS NOTES
BEHAVIORAL HEALTH FOLLOW-UP NOTE     5/24/2021     Patient was seen and examined in person, Chart reviewed   Patient's case discussed with staff/team    Chief Complaint: Lying in bed and avoidant not offering any conversation      Interim History: Patient seen in his room he has been isolative not attending groups and socialize with peers. He appears preoccupied and paranoid staff reports he appears internally stimulated. He answers I do not know almost every assessment question he is not taking medications he only comes out of his room to eat he is mostly isolated to his room appears paranoid    Appetite:   [x] Normal/Unchanged  [] Increased  [] Decreased      Sleep:       [x] Normal/Unchanged  [] Fair       [] Poor              Energy:    [x] Normal/Unchanged  [] Increased  [] Decreased        SI [] Present  [x] Absent    HI  []Present  [x] Absent     Aggression:  [] yes  [x] no    Patient is [x] able  [] unable to CONTRACT FOR SAFETY     PAST MEDICAL/PSYCHIATRIC HISTORY:   Past Medical History:   Diagnosis Date    Anxiety     Asthma     no meds currently- mostly exercise induced    Claustrophobia     confined spaces with locked doors    Depression     Epigastric pain     Nausea     Nut allergy        FAMILY/SOCIAL HISTORY:  No family history on file.   Social History     Socioeconomic History    Marital status: Single     Spouse name: Not on file    Number of children: 0    Years of education: 15    Highest education level: Not on file   Occupational History    Occupation:      Employer: [employer]   Tobacco Use    Smoking status: Never Smoker    Smokeless tobacco: Never Used   Vaping Use    Vaping Use: Never used   Substance and Sexual Activity    Alcohol use: No    Drug use: Not Currently     Types: Marijuana     Comment: states has a medical card    Sexual activity: Not Currently     Partners: Female   Other Topics Concern    Not on file   Social History Narrative    ** Merged OPIATESCREENURINE POSITIVE 05/19/2021    PHENCYCLIDINESCREENURINE NOT DETECTED 05/19/2021    ETOH <10 05/19/2021     Lab Results   Component Value Date    TSH 1.240 04/18/2019     No results found for: LITHIUM  No results found for: VALPROATE, CBMZ      Treatment Plan:  The patient's diagnosis, treatment plan, medication management were formulated after patient was seen directly by the attending physician and myself and all relevant documentation was reviewed. Reviewed current Medications with the patient. Risks, benefits, side effects, drug-to-drug interactions and alternatives to treatment were discussed. Collateral information: CD evaluation  Encourage patient to attend group and other milieu activities.   Discharge planning discussed with the patient and treatment team.    Continue Depakote  mg at bedtime  Continue Zyprexa 5 mg at bedtime    We will send notes to the court for a probate hearing this patient is not taking medications and not participating in treatment    PSYCHOTHERAPY/COUNSELING:  [x] Therapeutic interview  [x] Supportive  [] CBT  [] Ongoing  [] Other    [x] Patient continues to need, on a daily basis, active treatment furnished directly by or requiring the supervision of inpatient psychiatric personnel      Anticipated Length of stay: 3 to 7 days based on stability            Electronically signed by KELVIN Argueta CNP on 3/09/4058 at 3:01 PM

## 2021-05-24 NOTE — PLAN OF CARE
Went into his room  to introduce myself and asked if I could check his blood pressure and temperature. Pt rolled over onto his stomach and tucked his arms under himself. Asked if he was refusing, initially didn't answer, the said he was. Did come out for dinner taking food and fluids well. Will not answer any assessment questions.

## 2021-05-24 NOTE — CARE COORDINATION
Client is no longer active with [facility] and indicate that he is not appropriate for their services. He will need a referral to another provider at discharge.    Electronically signed by Kianna Arana [employer] on 5/24/2021 at 3:58 PM

## 2021-05-25 PROCEDURE — 1240000000 HC EMOTIONAL WELLNESS R&B

## 2021-05-25 PROCEDURE — 99232 SBSQ HOSP IP/OBS MODERATE 35: CPT | Performed by: NURSE PRACTITIONER

## 2021-05-25 ASSESSMENT — PAIN SCALES - GENERAL
PAINLEVEL_OUTOF10: 0
PAINLEVEL_OUTOF10: 0

## 2021-05-25 NOTE — PROGRESS NOTES
OPIATESCREENURINE POSITIVE 05/19/2021    PHENCYCLIDINESCREENURINE NOT DETECTED 05/19/2021    ETOH <10 05/19/2021     Lab Results   Component Value Date    TSH 1.240 04/18/2019     No results found for: LITHIUM  No results found for: VALPROATE, CBMZ      Treatment Plan:  The patient's diagnosis, treatment plan, medication management were formulated after patient was seen directly by the attending physician and myself and all relevant documentation was reviewed. Reviewed current Medications with the patient. Risks, benefits, side effects, drug-to-drug interactions and alternatives to treatment were discussed. Collateral information: CD evaluation  Encourage patient to attend group and other milieu activities.   Discharge planning discussed with the patient and treatment team.    Continue Depakote  mg at bedtime  Continue Zyprexa 5 mg at bedtime    We will send notes to the court for a probate hearing this patient is not taking medications and not participating in treatment    PSYCHOTHERAPY/COUNSELING:  [x] Therapeutic interview  [x] Supportive  [] CBT  [] Ongoing  [] Other    [x] Patient continues to need, on a daily basis, active treatment furnished directly by or requiring the supervision of inpatient psychiatric personnel      Anticipated Length of stay: 3 to 7 days based on stability            Electronically signed by KELVIN Irwin CNP on 2/16/2812 at 9:05 AM

## 2021-05-25 NOTE — PLAN OF CARE
Problem: Altered Mood, Psychotic Behavior:  Goal: Absence of self-harm  Description: Absence of self-harm  Outcome: Met This Shift  No self harm. Goal: Ability to interact with others will improve  Description: Ability to interact with others will improve  Outcome: Not Met This Shift  Declines groups. Eats in room.

## 2021-05-26 PROCEDURE — 99232 SBSQ HOSP IP/OBS MODERATE 35: CPT | Performed by: NURSE PRACTITIONER

## 2021-05-26 PROCEDURE — 1240000000 HC EMOTIONAL WELLNESS R&B

## 2021-05-26 ASSESSMENT — PAIN SCALES - GENERAL
PAINLEVEL_OUTOF10: 0
PAINLEVEL_OUTOF10: 0

## 2021-05-26 NOTE — PROGRESS NOTES
BEHAVIORAL HEALTH FOLLOW-UP NOTE     5/26/2021     Patient was seen and examined in person, Chart reviewed   Patient's case discussed with staff/team    Chief Complaint: \" I would not call those medications. \"    Interim History: Patient seen in his room he continues to stay in his room all day bring his meal trays to his room he does not leave his room other than to get his food he is not attending groups and not socialize with peers he is not answering questions other than to say \"I would not call those medications. \"  Staff reports that he reported homicidal ideations without a plan or target today. He has no insight or judgment he makes no eye contact just lies in bed not taking medications on participating in treatment at all. Appetite:   [x] Normal/Unchanged  [] Increased  [] Decreased      Sleep:       [x] Normal/Unchanged  [] Fair       [] Poor              Energy:    [x] Normal/Unchanged  [] Increased  [] Decreased        SI [] Present  [x] Absent    HI  []Present  [x] Absent     Aggression:  [] yes  [x] no    Patient is [x] able  [] unable to CONTRACT FOR SAFETY     PAST MEDICAL/PSYCHIATRIC HISTORY:   Past Medical History:   Diagnosis Date    Anxiety     Asthma     no meds currently- mostly exercise induced    Claustrophobia     confined spaces with locked doors    Depression     Epigastric pain     Nausea     Nut allergy        FAMILY/SOCIAL HISTORY:  No family history on file.   Social History     Socioeconomic History    Marital status: Single     Spouse name: Not on file    Number of children: 0    Years of education: 15    Highest education level: Not on file   Occupational History    Occupation:      Employer: [employer]   Tobacco Use    Smoking status: Never Smoker    Smokeless tobacco: Never Used   Vaping Use    Vaping Use: Never used   Substance and Sexual Activity    Alcohol use: No    Drug use: Not Currently     Types: Marijuana     Comment: states has a medical card MD David    aluminum & magnesium hydroxide-simethicone (MAALOX) 200-200-20 MG/5ML suspension 30 mL, 30 mL, Oral, PRN, Michael Nguyễn MD    hydrOXYzine (VISTARIL) capsule 50 mg, 50 mg, Oral, TID PRN, Michael Nguyễn MD    traZODone (DESYREL) tablet 50 mg, 50 mg, Oral, Nightly PRN, Michael Nguyễn MD    diphenhydrAMINE (BENADRYL) injection 50 mg, 50 mg, Intramuscular, Q6H PRN, Michael Nguyễn MD    LORazepam (ATIVAN) injection 2 mg, 2 mg, Intramuscular, Q6H PRN, Michael Nguyễn MD    haloperidol lactate (HALDOL) injection 5 mg, 5 mg, Intramuscular, Q6H PRN, Michael Nguyễn MD      Examination:  /62   Pulse 102   Temp 98.5 °F (36.9 °C) (Temporal)   Resp 14   SpO2 98%   Gait - steady  Medication side effects(SE): No    Mental Status Examination:    Level of consciousness:  within normal limits   Appearance:  fair grooming and fair hygiene  Behavior/Motor:  no abnormalities noted  Attitude toward examiner: Uncooperative  Speech: Normal rate rhythm and tone  Mood: \" I am fine. \"  Affect: Mood incongruent irritable easily agitated  Thought processes: Poverty of content  Thought content: Denies auditory visualizations delusions or perceptual normalities denies SI/HI intent or plan he appears to be internally stimulated guarded and paranoid  Cognition:  oriented to person, place, and time   Concentration intact  Insight poor   Judgement poor     ASSESSMENT:   Patient symptoms are:  [] Well controlled  [] Improving  [] Worsening  [x] No change      Diagnosis:   Principal Problem:    Acute psychosis ([facility]  Active Problems:    Polysubstance abuse ([facility]  Resolved Problems:    * No resolved hospital problems. *      LABS:    No results for input(s): WBC, HGB, PLT in the last 72 hours. No results for input(s): NA, K, CL, CO2, BUN, CREATININE, GLUCOSE in the last 72 hours. No results for input(s): BILITOT, ALKPHOS, AST, ALT in the last 72 hours.   Lab Results   Component Value Date    [facility] KELVIN Castillo CNP on 5/98/2026 at 3:45 PM         Electronically signed by KELVIN Castillo CNP on 9/93/3411 at 11:52 AM

## 2021-05-26 NOTE — PLAN OF CARE
Problem: Altered Mood, Psychotic Behavior:  Goal: Able to demonstrate trust by eating, participating in treatment and following staff's direction  Description: Able to demonstrate trust by eating, participating in treatment and following staff's direction  Outcome: Ongoing     Problem: Altered Mood, Psychotic Behavior:  Goal: Able to verbalize reality based thinking  Description: Able to verbalize reality based thinking  Outcome: Ongoing     Problem: Altered Mood, Psychotic Behavior:  Goal: Absence of self-harm  Description: Absence of self-harm  5/25/2021 2119 by Winsome Shane RN  Outcome: Met This Shift  5/25/2021 1232 by Christine Barbosa RN  Outcome: Met This Shift

## 2021-05-26 NOTE — PLAN OF CARE
Problem: Altered Mood, Psychotic Behavior:  Goal: Able to demonstrate trust by eating, participating in treatment and following staff's direction  Description: Able to demonstrate trust by eating, participating in treatment and following staff's direction  5/25/2021 2119 by Mehrdad Knox RN  Outcome: Ongoing  REFUSES MEDICATIONS AND GROUPS. EATS IN ROOM. Problem: Altered Mood, Psychotic Behavior:  Goal: Able to verbalize reality based thinking  Description: Able to verbalize reality based thinking  5/25/2021 2119 by Mehrdad Knox RN  Outcome: Ongoing  NO VOICED DELUSIONS. Problem: Altered Mood, Psychotic Behavior:  Goal: Absence of self-harm  Description: Absence of self-harm  5/26/2021 1053 by Ana Baez RN  Outcome: Met This Shift  NO SELF HARM, BUT REPORTS FLEETING SUICIDAL IDEATIONS WITH NO PLANS OR INTENTS OR INTENDED PERSON AT THIS TIME.   5/25/2021 2119 by Mehrdad Knox RN  Outcome: Met This Shift     Problem: Altered Mood, Psychotic Behavior:  Goal: Ability to interact with others will improve  Description: Ability to interact with others will improve  Outcome: Ongoing  KEEPS TO SELF, AVOIDS STAFF INTERACTION, POOR EYE CONTACT.

## 2021-05-26 NOTE — PLAN OF CARE
[facility] Interdisciplinary Treatment Plan Note     Review Date & Time:  5/26/21 1000    Patient was not in treatment team. PT. REFUSED. Admission Type:   Admission Type: Involuntary    Reason for admission:  Reason for Admission: \"I'm tired\"    Estimated Length of Stay Update:   2 WEEKS   Estimated Discharge Date Update:  NEXT WED. PATIENT STRENGTHS:  Patient Strengths:Strengths: Connection to output provider  Patient Strengths and Limitations:Limitations: Unrealistic self-view, Tendency to isolate self, Difficulty problem solving/relies on others to help solve problems  Addictive Behavior:Addictive Behavior  In the past 3 months, have you felt or has someone told you that you have a problem with:  : None  Do you have a history of Chemical Use?:  (PT EVASIVE)  Do you have a history of Alcohol Use?:  (PT EVASIVE)  Do you have a history of Street Drug Abuse?:  (PT EVASIVE)  Histroy of Prescripton Drug Abuse?:  (PT EVASIVE)  Medical Problems:   Past Medical History:   Diagnosis Date    Anxiety     Asthma     no meds currently- mostly exercise induced    Claustrophobia     confined spaces with locked doors    Depression     Epigastric pain     Nausea     Nut allergy        Risk:  Fall RiskTotal: 73  Moses Scale Moses Scale Score: 22  BVC Total: 0  Change in scores; NO FALLS OR MOSES RISK IDENTIFIED THIS SHIFT.  Changes to plan of Care : CONTINUE TO ASSESS FOR ANY INCREASE IN RISK OR CHANGE IN STATUS    Status EXAM:   Status and Exam  Normal: No  Facial Expression: Avoids Gaze  Affect: Inappropriate  Level of Consciousness: Alert  Mood:Normal: No  Mood: Anxious, Suspicious  Motor Activity:Normal: No  Motor Activity: Repetitive Acts  Interview Behavior: Evasive, Uncooperative/Withdrawn  Preception: [place] to Person, Vernadine Perry to Time, [place] to Place, [place] to Situation  Attention:Normal: No  Attention: Distractible  Thought Processes: Other(See comment) (SLOWED)  Thought Content:Normal: No  Thought Content: Poverty of Content  Hallucinations: None  Delusions: No (NONE VOICED ON A.M. ASSESSMENT)  Delusions: Other(See Comment) (none voiced on a.m. assessment)  Memory:Normal: No  Memory: Confabulation  Insight and Judgment: No  Insight and Judgment: Poor Judgment, Poor Insight, Unmotivated  Present Suicidal Ideation: Yes (FLEETING, NO PLAN)  Present Homicidal Ideation: Yes (FLEETING, NO INTENDED SPECIFIC VICTIM)    Daily Assessment Last Entry:   Daily Sleep (WDL): Within Defined Limits         Patient Currently in Pain: No  Daily Nutrition (WDL): Within Defined Limits    Patient Monitoring:  Frequency of Checks: 4 times per hour, close    Psychiatric Symptoms:   Depression Symptoms  Depression Symptoms: Isolative, Loss of interest  Anxiety Symptoms  Anxiety Symptoms: Generalized  Krystle Symptoms  Krystle Symptoms: Poor judgment     Psychosis Symptoms  Delusion Type: No problems reported or observed., Other (Comment) (NONE VOICED ON A.M. ASSESSMENT)    Suicide Risk CSSR-S:  1) Within the past month, have you wished you were dead or wished you could go to sleep and not wake up? : No  2) Have you actually had any thoughts of killing yourself? : No  6) Have you ever done anything, started to do anything, or prepared to do anything to end your life?: No  Change in Result: PT. REPORTS FLEETING SUICIDAL IDEATIONS WITH NO PLAN OR INTENT AT PRESENT.  Change in Plan of care; CONTINUE TO ASSESS FOR ANY INCREASE IN RISK OR CHANGE IN STATUS, ASSESS FOR PLAN OR INTENT    EDUCATION:   Learner Progress Toward Treatment Goals: Reviewed results and recommendations of this team    Method:INDIVIDUAL    Outcome: No evidence of Learning    PATIENT GOALS: REFUSED TO GIVE    PLAN/TREATMENT RECOMMENDATIONS UPDATE:  PROBATE, POSSIBLE FORCED MEDICATIONS LETTER TO COURT TOMORROW, HEARING Friday, ASSESS SUICIDE RISK, HARM TO SELF OR OTHERS, ENCOURAGE COMPLIANCE, ASSESS FOR PSYCHOSIS    GOALS UPDATE:  Time frame for Short-Term Goals:  2 WEEKS      Santy Peguero RN

## 2021-05-27 PROCEDURE — 1240000000 HC EMOTIONAL WELLNESS R&B

## 2021-05-27 PROCEDURE — 99232 SBSQ HOSP IP/OBS MODERATE 35: CPT | Performed by: NURSE PRACTITIONER

## 2021-05-27 RX ORDER — PALIPERIDONE 3 MG/1
3 TABLET, EXTENDED RELEASE ORAL 2 TIMES DAILY
Status: DISCONTINUED | OUTPATIENT
Start: 2021-05-27 | End: 2021-05-28

## 2021-05-27 RX ORDER — DIVALPROEX SODIUM 250 MG/1
250 TABLET, DELAYED RELEASE ORAL EVERY 12 HOURS SCHEDULED
Status: DISCONTINUED | OUTPATIENT
Start: 2021-05-27 | End: 2021-06-03 | Stop reason: HOSPADM

## 2021-05-27 ASSESSMENT — PAIN SCALES - GENERAL
PAINLEVEL_OUTOF10: 0

## 2021-05-27 NOTE — PLAN OF CARE
Problem: Altered Mood, Psychotic Behavior:  Goal: Able to demonstrate trust by eating, participating in treatment and following staff's direction  Description: Able to demonstrate trust by eating, participating in treatment and following staff's direction  Outcome: Ongoing     Problem: Altered Mood, Psychotic Behavior:  Goal: Able to verbalize reality based thinking  Description: Able to verbalize reality based thinking  Outcome: Ongoing     Problem: Altered Mood, Psychotic Behavior:  Goal: Absence of self-harm  Description: Absence of self-harm  5/26/2021 2236 by Earlean Blizzard, RN  Outcome: Met This Shift  5/26/2021 1053 by Ramez Levy RN  Outcome: Met This Shift

## 2021-05-27 NOTE — PROGRESS NOTES
BEHAVIORAL HEALTH FOLLOW-UP NOTE     5/27/2021     Patient was seen and examined in person, Chart reviewed   Patient's case discussed with staff/team    Chief Complaint: Refusing to talk    Interim History: Patient seen in his room he continues to refuse to talk. He does not answer any questions he just lies in his bed. Per staff he comes out and gets his meal trays brings him into his room and eats does not socialize with peers not attending groups he is showering every day. He shows no insight and judgment to hospitalization need for treatment I explained to him that he would have a probate hearing tomorrow and he did not respond did not offer any conversation. Very poor insight and judgment. He is guarded does not answer any assessment questions. Irritable affect. Appetite:   [x] Normal/Unchanged  [] Increased  [] Decreased      Sleep:       [x] Normal/Unchanged  [] Fair       [] Poor              Energy:    [x] Normal/Unchanged  [] Increased  [] Decreased        SI [] Present  [x] Absent    HI  []Present  [x] Absent     Aggression:  [] yes  [x] no    Patient is [x] able  [] unable to CONTRACT FOR SAFETY     PAST MEDICAL/PSYCHIATRIC HISTORY:   Past Medical History:   Diagnosis Date    Anxiety     Asthma     no meds currently- mostly exercise induced    Claustrophobia     confined spaces with locked doors    Depression     Epigastric pain     Nausea     Nut allergy        FAMILY/SOCIAL HISTORY:  No family history on file.   Social History     Socioeconomic History    Marital status: Single     Spouse name: Not on file    Number of children: 0    Years of education: 15    Highest education level: Not on file   Occupational History    Occupation:      Employer: [employer]   Tobacco Use    Smoking status: Never Smoker    Smokeless tobacco: Never Used   Vaping Use    Vaping Use: Never used   Substance and Sexual Activity    Alcohol use: No    Drug use: Not Currently     Types: Marijuana     Comment: states has a medical card    Sexual activity: Not Currently     Partners: Female   Other Topics Concern    Not on file   Social History Narrative    ** Merged History Encounter **          Social Determinants of Health     Financial Resource Strain:     Difficulty of Paying Living Expenses:    Food Insecurity:     Worried About Running Out of Food in the Last Year:     Ran Out of Food in the Last Year:    Transportation Needs:     Lack of Transportation (Medical):  Lack of Transportation (Non-Medical):    Physical Activity:     Days of Exercise per Week:     Minutes of Exercise per Session:    Stress:     Feeling of Stress :    Social Connections:     Frequency of Communication with Friends and Family:     Frequency of Social Gatherings with Friends and Family:     Attends Anabaptist Services:     Active Member of Clubs or Organizations:     Attends Club or Organization Meetings:     Marital Status:    Intimate Partner Violence:     Fear of Current or Ex-Partner:     Emotionally Abused:     Physically Abused:     Sexually Abused:            ROS:  [x] All negative/unchanged except if checked.  Explain positive(checked items) below:  [] Constitutional  [] Eyes  [] Ear/Nose/Mouth/Throat  [] Respiratory  [] CV  [] GI  []   [] Musculoskeletal  [] Skin/Breast  [] Neurological  [] Endocrine  [] Heme/Lymph  [] Allergic/Immunologic    Explanation:     MEDICATIONS:    Current Facility-Administered Medications:     albuterol (PROVENTIL) nebulizer solution 2.5 mg, 2.5 mg, Nebulization, 4x Daily PRN, KELVIN Betancourt CNP    divalproex (DEPAKOTE ER) extended release tablet 250 mg, 250 mg, Oral, Nightly, KELVIN Payne CNP    OLANZapine zydis (ZYPREXA) disintegrating tablet 5 mg, 5 mg, Oral, Nightly, KELVIN Payne CNP    acetaminophen (TYLENOL) tablet 650 mg, 650 mg, Oral, Q6H PRN, Deisy Quiroga MD    magnesium hydroxide (MILK OF MAGNESIA) 400 MG/5ML suspension 30 mL, 30 mL, Oral, Daily PRN, Drake River MD    aluminum & magnesium hydroxide-simethicone (MAALOX) 200-200-20 MG/5ML suspension 30 mL, 30 mL, Oral, PRN, Drake River MD    hydrOXYzine (VISTARIL) capsule 50 mg, 50 mg, Oral, TID PRN, Drake River MD    traZODone (DESYREL) tablet 50 mg, 50 mg, Oral, Nightly PRN, Drake River MD    diphenhydrAMINE (BENADRYL) injection 50 mg, 50 mg, Intramuscular, Q6H PRN, Drake River MD    LORazepam (ATIVAN) injection 2 mg, 2 mg, Intramuscular, Q6H PRN, Drake River MD    haloperidol lactate (HALDOL) injection 5 mg, 5 mg, Intramuscular, Q6H PRN, Drake River MD      Examination:  /62   Pulse 102   Temp 98.5 °F (36.9 °C) (Temporal)   Resp 16   SpO2 98%   Gait - steady  Medication side effects(SE): No    Mental Status Examination:    Level of consciousness:  within normal limits   Appearance:  fair grooming and fair hygiene  Behavior/Motor:  no abnormalities noted  Attitude toward examiner: Uncooperative  Speech: Normal rate rhythm and tone  Mood: \" I am fine. \"  Affect: Mood incongruent irritable easily agitated  Thought processes: Poverty of content  Thought content: Denies auditory visualizations delusions or perceptual normalities denies SI/HI intent or plan he appears to be internally stimulated guarded and paranoid  Cognition:  oriented to person, place, and time   Concentration intact  Insight poor   Judgement poor     ASSESSMENT:   Patient symptoms are:  [] Well controlled  [] Improving  [] Worsening  [x] No change      Diagnosis:   Principal Problem:    Acute psychosis ([facility]  Active Problems:    Polysubstance abuse ([facility]  Resolved Problems:    * No resolved hospital problems. *      LABS:    No results for input(s): WBC, HGB, PLT in the last 72 hours. No results for input(s): NA, K, CL, CO2, BUN, CREATININE, GLUCOSE in the last 72 hours. No results for input(s): BILITOT, ALKPHOS, AST, ALT in the last 72 hours. Lab Results   Component Value Date    [address] POSITIVE 05/19/2021    BARBSCNU NOT DETECTED 05/19/2021    LABBENZ NOT DETECTED 05/19/2021    LABMETH NOT DETECTED 05/19/2021    OPIATESCREENURINE POSITIVE 05/19/2021    PHENCYCLIDINESCREENURINE NOT DETECTED 05/19/2021    ETOH <10 05/19/2021     Lab Results   Component Value Date    TSH 1.240 04/18/2019     No results found for: LITHIUM  No results found for: VALPROATE, CBMZ      Treatment Plan:  The patient's diagnosis, treatment plan, medication management were formulated after patient was seen directly by the attending physician and myself and all relevant documentation was reviewed. Reviewed current Medications with the patient. Risks, benefits, side effects, drug-to-drug interactions and alternatives to treatment were discussed. Collateral information: CD evaluation  Encourage patient to attend group and other milieu activities.   Discharge planning discussed with the patient and treatment team.    Depakote 250 mg twice daily  Invega 3 mg twice daily    Patient scheduled for probate and court ordered medication hearing tomorrow    PSYCHOTHERAPY/COUNSELING:  [x] Therapeutic interview  [x] Supportive  [] CBT  [] Ongoing  [] Other             Electronically signed by KELVIN Burns CNP on 0/56/4040 at 1:58 PM

## 2021-05-27 NOTE — PROGRESS NOTES
PT. REMAINS WITH POOR EYE CONTACT AND IS EVASIVE OR DECLINES ASSESSMENTS. PT. DID ADMIT TO FLEETING SUICIDAL IDEATIONS AND FLEETING HOMICIDAL IDEATIONS ON INITIAL ASSESSMENT THIS A.M. DENIES ACTIVE PLANS FOR SAME. DENIES ANY INTENDED VICTIM. REFUSES GROUPS AND MEDICATIONS, VITALS. AND GROUPS. TAKES  TRAY TO ROOM FOR MEALS.

## 2021-05-27 NOTE — PLAN OF CARE
Problem: Altered Mood, Psychotic Behavior:  Goal: Absence of self-harm  Description: Absence of self-harm  5/27/2021 1030 by Alejandrina Caldwell RN  Outcome: Met This Shift  No self harm. Pt. Continues to refuse all medications and full assessments. 5/27/2021 0514 by Dru Boyer RN  Outcome: Met This Shift  5/26/2021 2236 by Elida Albert RN  Outcome: Met This Shift     Problem: Altered Mood, Psychotic Behavior:  Goal: Ability to achieve adequate nutritional intake will improve  Description: Ability to achieve adequate nutritional intake will improve  5/27/2021 0514 by Dru Boyer RN  Outcome: Ongoing  Eats meals in room. Problem: Altered Mood, Psychotic Behavior:  Goal: Ability to interact with others will improve  Description: Ability to interact with others will improve  5/27/2021 1030 by Alejandrina Caldwell RN  Outcome: Ongoing  No interaction. Pt. Answers assessment questions with vague, superficial answers.    5/27/2021 0514 by Dru Boyer RN  Outcome: Ongoing

## 2021-05-27 NOTE — PROGRESS NOTES
UP TO SHOWER AND FOR MEAL TRAYS, BUT  EATS IN ROOM. AVOIDANT OF EYE CONTACT. OBSERVED TO ROCK BACK AND FORTH WHILE IN BED. PT. CONTINUES TO REPORT FLEETING THOUGHTS TO HARM SELF AND OTHERS, BUT REPORTS NO PLAN, VICTIM  OR INTENT FOR SAME. DENIES HALLUCINATIONS, BUT IS PREOCCUPIED AND HESITANT/AVOIDANT WITH ASSESSMENTS AND IS DELAYED IN RESPONSES. NO EYE CONTACT AND DOES NOT INITIATE CONVERSATION, UNLESS ASKING TO SHOWER. PT. REPORTED CONTINUES TO REFUSE ALL MEDICATIONS AND ATTENDS NO GROUPS AND IS SECLUSIVE TO ROOM MOST OF DAY.

## 2021-05-28 PROCEDURE — 99232 SBSQ HOSP IP/OBS MODERATE 35: CPT | Performed by: NURSE PRACTITIONER

## 2021-05-28 PROCEDURE — 6370000000 HC RX 637 (ALT 250 FOR IP): Performed by: NURSE PRACTITIONER

## 2021-05-28 PROCEDURE — 6370000000 HC RX 637 (ALT 250 FOR IP): Performed by: PSYCHIATRY & NEUROLOGY

## 2021-05-28 PROCEDURE — 1240000000 HC EMOTIONAL WELLNESS R&B

## 2021-05-28 RX ORDER — HALOPERIDOL 5 MG/ML
5 INJECTION INTRAMUSCULAR 2 TIMES DAILY
Status: DISCONTINUED | OUTPATIENT
Start: 2021-05-28 | End: 2021-05-31

## 2021-05-28 RX ORDER — PALIPERIDONE 3 MG/1
3 TABLET, EXTENDED RELEASE ORAL 2 TIMES DAILY
Status: DISCONTINUED | OUTPATIENT
Start: 2021-05-28 | End: 2021-05-31

## 2021-05-28 RX ADMIN — TRAZODONE HYDROCHLORIDE 50 MG: 50 TABLET ORAL at 21:28

## 2021-05-28 RX ADMIN — DIVALPROEX SODIUM 250 MG: 250 TABLET, DELAYED RELEASE ORAL at 09:37

## 2021-05-28 RX ADMIN — PALIPERIDONE 3 MG: 3 TABLET, EXTENDED RELEASE ORAL at 09:38

## 2021-05-28 RX ADMIN — DIVALPROEX SODIUM 250 MG: 250 TABLET, DELAYED RELEASE ORAL at 21:28

## 2021-05-28 RX ADMIN — PALIPERIDONE 3 MG: 3 TABLET, EXTENDED RELEASE ORAL at 22:05

## 2021-05-28 ASSESSMENT — PAIN SCALES - GENERAL
PAINLEVEL_OUTOF10: 0
PAINLEVEL_OUTOF10: 0

## 2021-05-28 NOTE — PROGRESS NOTES
BEHAVIORAL HEALTH FOLLOW-UP NOTE     5/28/2021     Patient was seen and examined in person, Chart reviewed   Patient's case discussed with staff/team    Chief Complaint: \" I am making my own schedule medications and if your medications do not line up I am not going to take them. \"      Interim History: Patient seen in his room. Patient had a probate and court ordered medication hearing today where he expressed concerns about always being on the same medications. Expressed concerns about not wanting to continue taking Depakote. I attempted to discuss with patient about his options of mood stabilizers and he states that he is making his own schedule what medications he wants to take he has a piece of paper where he had written Adderall and amphetamines. He states he is not going to take any medications as outlined up with what he wants to take. He shows no insight and judgment hospitalization need for treatment. He denies SI/HI intent or plan currently. Very guarded    Appetite:   [x] Normal/Unchanged  [] Increased  [] Decreased      Sleep:       [x] Normal/Unchanged  [] Fair       [] Poor              Energy:    [x] Normal/Unchanged  [] Increased  [] Decreased        SI [] Present  [x] Absent    HI  []Present  [x] Absent     Aggression:  [] yes  [x] no    Patient is [x] able  [] unable to CONTRACT FOR SAFETY     PAST MEDICAL/PSYCHIATRIC HISTORY:   Past Medical History:   Diagnosis Date    Anxiety     Asthma     no meds currently- mostly exercise induced    Claustrophobia     confined spaces with locked doors    Depression     Epigastric pain     Nausea     Nut allergy        FAMILY/SOCIAL HISTORY:  No family history on file.   Social History     Socioeconomic History    Marital status: Single     Spouse name: Not on file    Number of children: 0    Years of education: 15    Highest education level: Not on file   Occupational History    Occupation: [employer]     Employer: [employer]   Tobacco Use No results for input(s): WBC, HGB, PLT in the last 72 hours. No results for input(s): NA, K, CL, CO2, BUN, CREATININE, GLUCOSE in the last 72 hours. No results for input(s): BILITOT, ALKPHOS, AST, ALT in the last 72 hours. Lab Results   Component Value Date    [address] POSITIVE 05/19/2021    BARBSCNU NOT DETECTED 05/19/2021    LABBENZ NOT DETECTED 05/19/2021    LABMETH NOT DETECTED 05/19/2021    OPIATESCREENURINE POSITIVE 05/19/2021    PHENCYCLIDINESCREENURINE NOT DETECTED 05/19/2021    ETOH <10 05/19/2021     Lab Results   Component Value Date    TSH 1.240 04/18/2019     No results found for: LITHIUM  No results found for: VALPROATE, CBMZ      Treatment Plan:  The patient's diagnosis, treatment plan, medication management were formulated after patient was seen directly by the attending physician and myself and all relevant documentation was reviewed. Reviewed current Medications with the patient. Risks, benefits, side effects, drug-to-drug interactions and alternatives to treatment were discussed. Collateral information: CD evaluation  Encourage patient to attend group and other milieu activities.   Discharge planning discussed with the patient and treatment team.    Depakote 250 mg twice daily  Invega 3 mg twice daily if patient refuses Crystal Bach will give 5 mg IM of Joel Rodrigues has granted to probate as well as court ordered medications    PSYCHOTHERAPY/COUNSELING:  [x] Therapeutic interview  [x] Supportive  [] CBT  [] Ongoing  [] Other             Electronically signed by KELVIN Garcia CNP on 9/22/8639 at 10:20 AM

## 2021-05-28 NOTE — PROGRESS NOTES
PT. WAS COMPLAINT TO MEDICATIONS POST COURT HEARING.    PT. HAS BEEN IN CONTROL WITH NO SELF HARM OR AGITATION. MAKES NEEDS KNOWN APPROPRIATELY. POOR EYE CONTACT, BUT IS MORE SPONTANEOUS ON APPROACH.

## 2021-05-28 NOTE — CARE COORDINATION
Navigator attended nap- [employer] based upon testimony provided  found client subject to court order and granted forced medication order. Client will be discharged on an AOT, client will need to be referred for outpatient services. He reported that it unknown if he is able to return home, he agreed to sign a PATRICIO for his mom.      Electronically signed by Kianna Arana, [facility] on 5/28/2021 at 9:57 AM

## 2021-05-29 PROCEDURE — 1240000000 HC EMOTIONAL WELLNESS R&B

## 2021-05-29 PROCEDURE — 6370000000 HC RX 637 (ALT 250 FOR IP): Performed by: NURSE PRACTITIONER

## 2021-05-29 PROCEDURE — 6370000000 HC RX 637 (ALT 250 FOR IP): Performed by: PSYCHIATRY & NEUROLOGY

## 2021-05-29 PROCEDURE — 99231 SBSQ HOSP IP/OBS SF/LOW 25: CPT | Performed by: NURSE PRACTITIONER

## 2021-05-29 PROCEDURE — 6360000002 HC RX W HCPCS: Performed by: NURSE PRACTITIONER

## 2021-05-29 RX ADMIN — TRAZODONE HYDROCHLORIDE 50 MG: 50 TABLET ORAL at 22:05

## 2021-05-29 RX ADMIN — HALOPERIDOL LACTATE 5 MG: 5 INJECTION, SOLUTION INTRAMUSCULAR at 09:28

## 2021-05-29 RX ADMIN — DIVALPROEX SODIUM 250 MG: 250 TABLET, DELAYED RELEASE ORAL at 22:05

## 2021-05-29 RX ADMIN — HYDROXYZINE PAMOATE 50 MG: 50 CAPSULE ORAL at 22:05

## 2021-05-29 RX ADMIN — PALIPERIDONE 3 MG: 3 TABLET, EXTENDED RELEASE ORAL at 22:05

## 2021-05-29 ASSESSMENT — PAIN SCALES - GENERAL: PAINLEVEL_OUTOF10: 0

## 2021-05-29 NOTE — BH NOTE
Patient refused to take scheduled Depakote and Invega PO at this time. Per court ordered medication administration order, patient was given Haldol 5 mg IM to left deltoid by this nurse. Patient was compliant with injection. Patient stated \"I'm refusing the pill because that's the intention I want to make. I don't want these meds. \" Patient says he has taken these medications \"for years and they don't work. \" Mady Munguia also said \"I won't fight you on it, do what you have to do\" when told that the injection had to be given each time he refuses the PO medications.

## 2021-05-29 NOTE — PLAN OF CARE
Problem: Altered Mood, Psychotic Behavior:  Goal: Able to demonstrate trust by eating, participating in treatment and following staff's direction  Description: Able to demonstrate trust by eating, participating in treatment and following staff's direction  Outcome: Ongoing     Problem: Altered Mood, Psychotic Behavior:  Goal: Able to verbalize decrease in frequency and intensity of hallucinations  Description: Able to verbalize decrease in frequency and intensity of hallucinations  Outcome: Met This Shift     Problem: Altered Mood, Psychotic Behavior:  Goal: Able to verbalize reality based thinking  Description: Able to verbalize reality based thinking  Outcome: Ongoing     Problem: Altered Mood, Psychotic Behavior:  Goal: Absence of self-harm  Description: Absence of self-harm  5/28/2021 2323 by Jose Mckee RN  Outcome: Met This Shift  5/28/2021 1334 by Bari Oconnell RN  Outcome: Met This Shift    Pt positive for fleeting suicidal ideations no plan. Stated thoughts are chronic. Pt denies homicidal ideations and hallucinations. Out on the unit for 2 hrs watching television. Avoids gaze. States meds will make him irritable and \"I just don't want any meds. \" pt took meds PO. Will continue to monitor.

## 2021-05-29 NOTE — PLAN OF CARE
Patient is positive for SI with no plan or intent, pt. States these thoughts are chronic. Patient denies HI, hallucinations, and physical complaints.

## 2021-05-29 NOTE — PROGRESS NOTES
BEHAVIORAL HEALTH FOLLOW-UP NOTE     5/29/2021     Patient was seen and examined in person, Chart reviewed   Patient's case discussed with staff/team    Chief Complaint: \" My arm hurts\"      Interim History: Patient seen in his room. He remains guarded and disorganized. He has been refusing his court order medications receiving IM injections. States he is going to start taking medications now. He has no insight or judgment continues to isolate guarded and paranoid did not attend groups or socialize with peers makes multiple bizarre statements      Appetite:   [x] Normal/Unchanged  [] Increased  [] Decreased      Sleep:       [x] Normal/Unchanged  [] Fair       [] Poor              Energy:    [x] Normal/Unchanged  [] Increased  [] Decreased        SI [] Present  [x] Absent    HI  []Present  [x] Absent     Aggression:  [] yes  [x] no    Patient is [x] able  [] unable to CONTRACT FOR SAFETY     PAST MEDICAL/PSYCHIATRIC HISTORY:   Past Medical History:   Diagnosis Date    Anxiety     Asthma     no meds currently- mostly exercise induced    Claustrophobia     confined spaces with locked doors    Depression     Epigastric pain     Nausea     Nut allergy        FAMILY/SOCIAL HISTORY:  No family history on file.   Social History     Socioeconomic History    Marital status: Single     Spouse name: Not on file    Number of children: 0    Years of education: 15    Highest education level: Not on file   Occupational History    Occupation:      Employer: [employer]   Tobacco Use    Smoking status: Never Smoker    Smokeless tobacco: Never Used   Vaping Use    Vaping Use: Never used   Substance and Sexual Activity    Alcohol use: No    Drug use: Not Currently     Types: Marijuana     Comment: states has a medical card    Sexual activity: Not Currently     Partners: Female   Other Topics Concern    Not on file   Social History Narrative    ** Merged History Encounter **          Social Determinants of Health     Financial Resource Strain:     Difficulty of Paying Living Expenses:    Food Insecurity:     Worried About [address] in the Last Year:     [address] [facility] [address] in the Last Year:    Transportation Needs:     Lack of Transportation (Medical):  Lack of Transportation (Non-Medical):    Physical Activity:     Days of Exercise per Week:     Minutes of Exercise per Session:    Stress:     Feeling of Stress :    Social Connections:     Frequency of Communication with Friends and Family:     Frequency of Social Gatherings with Friends and Family:     Attends Druze Services:     Active Member of Clubs or Organizations:     Attends Club or Organization Meetings:     Marital Status:    Intimate Partner Violence:     Fear of Current or Ex-Partner:     Emotionally Abused:     Physically Abused:     Sexually Abused:            ROS:  [x] All negative/unchanged except if checked.  Explain positive(checked items) below:  [] Constitutional  [] Eyes  [] Ear/Nose/Mouth/Throat  [] Respiratory  [] CV  [] GI  []   [] Musculoskeletal  [] Skin/Breast  [] Neurological  [] Endocrine  [] Heme/Lymph  [] Allergic/Immunologic    Explanation:     MEDICATIONS:    Current Facility-Administered Medications:     paliperidone (INVEGA) extended release tablet 3 mg, 3 mg, Oral, BID, 3 mg at 05/28/21 2205 **OR** haloperidol lactate (HALDOL) injection 5 mg, 5 mg, Intramuscular, BID, KELVIN Mccollum CNP, 5 mg at 05/29/21 6281    divalproex (DEPAKOTE) DR tablet 250 mg, 250 mg, Oral, 2 times per day, KELVIN Matthew CNP, 542 mg at 05/28/21 2128    albuterol (PROVENTIL) nebulizer solution 2.5 mg, 2.5 mg, Nebulization, 4x Daily PRN, KELVIN Matthew CNP    acetaminophen (TYLENOL) tablet 650 mg, 650 mg, Oral, Q6H PRN, Ricky Raymond MD    magnesium hydroxide (MILK OF MAGNESIA) 400 MG/5ML suspension 30 mL, 30 mL, Oral, Daily PRN, Ricky Raymond MD    aluminum & magnesium hydroxide-simethicone (MAALOX) 308-394-04 MG/5ML suspension 30 mL, 30 mL, Oral, PRN, Steph Aceves MD    hydrOXYzine (VISTARIL) capsule 50 mg, 50 mg, Oral, TID PRN, Steph Aceves MD    traZODone (DESYREL) tablet 50 mg, 50 mg, Oral, Nightly PRN, Steph Aceves MD, 50 mg at 05/28/21 2128    diphenhydrAMINE (BENADRYL) injection 50 mg, 50 mg, Intramuscular, Q6H PRN, Steph Aceves MD    LORazepam (ATIVAN) injection 2 mg, 2 mg, Intramuscular, Q6H PRN, Steph Aceves MD    haloperidol lactate (HALDOL) injection 5 mg, 5 mg, Intramuscular, Q6H PRN, Steph Aceves MD      Examination:  /63   Pulse 85   Temp 98.2 °F (36.8 °C)   Resp 14   SpO2 98%   Gait - steady  Medication side effects(SE): No    Mental Status Examination:    Level of consciousness:  within normal limits   Appearance:  fair grooming and fair hygiene  Behavior/Motor:  no abnormalities noted  Attitude toward examiner: Uncooperative  Speech: Normal rate rhythm and tone  Mood: \" I am fine. \"  Affect: Mood incongruent irritable easily agitated  Thought processes: Poverty of content  Thought content: Denies auditory visualizations delusions or perceptual normalities denies SI/HI intent or plan he appears to be internally stimulated guarded and paranoid  Cognition:  oriented to person, place, and time   Concentration intact  Insight poor   Judgement poor     ASSESSMENT:   Patient symptoms are:  [] Well controlled  [] Improving  [] Worsening  [x] No change      Diagnosis:   Principal Problem:    Acute psychosis ([facility]  Active Problems:    Polysubstance abuse ([facility]  Resolved Problems:    * No resolved hospital problems. *      LABS:    No results for input(s): WBC, HGB, PLT in the last 72 hours. No results for input(s): NA, K, CL, CO2, BUN, CREATININE, GLUCOSE in the last 72 hours. No results for input(s): BILITOT, ALKPHOS, AST, ALT in the last 72 hours.   Lab Results   Component Value Date    [facility] POSITIVE 05/19/2021    TED NOT DETECTED 05/19/2021    LABBENZ NOT DETECTED 05/19/2021    LABMETH NOT DETECTED 05/19/2021    OPIATESCREENURINE POSITIVE 05/19/2021    PHENCYCLIDINESCREENURINE NOT DETECTED 05/19/2021    ETOH <10 05/19/2021     Lab Results   Component Value Date    TSH 1.240 04/18/2019     No results found for: LITHIUM  No results found for: VALPROATE, CBMZ      Treatment Plan:  The patient's diagnosis, treatment plan, medication management were formulated after patient was seen directly by the attending physician and myself and all relevant documentation was reviewed. Reviewed current Medications with the patient. Risks, benefits, side effects, drug-to-drug interactions and alternatives to treatment were discussed. Collateral information: CD evaluation  Encourage patient to attend group and other milieu activities.   Discharge planning discussed with the patient and treatment team.    Depakote 250 mg twice daily  Invega 3 mg twice daily if patient refuses Myrna Adame will give 5 mg IM of [place] has granted to probate as well as court ordered medications    PSYCHOTHERAPY/COUNSELING:  [x] Therapeutic interview  [x] Supportive  [] CBT  [] Ongoing  [] Other             Electronically signed by KELVIN Irwin CNP on 9/64/2267 at 10:44 AM

## 2021-05-30 PROCEDURE — 99231 SBSQ HOSP IP/OBS SF/LOW 25: CPT | Performed by: NURSE PRACTITIONER

## 2021-05-30 PROCEDURE — 6370000000 HC RX 637 (ALT 250 FOR IP): Performed by: PSYCHIATRY & NEUROLOGY

## 2021-05-30 PROCEDURE — 6370000000 HC RX 637 (ALT 250 FOR IP): Performed by: NURSE PRACTITIONER

## 2021-05-30 PROCEDURE — 1240000000 HC EMOTIONAL WELLNESS R&B

## 2021-05-30 RX ORDER — BENZTROPINE MESYLATE 1 MG/1
1 TABLET ORAL 2 TIMES DAILY
Status: DISCONTINUED | OUTPATIENT
Start: 2021-05-30 | End: 2021-06-03 | Stop reason: HOSPADM

## 2021-05-30 RX ADMIN — BENZTROPINE MESYLATE 1 MG: 1 TABLET ORAL at 21:26

## 2021-05-30 RX ADMIN — HYDROXYZINE PAMOATE 50 MG: 50 CAPSULE ORAL at 21:27

## 2021-05-30 RX ADMIN — PALIPERIDONE 3 MG: 3 TABLET, EXTENDED RELEASE ORAL at 21:26

## 2021-05-30 RX ADMIN — PALIPERIDONE 3 MG: 3 TABLET, EXTENDED RELEASE ORAL at 09:22

## 2021-05-30 RX ADMIN — DIVALPROEX SODIUM 250 MG: 250 TABLET, DELAYED RELEASE ORAL at 21:26

## 2021-05-30 RX ADMIN — TRAZODONE HYDROCHLORIDE 50 MG: 50 TABLET ORAL at 21:27

## 2021-05-30 RX ADMIN — DIVALPROEX SODIUM 250 MG: 250 TABLET, DELAYED RELEASE ORAL at 09:22

## 2021-05-30 ASSESSMENT — PAIN SCALES - GENERAL: PAINLEVEL_OUTOF10: 0

## 2021-05-30 NOTE — PLAN OF CARE
Patient denies SI, HI, hallucinations and physical complaints currently. Patient is out for meals but remains isolative at other times. Patient states he is no longer having side effects from medications.

## 2021-05-30 NOTE — PLAN OF CARE
Pt resting in bed apparently asleep with easy even respirations at HS staggered q 15 min electronic rounding.

## 2021-05-30 NOTE — PLAN OF CARE
Patient reports fleeing SI without plan or intent. Denies HI and hallucinations. Patient is guarded and isolative only coming out to get a snack this evening. Eye contact is poor. Patient was compliant with taking PO medication this evening. No behavioral issues. No complains or concerns verbalized. Will continue to monitor and offer support.         Problem: Altered Mood, Psychotic Behavior:  Goal: Able to verbalize decrease in frequency and intensity of hallucinations  Description: Able to verbalize decrease in frequency and intensity of hallucinations  5/29/2021 2239 by Franklin Mcknight RN  Outcome: Met This Shift     Problem: Altered Mood, Psychotic Behavior:  Goal: Absence of self-harm  Description: Absence of self-harm  Outcome: Met This Shift     Problem: Altered Mood, Psychotic Behavior:  Goal: Compliance with prescribed medication regimen will improve  Description: Compliance with prescribed medication regimen will improve  Outcome: Met This Shift     Problem: Altered Mood, Psychotic Behavior:  Goal: Able to demonstrate trust by eating, participating in treatment and following staff's direction  Description: Able to demonstrate trust by eating, participating in treatment and following staff's direction  5/29/2021 2239 by Franklin Mcknight RN  Outcome: Not Met This Shift     Problem: Altered Mood, Psychotic Behavior:  Goal: Ability to interact with others will improve  Description: Ability to interact with others will improve  Outcome: Not Met This Shift         Electronically signed by Franklin Mcknight RN on 5/29/2021 at 10:50 PM Holding Losartan given KRIS  Norvasc 10mg  Monitor BP Good Fair

## 2021-05-30 NOTE — PROGRESS NOTES
BEHAVIORAL HEALTH FOLLOW-UP NOTE     5/30/2021     Patient was seen and examined in person, Chart reviewed   Patient's case discussed with staff/team    Chief Complaint: \" My arm hurts\"      Interim History: Patient seen in his room. He remains guarded and disorganized. Staff reports he took his court ordered medications yesterday did not require IM injections. He is more organized today. He states that he has trouble where he feels that he has to keep moving but he is agreeable to try Cogentin. Denies SI/HI intent or plan denies auditory visualizations states he has been out in the unit watching a movie plans to start attending groups      Appetite:   [x] Normal/Unchanged  [] Increased  [] Decreased      Sleep:       [x] Normal/Unchanged  [] Fair       [] Poor              Energy:    [x] Normal/Unchanged  [] Increased  [] Decreased        SI [] Present  [x] Absent    HI  []Present  [x] Absent     Aggression:  [] yes  [x] no    Patient is [x] able  [] unable to CONTRACT FOR SAFETY     PAST MEDICAL/PSYCHIATRIC HISTORY:   Past Medical History:   Diagnosis Date    Anxiety     Asthma     no meds currently- mostly exercise induced    Claustrophobia     confined spaces with locked doors    Depression     Epigastric pain     Nausea     Nut allergy        FAMILY/SOCIAL HISTORY:  No family history on file.   Social History     Socioeconomic History    Marital status: Single     Spouse name: Not on file    Number of children: 0    Years of education: 15    Highest education level: Not on file   Occupational History    Occupation:      Employer: [employer]   Tobacco Use    Smoking status: Never Smoker    Smokeless tobacco: Never Used   Vaping Use    Vaping Use: Never used   Substance and Sexual Activity    Alcohol use: No    Drug use: Not Currently     Types: Marijuana     Comment: states has a medical card    Sexual activity: Not Currently     Partners: Female   Other Topics Concern    Not on file   Social History Narrative    ** Merged History Encounter **          Social Determinants of Health     Financial Resource Strain:     Difficulty of Paying Living Expenses:    Food Insecurity:     Worried About Running Out of Food in the Last Year:     [address] in the Last Year:    Transportation Needs:     Lack of Transportation (Medical):  Lack of Transportation (Non-Medical):    Physical Activity:     Days of Exercise per Week:     Minutes of Exercise per Session:    Stress:     Feeling of Stress :    Social Connections:     Frequency of Communication with Friends and Family:     Frequency of Social Gatherings with Friends and Family:     Attends Restorationist Services:     Active Member of Clubs or Organizations:     Attends Club or Organization Meetings:     Marital Status:    Intimate Partner Violence:     Fear of Current or Ex-Partner:     Emotionally Abused:     Physically Abused:     Sexually Abused:            ROS:  [x] All negative/unchanged except if checked.  Explain positive(checked items) below:  [] Constitutional  [] Eyes  [] Ear/Nose/Mouth/Throat  [] Respiratory  [] CV  [] GI  []   [] Musculoskeletal  [] Skin/Breast  [] Neurological  [] Endocrine  [] Heme/Lymph  [] Allergic/Immunologic    Explanation:     MEDICATIONS:    Current Facility-Administered Medications:     paliperidone (INVEGA) extended release tablet 3 mg, 3 mg, Oral, BID, 3 mg at 05/30/21 8910 **OR** haloperidol lactate (HALDOL) injection 5 mg, 5 mg, Intramuscular, BID, KELVIN Betancourt CNP, 5 mg at 05/29/21 3999    divalproex (DEPAKOTE) DR tablet 250 mg, 250 mg, Oral, 2 times per day, KELVIN Walker CNP, 261 mg at 05/30/21 0048    albuterol (PROVENTIL) nebulizer solution 2.5 mg, 2.5 mg, Nebulization, 4x Daily PRN, KELVIN Betancourt CNP    acetaminophen (TYLENOL) tablet 650 mg, 650 mg, Oral, Q6H PRN, Deisy Quiroga MD    magnesium hydroxide (MILK OF MAGNESIA) 400 MG/5ML suspension

## 2021-05-31 PROCEDURE — 1240000000 HC EMOTIONAL WELLNESS R&B

## 2021-05-31 PROCEDURE — 6370000000 HC RX 637 (ALT 250 FOR IP): Performed by: NURSE PRACTITIONER

## 2021-05-31 PROCEDURE — 99232 SBSQ HOSP IP/OBS MODERATE 35: CPT | Performed by: NURSE PRACTITIONER

## 2021-05-31 RX ORDER — PALIPERIDONE 6 MG/1
6 TABLET, EXTENDED RELEASE ORAL DAILY
Status: DISCONTINUED | OUTPATIENT
Start: 2021-05-31 | End: 2021-06-03 | Stop reason: HOSPADM

## 2021-05-31 RX ORDER — PALIPERIDONE 3 MG/1
3 TABLET, EXTENDED RELEASE ORAL NIGHTLY
Status: DISCONTINUED | OUTPATIENT
Start: 2021-05-31 | End: 2021-06-03 | Stop reason: HOSPADM

## 2021-05-31 RX ORDER — HALOPERIDOL 5 MG/ML
5 INJECTION INTRAMUSCULAR NIGHTLY
Status: DISCONTINUED | OUTPATIENT
Start: 2021-05-31 | End: 2021-06-03 | Stop reason: HOSPADM

## 2021-05-31 RX ORDER — HALOPERIDOL 5 MG/ML
5 INJECTION INTRAMUSCULAR DAILY
Status: DISCONTINUED | OUTPATIENT
Start: 2021-05-31 | End: 2021-06-03 | Stop reason: HOSPADM

## 2021-05-31 RX ADMIN — PALIPERIDONE 3 MG: 3 TABLET, EXTENDED RELEASE ORAL at 20:49

## 2021-05-31 RX ADMIN — DIVALPROEX SODIUM 250 MG: 250 TABLET, DELAYED RELEASE ORAL at 09:44

## 2021-05-31 RX ADMIN — BENZTROPINE MESYLATE 1 MG: 1 TABLET ORAL at 20:50

## 2021-05-31 RX ADMIN — BENZTROPINE MESYLATE 1 MG: 1 TABLET ORAL at 09:51

## 2021-05-31 RX ADMIN — PALIPERIDONE 6 MG: 6 TABLET, EXTENDED RELEASE ORAL at 09:48

## 2021-05-31 RX ADMIN — DIVALPROEX SODIUM 250 MG: 250 TABLET, DELAYED RELEASE ORAL at 20:50

## 2021-05-31 ASSESSMENT — PAIN SCALES - GENERAL
PAINLEVEL_OUTOF10: 0

## 2021-05-31 NOTE — PLAN OF CARE
Problem: Altered Mood, Psychotic Behavior:  Goal: Able to demonstrate trust by eating, participating in treatment and following staff's direction  Description: Able to demonstrate trust by eating, participating in treatment and following staff's direction  5/30/2021 2123 by Martha Moore RN  Outcome: Ongoing  5/30/2021 0837 by Carol Lopez RN  Outcome: Ongoing     Problem: Altered Mood, Psychotic Behavior:  Goal: Able to verbalize reality based thinking  Description: Able to verbalize reality based thinking  5/30/2021 2123 by Martha Moore RN  Outcome: Ongoing  5/30/2021 0837 by Carol Lopez RN  Outcome: Ongoing     Problem: Altered Mood, Psychotic Behavior:  Goal: Able to verbalize decrease in frequency and intensity of hallucinations  Description: Able to verbalize decrease in frequency and intensity of hallucinations  Outcome: Ongoing

## 2021-05-31 NOTE — PROGRESS NOTES
PT. HAS BEEN IN CONTROL WITH NO UNIT PROBLEMS. UP FOR BRIEF INTERVALS. AVOIDS EYE CONATCT, BUT AFFECT IS IMPROVED. PT. IS MORE SPONTANEOUS ON APPROACH AND REMAINS MEDICATION COMPLIANT. GROUPS WERE ENCOURAGED. PT. DENIES SUICIDAL IDEATIONS, HOMICIDAL IDEATIONS AND HALLUCINATIONS. ACCEPTING TO SUPPORT GIVEN.

## 2021-05-31 NOTE — PLAN OF CARE
Problem: Altered Mood, Psychotic Behavior:  Goal: Able to verbalize reality based thinking  Description: Able to verbalize reality based thinking  5/31/2021 0510 by Eloina Jarvis RN  Outcome: Ongoing  NO VOICED DELUSIONS. 5/30/2021 2123 by Sai Perez RN  Outcome: Ongoing     Problem: Altered Mood, Psychotic Behavior:  Goal: Absence of self-harm  Description: Absence of self-harm  5/31/2021 0944 by Dannielle River RN  Outcome: Met This Shift  NO SELF HARM.    5/31/2021 0510 by Eloina Jarvis RN  Outcome: Met This Shift  5/30/2021 2123 by Sai Perez RN  Outcome: Ongoing     Problem: Altered Mood, Psychotic Behavior:  Goal: Ability to achieve adequate nutritional intake will improve  Description: Ability to achieve adequate nutritional intake will improve  5/31/2021 0510 by Eloina Jarvis RN  Outcome: Ongoing  EATS MEALS,  APPETITE ADEQUATE.   5/30/2021 2123 by Sai Perez RN  Outcome: Ongoing

## 2021-05-31 NOTE — PROGRESS NOTES
BEHAVIORAL HEALTH FOLLOW-UP NOTE     5/31/2021     Patient was seen and examined in person, Chart reviewed   Patient's case discussed with staff/team    Chief Complaint: \" I am taking the medications\"      Interim History: Patient seen in his room he does come out a watches television but does not attend groups not socializing with peers. Less bizarre statements. He has been taking his p.o. medications. He is agreeable to long-acting injection. He denies SI/HI intent or plan he denies any auditory or visual hallucinations    Appetite:   [x] Normal/Unchanged  [] Increased  [] Decreased      Sleep:       [x] Normal/Unchanged  [] Fair       [] Poor              Energy:    [x] Normal/Unchanged  [] Increased  [] Decreased        SI [] Present  [x] Absent    HI  []Present  [x] Absent     Aggression:  [] yes  [x] no    Patient is [x] able  [] unable to CONTRACT FOR SAFETY     PAST MEDICAL/PSYCHIATRIC HISTORY:   Past Medical History:   Diagnosis Date    Anxiety     Asthma     no meds currently- mostly exercise induced    Claustrophobia     confined spaces with locked doors    Depression     Epigastric pain     Nausea     Nut allergy        FAMILY/SOCIAL HISTORY:  No family history on file.   Social History     Socioeconomic History    Marital status: Single     Spouse name: Not on file    Number of children: 0    Years of education: 15    Highest education level: Not on file   Occupational History    Occupation:      Employer: [employer]   Tobacco Use    Smoking status: Never Smoker    Smokeless tobacco: Never Used   Vaping Use    Vaping Use: Never used   Substance and Sexual Activity    Alcohol use: No    Drug use: Not Currently     Types: Marijuana     Comment: states has a medical card    Sexual activity: Not Currently     Partners: Female   Other Topics Concern    Not on file   Social History Narrative    ** Merged History Encounter **          Social Determinants of Health     Financial (TYLENOL) tablet 650 mg, 650 mg, Oral, Q6H PRN, Chico Watts MD    magnesium hydroxide (MILK OF MAGNESIA) 400 MG/5ML suspension 30 mL, 30 mL, Oral, Daily PRN, Chico Watts MD    aluminum & magnesium hydroxide-simethicone (MAALOX) 200-200-20 MG/5ML suspension 30 mL, 30 mL, Oral, PRN, Chico Watts MD    hydrOXYzine (VISTARIL) capsule 50 mg, 50 mg, Oral, TID PRN, Chico Watts MD, 50 mg at 05/30/21 2127    traZODone (DESYREL) tablet 50 mg, 50 mg, Oral, Nightly PRN, Chico Watts MD, 50 mg at 05/30/21 2127    diphenhydrAMINE (BENADRYL) injection 50 mg, 50 mg, Intramuscular, Q6H PRN, Chico Watts MD    LORazepam (ATIVAN) injection 2 mg, 2 mg, Intramuscular, Q6H PRN, Chico Watts MD    haloperidol lactate (HALDOL) injection 5 mg, 5 mg, Intramuscular, Q6H PRN, Chico Watts MD      Examination:  /61   Pulse 63   Temp 98.7 °F (37.1 °C)   Resp 15   SpO2 98%   Gait - steady  Medication side effects(SE): No    Mental Status Examination:    Level of consciousness:  within normal limits   Appearance:  fair grooming and fair hygiene  Behavior/Motor:  no abnormalities noted  Attitude toward examiner: Uncooperative  Speech: Normal rate rhythm and tone  Mood: \" I am fine. \"  Affect: Mood incongruent irritable easily agitated  Thought processes: Poverty of content  Thought content: Denies auditory visualizations delusions or perceptual normalities denies SI/HI intent or plan he appears to be internally stimulated guarded and paranoid  Cognition:  oriented to person, place, and time   Concentration intact  Insight poor   Judgement poor     ASSESSMENT:   Patient symptoms are:  [] Well controlled  [] Improving  [] Worsening  [x] No change      Diagnosis:   Principal Problem:    Acute psychosis ([facility]  Active Problems:    Polysubstance abuse ([facility]  Resolved Problems:    * No resolved hospital problems. *      LABS:    No results for input(s): WBC, HGB, PLT in the last 72 hours.   No results for input(s): NA, K, CL, CO2, BUN, CREATININE, GLUCOSE in the last 72 hours. No results for input(s): BILITOT, ALKPHOS, AST, ALT in the last 72 hours. Lab Results   Component Value Date    [address] POSITIVE 05/19/2021    BARBSCNU NOT DETECTED 05/19/2021    LABBENZ NOT DETECTED 05/19/2021    LABMETH NOT DETECTED 05/19/2021    OPIATESCREENURINE POSITIVE 05/19/2021    PHENCYCLIDINESCREENURINE NOT DETECTED 05/19/2021    ETOH <10 05/19/2021     Lab Results   Component Value Date    TSH 1.240 04/18/2019     No results found for: LITHIUM  No results found for: VALPROATE, CBMZ      Treatment Plan:  The patient's diagnosis, treatment plan, medication management were formulated after patient was seen directly by the attending physician and myself and all relevant documentation was reviewed. Reviewed current Medications with the patient. Risks, benefits, side effects, drug-to-drug interactions and alternatives to treatment were discussed. Collateral information: CD evaluation  Encourage patient to attend group and other milieu activities.   Discharge planning discussed with the patient and treatment team.    Depakote 250 mg twice daily  Increase Invega 6 mg daily and 3 mg at bedtime if patient refuses Lisandra Laws will give 5 mg IM of Haldol  Cogentin 1 mg twice daily    Court has granted to probate as well as court ordered medications    PSYCHOTHERAPY/COUNSELING:  [x] Therapeutic interview  [x] Supportive  [] CBT  [] Ongoing  [] Other             Electronically signed by KELVIN Argueta CNP on 2/85/0734 at 8:37 AM

## 2021-05-31 NOTE — PROGRESS NOTES
Patient A+Ox 4, denies feelings of SI/HI, and AH/VH. Rates anxiety 9/10 and depression 8/10. Avoids gaze when conversing, brightens with conversation. Calm and pleasant. Good insight about current life's situation. Observed sitting in dayroom watching television.  No behavioral issues observed

## 2021-06-01 LAB — VALPROIC ACID LEVEL: 36 MCG/ML (ref 50–100)

## 2021-06-01 PROCEDURE — 6370000000 HC RX 637 (ALT 250 FOR IP): Performed by: NURSE PRACTITIONER

## 2021-06-01 PROCEDURE — 1240000000 HC EMOTIONAL WELLNESS R&B

## 2021-06-01 PROCEDURE — 6370000000 HC RX 637 (ALT 250 FOR IP): Performed by: PSYCHIATRY & NEUROLOGY

## 2021-06-01 PROCEDURE — 36415 COLL VENOUS BLD VENIPUNCTURE: CPT

## 2021-06-01 PROCEDURE — 80164 ASSAY DIPROPYLACETIC ACD TOT: CPT

## 2021-06-01 PROCEDURE — 99232 SBSQ HOSP IP/OBS MODERATE 35: CPT | Performed by: NURSE PRACTITIONER

## 2021-06-01 RX ADMIN — DIVALPROEX SODIUM 250 MG: 250 TABLET, DELAYED RELEASE ORAL at 20:45

## 2021-06-01 RX ADMIN — HYDROXYZINE PAMOATE 50 MG: 50 CAPSULE ORAL at 20:49

## 2021-06-01 RX ADMIN — TRAZODONE HYDROCHLORIDE 50 MG: 50 TABLET ORAL at 20:45

## 2021-06-01 RX ADMIN — PALIPERIDONE 3 MG: 3 TABLET, EXTENDED RELEASE ORAL at 20:45

## 2021-06-01 RX ADMIN — DIVALPROEX SODIUM 250 MG: 250 TABLET, DELAYED RELEASE ORAL at 09:49

## 2021-06-01 RX ADMIN — PALIPERIDONE 6 MG: 6 TABLET, EXTENDED RELEASE ORAL at 09:49

## 2021-06-01 RX ADMIN — BENZTROPINE MESYLATE 1 MG: 1 TABLET ORAL at 20:45

## 2021-06-01 RX ADMIN — BENZTROPINE MESYLATE 1 MG: 1 TABLET ORAL at 09:49

## 2021-06-01 ASSESSMENT — PAIN SCALES - GENERAL
PAINLEVEL_OUTOF10: 0

## 2021-06-01 NOTE — PROGRESS NOTES
BEHAVIORAL HEALTH FOLLOW-UP NOTE     6/1/2021     Patient was seen and examined in person, Chart reviewed   Patient's case discussed with staff/team    Chief Complaint: \" I want to take the Invega shot\"      Interim History: Patient seen in his room he does come out a watches television but does not attend groups not socializing with peers. Less bizarre statements. He has been taking his p.o. medications. He is asking for the Invega long-acting injection. He denies SI/HI intent or plan he denies any auditory or visual hallucinations more pleasant affect better insight and judgment states that he does not want his parents to be involved in his treatment plan anymore. Appetite:   [x] Normal/Unchanged  [] Increased  [] Decreased      Sleep:       [x] Normal/Unchanged  [] Fair       [] Poor              Energy:    [x] Normal/Unchanged  [] Increased  [] Decreased        SI [] Present  [x] Absent    HI  []Present  [x] Absent     Aggression:  [] yes  [x] no    Patient is [x] able  [] unable to CONTRACT FOR SAFETY     PAST MEDICAL/PSYCHIATRIC HISTORY:   Past Medical History:   Diagnosis Date    Anxiety     Asthma     no meds currently- mostly exercise induced    Claustrophobia     confined spaces with locked doors    Depression     Epigastric pain     Nausea     Nut allergy        FAMILY/SOCIAL HISTORY:  No family history on file.   Social History     Socioeconomic History    Marital status: Single     Spouse name: Not on file    Number of children: 0    Years of education: 15    Highest education level: Not on file   Occupational History    Occupation:      Employer: [employer]   Tobacco Use    Smoking status: Never Smoker    Smokeless tobacco: Never Used   Vaping Use    Vaping Use: Never used   Substance and Sexual Activity    Alcohol use: No    Drug use: Not Currently     Types: Marijuana     Comment: states has a medical card    Sexual activity: Not Currently     Partners: Female Other Topics Concern    Not on file   Social History Narrative    ** Merged History Encounter **          Social Determinants of Health     Financial Resource Strain:     Difficulty of Paying Living Expenses:    Food Insecurity:     Worried About Running Out of Food in the Last Year:     Ran Out of Food in the Last Year:    Transportation Needs:     Lack of Transportation (Medical):  Lack of Transportation (Non-Medical):    Physical Activity:     Days of Exercise per Week:     Minutes of Exercise per Session:    Stress:     Feeling of Stress :    Social Connections:     Frequency of Communication with Friends and Family:     Frequency of Social Gatherings with Friends and Family:     Attends Yazidism Services:     Active Member of Clubs or Organizations:     Attends Club or Organization Meetings:     Marital Status:    Intimate Partner Violence:     Fear of Current or Ex-Partner:     Emotionally Abused:     Physically Abused:     Sexually Abused:            ROS:  [x] All negative/unchanged except if checked.  Explain positive(checked items) below:  [] Constitutional  [] Eyes  [] Ear/Nose/Mouth/Throat  [] Respiratory  [] CV  [] GI  []   [] Musculoskeletal  [] Skin/Breast  [] Neurological  [] Endocrine  [] Heme/Lymph  [] Allergic/Immunologic    Explanation:     MEDICATIONS:    Current Facility-Administered Medications:     paliperidone (INVEGA) extended release tablet 6 mg, 6 mg, Oral, Daily, 6 mg at 05/31/21 0948 **OR** haloperidol lactate (HALDOL) injection 5 mg, 5 mg, Intramuscular, Daily, KELVIN Angulo CNP    paliperidone (INVEGA) extended release tablet 3 mg, 3 mg, Oral, Nightly, 3 mg at 05/31/21 2049 **OR** haloperidol lactate (HALDOL) injection 5 mg, 5 mg, Intramuscular, Nightly, KELVIN Payne CNP    benztropine (COGENTIN) tablet 1 mg, 1 mg, Oral, BID, KELVIN Payne CNP, 1 mg at 05/31/21 2050    divalproex (DEPAKOTE) DR tablet 250 mg, 250 mg, Oral, 2 times per day, KELVIN Gupta - CNP, 440 mg at 05/31/21 2050    albuterol (PROVENTIL) nebulizer solution 2.5 mg, 2.5 mg, Nebulization, 4x Daily PRN, KELVIN Gupta CNP    acetaminophen (TYLENOL) tablet 650 mg, 650 mg, Oral, Q6H PRN, Maribel Sawyer MD    magnesium hydroxide (MILK OF MAGNESIA) 400 MG/5ML suspension 30 mL, 30 mL, Oral, Daily PRN, Maribel Sawyer MD    aluminum & magnesium hydroxide-simethicone (MAALOX) 200-200-20 MG/5ML suspension 30 mL, 30 mL, Oral, PRN, Maribel Sawyer MD    hydrOXYzine (VISTARIL) capsule 50 mg, 50 mg, Oral, TID PRN, Maribel Sawyer MD, 50 mg at 05/30/21 2127    traZODone (DESYREL) tablet 50 mg, 50 mg, Oral, Nightly PRN, Maribel Sawyer MD, 50 mg at 05/30/21 2127    diphenhydrAMINE (BENADRYL) injection 50 mg, 50 mg, Intramuscular, Q6H PRN, Maribel Sawyer MD    LORazepam (ATIVAN) injection 2 mg, 2 mg, Intramuscular, Q6H PRN, Maribel Sawyer MD    haloperidol lactate (HALDOL) injection 5 mg, 5 mg, Intramuscular, Q6H PRN, Maribel Sawyer MD      Examination:  BP (!) 144/87   Pulse 76   Temp 97.4 °F (36.3 °C) (Oral)   Resp 16   SpO2 98%   Gait - steady  Medication side effects(SE): No    Mental Status Examination:    Level of consciousness:  within normal limits   Appearance:  fair grooming and fair hygiene  Behavior/Motor:  no abnormalities noted  Attitude toward examiner: Uncooperative  Speech: Normal rate rhythm and tone  Mood: \" I am fine. \"  Affect: Mood incongruent irritable easily agitated  Thought processes: Poverty of content  Thought content: Denies auditory visualizations delusions or perceptual normalities denies SI/HI intent or plan he appears to be internally stimulated guarded and paranoid  Cognition:  oriented to person, place, and time   Concentration intact  Insight poor   Judgement poor     ASSESSMENT:   Patient symptoms are:  [] Well controlled  [] Improving  [] Worsening  [x] No change      Diagnosis:   Principal Problem:

## 2021-06-01 NOTE — PLAN OF CARE
Problem: Altered Mood, Psychotic Behavior:  Goal: Able to demonstrate trust by eating, participating in treatment and following staff's direction  Description: Able to demonstrate trust by eating, participating in treatment and following staff's direction  Outcome: Met This Shift     Problem: Altered Mood, Psychotic Behavior:  Goal: Able to verbalize decrease in frequency and intensity of hallucinations  Description: Able to verbalize decrease in frequency and intensity of hallucinations  Outcome: Met This Shift     Problem: Altered Mood, Psychotic Behavior:  Goal: Able to verbalize reality based thinking  Description: Able to verbalize reality based thinking  6/1/2021 1735 by Miller Myers RN  Outcome: Ongoing  6/1/2021 0756 by Fidel Griffin RN  Outcome: Met This Shift     Problem: Altered Mood, Psychotic Behavior:  Goal: Absence of self-harm  Description: Absence of self-harm  6/1/2021 1735 by Miller Myers RN  Outcome: Met This Shift  6/1/2021 0756 by Fidel Griffin RN  Outcome: Met This Shift     Problem: Altered Mood, Deterioration in Function:  Goal: Able to verbalize reality based thinking  Description: Able to verbalize reality based thinking  6/1/2021 1735 by Miller Myers RN  Outcome: Ongoing  6/1/2021 0756 by Fidel Griffin RN  Outcome: Met This Shift       Pt denies suicidal ideations, homicidal ideations and hallucinations. Pt out on the unit after dinner. Pt did eat dinner in his room. Pt has improved eye contact. Pt stated the long acting injection made me dizzy \"But that didn't last long. I'm okay now. \" Pt is calm and cooperative. More organized. Appetite appropriate. Medication compliant. Will continue to monitor.

## 2021-06-02 PROBLEM — F25.0 SCHIZOAFFECTIVE DISORDER, BIPOLAR TYPE (HCC): Status: ACTIVE | Noted: 2021-06-02

## 2021-06-02 PROCEDURE — 1240000000 HC EMOTIONAL WELLNESS R&B

## 2021-06-02 PROCEDURE — 6370000000 HC RX 637 (ALT 250 FOR IP): Performed by: PSYCHIATRY & NEUROLOGY

## 2021-06-02 PROCEDURE — 99232 SBSQ HOSP IP/OBS MODERATE 35: CPT | Performed by: NURSE PRACTITIONER

## 2021-06-02 PROCEDURE — 6370000000 HC RX 637 (ALT 250 FOR IP): Performed by: NURSE PRACTITIONER

## 2021-06-02 RX ORDER — BENZTROPINE MESYLATE 1 MG/1
1 TABLET ORAL 2 TIMES DAILY
Qty: 60 TABLET | Refills: 0 | Status: ON HOLD | OUTPATIENT
Start: 2021-06-02 | End: 2021-06-16 | Stop reason: SDUPTHER

## 2021-06-02 RX ORDER — ALBUTEROL SULFATE 90 UG/1
2 AEROSOL, METERED RESPIRATORY (INHALATION) 4 TIMES DAILY PRN
Qty: 1 INHALER | Refills: 0 | Status: ON HOLD | OUTPATIENT
Start: 2021-06-02 | End: 2021-06-16 | Stop reason: SDUPTHER

## 2021-06-02 RX ORDER — PALIPERIDONE 3 MG/1
3 TABLET, EXTENDED RELEASE ORAL NIGHTLY
Qty: 30 TABLET | Refills: 0 | Status: ON HOLD
Start: 2021-06-02 | End: 2021-06-16 | Stop reason: HOSPADM

## 2021-06-02 RX ORDER — DIVALPROEX SODIUM 250 MG/1
250 TABLET, DELAYED RELEASE ORAL EVERY 12 HOURS SCHEDULED
Qty: 60 TABLET | Refills: 0 | Status: ON HOLD | OUTPATIENT
Start: 2021-06-02 | End: 2021-06-16 | Stop reason: SDUPTHER

## 2021-06-02 RX ORDER — PALIPERIDONE 6 MG/1
6 TABLET, EXTENDED RELEASE ORAL DAILY
Qty: 30 TABLET | Refills: 0 | Status: ON HOLD
Start: 2021-06-03 | End: 2021-06-16 | Stop reason: HOSPADM

## 2021-06-02 RX ADMIN — DIVALPROEX SODIUM 250 MG: 250 TABLET, DELAYED RELEASE ORAL at 21:03

## 2021-06-02 RX ADMIN — PALIPERIDONE 6 MG: 6 TABLET, EXTENDED RELEASE ORAL at 09:36

## 2021-06-02 RX ADMIN — HYDROXYZINE PAMOATE 50 MG: 50 CAPSULE ORAL at 21:06

## 2021-06-02 RX ADMIN — PALIPERIDONE 3 MG: 3 TABLET, EXTENDED RELEASE ORAL at 21:03

## 2021-06-02 RX ADMIN — BENZTROPINE MESYLATE 1 MG: 1 TABLET ORAL at 09:36

## 2021-06-02 RX ADMIN — BENZTROPINE MESYLATE 1 MG: 1 TABLET ORAL at 21:03

## 2021-06-02 RX ADMIN — TRAZODONE HYDROCHLORIDE 50 MG: 50 TABLET ORAL at 21:03

## 2021-06-02 RX ADMIN — DIVALPROEX SODIUM 250 MG: 250 TABLET, DELAYED RELEASE ORAL at 09:36

## 2021-06-02 ASSESSMENT — PAIN SCALES - GENERAL
PAINLEVEL_OUTOF10: 0
PAINLEVEL_OUTOF10: 0

## 2021-06-02 NOTE — CARE COORDINATION
Navigator met with client to review the results of Probate Court and courts expectations regarding Assisted Outpatient Treatment. Client displays superficial understanding of the expectations.    Electronically signed by Michael Camacho on 6/2/2021 at 3:31 PM

## 2021-06-02 NOTE — PROGRESS NOTES
[facility] Interdisciplinary Treatment Plan Note     Review Date & Time: 6/2/21 1000    Patient was in treatment team.    Admission Type:   Admission Type: Involuntary    Reason for admission:  Reason for Admission: \"I'm tired\"    Estimated Length of Stay Update:  1-3 days   Estimated Discharge Date Update: 6/4/21    PATIENT STRENGTHS:  Patient Strengths:Strengths: Connection to output provider  Patient Strengths and Limitations:Limitations: Unrealistic self-view, Tendency to isolate self, Difficulty problem solving/relies on others to help solve problems  Addictive Behavior:Addictive Behavior  In the past 3 months, have you felt or has someone told you that you have a problem with:  : None  Do you have a history of Chemical Use?:  (PT EVASIVE)  Do you have a history of Alcohol Use?:  (PT EVASIVE)  Do you have a history of Street Drug Abuse?:  (PT EVASIVE)  Histroy of Prescripton Drug Abuse?:  (PT EVASIVE)  Medical Problems:   Past Medical History:   Diagnosis Date    Anxiety     Asthma     no meds currently- mostly exercise induced    Claustrophobia     confined spaces with locked doors    Depression     Epigastric pain     Nausea     Nut allergy        Risk:  Fall RiskTotal: 73  Moses Scale Moses Scale Score: 22  BVC Total: 0  Change in scores no . Changes to plan of Care  No     Status EXAM:   Status and Exam  Normal: No  Facial Expression: Flat, Sad  Affect: Blunt  Level of Consciousness: Alert  Mood:Normal: No  Mood: Anxious, Depressed  Motor Activity:Normal: Yes  Motor Activity: Repetitive Acts  Interview Behavior: Cooperative  Preception: [place] to Person, Gloria Nestor to Time, [place] to Situation, [place] to Place  Attention:Normal: Yes  Attention: Distractible  Thought Processes: Other(See comment) (MORE ORGANIZED)  Thought Content:Normal: Yes  Thought Content: Poverty of Content  Hallucinations: None  Delusions: No  Delusions:  Other(See Comment)  Memory:Normal: Yes  Memory: Poor Recent, Poor Remote  Insight and Judgment: No  Insight and Judgment: Other(See comment) (impaired; improving)  Present Suicidal Ideation: No  Present Homicidal Ideation: No    Daily Assessment Last Entry:   Daily Sleep (WDL): Within Defined Limits         Patient Currently in Pain: Denies  Daily Nutrition (WDL): Within Defined Limits    Patient Monitoring:  Frequency of Checks: 4 times per hour, close    Psychiatric Symptoms:   Depression Symptoms  Depression Symptoms: Isolative  Anxiety Symptoms  Anxiety Symptoms: Generalized  Krystle Symptoms  Krystle Symptoms: No problems reported or observed. Psychosis Symptoms  Hallucination Type: No problems reported or observed. Delusion Type: No problems reported or observed.     Suicide Risk CSSR-S:  1) Within the past month, have you wished you were dead or wished you could go to sleep and not wake up? : No  2) Have you actually had any thoughts of killing yourself? : No  6) Have you ever done anything, started to do anything, or prepared to do anything to end your life?: No  Change in Result no  Change in Plan of care no     EDUCATION:   Learner Progress Toward Treatment Goals: Reviewed results and recommendations of this team, Reviewed group plan and strategies, Reviewed signs, symptoms and risk of self harm and violent behavior and Reviewed goals and plan of care    Method: Small group    Outcome: Demonstrated Understanding    PATIENT GOALS: no goal given    PLAN/TREATMENT RECOMMENDATIONS UPDATE: maintain therapeutic environment, encourage groups and medications    GOALS UPDATE:  Time frame for Short-Term Goals: n/a - no goal given      Paolo Aguirre RN

## 2021-06-02 NOTE — PROGRESS NOTES
BEHAVIORAL HEALTH FOLLOW-UP NOTE     6/2/2021     Patient was seen and examined in person, Chart reviewed   Patient's case discussed with staff/team    Chief Complaint: \" I am feeling better\"      Interim History: Patient seen during treatment team.  He states that he is feeling better however collateral from a she received from patient's mother is that patient has reported that he plans to stop taking medication as soon as he is discharged. Patient significant history of noncompliance with medication and drug use. He has failed mental health court in the past and has very poor insight and judgment into his hospitalization need for treatment. He does not denies all symptoms he is minimally social on the unit he has only attended 1 group throughout his hospitalization. Appetite:   [x] Normal/Unchanged  [] Increased  [] Decreased      Sleep:       [x] Normal/Unchanged  [] Fair       [] Poor              Energy:    [x] Normal/Unchanged  [] Increased  [] Decreased        SI [] Present  [x] Absent    HI  []Present  [x] Absent     Aggression:  [] yes  [x] no    Patient is [x] able  [] unable to CONTRACT FOR SAFETY     PAST MEDICAL/PSYCHIATRIC HISTORY:   Past Medical History:   Diagnosis Date    Anxiety     Asthma     no meds currently- mostly exercise induced    Claustrophobia     confined spaces with locked doors    Depression     Epigastric pain     Nausea     Nut allergy        FAMILY/SOCIAL HISTORY:  No family history on file.   Social History     Socioeconomic History    Marital status: Single     Spouse name: Not on file    Number of children: 0    Years of education: 15    Highest education level: Not on file   Occupational History    Occupation:      Employer: [employer]   Tobacco Use    Smoking status: Never Smoker    Smokeless tobacco: Never Used   Vaping Use    Vaping Use: Never used   Substance and Sexual Activity    Alcohol use: No    Drug use: Not Currently     Types: paranoid  Cognition:  oriented to person, place, and time   Concentration intact  Insight poor   Judgement poor     ASSESSMENT:   Patient symptoms are:  [] Well controlled  [] Improving  [] Worsening  [x] No change      Diagnosis:   Principal Problem:    Schizoaffective disorder, bipolar type ([facility]  Active Problems:    Polysubstance abuse ([facility]  Resolved Problems:    * No resolved hospital problems. *      LABS:    No results for input(s): WBC, HGB, PLT in the last 72 hours. No results for input(s): NA, K, CL, CO2, BUN, CREATININE, GLUCOSE in the last 72 hours. No results for input(s): BILITOT, ALKPHOS, AST, ALT in the last 72 hours. Lab Results   Component Value Date    Critical access hospital BEHAVIORAL HEALTH POSITIVE 05/19/2021    BARBSCNU NOT DETECTED 05/19/2021    LABBENZ NOT DETECTED 05/19/2021    LABMETH NOT DETECTED 05/19/2021    OPIATESCREENURINE POSITIVE 05/19/2021    PHENCYCLIDINESCREENURINE NOT DETECTED 05/19/2021    ETOH <10 05/19/2021     Lab Results   Component Value Date    TSH 1.240 04/18/2019     No results found for: LITHIUM  Lab Results   Component Value Date    VALPROATE 36 (L) 06/01/2021         Treatment Plan:  The patient's diagnosis, treatment plan, medication management were formulated after patient was seen directly by the attending physician and myself and all relevant documentation was reviewed. Reviewed current Medications with the patient. Risks, benefits, side effects, drug-to-drug interactions and alternatives to treatment were discussed. Collateral information: CD evaluation  Encourage patient to attend group and other milieu activities.   Discharge planning discussed with the patient and treatment team.    Depakote 250 mg twice daily  Continue Invega 6 mg daily and 3 mg at bedtime if patient refuses Octavia Forts will give 5 mg IM of Haldol  Cogentin 1 mg twice daily  Invega Sustenna 234 mg IM once followed by 156 mg IM every 30 days 1 week later    Court has granted to probate as well as court ordered medications    PSYCHOTHERAPY/COUNSELING:  [x] Therapeutic interview  [x] Supportive  [] CBT  [] Ongoing  [] Other             Electronically signed by KELVIN Dias CNP on 2/7/2655 at 2:46 PM

## 2021-06-02 NOTE — PLAN OF CARE
Patient is isolative but is slowly beginning to attend groups. Patient reports that he feels hopeless and helpless and is anxious and depressed regarding his discharge plan as he does not know where he is going to go. Patient is medication compliant currently. He is no behavioral issues. Patient denies SI, HI, and AVH. Patient insight and judgement are impaired, but improving. No delusional behavior. Will monitor closely.

## 2021-06-03 VITALS
TEMPERATURE: 98.1 F | SYSTOLIC BLOOD PRESSURE: 123 MMHG | HEART RATE: 52 BPM | DIASTOLIC BLOOD PRESSURE: 66 MMHG | OXYGEN SATURATION: 98 % | RESPIRATION RATE: 14 BRPM

## 2021-06-03 PROCEDURE — 6370000000 HC RX 637 (ALT 250 FOR IP): Performed by: NURSE PRACTITIONER

## 2021-06-03 PROCEDURE — 99239 HOSP IP/OBS DSCHRG MGMT >30: CPT | Performed by: NURSE PRACTITIONER

## 2021-06-03 RX ADMIN — BENZTROPINE MESYLATE 1 MG: 1 TABLET ORAL at 09:46

## 2021-06-03 RX ADMIN — PALIPERIDONE 6 MG: 6 TABLET, EXTENDED RELEASE ORAL at 09:46

## 2021-06-03 RX ADMIN — DIVALPROEX SODIUM 250 MG: 250 TABLET, DELAYED RELEASE ORAL at 09:46

## 2021-06-03 ASSESSMENT — PAIN SCALES - GENERAL: PAINLEVEL_OUTOF10: 0

## 2021-06-03 NOTE — CARE COORDINATION
Confirmed with supervisor Juliana Kenyon that pt can be transported to [facility] through [facility]. Transportation will be set up when nurse gives the okay. Transportation set up through [employer].

## 2021-06-03 NOTE — DISCHARGE SUMMARY
DISCHARGE SUMMARY      Patient ID:  Cameron Ledezma  [id number]  25 y.o.  1995    Admit date: 5/19/2021    Discharge date and time: 6/3/2021    Admitting Physician: Eugene Kimbrough MD     Discharge Physician: Dr John Gamble MD    Discharge Diagnoses:   Patient Active Problem List   Diagnosis    Gastritis    Bilious vomiting with nausea    Pneumonia due to organism    [facility] failure [facility] hypoxia [facility]    Polysubstance abuse ([facility]    Hepatitis    SIRS (systemic inflammatory response syndrome) ([facility]    Hypokalemia    Asthma    Hyperglycemia    Severe protein-calorie malnutrition ([facility]    Depression with suicidal ideation    Severe episode of recurrent major depressive disorder, without psychotic features ([facility]    [facility] B [facility] disorder [facility]    Schizoaffective disorder, bipolar type ([facility]       Admission Condition: poor    Discharged Condition: stable    Admission Circumstance: brought in by EMS after patient became aggressive at home was throwing things around the house. PAST MEDICAL/PSYCHIATRIC HISTORY:   Past Medical History:   Diagnosis Date    Anxiety     Asthma     no meds currently- mostly exercise induced    Claustrophobia     confined spaces with locked doors    Depression     Epigastric pain     Nausea     Nut allergy        FAMILY/SOCIAL HISTORY:  No family history on file.   Social History     Socioeconomic History    Marital status: Single     Spouse name: Not on file    Number of children: 0    Years of education: 15    Highest education level: Not on file   Occupational History    Occupation:      Employer: [employer]   Tobacco Use    Smoking status: Never Smoker    Smokeless tobacco: Never Used   Vaping Use    Vaping Use: Never used   Substance and Sexual Activity    Alcohol use: No    Drug use: Not Currently     Types: Marijuana     Comment: states has a medical card    Sexual activity: Not Currently     Partners: Female   Other Topics crisis unit for next level of care. Medical events were insignificant and patient continued to improve on the floor. He start coming out of his room he is attending groups to socializing with peers. He never made any suicidal statements or any suicidal gestures while in the unit. Social workers obtain collateral information from patient's mother who was able to voicing concerns that she had. Treatment team felt the patient obtain the maximum benefit from his hospitalization he was set up with an outpatient mental health agency for outpatient follow-up services and was discharged on an outpatient commitment order. . At the time of discharge patient did not show any impulsive behavior. He was up on the unit he was attending groups and socializing with peers. He vehemently denied any suicidal homicidal ideations intent or plan. He was eating well and sleeping well there are no neurovegetative signs or symptoms of depression he denied any auditory or visual hallucinations. There are no overt or covert signs of psychosis. He was appreciative of the help that he received here. This patient no longer meets criteria for inpatient hospitalization. No AVH or paranoid thoughts  No hopeless or worthless feeling  No active SI/HI  Appetite:  [x] Normal  [] Increased  [] Decreased    Sleep:       [x] Normal  [] Fair       [] Poor            Energy:    [x] Normal  [] Increased  [] Decreased     SI [] Present  [x] Absent  HI  []Present  [x] Absent   Aggression:  [] yes  [x] no  Patient is [x] able  [] unable to CONTRACT FOR SAFETY   Medication side effects(SE):  [x] None(Psych. Meds.) [] Other      Mental Status Examination on discharge:    Level of consciousness:  within normal limits   Appearance:  well-appearing  Behavior/Motor:  no abnormalities noted  Attitude toward examiner:  attentive and good eye contact  Speech:  spontaneous, normal rate and normal volume   Mood: \" My mood is good. \"  Affect: Appropriate

## 2021-06-03 NOTE — BH NOTE
[facility] Interdisciplinary Treatment Plan Note     Review Date & Time: 6/3/2021 0830    Patient was in treatment team.    Admission Type:   Admission Type: Involuntary    Reason for admission:  Reason for Admission: \"I'm tired\"    Estimated Length of Stay Update:  3-5 days   Estimated Discharge Date Update: 6/6/2021    PATIENT STRENGTHS:  Patient Strengths:Strengths: Connection to output provider  Patient Strengths and Limitations:Limitations: Unrealistic self-view, Tendency to isolate self, Difficulty problem solving/relies on others to help solve problems  Addictive Behavior:Addictive Behavior  In the past 3 months, have you felt or has someone told you that you have a problem with:  : None  Do you have a history of Chemical Use?:  (PT EVASIVE)  Do you have a history of Alcohol Use?:  (PT EVASIVE)  Do you have a history of Street Drug Abuse?:  (PT EVASIVE)  Histroy of Prescripton Drug Abuse?:  (PT EVASIVE)  Medical Problems:   Past Medical History:   Diagnosis Date    Anxiety     Asthma     no meds currently- mostly exercise induced    Claustrophobia     confined spaces with locked doors    Depression     Epigastric pain     Nausea     Nut allergy        Risk:  Fall RiskTotal: 73  Moses Scale Moses Scale Score: 22  BVC Total: 0  Change in scoresnone. Changes to plan of Care none    Status EXAM:   Status and Exam  Normal: No  Facial Expression: Sad, Worried  Affect: Blunt  Level of Consciousness: Alert  Mood:Normal: No  Mood: Depressed, Sad, Anxious  Motor Activity:Normal: No  Motor Activity: Decreased  Interview Behavior: Cooperative, Evasive  Preception: [place] to Person, Maryse Graver to Time, [place] to Place, [place] to Situation  Attention:Normal: No  Attention: Distractible  Thought Processes: Other(See comment) (improving)  Thought Content:Normal: No  Thought Content: Preoccupations  Hallucinations: None  Delusions: No  Delusions:  Other(See Comment)  Memory:Normal: No  Memory: Poor Recent  Insight and Judgment: No  Insight and Judgment: Poor Judgment, Poor Insight  Present Suicidal Ideation: No  Present Homicidal Ideation: No    Daily Assessment Last Entry:   Daily Sleep (WDL): Within Defined Limits         Patient Currently in Pain: Denies  Daily Nutrition (WDL): Within Defined Limits    Patient Monitoring:  Frequency of Checks: 4 times per hour, close    Psychiatric Symptoms:   Depression Symptoms  Depression Symptoms:  (pt asleep)  Anxiety Symptoms  Anxiety Symptoms: Generalized  Krystle Symptoms  Krystle Symptoms: No problems reported or observed. Psychosis Symptoms  Hallucination Type: No problems reported or observed. Delusion Type: No problems reported or observed.     Suicide Risk CSSR-S:  1) Within the past month, have you wished you were dead or wished you could go to sleep and not wake up? : No  2) Have you actually had any thoughts of killing yourself? : No  6) Have you ever done anything, started to do anything, or prepared to do anything to end your life?: No  Change in Result none Change in Plan of care none    EDUCATION:   Learner Progress Toward Treatment Goals: Reviewed group plan and strategies    Method: Individual    Outcome: Verbalized understanding    PATIENT GOALS: medication compliance and group therapy attendance    PLAN/TREATMENT RECOMMENDATIONS UPDATE: group therapy attendance, medication compliance    GOALS UPDATE:  Time frame for Short-Term Goals: 3-5 days      Virgilio Starkey RN

## 2021-06-03 NOTE — PLAN OF CARE
Problem: Altered Mood, Psychotic Behavior:  Goal: Able to demonstrate trust by eating, participating in treatment and following staff's direction  Description: Able to demonstrate trust by eating, participating in treatment and following staff's direction  6/3/2021 1450 by Fabrizio Somers RN  Outcome: Completed  6/3/2021 1047 by Fabrizio Somers RN  Outcome: Ongoing  Goal: Able to verbalize decrease in frequency and intensity of hallucinations  Description: Able to verbalize decrease in frequency and intensity of hallucinations  6/3/2021 1450 by Fabrizio Somers RN  Outcome: Completed  6/3/2021 1047 by Fabrizio Somers RN  Outcome: Ongoing  Goal: Able to verbalize reality based thinking  Description: Able to verbalize reality based thinking  6/3/2021 1450 by Fabrizio Somers RN  Outcome: Completed  6/3/2021 1047 by Fabrizio Somers RN  Outcome: Ongoing  Goal: Absence of self-harm  Description: Absence of self-harm  6/3/2021 1450 by Fabrizio Somers RN  Outcome: Completed  6/3/2021 1047 by Fabrizio Somers RN  Outcome: Ongoing  Goal: Ability to achieve adequate nutritional intake will improve  Description: Ability to achieve adequate nutritional intake will improve  6/3/2021 1450 by Fabrizio Somers RN  Outcome: Completed  6/3/2021 1047 by Fabrizio Somers RN  Outcome: Ongoing  Goal: Ability to interact with others will improve  Description: Ability to interact with others will improve  6/3/2021 1450 by Fabrizio Somers RN  Outcome: Completed  6/3/2021 1047 by Fabrizio Somers RN  Outcome: Ongoing  Goal: Compliance with prescribed medication regimen will improve  Description: Compliance with prescribed medication regimen will improve  6/3/2021 1450 by Fabrizio Somers RN  Outcome: Completed  6/3/2021 1047 by Fabrizio Somers RN  Outcome: Ongoing  Goal: Patient specific goal  Description: Patient specific goal  6/3/2021 1450 by Fabrizio Somers RN  Outcome: Completed  6/3/2021 1047 by

## 2021-06-03 NOTE — PLAN OF CARE
Problem: Altered Mood, Psychotic Behavior:  Goal: Able to verbalize reality based thinking  Description: Able to verbalize reality based thinking  6/2/2021 2018 by Ursula Cortes RN  Outcome: Ongoing     Problem: Altered Mood, Psychotic Behavior:  Goal: Ability to interact with others will improve  Description: Ability to interact with others will improve  Outcome: Ongoing     Patient has been withdrawn to his room. Only social with roommate. Calm and cooperative during conversation. Mood has improved. Offers more to the conversation. Avoids eye contact. Denies suicidal/homicidal ideations and hallucinations at this time. Purposeful rounding continued.

## 2021-06-03 NOTE — BH NOTE
Patient denies thoughts to harm self or others, denies hallucinations. Patient isolates to his room, rocking in bed, denies need for PRN medication for anxiety at this time. Patient is compliant with medications, attends groups. Appetite is good, behavior in control.  Emotional support given

## 2021-06-09 ENCOUNTER — HOSPITAL ENCOUNTER (INPATIENT)
Age: 26
LOS: 8 days | Discharge: HOME | DRG: 885 | End: 2021-06-17
Attending: EMERGENCY MEDICINE | Admitting: PSYCHIATRY & NEUROLOGY
Payer: COMMERCIAL

## 2021-06-09 DIAGNOSIS — J45.20 MILD INTERMITTENT ASTHMA WITHOUT COMPLICATION: ICD-10-CM

## 2021-06-09 DIAGNOSIS — R45.851 DEPRESSION WITH SUICIDAL IDEATION: Primary | ICD-10-CM

## 2021-06-09 DIAGNOSIS — F32.A DEPRESSION WITH SUICIDAL IDEATION: Primary | ICD-10-CM

## 2021-06-09 LAB
ACETAMINOPHEN LEVEL: <5 MCG/ML (ref 10–30)
ALBUMIN SERPL-MCNC: 4.2 G/DL (ref 3.5–5.2)
ALP BLD-CCNC: 51 U/L (ref 40–129)
ALT SERPL-CCNC: 13 U/L (ref 0–40)
AMPHETAMINE SCREEN, URINE: NOT DETECTED
ANION GAP SERPL CALCULATED.3IONS-SCNC: 9 MMOL/L (ref 7–16)
AST SERPL-CCNC: 18 U/L (ref 0–39)
BARBITURATE SCREEN URINE: NOT DETECTED
BASOPHILS ABSOLUTE: 0.03 E9/L (ref 0–0.2)
BASOPHILS RELATIVE PERCENT: 0.4 % (ref 0–2)
BENZODIAZEPINE SCREEN, URINE: NOT DETECTED
BILIRUB SERPL-MCNC: 0.3 MG/DL (ref 0–1.2)
BUN BLDV-MCNC: 10 MG/DL (ref 6–20)
CALCIUM SERPL-MCNC: 8.9 MG/DL (ref 8.6–10.2)
CANNABINOID SCREEN URINE: NOT DETECTED
CHLORIDE BLD-SCNC: 104 MMOL/L (ref 98–107)
CO2: 24 MMOL/L (ref 22–29)
COCAINE METABOLITE SCREEN URINE: NOT DETECTED
CREAT SERPL-MCNC: 0.9 MG/DL (ref 0.7–1.2)
EKG ATRIAL RATE: 90 BPM
EKG P AXIS: 77 DEGREES
EKG P-R INTERVAL: 162 MS
EKG Q-T INTERVAL: 336 MS
EKG QRS DURATION: 80 MS
EKG QTC CALCULATION (BAZETT): 411 MS
EKG R AXIS: 120 DEGREES
EKG T AXIS: 60 DEGREES
EKG VENTRICULAR RATE: 90 BPM
EOSINOPHILS ABSOLUTE: 0.48 E9/L (ref 0.05–0.5)
EOSINOPHILS RELATIVE PERCENT: 7.2 % (ref 0–6)
ETHANOL: <10 MG/DL (ref 0–0.08)
FENTANYL SCREEN, URINE: NOT DETECTED
GFR AFRICAN AMERICAN: >60
GFR NON-AFRICAN AMERICAN: >60 ML/MIN/1.73
GLUCOSE BLD-MCNC: 94 MG/DL (ref 74–99)
HCT VFR BLD CALC: 41 % (ref 37–54)
HEMOGLOBIN: 14.1 G/DL (ref 12.5–16.5)
IMMATURE GRANULOCYTES #: 0.01 E9/L
IMMATURE GRANULOCYTES %: 0.1 % (ref 0–5)
INFLUENZA A: NOT DETECTED
INFLUENZA B: NOT DETECTED
LYMPHOCYTES ABSOLUTE: 1.83 E9/L (ref 1.5–4)
LYMPHOCYTES RELATIVE PERCENT: 27.3 % (ref 20–42)
Lab: NORMAL
MCH RBC QN AUTO: 31.5 PG (ref 26–35)
MCHC RBC AUTO-ENTMCNC: 34.4 % (ref 32–34.5)
MCV RBC AUTO: 91.5 FL (ref 80–99.9)
METHADONE SCREEN, URINE: NOT DETECTED
MONOCYTES ABSOLUTE: 0.62 E9/L (ref 0.1–0.95)
MONOCYTES RELATIVE PERCENT: 9.2 % (ref 2–12)
NEUTROPHILS ABSOLUTE: 3.74 E9/L (ref 1.8–7.3)
NEUTROPHILS RELATIVE PERCENT: 55.8 % (ref 43–80)
OPIATE SCREEN URINE: NOT DETECTED
OXYCODONE URINE: NOT DETECTED
PDW BLD-RTO: 11.7 FL (ref 11.5–15)
PHENCYCLIDINE SCREEN URINE: NOT DETECTED
PLATELET # BLD: 224 E9/L (ref 130–450)
PMV BLD AUTO: 10.6 FL (ref 7–12)
POTASSIUM REFLEX MAGNESIUM: 4.6 MMOL/L (ref 3.5–5)
RBC # BLD: 4.48 E12/L (ref 3.8–5.8)
SALICYLATE, SERUM: <0.3 MG/DL (ref 0–30)
SARS-COV-2 RNA, RT PCR: NOT DETECTED
SODIUM BLD-SCNC: 137 MMOL/L (ref 132–146)
TOTAL PROTEIN: 6.8 G/DL (ref 6.4–8.3)
TRICYCLIC ANTIDEPRESSANTS SCREEN SERUM: NEGATIVE NG/ML
WBC # BLD: 6.7 E9/L (ref 4.5–11.5)

## 2021-06-09 PROCEDURE — 82077 ASSAY SPEC XCP UR&BREATH IA: CPT

## 2021-06-09 PROCEDURE — 93005 ELECTROCARDIOGRAM TRACING: CPT | Performed by: STUDENT IN AN ORGANIZED HEALTH CARE EDUCATION/TRAINING PROGRAM

## 2021-06-09 PROCEDURE — 87636 SARSCOV2 & INF A&B AMP PRB: CPT

## 2021-06-09 PROCEDURE — 80307 DRUG TEST PRSMV CHEM ANLYZR: CPT

## 2021-06-09 PROCEDURE — 1240000000 HC EMOTIONAL WELLNESS R&B

## 2021-06-09 PROCEDURE — 80179 DRUG ASSAY SALICYLATE: CPT

## 2021-06-09 PROCEDURE — 80053 COMPREHEN METABOLIC PANEL: CPT

## 2021-06-09 PROCEDURE — 93010 ELECTROCARDIOGRAM REPORT: CPT | Performed by: INTERNAL MEDICINE

## 2021-06-09 PROCEDURE — 99283 EMERGENCY DEPT VISIT LOW MDM: CPT

## 2021-06-09 PROCEDURE — 2580000003 HC RX 258: Performed by: STUDENT IN AN ORGANIZED HEALTH CARE EDUCATION/TRAINING PROGRAM

## 2021-06-09 PROCEDURE — 80143 DRUG ASSAY ACETAMINOPHEN: CPT

## 2021-06-09 PROCEDURE — 85025 COMPLETE CBC W/AUTO DIFF WBC: CPT

## 2021-06-09 RX ORDER — HALOPERIDOL 5 MG/ML
5 INJECTION INTRAMUSCULAR EVERY 6 HOURS PRN
Status: DISCONTINUED | OUTPATIENT
Start: 2021-06-09 | End: 2021-06-17 | Stop reason: HOSPADM

## 2021-06-09 RX ORDER — HALOPERIDOL 5 MG
5 TABLET ORAL EVERY 6 HOURS PRN
Status: DISCONTINUED | OUTPATIENT
Start: 2021-06-09 | End: 2021-06-17 | Stop reason: HOSPADM

## 2021-06-09 RX ORDER — 0.9 % SODIUM CHLORIDE 0.9 %
1000 INTRAVENOUS SOLUTION INTRAVENOUS ONCE
Status: COMPLETED | OUTPATIENT
Start: 2021-06-09 | End: 2021-06-09

## 2021-06-09 RX ORDER — ACETAMINOPHEN 325 MG/1
650 TABLET ORAL EVERY 6 HOURS PRN
Status: DISCONTINUED | OUTPATIENT
Start: 2021-06-09 | End: 2021-06-17 | Stop reason: HOSPADM

## 2021-06-09 RX ORDER — MAGNESIUM HYDROXIDE/ALUMINUM HYDROXICE/SIMETHICONE 120; 1200; 1200 MG/30ML; MG/30ML; MG/30ML
30 SUSPENSION ORAL PRN
Status: DISCONTINUED | OUTPATIENT
Start: 2021-06-09 | End: 2021-06-17 | Stop reason: HOSPADM

## 2021-06-09 RX ORDER — HYDROXYZINE PAMOATE 50 MG/1
50 CAPSULE ORAL 3 TIMES DAILY PRN
Status: DISCONTINUED | OUTPATIENT
Start: 2021-06-09 | End: 2021-06-17 | Stop reason: HOSPADM

## 2021-06-09 RX ADMIN — SODIUM CHLORIDE 1000 ML: 9 INJECTION, SOLUTION INTRAVENOUS at 13:08

## 2021-06-09 ASSESSMENT — ENCOUNTER SYMPTOMS
BACK PAIN: 0
NAUSEA: 0
CHEST TIGHTNESS: 0
ABDOMINAL PAIN: 0
COUGH: 0
EYE DISCHARGE: 0
CONSTIPATION: 0
DIARRHEA: 0
APNEA: 0
EYE REDNESS: 0
PHOTOPHOBIA: 0
SINUS PRESSURE: 0
TROUBLE SWALLOWING: 0
SORE THROAT: 0
VOMITING: 0
RHINORRHEA: 0
EYE PAIN: 0
WHEEZING: 0
SHORTNESS OF BREATH: 0

## 2021-06-09 ASSESSMENT — PAIN SCALES - GENERAL: PAINLEVEL_OUTOF10: 0

## 2021-06-09 ASSESSMENT — SLEEP AND FATIGUE QUESTIONNAIRES
SLEEP PATTERN: INSOMNIA
DO YOU HAVE DIFFICULTY SLEEPING: YES
DIFFICULTY STAYING ASLEEP: YES
DIFFICULTY FALLING ASLEEP: YES
AVERAGE NUMBER OF SLEEP HOURS: 6
DO YOU USE A SLEEP AID: YES
RESTFUL SLEEP: NO
DIFFICULTY ARISING: NO

## 2021-06-09 ASSESSMENT — PATIENT HEALTH QUESTIONNAIRE - PHQ9: SUM OF ALL RESPONSES TO PHQ QUESTIONS 1-9: 18

## 2021-06-09 ASSESSMENT — LIFESTYLE VARIABLES: HISTORY_ALCOHOL_USE: NO

## 2021-06-09 NOTE — ED NOTES
Bed: [facility]  Expected date:   Expected time:   Means of arrival:   Comments:  GEO Ro RN  06/09/21 [id number]

## 2021-06-09 NOTE — ED NOTES
Emergency Department [facility] AN AFFILIATE OF [facility] Biopsychosocial Assessment Note     Chief Complaint:      Patient is a 31year old, male presenting to ED for suicidal ideation with thoughts of wanting to jump off of a bridge. Patient reports that he was released from the crisis unit today and that his parents kicked him out. Patient reports that he is now homeless and does not know what to do. Patient denies homicidal ideation.     MSE: Patient is alert, oriented x 4. Patient mood is is anxious, affect is flat. Patient thought process/speech pattern are clear. Patient denies any A/V hallucinations.     Clinical Summary/History:      Patient has a mental health hx of schizoaffective disorder, depression, & personality disorder; patient reports that he does not know who he is scheduled to begin outpatient services with. Per patient discharge instructions he was scheduled to begin services with [facility] today at 10:00 AM - patient did not attend the appointment. Patient last psychiatric admission was 5/19/21-6/3/21 on [facility].     Patient does not answer questions regarding sleep, appetite, hx of suicide attempts, or self injurious behaviors.     Patient has a hx of polysubstance abuse - patient denies any recent drug use. UDS is negative.     Gender  [x]? Male []? Female []? Transgender  []? Other     Sexual Orientation    []? Heterosexual []? Homosexual []? Bisexual []? Other     N/A     Suicidal Behavioral: CSSR-S Complete. [x]? Reports:               [x]? Past [x]? Present   []? Denies     Homicidal/ Violent Behavior  []? Reports:              []? Past []? Present   [x]? Denies      Hallucinations/Delusions   []? Reports:    [x]? Denies      Substance Use/Alcohol Use/Addiction: SBIRT Screen Complete. [x]? Reports:  Cannabis, psychadelic; no recent  []? Denies      Trauma History  []? Reports:  [x]? Denies      Collateral Information:         Level of Care/Disposition Plan  []? Home:   []? Outpatient Provider:   []?  Crisis Unit: [x]? Inpatient Psychiatric Unit:  []? Other:      Patient was pink slipped by ED Doctor. SW will pursue inpatient admission for safety/stabilization.      Jewels Curran, MSW, LSW  06/09/21 1774

## 2021-06-09 NOTE — ED PROVIDER NOTES
Hematological: Negative for adenopathy. Psychiatric/Behavioral: Positive for suicidal ideas. Negative for confusion. The patient is not nervous/anxious. All other systems reviewed and are negative. Physical Exam  Vitals and nursing note reviewed. Constitutional:       General: He is not in acute distress. Appearance: He is well-developed. Comments: Awake and alert. Sitting in the gurney in no obvious distress. HENT:      Head: Normocephalic and atraumatic. Mouth/Throat:      Mouth: Mucous membranes are moist.      Pharynx: No oropharyngeal exudate. Eyes:      General: No scleral icterus. Pupils: Pupils are equal, round, and reactive to light. Cardiovascular:      Rate and Rhythm: Normal rate and regular rhythm. Heart sounds: Normal heart sounds. No murmur heard. Comments: 2+ radial and dorsal pedis pulses bilaterally  Pulmonary:      Effort: Pulmonary effort is normal. No respiratory distress. Breath sounds: Normal breath sounds. No wheezing. Abdominal:      Palpations: Abdomen is soft. Tenderness: There is no abdominal tenderness. There is no guarding or rebound. Musculoskeletal:         General: No tenderness or deformity. Normal range of motion. Cervical back: Normal range of motion and neck supple. Right lower leg: No edema. Left lower leg: No edema. Skin:     General: Skin is warm and dry. Capillary Refill: Capillary refill takes less than 2 seconds. Findings: No rash. Neurological:      General: No focal deficit present. Mental Status: He is alert and oriented to person, place, and time. Cranial Nerves: No cranial nerve deficit. Sensory: No sensory deficit. Motor: No weakness or abnormal muscle tone. Psychiatric:         Behavior: Behavior normal.      Comments: Cooperative. Depressed. Positive suicidal ideation. Negative for hallucinations or homicidal ideation.           Procedures     MDM This is a 78-year-old male with history of multiple psychiatric disorders, who presents with suicidal ideation plan to jump off a bridge. In the emergency department patient pink slipped. Patient awake and alert, hemodynamic stable, afebrile no respiratory distress. Medically cleared for further evaluation by psychiatry. ED Course as of Jun 09 1952 Wed Jun 09, 2021   1359 The patient is medically cleared. [LM]      ED Course User Index  [LM] Areli Pratt DO         ED Course as of Jun 09 1952 Wed Jun 09, 2021   1359 The patient is medically cleared. [LM]      ED Course User Index  [LM] Areli Pratt DO       --------------------------------------------- PAST HISTORY ---------------------------------------------  Past Medical History:  has a past medical history of Anxiety, Asthma, Claustrophobia, Depression, Epigastric pain, Nausea, and Nut allergy. Past Surgical History:  has a past surgical history that includes [place] tooth extraction; Upper gastrointestinal endoscopy (N/A, 8/19/2019); and bronchoscopy (N/A, 11/9/2019). Social History:  reports that he has never smoked. He has never used smokeless tobacco. He reports previous drug use. Drug: Marijuana. He reports that he does not drink alcohol. Family History: family history is not on file. The patients home medications have been reviewed.     Allergies: Peanut-containing drug products and Lorabid [loracarbef]    -------------------------------------------------- RESULTS -------------------------------------------------    LABS:  Results for orders placed or performed during the hospital encounter of 06/09/21   COVID-19 & Influenza Combo    Specimen: Nasopharyngeal Swab   Result Value Ref Range    SARS-CoV-2 RNA, RT PCR NOT DETECTED NOT DETECTED    INFLUENZA A NOT DETECTED NOT DETECTED    INFLUENZA B NOT DETECTED NOT DETECTED   CBC Auto Differential   Result Value Ref Range    WBC 6.7 4.5 - 11.5 E9/L    RBC 4.48 3.80 - 5.80 E12/L Hemoglobin 14.1 12.5 - 16.5 g/dL    Hematocrit 41.0 37.0 - 54.0 %    MCV 91.5 80.0 - 99.9 fL    MCH 31.5 26.0 - 35.0 pg    MCHC 34.4 32.0 - 34.5 %    RDW 11.7 11.5 - 15.0 fL    Platelets 252 871 - 532 E9/L    MPV 10.6 7.0 - 12.0 fL    Neutrophils % 55.8 43.0 - 80.0 %    Immature Granulocytes % 0.1 0.0 - 5.0 %    Lymphocytes % 27.3 20.0 - 42.0 %    Monocytes % 9.2 2.0 - 12.0 %    Eosinophils % 7.2 (H) 0.0 - 6.0 %    Basophils % 0.4 0.0 - 2.0 %    Neutrophils Absolute 3.74 1.80 - 7.30 E9/L    Immature Granulocytes # 0.01 E9/L    Lymphocytes Absolute 1.83 1.50 - 4.00 E9/L    Monocytes Absolute 0.62 0.10 - 0.95 E9/L    Eosinophils Absolute 0.48 0.05 - 0.50 E9/L    Basophils Absolute 0.03 0.00 - 0.20 E9/L   Comprehensive Metabolic Panel w/ Reflex to MG   Result Value Ref Range    Sodium 137 132 - 146 mmol/L    Potassium reflex Magnesium 4.6 3.5 - 5.0 mmol/L    Chloride 104 98 - 107 mmol/L    CO2 24 22 - 29 mmol/L    Anion Gap 9 7 - 16 mmol/L    Glucose 94 74 - 99 mg/dL    BUN 10 6 - 20 mg/dL    CREATININE 0.9 0.7 - 1.2 mg/dL    GFR Non-African American >60 >=60 mL/min/1.73    GFR African American >60     Calcium 8.9 8.6 - 10.2 mg/dL    Total Protein 6.8 6.4 - 8.3 g/dL    Albumin 4.2 3.5 - 5.2 g/dL    Total Bilirubin 0.3 0.0 - 1.2 mg/dL    Alkaline Phosphatase 51 40 - 129 U/L    ALT 13 0 - 40 U/L    AST 18 0 - 39 U/L   Serum Drug Screen   Result Value Ref Range    Ethanol Lvl <10 mg/dL    Acetaminophen Level <5.0 (L) 10.0 - 79.1 mcg/mL    Salicylate, Serum <5.9 0.0 - 30.0 mg/dL    TCA Scrn NEGATIVE Cutoff:300 ng/mL   URINE DRUG SCREEN   Result Value Ref Range    Amphetamine Screen, Urine NOT DETECTED Negative <1000 ng/mL    Barbiturate Screen, Ur NOT DETECTED Negative < 200 ng/mL    Benzodiazepine Screen, Urine NOT DETECTED Negative < 200 ng/mL    Cannabinoid Scrn, Ur NOT DETECTED Negative < 50ng/mL    Cocaine Metabolite Screen, Urine NOT DETECTED Negative < 300 ng/mL    Opiate Scrn, Ur NOT DETECTED Negative < 300ng/mL PCP Screen, Urine NOT DETECTED Negative < 25 ng/mL    Methadone Screen, Urine NOT DETECTED Negative <300 ng/mL    Oxycodone Urine NOT DETECTED Negative <100 ng/mL    FENTANYL SCREEN, URINE NOT DETECTED Negative <1 ng/mL    Drug Screen Comment: see below    EKG 12 Lead   Result Value Ref Range    Ventricular Rate 90 BPM    Atrial Rate 90 BPM    P-R Interval 162 ms    QRS Duration 80 ms    Q-T Interval 336 ms    QTc Calculation (Bazett) 411 ms    P Axis 77 degrees    R Axis 120 degrees    T Axis 60 degrees       RADIOLOGY:  No orders to display       EKG: This EKG is signed and interpreted by me. Rate: 90bpm  Rhythm: sinus  Interpretation: Normal axis. QTC of 411 ms. No ST segment elevation or depression. Comparison: stable as compared to patient's most recent EKG      ------------------------- NURSING NOTES AND VITALS REVIEWED ---------------------------  Date / Time Roomed:  6/9/2021 12:10 PM  [facility]    The nursing notes within the ED encounter and vital signs as below have been reviewed. Patient Vitals for the past 24 hrs:   BP Temp Pulse Resp SpO2 Height Weight   06/09/21 1207 122/84  107 16 98 % 6' 1\" (1.854 m) 180 lb (81.6 kg)   06/09/21 1200  98.2 °F (36.8 °C) 128 16 96 %         Oxygen Saturation Interpretation: Normal    ------------------------------------------ PROGRESS NOTES ------------------------------------------    Counseling:  I have spoken with the patient and discussed todays results, in addition to providing specific details for the plan of care and counseling regarding the diagnosis and prognosis. Their questions are answered at this time and they are agreeable with the plan of admission.    --------------------------------- ADDITIONAL PROVIDER NOTES ---------------------------------    This patient has remained hemodynamically stable during their ED course. Diagnosis:  1.  Depression with suicidal ideation        Disposition:  Patient's disposition: Admit to mental health unit - medically cleared for admission  Patient's condition is stable.          Andrew Estes DO  Resident  06/09/21 1079

## 2021-06-09 NOTE — ED NOTES
Personal belongings secured in locker #28     Kristin Richards RN  06/09/21 2242 [address]  06/09/21 1590

## 2021-06-10 LAB
CHOLESTEROL, TOTAL: 104 MG/DL (ref 0–199)
HBA1C MFR BLD: 5.3 % (ref 4–5.6)
HDLC SERPL-MCNC: 32 MG/DL
LDL CHOLESTEROL CALCULATED: 63 MG/DL (ref 0–99)
TRIGL SERPL-MCNC: 45 MG/DL (ref 0–149)
VLDLC SERPL CALC-MCNC: 9 MG/DL

## 2021-06-10 PROCEDURE — 80061 LIPID PANEL: CPT

## 2021-06-10 PROCEDURE — 6370000000 HC RX 637 (ALT 250 FOR IP): Performed by: PSYCHIATRY & NEUROLOGY

## 2021-06-10 PROCEDURE — 36415 COLL VENOUS BLD VENIPUNCTURE: CPT

## 2021-06-10 PROCEDURE — 6370000000 HC RX 637 (ALT 250 FOR IP): Performed by: NURSE PRACTITIONER

## 2021-06-10 PROCEDURE — 83036 HEMOGLOBIN GLYCOSYLATED A1C: CPT

## 2021-06-10 PROCEDURE — 1240000000 HC EMOTIONAL WELLNESS R&B

## 2021-06-10 PROCEDURE — 99221 1ST HOSP IP/OBS SF/LOW 40: CPT | Performed by: NURSE PRACTITIONER

## 2021-06-10 RX ORDER — ALBUTEROL SULFATE 90 UG/1
2 AEROSOL, METERED RESPIRATORY (INHALATION) 4 TIMES DAILY PRN
Status: DISCONTINUED | OUTPATIENT
Start: 2021-06-10 | End: 2021-06-17 | Stop reason: HOSPADM

## 2021-06-10 RX ORDER — PALIPERIDONE 3 MG/1
3 TABLET, EXTENDED RELEASE ORAL 2 TIMES DAILY
Status: DISCONTINUED | OUTPATIENT
Start: 2021-06-10 | End: 2021-06-17 | Stop reason: HOSPADM

## 2021-06-10 RX ORDER — TRAZODONE HYDROCHLORIDE 50 MG/1
50 TABLET ORAL NIGHTLY PRN
Status: DISCONTINUED | OUTPATIENT
Start: 2021-06-10 | End: 2021-06-11

## 2021-06-10 RX ORDER — DIVALPROEX SODIUM 250 MG/1
250 TABLET, DELAYED RELEASE ORAL EVERY 12 HOURS SCHEDULED
Status: DISCONTINUED | OUTPATIENT
Start: 2021-06-10 | End: 2021-06-17 | Stop reason: HOSPADM

## 2021-06-10 RX ORDER — BENZTROPINE MESYLATE 1 MG/1
1 TABLET ORAL 2 TIMES DAILY
Status: DISCONTINUED | OUTPATIENT
Start: 2021-06-10 | End: 2021-06-17 | Stop reason: HOSPADM

## 2021-06-10 RX ADMIN — BENZTROPINE MESYLATE 1 MG: 1 TABLET ORAL at 09:51

## 2021-06-10 RX ADMIN — DIVALPROEX SODIUM 250 MG: 250 TABLET, DELAYED RELEASE ORAL at 22:01

## 2021-06-10 RX ADMIN — DIVALPROEX SODIUM 250 MG: 250 TABLET, DELAYED RELEASE ORAL at 09:51

## 2021-06-10 RX ADMIN — TRAZODONE HYDROCHLORIDE 50 MG: 50 TABLET ORAL at 22:01

## 2021-06-10 RX ADMIN — PALIPERIDONE 3 MG: 3 TABLET, EXTENDED RELEASE ORAL at 09:51

## 2021-06-10 RX ADMIN — BENZTROPINE MESYLATE 1 MG: 1 TABLET ORAL at 22:02

## 2021-06-10 RX ADMIN — PALIPERIDONE 3 MG: 3 TABLET, EXTENDED RELEASE ORAL at 22:01

## 2021-06-10 NOTE — PROGRESS NOTES
[facility]  Initial Interdisciplinary Treatment Plan NOTE    Review Date & Time: 6/10/21 1000    Patient was not in treatment team    Admission Type:   Admission Type:  Involuntary    Reason for admission:  Reason for Admission: [address]      Estimated Length of Stay Update:  3-5 days  Estimated Discharge Date Update: 6/14/21    PATIENT STRENGTHS:  Patient Strengths Strengths: Connection to output provider  Patient Strengths and Limitations:Limitations: Multiple barriers to leisure interests, Lacks leisure interests  Addictive Behavior:Addictive Behavior  In the past 3 months, have you felt or has someone told you that you have a problem with:  : None  Do you have a history of Chemical Use?: No  Do you have a history of Alcohol Use?: No  Do you have a history of Street Drug Abuse?: No  Histroy of Prescripton Drug Abuse?: No  Medical Problems:  Past Medical History:   Diagnosis Date    Anxiety     Asthma     no meds currently- mostly exercise induced    Claustrophobia     confined spaces with locked doors    Depression     Epigastric pain     Nausea     Nut allergy        EDUCATION:   Learner Progress Toward Treatment Goals: Reviewed results and recommendations of this team, Reviewed group plan and strategies, Reviewed signs, symptoms and risk of self harm and violent behavior and Reviewed goals and plan of care    Method: Small group    Outcome: Demonstrated Understanding    PATIENT GOALS: no goal given      PLAN/TREATMENT RECOMMENDATIONS UPDATE: encourage therapeutic environment, maintain therapeutic environment    GOALS UPDATE:   Time frame for Short-Term Goals: n/a - no goal    Addis Morin RN

## 2021-06-10 NOTE — PROGRESS NOTES
Contacted Crisis Center/ [facility] and spoke with [facility]. Elfego confirmed that Judene Closs did receive the [place] 156mg Long Acting Injection on 6/8/21. He also informs me that Judene Closs walked out [facility] without signing any paperwork and left without his medications, which are still at the facility.

## 2021-06-10 NOTE — BH NOTE
Remains resting in bed with sheet pulled over his head isolative and evasive no c/ emotional support given

## 2021-06-10 NOTE — PROGRESS NOTES
`[facility] [place]  Admission Note     Admission Type:   Admission Type:  Involuntary    Reason for admission:  Reason for Admission: SUICIDAL FEELINGS TO JUMP OFF BRIDGE    PATIENT STRENGTHS:  Strengths: Connection to output provider    Patient Strengths and Limitations:  Limitations: Hopeless about future    Addictive Behavior:   Addictive Behavior  In the past 3 months, have you felt or has someone told you that you have a problem with:  : None  Do you have a history of Chemical Use?: No  Do you have a history of Alcohol Use?: No  Do you have a history of Street Drug Abuse?: No  Histroy of Prescripton Drug Abuse?: No    Medical Problems:   Past Medical History:   Diagnosis Date    Anxiety     Asthma     no meds currently- mostly exercise induced    Claustrophobia     confined spaces with locked doors    Depression     Epigastric pain     Nausea     Nut allergy        Status EXAM:  Status and Exam  Normal: Yes  Facial Expression: Avoids Gaze  Affect: Appropriate  Level of Consciousness: Alert  Mood:Normal: No  Mood: Depressed  Motor Activity:Normal: Yes  Interview Behavior: Cooperative  Preception: [place] to Person, [place] to Time, [place] to Place, [place] to Situation  Attention:Normal: Yes  Thought Content:Normal: Yes  Hallucinations: None  Delusions: No  Memory:Normal: Yes  Insight and Judgment: Yes  Present Suicidal Ideation: Yes  Present Homicidal Ideation: No    Tobacco Screening:  Practical Counseling, on admission, chucky X, if applicable and completed (first 3 are required if patient doesn't refuse):            ( )  Recognizing danger situations (included triggers and roadblocks)                    ( )  Coping skills (new ways to manage stress, exercise, relaxation techniques, changing routine, distraction)                                                           ( )  Basic information about quitting (benefits of quitting, techniques in how to quit, available resources  ( ) Referral for counseling faxed to [facility]                                           ( ) Patient refused counseling  (X ) Patient has not smoked in the last 30 days    Metabolic Screening:    Lab Results   Component Value Date    LABA1C 5.2 12/11/2019       Lab Results   Component Value Date    CHOL 173 12/11/2019    CHOL 163 04/18/2019     Lab Results   Component Value Date    TRIG 134 12/11/2019    TRIG 98 04/18/2019     Lab Results   Component Value Date    HDL 42 12/11/2019    HDL 54 04/18/2019     No components found for: LDLCAL  Lab Results   Component Value Date    LABVLDL 27 12/11/2019    LABVLDL 20 04/18/2019         Body mass index is 23.75 kg/m². BP Readings from Last 2 Encounters:   06/09/21 112/66   06/03/21 123/66           Pt admitted with followings belongings:     Patient oriented to surroundings and program expectations and copy of patient rights given.  Received admission packet:  YES  Consents reviewed, signed YES  Patient verbalize understanding:  OF UNIT EXPECTATIONS                Kelsie Martin RN

## 2021-06-11 PROCEDURE — 99232 SBSQ HOSP IP/OBS MODERATE 35: CPT | Performed by: NURSE PRACTITIONER

## 2021-06-11 PROCEDURE — 6370000000 HC RX 637 (ALT 250 FOR IP): Performed by: NURSE PRACTITIONER

## 2021-06-11 PROCEDURE — 1240000000 HC EMOTIONAL WELLNESS R&B

## 2021-06-11 RX ORDER — MECOBALAMIN 5000 MCG
5 TABLET,DISINTEGRATING ORAL NIGHTLY PRN
Status: DISCONTINUED | OUTPATIENT
Start: 2021-06-11 | End: 2021-06-17 | Stop reason: HOSPADM

## 2021-06-11 RX ADMIN — PALIPERIDONE 3 MG: 3 TABLET, EXTENDED RELEASE ORAL at 20:55

## 2021-06-11 RX ADMIN — DIVALPROEX SODIUM 250 MG: 250 TABLET, DELAYED RELEASE ORAL at 08:58

## 2021-06-11 RX ADMIN — DIVALPROEX SODIUM 250 MG: 250 TABLET, DELAYED RELEASE ORAL at 20:54

## 2021-06-11 RX ADMIN — BENZTROPINE MESYLATE 1 MG: 1 TABLET ORAL at 20:55

## 2021-06-11 RX ADMIN — PALIPERIDONE 3 MG: 3 TABLET, EXTENDED RELEASE ORAL at 08:58

## 2021-06-11 RX ADMIN — BENZTROPINE MESYLATE 1 MG: 1 TABLET ORAL at 08:58

## 2021-06-11 NOTE — PROGRESS NOTES
BEHAVIORAL HEALTH FOLLOW-UP NOTE     6/11/2021     Patient was seen and examined in person, Chart reviewed   Patient's case discussed with staff/team    Chief Complaint: \" I cannot have a roommate. \"    Interim History: Patient seen during treatment team.  He states that he is suicidal and that he has nothing to live for because he has to live on the streets and is homeless. He states that he is \"ready to die. \"  We discussed him going back to the crisis unit and stepping on the door suburban home while he works with his  on his housing. He states that he is not interested in staying the door suburban home to the fact that he is out of a roommate. He shows very poor insight and judgment into his current situation and his homelessness. Denies SI/HI intent or plan denies auditory or visual hallucinations      Appetite:   [x] Normal/Unchanged  [] Increased  [] Decreased      Sleep:       [x] Normal/Unchanged  [] Fair       [] Poor              Energy:    [x] Normal/Unchanged  [] Increased  [] Decreased        SI [] Present  [x] Absent    HI  []Present  [x] Absent     Aggression:  [] yes  [x] no    Patient is [x] able  [] unable to CONTRACT FOR SAFETY     PAST MEDICAL/PSYCHIATRIC HISTORY:   Past Medical History:   Diagnosis Date    Anxiety     Asthma     no meds currently- mostly exercise induced    Claustrophobia     confined spaces with locked doors    Depression     Epigastric pain     Nausea     Nut allergy        FAMILY/SOCIAL HISTORY:  History reviewed. No pertinent family history.   Social History     Socioeconomic History    Marital status: Single     Spouse name: Not on file    Number of children: 0    Years of education: 15    Highest education level: Not on file   Occupational History    Occupation:      Employer: [employer]   Tobacco Use    Smoking status: Never Smoker    Smokeless tobacco: Never Used   Vaping Use    Vaping Use: Never used   Substance and Sexual Activity    Alcohol use: No    Drug use: Not Currently     Types: Marijuana     Comment: states has a medical card    Sexual activity: Not Currently     Partners: Female   Other Topics Concern    Not on file   Social History Narrative    ** Merged History Encounter **          Social Determinants of Health     Financial Resource Strain:     Difficulty of Paying Living Expenses:    Food Insecurity:     Worried About Running Out of Food in the Last Year:     Ran Out of Food in the Last Year:    Transportation Needs:     Lack of Transportation (Medical):  Lack of Transportation (Non-Medical):    Physical Activity:     Days of Exercise per Week:     Minutes of Exercise per Session:    Stress:     Feeling of Stress :    Social Connections:     Frequency of Communication with Friends and Family:     Frequency of Social Gatherings with Friends and Family:     Attends [employer] Services:     Active Member of Clubs or Organizations:     Attends Club or Organization Meetings:     Marital Status:    Intimate Partner Violence:     Fear of Current or Ex-Partner:     Emotionally Abused:     Physically Abused:     Sexually Abused:            ROS:  [x] All negative/unchanged except if checked.  Explain positive(checked items) below:  [] Constitutional  [] Eyes  [] Ear/Nose/Mouth/Throat  [] Respiratory  [] CV  [] GI  []   [] Musculoskeletal  [] Skin/Breast  [] Neurological  [] Endocrine  [] Heme/Lymph  [] Allergic/Immunologic    Explanation:     MEDICATIONS:    Current Facility-Administered Medications:     albuterol sulfate  (90 Base) MCG/ACT inhaler 2 puff, 2 puff, Inhalation, 4x Daily PRN, KELVIN Betancourt CNP    benztropine (COGENTIN) tablet 1 mg, 1 mg, Oral, BID, KELVIN Betancourt CNP, 1 mg at 06/10/21 2202    divalproex (DEPAKOTE) DR tablet 250 mg, 250 mg, Oral, 2 times per day, KELVIN Walker CNP, 353 mg at 06/10/21 2201    paliperidone (INVEGA) extended release tablet 3 mg, 3 mg, Oral, BID, Brenna Samano, KELVIN - CNP, 3 mg at 06/10/21 2201    traZODone (DESYREL) tablet 50 mg, 50 mg, Oral, Nightly PRN, Tammy Riedel, MD, 50 mg at 06/10/21 2201    acetaminophen (TYLENOL) tablet 650 mg, 650 mg, Oral, O9E PRN, Mickeal Tanner Mccarthyk, APRN - CNP    magnesium hydroxide (MILK OF MAGNESIA) 400 MG/5ML suspension 30 mL, 30 mL, Oral, Daily PRN, Mickeal Tanner Boyerlick, APRN - CNP    aluminum & magnesium hydroxide-simethicone (MAALOX) 200-200-20 MG/5ML suspension 30 mL, 30 mL, Oral, PRN, Mickeal Sandrhonda Boyerlick, APRN - CNP    hydrOXYzine (VISTARIL) capsule 50 mg, 50 mg, Oral, TID PRN, Mickeal Tanner Mccarthyk, APRN - CNP    haloperidol (HALDOL) tablet 5 mg, 5 mg, Oral, Q6H PRN **OR** haloperidol lactate (HALDOL) injection 5 mg, 5 mg, Intramuscular, V5A PRN, Micjamesal Tanner Sandy, APRN - CNP      Examination:  BP (!) 118/54   Pulse 62   Temp 98.9 °F (37.2 °C) (Oral)   Resp 14   Ht 6' 1\" (1.854 m)   Wt 180 lb (81.6 kg)   SpO2 98%   BMI 23.75 kg/m²   Gait - steady  Medication side effects(SE):     Mental Status Examination:    Level of consciousness:  within normal limits   Appearance: Fair grooming and fair hygiene  Behavior/Motor:  no abnormalities noted  Attitude toward examiner:  cooperative  Speech:  spontaneous, normal rate and normal volume   Mood: \" I am depressed. \"  Affect: Flat and blunted  Thought processes: Linear without flight of ideas loose associations  Thought content: Devoid of any auditory visualizations delusions or other perceptual denies. States he is \"ready to die. \"  Denies homicidal ideation intent or plan  Cognition:  oriented to person, place, and time   Concentration intact  Insight poor   Judgement poor     ASSESSMENT:   Patient symptoms are:  [] Well controlled  [] Improving  [] Worsening  [x] No change      Diagnosis:   Principal Problem:    Schizoaffective disorder, bipolar type ([address]  Active Problems:    Cluster B personality disorder ([facility]  Resolved Problems:    * No resolved hospital problems. *      LABS:    Recent Labs     06/09/21  1259   WBC 6.7   HGB 14.1        Recent Labs     06/09/21  1259      K 4.6      CO2 24   BUN 10   CREATININE 0.9   GLUCOSE 94     Recent Labs     06/09/21  1259   BILITOT 0.3   ALKPHOS 51   AST 18   ALT 13     Lab Results   Component Value Date    LABAMPH NOT DETECTED 06/09/2021    BARBSCNU NOT DETECTED 06/09/2021    LABBENZ NOT DETECTED 06/09/2021    LABMETH NOT DETECTED 06/09/2021    OPIATESCREENURINE NOT DETECTED 06/09/2021    PHENCYCLIDINESCREENURINE NOT DETECTED 06/09/2021    ETOH <10 06/09/2021     Lab Results   Component Value Date    TSH 1.240 04/18/2019     No results found for: LITHIUM  Lab Results   Component Value Date    VALPROATE 36 (L) 06/01/2021         Treatment Plan:  Reviewed current Medications with the patient. Risks, benefits, side effects, drug-to-drug interactions and alternatives to treatment were discussed. Collateral information:   CD evaluation  Encourage patient to attend group and other milieu activities.   Discharge planning discussed with the patient and treatment team.     Continue Depakote 250 mg twice daily for mood stabilization  Continue Cogentin 1 mg twice daily for side effects  Continue Invega 3 mg twice daily for psychosis  Continue [place] injection patient received his last injection 156 mg IM every 30 days on 6/8/2021    PSYCHOTHERAPY/COUNSELING:  [x] Therapeutic interview  [x] Supportive  [] CBT  [] Ongoing  [] Other    [x] Patient continues to need, on a daily basis, active treatment furnished directly by or requiring the supervision of inpatient psychiatric personnel      Anticipated Length of stay: 3 to 7 days based on stability            Electronically signed by KELVIN Dias CNP on 5/07/3271 at 8:40 AM

## 2021-06-11 NOTE — PLAN OF CARE
Problem: Depressive Behavior With or Without Suicide Precautions:  Goal: Able to verbalize and/or display a decrease in depressive symptoms  Description: Able to verbalize and/or display a decrease in depressive symptoms  Outcome: Ongoing  Goal: Ability to disclose and discuss suicidal ideas will improve  Description: Ability to disclose and discuss suicidal ideas will improve  Outcome: Ongoing   Pt states he is suicidal with plan of \"any way I can kill myself\". Pt states he is ready to die because he has nowhere to live and he is always homeless or incarcerated. Pt has poor insight and does not want to stay at a group home or shelter where he would have to share a room with someone else. Pt isolative to his room most of the day in bed. Pt takes med's and attends select groups.

## 2021-06-11 NOTE — BH NOTE
[facility]  Day 3 Interdisciplinary Treatment Plan NOTE    Review Date & Time: 06/11/2021 0909     Patient was in treatment team    Admission Type:   Admission Type: Involuntary    Reason for admission:  Reason for Admission: [address]  Estimated Length of Stay Update:  5-7 days   Estimated Discharge Date Update: 06/17/2021    PATIENT STRENGTHS:  Patient Strengths Strengths: Connection to output provider  Patient Strengths and Limitations:Limitations: Multiple barriers to leisure interests, Lacks leisure interests  Addictive Behavior:Addictive Behavior  In the past 3 months, have you felt or has someone told you that you have a problem with:  : None  Do you have a history of Chemical Use?: No  Do you have a history of Alcohol Use?: No  Do you have a history of Street Drug Abuse?: No  Histroy of Prescripton Drug Abuse?: No  Medical Problems:  Past Medical History:   Diagnosis Date    Anxiety     Asthma     no meds currently- mostly exercise induced    Claustrophobia     confined spaces with locked doors    Depression     Epigastric pain     Nausea     Nut allergy        Risk:  Fall RiskTotal: 75  Moses Scale Moses Scale Score: 22  BVC Total: 0  Change in scores none.  Changes to plan of Care  none    Status EXAM:   Status and Exam  Normal: No  Facial Expression: Flat, Sad, Worried  Affect: Appropriate  Level of Consciousness: Alert  Mood:Normal: No  Mood: Depressed, Anxious, Sad  Motor Activity:Normal: No  Motor Activity: Decreased  Interview Behavior: Cooperative  Preception: [place] to Person, [place] to Time, [place] to Place, [place] to Situation  Attention:Normal: No  Attention: Others(See comment) (focused on finding a discharge plan)  Thought Processes: Other(See comment) (improving)  Thought Content:Normal: No  Thought Content: Preoccupations  Hallucinations: None  Delusions: No  Memory:Normal: No  Memory: Poor Remote, Confabulation  Insight and Judgment: No Insight and Judgment: Poor Judgment, Poor Insight  Present Suicidal Ideation: Yes (contracts and states he feels safe here)  Present Homicidal Ideation: No    Daily Assessment Last Entry:   Daily Sleep (WDL): Within Defined Limits         Patient Currently in Pain: Denies  Daily Nutrition (WDL): Within Defined Limits    Patient Monitoring:  Frequency of Checks: 4 times per hour, close    Psychiatric Symptoms:   Depression Symptoms  Depression Symptoms: No problems reported or observed. Anxiety Symptoms  Anxiety Symptoms: No problems reported or observed. Krystle Symptoms  Krystle Symptoms: No problems reported or observed. Psychosis Symptoms  Hallucination Type: No problems reported or observed. Delusion Type: No problems reported or observed. Suicide Risk CSSR-S:  1) Within the past month, have you wished you were dead or wished you could go to sleep and not wake up? : Yes  2) Have you actually had any thoughts of killing yourself? : Yes  3) Have you been thinking about how you might kill yourself? : Yes  5) Have you started to work out or worked out the details of how to kill yourself?  Do you intend to carry out this plan? : Yes  6) Have you ever done anything, started to do anything, or prepared to do anything to end your life?: Yes  Change in Result none Change in Plan of care none      EDUCATION:   Learner Progress Toward Treatment Goals: Reviewed group plan and strategies    Method: Small group    Outcome: Needs reinforcement    PATIENT GOALS: eat healthy    PLAN/TREATMENT RECOMMENDATIONS UPDATE:06/17/2021    GOALS UPDATE:   Time frame for Short-Term Goals: 1-3 days       Jhon Gunderson RN

## 2021-06-12 PROCEDURE — 6370000000 HC RX 637 (ALT 250 FOR IP): Performed by: NURSE PRACTITIONER

## 2021-06-12 PROCEDURE — 99232 SBSQ HOSP IP/OBS MODERATE 35: CPT | Performed by: NURSE PRACTITIONER

## 2021-06-12 PROCEDURE — 1240000000 HC EMOTIONAL WELLNESS R&B

## 2021-06-12 RX ADMIN — BENZTROPINE MESYLATE 1 MG: 1 TABLET ORAL at 09:44

## 2021-06-12 RX ADMIN — PALIPERIDONE 3 MG: 3 TABLET, EXTENDED RELEASE ORAL at 09:44

## 2021-06-12 RX ADMIN — PALIPERIDONE 3 MG: 3 TABLET, EXTENDED RELEASE ORAL at 21:24

## 2021-06-12 RX ADMIN — BENZTROPINE MESYLATE 1 MG: 1 TABLET ORAL at 21:25

## 2021-06-12 RX ADMIN — DIVALPROEX SODIUM 250 MG: 250 TABLET, DELAYED RELEASE ORAL at 09:44

## 2021-06-12 RX ADMIN — Medication 5 MG: at 21:25

## 2021-06-12 RX ADMIN — HYDROXYZINE PAMOATE 50 MG: 50 CAPSULE ORAL at 21:24

## 2021-06-12 RX ADMIN — DIVALPROEX SODIUM 250 MG: 250 TABLET, DELAYED RELEASE ORAL at 21:24

## 2021-06-12 ASSESSMENT — SLEEP AND FATIGUE QUESTIONNAIRES
DO YOU HAVE DIFFICULTY SLEEPING: YES
SLEEP PATTERN: DIFFICULTY FALLING ASLEEP
DIFFICULTY STAYING ASLEEP: YES
RESTFUL SLEEP: NO
DO YOU USE A SLEEP AID: NO
AVERAGE NUMBER OF SLEEP HOURS: 6
DIFFICULTY FALLING ASLEEP: YES
DIFFICULTY ARISING: YES

## 2021-06-12 ASSESSMENT — LIFESTYLE VARIABLES: HISTORY_ALCOHOL_USE: NO

## 2021-06-12 ASSESSMENT — PATIENT HEALTH QUESTIONNAIRE - PHQ9: SUM OF ALL RESPONSES TO PHQ QUESTIONS 1-9: 8

## 2021-06-12 NOTE — PROGRESS NOTES
Pt sleeping in his room. Woke. Pt is polite and cooperative. Admits to being depressed \"for years\". 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 States he goes home after work and \"stays in the basement by [employer]". Pt states he feels \"alone\" and is not \"very outgoing\". Remains suicidal. Plan is to do it   [facility] way I can. Amy tried enought times but I cant seem to do it. I shouldve  a couple times already\". Pt states he doesn't mind taking Depakote and believes it helps. But does not like needles and taking the [place] shot. States he is being probated for 90 days he believes. Plans to only continue the Depakote after it is over. Pt denies HI and AVH. Pt states he is open to alternative therapy other than shots.  Will continue to monitor

## 2021-06-12 NOTE — ED NOTES
Spoke with pt's mom. Mom states pt cannot go back to house until he gets inpatient rehab and then a sponsor. Mom states she does not believe pt will harm self once he gets on track with treatment. Mom has no safety concerns and states pt has no access to any weapons.

## 2021-06-12 NOTE — PROGRESS NOTES
BEHAVIORAL HEALTH FOLLOW-UP NOTE     6/12/2021     Patient was seen and examined in person, Chart reviewed   Patient's case discussed with staff/team    Chief Complaint: \" SI not too bad right now. \"    Interim History: Patient seen in his room. States his suicidal ideations are \"not that bad. \"  He denies any auditory visualizations he is lying with his face mask over his eyes. He is not offering much conversation. He is isolate to his room not attending groups and not socialize with peers he is medication compliant no behavioral disturbances on the unit    Appetite:   [x] Normal/Unchanged  [] Increased  [] Decreased      Sleep:       [x] Normal/Unchanged  [] Fair       [] Poor              Energy:    [x] Normal/Unchanged  [] Increased  [] Decreased        SI [] Present  [x] Absent    HI  []Present  [x] Absent     Aggression:  [] yes  [x] no    Patient is [x] able  [] unable to CONTRACT FOR SAFETY     PAST MEDICAL/PSYCHIATRIC HISTORY:   Past Medical History:   Diagnosis Date    Anxiety     Asthma     no meds currently- mostly exercise induced    Claustrophobia     confined spaces with locked doors    Depression     Epigastric pain     Nausea     Nut allergy        FAMILY/SOCIAL HISTORY:  History reviewed. No pertinent family history.   Social History     Socioeconomic History    Marital status: Single     Spouse name: Not on file    Number of children: 0    Years of education: 15    Highest education level: Not on file   Occupational History    Occupation:      Employer: [employer]   Tobacco Use    Smoking status: Never Smoker    Smokeless tobacco: Never Used   Vaping Use    Vaping Use: Never used   Substance and Sexual Activity    Alcohol use: No    Drug use: Not Currently     Types: Marijuana     Comment: states has a medical card    Sexual activity: Not Currently     Partners: Female   Other Topics Concern    Not on file   Social History Narrative    ** Merged History Encounter ** Social Determinants of Health     Financial Resource Strain:     Difficulty of Paying Living Expenses:    Food Insecurity:     Worried About Running Out of Food in the Last Year:     [address] in the Last Year:    Transportation Needs:     Lack of Transportation (Medical):  Lack of Transportation (Non-Medical):    Physical Activity:     Days of Exercise per Week:     Minutes of Exercise per Session:    Stress:     Feeling of Stress :    Social Connections:     Frequency of Communication with Friends and Family:     Frequency of Social Gatherings with Friends and Family:     Attends Faith Services:     Active Member of Clubs or Organizations:     Attends Club or Organization Meetings:     Marital Status:    Intimate Partner Violence:     Fear of Current or Ex-Partner:     Emotionally Abused:     Physically Abused:     Sexually Abused:            ROS:  [x] All negative/unchanged except if checked.  Explain positive(checked items) below:  [] Constitutional  [] Eyes  [] Ear/Nose/Mouth/Throat  [] Respiratory  [] CV  [] GI  []   [] Musculoskeletal  [] Skin/Breast  [] Neurological  [] Endocrine  [] Heme/Lymph  [] Allergic/Immunologic    Explanation:     MEDICATIONS:    Current Facility-Administered Medications:     melatonin disintegrating tablet 5 mg, 5 mg, Oral, Nightly PRN, KELVIN Ricks CNP    albuterol sulfate  (90 Base) MCG/ACT inhaler 2 puff, 2 puff, Inhalation, 4x Daily PRN, KELVIN Ricks CNP    benztropine (COGENTIN) tablet 1 mg, 1 mg, Oral, BID, KELVIN Ricks CNP, 1 mg at 06/12/21 0944    divalproex (DEPAKOTE) DR tablet 250 mg, 250 mg, Oral, 2 times per day, KELVIN Robledo CNP, 325 mg at 06/12/21 0944    paliperidone (INVEGA) extended release tablet 3 mg, 3 mg, Oral, BID, KELVIN Ricks CNP, 3 mg at 06/12/21 0944    acetaminophen (TYLENOL) tablet 650 mg, 650 mg, Oral, Y2R PRN, KELVIN Robledo CNP    magnesium hydroxide (MILK OF MAGNESIA) 400 MG/5ML suspension 30 mL, 30 mL, Oral, Daily PRN, Shayan Groom Dellick, APRN - CNP    aluminum & magnesium hydroxide-simethicone (MAALOX) 200-200-20 MG/5ML suspension 30 mL, 30 mL, Oral, PRN, Shayan Groom Dellick, APRN - CNP    hydrOXYzine (VISTARIL) capsule 50 mg, 50 mg, Oral, TID PRN, Shayan Groom Dellick, APRN - CNP    haloperidol (HALDOL) tablet 5 mg, 5 mg, Oral, Q6H PRN **OR** haloperidol lactate (HALDOL) injection 5 mg, 5 mg, Intramuscular, K6Y PRN, Shayan Groom Dellick, APRN - CNP      Examination:  BP (!) 105/57   Pulse 63   Temp 98.8 °F (37.1 °C)   Resp 14   Ht 6' 1\" (1.854 m)   Wt 180 lb (81.6 kg)   SpO2 98%   BMI 23.75 kg/m²   Gait - steady  Medication side effects(SE):     Mental Status Examination:    Level of consciousness:  within normal limits   Appearance: Fair grooming and fair hygiene  Behavior/Motor:  no abnormalities noted  Attitude toward examiner:  cooperative  Speech:  spontaneous, normal rate and normal volume   Mood: \" I am depressed. \"  Affect: Flat and blunted  Thought processes: Linear without flight of ideas loose associations  Thought content: Devoid of any auditory visualizations delusions or other perceptual denies. States he is \"ready to die. \"  Denies homicidal ideation intent or plan  Cognition:  oriented to person, place, and time   Concentration intact  Insight poor   Judgement poor     ASSESSMENT:   Patient symptoms are:  [] Well controlled  [] Improving  [] Worsening  [x] No change      Diagnosis:   Principal Problem:    Schizoaffective disorder, bipolar type ([facility] [address]  Active Problems:    Cluster B personality disorder ([facility] [address]  Resolved Problems:    * No resolved hospital problems.  *      LABS:    Recent Labs     06/09/21  1259   WBC 6.7   HGB 14.1        Recent Labs     06/09/21  1259      K 4.6      CO2 24   BUN 10   CREATININE 0.9   GLUCOSE 94     Recent Labs     06/09/21  1259   BILITOT 0.3   ALKPHOS 51   AST 18   ALT 13     Lab Results Component Value Date    LABAMPH NOT DETECTED 06/09/2021    BARBSCNU NOT DETECTED 06/09/2021    LABBENZ NOT DETECTED 06/09/2021    LABMETH NOT DETECTED 06/09/2021    OPIATESCREENURINE NOT DETECTED 06/09/2021    PHENCYCLIDINESCREENURINE NOT DETECTED 06/09/2021    ETOH <10 06/09/2021     Lab Results   Component Value Date    TSH 1.240 04/18/2019     No results found for: LITHIUM  Lab Results   Component Value Date    VALPROATE 36 (L) 06/01/2021         Treatment Plan:  Reviewed current Medications with the patient. Risks, benefits, side effects, drug-to-drug interactions and alternatives to treatment were discussed. Collateral information:   CD evaluation  Encourage patient to attend group and other milieu activities.   Discharge planning discussed with the patient and treatment team.     Continue Depakote 250 mg twice daily for mood stabilization  Continue Cogentin 1 mg twice daily for side effects  Continue Invega 3 mg twice daily for psychosis  Continue Sissy Geo injection patient received his last injection 156 mg IM every 30 days on 6/8/2021    PSYCHOTHERAPY/COUNSELING:  [x] Therapeutic interview  [x] Supportive  [] CBT  [] Ongoing  [] Other    [x] Patient continues to need, on a daily basis, active treatment furnished directly by or requiring the supervision of inpatient psychiatric personnel      Anticipated Length of stay: 3 to 7 days based on stability            Electronically signed by KELVIN Matthew CNP on 8/49/1635 at 11:27 AM

## 2021-06-13 PROCEDURE — 6370000000 HC RX 637 (ALT 250 FOR IP): Performed by: NURSE PRACTITIONER

## 2021-06-13 PROCEDURE — 99231 SBSQ HOSP IP/OBS SF/LOW 25: CPT | Performed by: NURSE PRACTITIONER

## 2021-06-13 PROCEDURE — 1240000000 HC EMOTIONAL WELLNESS R&B

## 2021-06-13 RX ADMIN — Medication 5 MG: at 21:06

## 2021-06-13 RX ADMIN — PALIPERIDONE 3 MG: 3 TABLET, EXTENDED RELEASE ORAL at 09:26

## 2021-06-13 RX ADMIN — DIVALPROEX SODIUM 250 MG: 250 TABLET, DELAYED RELEASE ORAL at 21:06

## 2021-06-13 RX ADMIN — DIVALPROEX SODIUM 250 MG: 250 TABLET, DELAYED RELEASE ORAL at 09:26

## 2021-06-13 RX ADMIN — HYDROXYZINE PAMOATE 50 MG: 50 CAPSULE ORAL at 21:06

## 2021-06-13 RX ADMIN — PALIPERIDONE 3 MG: 3 TABLET, EXTENDED RELEASE ORAL at 21:06

## 2021-06-13 RX ADMIN — BENZTROPINE MESYLATE 1 MG: 1 TABLET ORAL at 09:26

## 2021-06-13 RX ADMIN — BENZTROPINE MESYLATE 1 MG: 1 TABLET ORAL at 21:06

## 2021-06-13 NOTE — PROGRESS NOTES
BEHAVIORAL HEALTH FOLLOW-UP NOTE     6/13/2021     Patient was seen and examined in person, Chart reviewed   Patient's case discussed with staff/team    Chief Complaint: \" I am doing pretty good how are you doing. \"    Interim History: Patient seen in his room. He is pleasant and cooperative. Today he is denying any SI/HI intent or plan he denies any auditory visual hallucinations. He states he is just a little bit anxious about where he is going to go on discharge. He states he thinks he is going to go to drug rehab. He is isolate to his room not socialize with peers or attending groups    Appetite:   [x] Normal/Unchanged  [] Increased  [] Decreased      Sleep:       [x] Normal/Unchanged  [] Fair       [] Poor              Energy:    [x] Normal/Unchanged  [] Increased  [] Decreased        SI [] Present  [x] Absent    HI  []Present  [x] Absent     Aggression:  [] yes  [x] no    Patient is [x] able  [] unable to CONTRACT FOR SAFETY     PAST MEDICAL/PSYCHIATRIC HISTORY:   Past Medical History:   Diagnosis Date    Anxiety     Asthma     no meds currently- mostly exercise induced    Claustrophobia     confined spaces with locked doors    Depression     Epigastric pain     Nausea     Nut allergy        FAMILY/SOCIAL HISTORY:  History reviewed. No pertinent family history.   Social History     Socioeconomic History    Marital status: Single     Spouse name: Not on file    Number of children: 0    Years of education: 15    Highest education level: Not on file   Occupational History    Occupation:      Employer: [employer]   Tobacco Use    Smoking status: Never Smoker    Smokeless tobacco: Never Used   Vaping Use    Vaping Use: Never used   Substance and Sexual Activity    Alcohol use: No    Drug use: Not Currently     Types: Marijuana     Comment: states has a medical card    Sexual activity: Not Currently     Partners: Female   Other Topics Concern    Not on file   Social History Narrative ** Merged History Encounter **          Social Determinants of Health     Financial Resource Strain:     Difficulty of Paying Living Expenses:    Food Insecurity:     Worried About Running Out of Food in the Last Year:     [address] in the Last Year:    Transportation Needs:     Lack of Transportation (Medical):  Lack of Transportation (Non-Medical):    Physical Activity:     Days of Exercise per Week:     Minutes of Exercise per Session:    Stress:     Feeling of Stress :    Social Connections:     Frequency of Communication with Friends and Family:     Frequency of Social Gatherings with Friends and Family:     Attends Advent Services:     Active Member of Clubs or Organizations:     Attends Club or Organization Meetings:     Marital Status:    Intimate Partner Violence:     Fear of Current or Ex-Partner:     Emotionally Abused:     Physically Abused:     Sexually Abused:            ROS:  [x] All negative/unchanged except if checked.  Explain positive(checked items) below:  [] Constitutional  [] Eyes  [] Ear/Nose/Mouth/Throat  [] Respiratory  [] CV  [] GI  []   [] Musculoskeletal  [] Skin/Breast  [] Neurological  [] Endocrine  [] Heme/Lymph  [] Allergic/Immunologic    Explanation:     MEDICATIONS:    Current Facility-Administered Medications:     melatonin disintegrating tablet 5 mg, 5 mg, Oral, Nightly PRN, KELVIN Mraes CNP, 5 mg at 06/12/21 2125    albuterol sulfate  (90 Base) MCG/ACT inhaler 2 puff, 2 puff, Inhalation, 4x Daily PRN, KELVIN Mares CNP    benztropine (COGENTIN) tablet 1 mg, 1 mg, Oral, BID, KELVIN Mares CNP, 1 mg at 06/13/21 0926    divalproex (DEPAKOTE) DR tablet 250 mg, 250 mg, Oral, 2 times per day, KELVIN Lebron CNP, 554 mg at 06/13/21 0926    paliperidone (INVEGA) extended release tablet 3 mg, 3 mg, Oral, BID, KELVIN Mares CNP, 3 mg at 06/13/21 0926    acetaminophen (TYLENOL) tablet 650 mg, 650 mg, ALKPHOS, AST, ALT in the last 72 hours. Lab Results   Component Value Date    LABAMPH NOT DETECTED 06/09/2021    BARBSCNU NOT DETECTED 06/09/2021    LABBENZ NOT DETECTED 06/09/2021    LABMETH NOT DETECTED 06/09/2021    OPIATESCREENURINE NOT DETECTED 06/09/2021    PHENCYCLIDINESCREENURINE NOT DETECTED 06/09/2021    ETOH <10 06/09/2021     Lab Results   Component Value Date    TSH 1.240 04/18/2019     No results found for: LITHIUM  Lab Results   Component Value Date    VALPROATE 36 (L) 06/01/2021         Treatment Plan:  Reviewed current Medications with the patient. Risks, benefits, side effects, drug-to-drug interactions and alternatives to treatment were discussed. Collateral information:   CD evaluation  Encourage patient to attend group and other milieu activities.   Discharge planning discussed with the patient and treatment team.     Continue Depakote 250 mg twice daily for mood stabilization  Continue Cogentin 1 mg twice daily for side effects  Continue Invega 3 mg twice daily for psychosis  Continue Annamae Calderon injection patient received his last injection 156 mg IM every 30 days on 6/8/2021    PSYCHOTHERAPY/COUNSELING:  [x] Therapeutic interview  [x] Supportive  [] CBT  [] Ongoing  [] Other    [x] Patient continues to need, on a daily basis, active treatment furnished directly by or requiring the supervision of inpatient psychiatric personnel      Anticipated Length of stay: 3 to 7 days based on stability            Electronically signed by KELVIN Terry CNP on 0/69/2903 at 11:12 AM

## 2021-06-13 NOTE — PROGRESS NOTES
Pt alert, flat, and sad. Pt states his suicidal thoughts have decreased . He is focused on discharge to a treatment program. Encouraged pt to attend group but he declined. Will continue to monitor.

## 2021-06-14 PROCEDURE — 1240000000 HC EMOTIONAL WELLNESS R&B

## 2021-06-14 PROCEDURE — 6370000000 HC RX 637 (ALT 250 FOR IP): Performed by: NURSE PRACTITIONER

## 2021-06-14 PROCEDURE — 99232 SBSQ HOSP IP/OBS MODERATE 35: CPT | Performed by: NURSE PRACTITIONER

## 2021-06-14 RX ADMIN — DIVALPROEX SODIUM 250 MG: 250 TABLET, DELAYED RELEASE ORAL at 09:16

## 2021-06-14 RX ADMIN — Medication 5 MG: at 21:30

## 2021-06-14 RX ADMIN — PALIPERIDONE 3 MG: 3 TABLET, EXTENDED RELEASE ORAL at 09:17

## 2021-06-14 RX ADMIN — DIVALPROEX SODIUM 250 MG: 250 TABLET, DELAYED RELEASE ORAL at 21:30

## 2021-06-14 RX ADMIN — BENZTROPINE MESYLATE 1 MG: 1 TABLET ORAL at 21:30

## 2021-06-14 RX ADMIN — PALIPERIDONE 3 MG: 3 TABLET, EXTENDED RELEASE ORAL at 21:30

## 2021-06-14 RX ADMIN — BENZTROPINE MESYLATE 1 MG: 1 TABLET ORAL at 09:17

## 2021-06-14 NOTE — PLAN OF CARE
[facility] Interdisciplinary Treatment Plan Note     Review Date & Time: 6/14/21 0900    Patient was in treatment team.    Admission Type:   Admission Type: Involuntary    Reason for admission:  Reason for Admission: [address]    Estimated Length of Stay Update:  1-3 days   Estimated Discharge Date Update: 6/17/21    PATIENT STRENGTHS:  Patient Strengths:Strengths: Positive Support, Connection to output provider, No significant Physical Illness  Patient Strengths and Limitations:Limitations: Inappropriate/potentially harmful leisure interests  Addictive Behavior:Addictive Behavior  In the past 3 months, have you felt or has someone told you that you have a problem with:  : None  Do you have a history of Chemical Use?: Yes  Do you have a history of Alcohol Use?: No  Do you have a history of Street Drug Abuse?: Yes  Histroy of Prescripton Drug Abuse?: No  Medical Problems:   Past Medical History:   Diagnosis Date    Anxiety     Asthma     no meds currently- mostly exercise induced    Claustrophobia     confined spaces with locked doors    Depression     Epigastric pain     Nausea     Nut allergy        Risk:  Fall RiskTotal: 53  Moses Scale Moses Scale Score: 22  BVC Total: 0  Change in scores no. Changes to plan of Care no    Status EXAM:   Status and Exam  Normal: No  Facial Expression: Sad, Worried  Affect: Congruent  Level of Consciousness: Alert  Mood:Normal: No  Mood: Depressed, Anhedonia  Motor Activity:Normal: No  Motor Activity: Decreased  Interview Behavior: Cooperative  Preception: Depue to Person, Depue to Time, Depue to Place, Depue to Situation  Attention:Normal: Yes  Attention: Distractible  Thought Processes: Other(See comment) (clear, organized)  Thought Content:Normal: No  Thought Content: Preoccupations  Hallucinations: None  Delusions: No  Delusions:  Other(See Comment)  Memory:Normal: Yes  Memory: Other(See comment) (intact)  Insight behaviors.      GOALS UPDATE:  Time frame for Short-Term Goals: 1-3 days      Zackary Mayer RN

## 2021-06-14 NOTE — PROGRESS NOTES
Pt sleeping in his room. Pt woke. Sat and talked in length about pts goals, short and long term. Pt denies SI, HI, and AVH. Per pt, he states he was in college for Engineering and failed out as well as failed in the relationship. Pt states his parents were not supportive. Pt states his drug of choice is cocaine. Pt states he wanted to change his major and, again, the parents were not supportive. \"They're ok with everything my brother does though. \" Pt states he can \"put down the drugs but Id rather live a good short life than a long pointless one\". Discussed options of choices that he has control over. Pt states he is afraid that he will try to talk to his parents and they will shut him down again. Pt brought up his use of medical marijuana. States due to the long list of stupid things he has done in the past (to include the multiple SA) he now has chronic pain. Pt states he uses it for that and it also helps with his anxiety. States his parents are also against that. Pt states he is willing to sit down with his parents and talk , but is also thinking he may need to live on his own in order to [facility] [address] [facility] [place] on in my own way\". Will continue to monitor.

## 2021-06-14 NOTE — PROGRESS NOTES
BEHAVIORAL HEALTH FOLLOW-UP NOTE     6/14/2021     Patient was seen and examined in person, Chart reviewed   Patient's case discussed with staff/team    Chief Complaint: \" I think we have a be good for me. \"    Interim History: Patient seen during treatment team.  He states that he is agreeable to go to rehab. He denies SI/HI intent or plan denies any auditory or visual hallucinations. No overt overt signs psychosis. Does tend to keep to himself on the unit he is not socialize with peers not attending groups. No behavioral outburst on the unit. Appetite:   [x] Normal/Unchanged  [] Increased  [] Decreased      Sleep:       [x] Normal/Unchanged  [] Fair       [] Poor              Energy:    [x] Normal/Unchanged  [] Increased  [] Decreased        SI [] Present  [x] Absent    HI  []Present  [x] Absent     Aggression:  [] yes  [x] no    Patient is [x] able  [] unable to CONTRACT FOR SAFETY     PAST MEDICAL/PSYCHIATRIC HISTORY:   Past Medical History:   Diagnosis Date    Anxiety     Asthma     no meds currently- mostly exercise induced    Claustrophobia     confined spaces with locked doors    Depression     Epigastric pain     Nausea     Nut allergy        FAMILY/SOCIAL HISTORY:  History reviewed. No pertinent family history.   Social History     Socioeconomic History    Marital status: Single     Spouse name: Not on file    Number of children: 0    Years of education: 15    Highest education level: Not on file   Occupational History    Occupation:      Employer: [employer]   Tobacco Use    Smoking status: Never Smoker    Smokeless tobacco: Never Used   Vaping Use    Vaping Use: Never used   Substance and Sexual Activity    Alcohol use: No    Drug use: Not Currently     Types: Marijuana     Comment: states has a medical card    Sexual activity: Not Currently     Partners: Female   Other Topics Concern    Not on file   Social History Narrative    ** Merged History Encounter ** Social Determinants of Health     Financial Resource Strain:     Difficulty of Paying Living Expenses:    Food Insecurity:     Worried About Running Out of Food in the Last Year:     [address] in the Last Year:    Transportation Needs:     Lack of Transportation (Medical):  Lack of Transportation (Non-Medical):    Physical Activity:     Days of Exercise per Week:     Minutes of Exercise per Session:    Stress:     Feeling of Stress :    Social Connections:     Frequency of Communication with Friends and Family:     Frequency of Social Gatherings with Friends and Family:     Attends [employer]regation Services:     Active Member of Clubs or Organizations:     Attends Club or Organization Meetings:     Marital Status:    Intimate Partner Violence:     Fear of Current or Ex-Partner:     Emotionally Abused:     Physically Abused:     Sexually Abused:            ROS:  [x] All negative/unchanged except if checked.  Explain positive(checked items) below:  [] Constitutional  [] Eyes  [] Ear/Nose/Mouth/Throat  [] Respiratory  [] CV  [] GI  []   [] Musculoskeletal  [] Skin/Breast  [] Neurological  [] Endocrine  [] Heme/Lymph  [] Allergic/Immunologic    Explanation:     MEDICATIONS:    Current Facility-Administered Medications:     melatonin disintegrating tablet 5 mg, 5 mg, Oral, Nightly PRN, KELVIN Do CNP, 5 mg at 06/13/21 2106    albuterol sulfate  (90 Base) MCG/ACT inhaler 2 puff, 2 puff, Inhalation, 4x Daily PRN, KELVIN Do CNP    benztropine (COGENTIN) tablet 1 mg, 1 mg, Oral, BID, HedyKELVIN Ordonez CNP, 1 mg at 06/13/21 2106    divalproex (DEPAKOTE) DR tablet 250 mg, 250 mg, Oral, 2 times per day, KELVIN Lamb CNP, 893 mg at 06/13/21 2106    paliperidone (INVEGA) extended release tablet 3 mg, 3 mg, Oral, BID, KELVIN Do CNP, 3 mg at 06/13/21 2106    acetaminophen (TYLENOL) tablet 650 mg, 650 mg, Oral, X3X PRN, KELVIN Lamb CNP   magnesium hydroxide (MILK OF MAGNESIA) 400 MG/5ML suspension 30 mL, 30 mL, Oral, Daily PRN, Darleene Broaden Dellick, APRN - CNP    aluminum & magnesium hydroxide-simethicone (MAALOX) 200-200-20 MG/5ML suspension 30 mL, 30 mL, Oral, PRN, Darleene Broaden Dellick, APRN - CNP    hydrOXYzine (VISTARIL) capsule 50 mg, 50 mg, Oral, TID PRN, Rosanna Oropeza APRN - CNP, 50 mg at 06/13/21 2106    haloperidol (HALDOL) tablet 5 mg, 5 mg, Oral, Q6H PRN **OR** haloperidol lactate (HALDOL) injection 5 mg, 5 mg, Intramuscular, J2I PRN, Darleene Broaden Dellick, APRN - CNP      Examination:  /70   Pulse 64   Temp 98.7 °F (37.1 °C)   Resp 14   Ht 6' 1\" (1.854 m)   Wt 180 lb (81.6 kg)   SpO2 98%   BMI 23.75 kg/m²   Gait - steady  Medication side effects(SE):     Mental Status Examination:    Level of consciousness:  within normal limits   Appearance: Fair grooming and fair hygiene  Behavior/Motor:  no abnormalities noted  Attitude toward examiner:  cooperative  Speech:  spontaneous, normal rate and normal volume   Mood: \" I am depressed. \"  Affect: Flat and blunted  Thought processes: Linear without flight of ideas loose associations  Thought content: Devoid of any auditory visualizations delusions or other perceptual denies. States he is \"ready to die. \"  Denies homicidal ideation intent or plan  Cognition:  oriented to person, place, and time   Concentration intact  Insight poor   Judgement poor     ASSESSMENT:   Patient symptoms are:  [] Well controlled  [] Improving  [] Worsening  [x] No change      Diagnosis:   Principal Problem:    Schizoaffective disorder, bipolar type ([facility] [address]  Active Problems:    [facility]  Resolved Problems:    * No resolved hospital problems. *      LABS:    No results for input(s): WBC, HGB, PLT in the last 72 hours. No results for input(s): NA, K, CL, CO2, BUN, CREATININE, GLUCOSE in the last 72 hours. No results for input(s): BILITOT, ALKPHOS, AST, ALT in the last 72 hours.   Lab Results Component Value Date    LABAMPH NOT DETECTED 06/09/2021    BARBSCNU NOT DETECTED 06/09/2021    LABBENZ NOT DETECTED 06/09/2021    LABMETH NOT DETECTED 06/09/2021    OPIATESCREENURINE NOT DETECTED 06/09/2021    PHENCYCLIDINESCREENURINE NOT DETECTED 06/09/2021    ETOH <10 06/09/2021     Lab Results   Component Value Date    TSH 1.240 04/18/2019     No results found for: LITHIUM  Lab Results   Component Value Date    VALPROATE 36 (L) 06/01/2021         Treatment Plan:  Reviewed current Medications with the patient. Risks, benefits, side effects, drug-to-drug interactions and alternatives to treatment were discussed. Collateral information:   CD evaluation  Encourage patient to attend group and other milieu activities.   Discharge planning discussed with the patient and treatment team.     Continue Depakote 250 mg twice daily for mood stabilization  Continue Cogentin 1 mg twice daily for side effects  Continue Invega 3 mg twice daily for psychosis  Continue Oleangelica Fitzpatrick injection patient received his last injection 156 mg IM every 30 days on 6/8/2021    PSYCHOTHERAPY/COUNSELING:  [x] Therapeutic interview  [x] Supportive  [] CBT  [] Ongoing  [] Other    [x] Patient continues to need, on a daily basis, active treatment furnished directly by or requiring the supervision of inpatient psychiatric personnel      Anticipated Length of stay: 3 to 7 days based on stability            Electronically signed by KELVIN Moss CNP on 0/47/7257 at 8:49 AM

## 2021-06-15 PROCEDURE — 6370000000 HC RX 637 (ALT 250 FOR IP): Performed by: NURSE PRACTITIONER

## 2021-06-15 PROCEDURE — 99231 SBSQ HOSP IP/OBS SF/LOW 25: CPT | Performed by: NURSE PRACTITIONER

## 2021-06-15 PROCEDURE — 1240000000 HC EMOTIONAL WELLNESS R&B

## 2021-06-15 RX ADMIN — PALIPERIDONE 3 MG: 3 TABLET, EXTENDED RELEASE ORAL at 09:02

## 2021-06-15 RX ADMIN — PALIPERIDONE 3 MG: 3 TABLET, EXTENDED RELEASE ORAL at 20:37

## 2021-06-15 RX ADMIN — DIVALPROEX SODIUM 250 MG: 250 TABLET, DELAYED RELEASE ORAL at 20:37

## 2021-06-15 RX ADMIN — Medication 5 MG: at 20:37

## 2021-06-15 RX ADMIN — DIVALPROEX SODIUM 250 MG: 250 TABLET, DELAYED RELEASE ORAL at 09:02

## 2021-06-15 RX ADMIN — BENZTROPINE MESYLATE 1 MG: 1 TABLET ORAL at 09:02

## 2021-06-15 RX ADMIN — BENZTROPINE MESYLATE 1 MG: 1 TABLET ORAL at 20:37

## 2021-06-15 ASSESSMENT — PAIN SCALES - GENERAL
PAINLEVEL_OUTOF10: 0

## 2021-06-15 NOTE — PROGRESS NOTES
BEHAVIORAL HEALTH FOLLOW-UP NOTE     6/15/2021     Patient was seen and examined in person, Chart reviewed   Patient's case discussed with staff/team    Chief Complaint: \" I am doing okay. \"    Interim History: Patient seen in his room his affect is still brighter appropriate and pleasant he still remains isolative does not attend groups or socialize with peers. He denies SI/HI intent or plan he denies auditory visual hallucinations he is agreeable to go to rehab. Eating well sleeping well no neurovegetative signs symptoms of depression      Appetite:   [x] Normal/Unchanged  [] Increased  [] Decreased      Sleep:       [x] Normal/Unchanged  [] Fair       [] Poor              Energy:    [x] Normal/Unchanged  [] Increased  [] Decreased        SI [] Present  [x] Absent    HI  []Present  [x] Absent     Aggression:  [] yes  [x] no    Patient is [x] able  [] unable to CONTRACT FOR SAFETY     PAST MEDICAL/PSYCHIATRIC HISTORY:   Past Medical History:   Diagnosis Date    Anxiety     Asthma     no meds currently- mostly exercise induced    Claustrophobia     confined spaces with locked doors    Depression     Epigastric pain     Nausea     Nut allergy        FAMILY/SOCIAL HISTORY:  History reviewed. No pertinent family history.   Social History     Socioeconomic History    Marital status: Single     Spouse name: Not on file    Number of children: 0    Years of education: 15    Highest education level: Not on file   Occupational History    Occupation:      Employer: [employer]   Tobacco Use    Smoking status: Never Smoker    Smokeless tobacco: Never Used   Vaping Use    Vaping Use: Never used   Substance and Sexual Activity    Alcohol use: No    Drug use: Not Currently     Types: Marijuana     Comment: states has a medical card    Sexual activity: Not Currently     Partners: Female   Other Topics Concern    Not on file   Social History Narrative    ** Merged History Encounter **          Social Determinants of Health     Financial Resource Strain:     Difficulty of Paying Living Expenses:    Food Insecurity:     Worried About Running Out of Food in the Last Year:     [address] in the Last Year:    Transportation Needs:     Lack of Transportation (Medical):  Lack of Transportation (Non-Medical):    Physical Activity:     Days of Exercise per Week:     Minutes of Exercise per Session:    Stress:     Feeling of Stress :    Social Connections:     Frequency of Communication with Friends and Family:     Frequency of Social Gatherings with Friends and Family:     Attends [employer] Services:     Active Member of Clubs or Organizations:     Attends Club or Organization Meetings:     Marital Status:    Intimate Partner Violence:     Fear of Current or Ex-Partner:     Emotionally Abused:     Physically Abused:     Sexually Abused:            ROS:  [x] All negative/unchanged except if checked.  Explain positive(checked items) below:  [] Constitutional  [] Eyes  [] Ear/Nose/Mouth/Throat  [] Respiratory  [] CV  [] GI  []   [] Musculoskeletal  [] Skin/Breast  [] Neurological  [] Endocrine  [] Heme/Lymph  [] Allergic/Immunologic    Explanation:     MEDICATIONS:    Current Facility-Administered Medications:     melatonin disintegrating tablet 5 mg, 5 mg, Oral, Nightly PRN, KELVIN Shepard CNP, 5 mg at 06/14/21 2130    albuterol sulfate  (90 Base) MCG/ACT inhaler 2 puff, 2 puff, Inhalation, 4x Daily PRN, KELVIN Shepard CNP    benztropine (COGENTIN) tablet 1 mg, 1 mg, Oral, BID, HedyKELVIN Ordonez CNP, 1 mg at 06/15/21 0902    divalproex (DEPAKOTE) DR tablet 250 mg, 250 mg, Oral, 2 times per day, KELVIN Burns CNP, 043 mg at 06/15/21 0902    paliperidone (INVEGA) extended release tablet 3 mg, 3 mg, Oral, BID, KELVIN Shepard CNP, 3 mg at 06/15/21 0902    acetaminophen (TYLENOL) tablet 650 mg, 650 mg, Oral, Z4Q PRN, KELVIN Burns CNP   magnesium hydroxide (MILK OF MAGNESIA) 400 MG/5ML suspension 30 mL, 30 mL, Oral, Daily PRN, Phyllistine Dawn Dellick, APRN - CNP    aluminum & magnesium hydroxide-simethicone (MAALOX) 200-200-20 MG/5ML suspension 30 mL, 30 mL, Oral, PRN, Phyllistine Dawn Dellick, APRN - CNP    hydrOXYzine (VISTARIL) capsule 50 mg, 50 mg, Oral, TID PRN, Misha Mateo, APRN - CNP, 50 mg at 06/13/21 2106    haloperidol (HALDOL) tablet 5 mg, 5 mg, Oral, Q6H PRN **OR** haloperidol lactate (HALDOL) injection 5 mg, 5 mg, Intramuscular, U4C PRN, Phyllistine Dawn Dellick, APRN - CNP      Examination:  BP 95/60   Pulse 57   Temp 98.2 °F (36.8 °C) (Oral)   Resp 16   Ht 6' 1\" (1.854 m)   Wt 180 lb (81.6 kg)   SpO2 98%   BMI 23.75 kg/m²   Gait - steady  Medication side effects(SE):     Mental Status Examination:    Level of consciousness:  within normal limits   Appearance: Fair grooming and fair hygiene  Behavior/Motor:  no abnormalities noted  Attitude toward examiner:  cooperative  Speech:  spontaneous, normal rate and normal volume   Mood: \" I am depressed. \"  Affect: Flat and blunted  Thought processes: Linear without flight of ideas loose associations  Thought content: Devoid of any auditory visualizations delusions or other perceptual denies. States he is \"ready to die. \"  Denies homicidal ideation intent or plan  Cognition:  oriented to person, place, and time   Concentration intact  Insight poor   Judgement poor     ASSESSMENT:   Patient symptoms are:  [] Well controlled  [] Improving  [] Worsening  [x] No change      Diagnosis:   Principal Problem:    Schizoaffective disorder, bipolar type ([facility] [address]  Active Problems:    Cluster B personality disorder ([facility] [address]  Resolved Problems:    * No resolved hospital problems. *      LABS:    No results for input(s): WBC, HGB, PLT in the last 72 hours. No results for input(s): NA, K, CL, CO2, BUN, CREATININE, GLUCOSE in the last 72 hours. No results for input(s): BILITOT, ALKPHOS, AST, ALT in the last 72 hours.   Lab Results   Component Value Date    LABAMPH NOT DETECTED 06/09/2021    BARBSCNU NOT DETECTED 06/09/2021    LABBENZ NOT DETECTED 06/09/2021    LABMETH NOT DETECTED 06/09/2021    OPIATESCREENURINE NOT DETECTED 06/09/2021    PHENCYCLIDINESCREENURINE NOT DETECTED 06/09/2021    ETOH <10 06/09/2021     Lab Results   Component Value Date    TSH 1.240 04/18/2019     No results found for: LITHIUM  Lab Results   Component Value Date    VALPROATE 36 (L) 06/01/2021         Treatment Plan:  Reviewed current Medications with the patient. Risks, benefits, side effects, drug-to-drug interactions and alternatives to treatment were discussed. Collateral information:   CD evaluation  Encourage patient to attend group and other milieu activities.   Discharge planning discussed with the patient and treatment team.     Continue Depakote 250 mg twice daily for mood stabilization  Continue Cogentin 1 mg twice daily for side effects  Continue Invega 3 mg twice daily for psychosis  Continue Felicie Mt injection patient received his last injection 156 mg IM every 30 days on 6/8/2021    PSYCHOTHERAPY/COUNSELING:  [x] Therapeutic interview  [x] Supportive  [] CBT  [] Ongoing  [] Other    [x] Patient continues to need, on a daily basis, active treatment furnished directly by or requiring the supervision of inpatient psychiatric personnel      Anticipated Length of stay: 3 to 7 days based on stability            Electronically signed by KELVIN Garcia CNP on 5/13/7438 at 10:29 AM

## 2021-06-16 PROCEDURE — 99232 SBSQ HOSP IP/OBS MODERATE 35: CPT | Performed by: NURSE PRACTITIONER

## 2021-06-16 PROCEDURE — 6370000000 HC RX 637 (ALT 250 FOR IP): Performed by: NURSE PRACTITIONER

## 2021-06-16 PROCEDURE — 1240000000 HC EMOTIONAL WELLNESS R&B

## 2021-06-16 RX ORDER — BENZTROPINE MESYLATE 1 MG/1
1 TABLET ORAL 2 TIMES DAILY
Qty: 60 TABLET | Refills: 0 | Status: SHIPPED | OUTPATIENT
Start: 2021-06-16 | End: 2021-07-16

## 2021-06-16 RX ORDER — ALBUTEROL SULFATE 90 UG/1
2 AEROSOL, METERED RESPIRATORY (INHALATION) 4 TIMES DAILY PRN
Qty: 1 INHALER | Refills: 0 | Status: SHIPPED | OUTPATIENT
Start: 2021-06-16 | End: 2021-06-30

## 2021-06-16 RX ORDER — PALIPERIDONE 3 MG/1
3 TABLET, EXTENDED RELEASE ORAL 2 TIMES DAILY
Qty: 60 TABLET | Refills: 0 | Status: SHIPPED | OUTPATIENT
Start: 2021-06-16 | End: 2021-07-16

## 2021-06-16 RX ORDER — MECOBALAMIN 5000 MCG
5 TABLET,DISINTEGRATING ORAL NIGHTLY PRN
Qty: 30 TABLET | Refills: 0 | Status: SHIPPED | OUTPATIENT
Start: 2021-06-16 | End: 2021-07-16

## 2021-06-16 RX ORDER — DIVALPROEX SODIUM 250 MG/1
250 TABLET, DELAYED RELEASE ORAL EVERY 12 HOURS SCHEDULED
Qty: 60 TABLET | Refills: 0 | Status: SHIPPED | OUTPATIENT
Start: 2021-06-16 | End: 2021-07-16

## 2021-06-16 RX ADMIN — BENZTROPINE MESYLATE 1 MG: 1 TABLET ORAL at 08:40

## 2021-06-16 RX ADMIN — DIVALPROEX SODIUM 250 MG: 250 TABLET, DELAYED RELEASE ORAL at 08:40

## 2021-06-16 RX ADMIN — PALIPERIDONE 3 MG: 3 TABLET, EXTENDED RELEASE ORAL at 20:25

## 2021-06-16 RX ADMIN — BENZTROPINE MESYLATE 1 MG: 1 TABLET ORAL at 20:26

## 2021-06-16 RX ADMIN — Medication 5 MG: at 20:26

## 2021-06-16 RX ADMIN — PALIPERIDONE 3 MG: 3 TABLET, EXTENDED RELEASE ORAL at 08:40

## 2021-06-16 RX ADMIN — DIVALPROEX SODIUM 250 MG: 250 TABLET, DELAYED RELEASE ORAL at 20:25

## 2021-06-16 ASSESSMENT — PAIN SCALES - GENERAL: PAINLEVEL_OUTOF10: 0

## 2021-06-16 NOTE — PLAN OF CARE
Problem: Depressive Behavior With or Without Suicide Precautions:  Goal: Ability to disclose and discuss suicidal ideas will improve  Description: Ability to disclose and discuss suicidal ideas will improve  6/16/2021 0839 by Mary Kate Oconnell RN  Outcome: Met This Shift  Pt. Denies suicidal ideations. 6/15/2021 2016 by John Rashid RN  Outcome: Met This Shift     Problem: Depressive Behavior With or Without Suicide Precautions:  Goal: Absence of self-harm  Description: Absence of self-harm  6/16/2021 0839 by Mary Kate Oconnell RN  Outcome: Met This Shift  No self harm.    6/15/2021 2016 by John Rashid RN  Outcome: Met This Shift

## 2021-06-16 NOTE — PROGRESS NOTES
BEHAVIORAL HEALTH FOLLOW-UP NOTE     6/16/2021     Patient was seen and examined in person, Chart reviewed   Patient's case discussed with staff/team    Chief Complaint: \" I am doing okay. \"    Interim History: Patient seen in his room his affect is still brighter appropriate and pleasant he still remains isolative does not attend groups or socialize with peers. He denies SI/HI intent or plan he denies auditory visual hallucinations he is agreeable to go to rehab. Eating well sleeping well no neurovegetative signs symptoms of depression patient is looking forward to going to inpatient rehab he is bright and future oriented      Appetite:   [x] Normal/Unchanged  [] Increased  [] Decreased      Sleep:       [x] Normal/Unchanged  [] Fair       [] Poor              Energy:    [x] Normal/Unchanged  [] Increased  [] Decreased        SI [] Present  [x] Absent    HI  []Present  [x] Absent     Aggression:  [] yes  [x] no    Patient is [x] able  [] unable to CONTRACT FOR SAFETY     PAST MEDICAL/PSYCHIATRIC HISTORY:   Past Medical History:   Diagnosis Date    Anxiety     Asthma     no meds currently- mostly exercise induced    Claustrophobia     confined spaces with locked doors    Depression     Epigastric pain     Nausea     Nut allergy        FAMILY/SOCIAL HISTORY:  History reviewed. No pertinent family history.   Social History     Socioeconomic History    Marital status: Single     Spouse name: Not on file    Number of children: 0    Years of education: 15    Highest education level: Not on file   Occupational History    Occupation:      Employer: [employer]   Tobacco Use    Smoking status: Never Smoker    Smokeless tobacco: Never Used   Vaping Use    Vaping Use: Never used   Substance and Sexual Activity    Alcohol use: No    Drug use: Not Currently     Types: Marijuana     Comment: states has a medical card    Sexual activity: Not Currently     Partners: Female   Other Topics Concern    Not on file   Social History Narrative    ** Merged History Encounter **          Social Determinants of Health     Financial Resource Strain:     Difficulty of Paying Living Expenses:    Food Insecurity:     Worried About Running Out of Food in the Last Year:     Ran Out of Food in the Last Year:    Transportation Needs:     Lack of Transportation (Medical):  Lack of Transportation (Non-Medical):    Physical Activity:     Days of Exercise per Week:     Minutes of Exercise per Session:    Stress:     Feeling of Stress :    Social Connections:     Frequency of Communication with Friends and Family:     Frequency of Social Gatherings with Friends and Family:     Attends Jehovah[employer] witness Services:     Active Member of Clubs or Organizations:     Attends Club or Organization Meetings:     Marital Status:    Intimate Partner Violence:     Fear of Current or Ex-Partner:     Emotionally Abused:     Physically Abused:     Sexually Abused:            ROS:  [x] All negative/unchanged except if checked.  Explain positive(checked items) below:  [] Constitutional  [] Eyes  [] Ear/Nose/Mouth/Throat  [] Respiratory  [] CV  [] GI  []   [] Musculoskeletal  [] Skin/Breast  [] Neurological  [] Endocrine  [] Heme/Lymph  [] Allergic/Immunologic    Explanation:     MEDICATIONS:    Current Facility-Administered Medications:     melatonin disintegrating tablet 5 mg, 5 mg, Oral, Nightly PRN, Winter Verona Sandy, APRN - CNP, 5 mg at 06/15/21 2037    albuterol sulfate  (90 Base) MCG/ACT inhaler 2 puff, 2 puff, Inhalation, 4x Daily PRN, Winter Chikisless Merlinlick, APRN - CNP    benztropine (COGENTIN) tablet 1 mg, 1 mg, Oral, BID, Hedykobe Sandy, APRN - CNP, 1 mg at 06/16/21 0840    divalproex (DEPAKOTE) DR tablet 250 mg, 250 mg, Oral, 2 times per day, Surinder Arrington, APRN - CNP, 280 mg at 06/16/21 0840    paliperidone (INVEGA) extended release tablet 3 mg, 3 mg, Oral, BID, Wintermariel Boyerliclance, APRN - CNP, 3 mg at 06/16/21 0840   acetaminophen (TYLENOL) tablet 650 mg, 650 mg, Oral, C1U PRN, Michi Sandy, APRN - CNP    magnesium hydroxide (MILK OF MAGNESIA) 400 MG/5ML suspension 30 mL, 30 mL, Oral, Daily PRN, Michi Boyerlick, APRN - CNP    aluminum & magnesium hydroxide-simethicone (MAALOX) 200-200-20 MG/5ML suspension 30 mL, 30 mL, Oral, PRN, Michi Sandy, APRN - CNP    hydrOXYzine (VISTARIL) capsule 50 mg, 50 mg, Oral, TID PRN, Grady Favorite, APRN - CNP, 50 mg at 06/13/21 2106    haloperidol (HALDOL) tablet 5 mg, 5 mg, Oral, Q6H PRN **OR** haloperidol lactate (HALDOL) injection 5 mg, 5 mg, Intramuscular, T5B PRN, Michi Sandy, APRN - CNP      Examination:  BP (!) 130/56   Pulse 51   Temp 97.7 °F (36.5 °C) (Oral)   Resp 13   Ht 6' 1\" (1.854 m)   Wt 180 lb (81.6 kg)   SpO2 98%   BMI 23.75 kg/m²   Gait - steady  Medication side effects(SE):     Mental Status Examination:    Level of consciousness:  within normal limits   Appearance: Fair grooming and fair hygiene  Behavior/Motor:  no abnormalities noted  Attitude toward examiner:  cooperative  Speech:  spontaneous, normal rate and normal volume   Mood: \" I am depressed. \"  Affect: Flat and blunted  Thought processes: Linear without flight of ideas loose associations  Thought content: Devoid of any auditory visualizations delusions or other perceptual denies. States he is \"ready to die. \"  Denies homicidal ideation intent or plan  Cognition:  oriented to person, place, and time   Concentration intact  Insight poor   Judgement poor     ASSESSMENT:   Patient symptoms are:  [] Well controlled  [] Improving  [] Worsening  [x] No change      Diagnosis:   Principal Problem:    Schizoaffective disorder, bipolar type ([facility] [address]  Active Problems:    Cluster B personality disorder ([facility] [address]  Resolved Problems:    * No resolved hospital problems. *      LABS:    No results for input(s): WBC, HGB, PLT in the last 72 hours.   No results for input(s): NA, K, CL, CO2, BUN, CREATININE, GLUCOSE in the last 72 hours. No results for input(s): BILITOT, ALKPHOS, AST, ALT in the last 72 hours. Lab Results   Component Value Date    LABAMPH NOT DETECTED 06/09/2021    BARBSCNU NOT DETECTED 06/09/2021    LABBENZ NOT DETECTED 06/09/2021    LABMETH NOT DETECTED 06/09/2021    OPIATESCREENURINE NOT DETECTED 06/09/2021    PHENCYCLIDINESCREENURINE NOT DETECTED 06/09/2021    ETOH <10 06/09/2021     Lab Results   Component Value Date    TSH 1.240 04/18/2019     No results found for: LITHIUM  Lab Results   Component Value Date    VALPROATE 36 (L) 06/01/2021         Treatment Plan:  Reviewed current Medications with the patient. Risks, benefits, side effects, drug-to-drug interactions and alternatives to treatment were discussed. Collateral information:   CD evaluation  Encourage patient to attend group and other milieu activities.   Discharge planning discussed with the patient and treatment team.     Continue Depakote 250 mg twice daily for mood stabilization  Continue Cogentin 1 mg twice daily for side effects  Continue Invega 3 mg twice daily for psychosis  Continue Frankey Salmons injection patient received his last injection 156 mg IM every 30 days on 6/8/2021    PSYCHOTHERAPY/COUNSELING:  [x] Therapeutic interview  [x] Supportive  [] CBT  [] Ongoing  [] Other    [x] Patient continues to need, on a daily basis, active treatment furnished directly by or requiring the supervision of inpatient psychiatric personnel      Anticipated Length of stay: 3 to 7 days based on stability            Electronically signed by KELVIN Steward CNP on 8/31/6858 at 9:06 AM

## 2021-06-16 NOTE — PROGRESS NOTES
PT. DENIES SUICIDAL IDEATIONS, HOMICIDAL IDEATIONS AND HALLUCINATIONS. NO VOICED DELUSIONS. IN CONTROL. QUIET, LOW PROFILE. WILL PLAN ON DISCHARGE TO REHAB TOMORROW.

## 2021-06-17 VITALS
BODY MASS INDEX: 23.86 KG/M2 | DIASTOLIC BLOOD PRESSURE: 65 MMHG | HEIGHT: 73 IN | HEART RATE: 61 BPM | WEIGHT: 180 LBS | TEMPERATURE: 98.4 F | SYSTOLIC BLOOD PRESSURE: 117 MMHG | OXYGEN SATURATION: 98 % | RESPIRATION RATE: 15 BRPM

## 2021-06-17 PROCEDURE — 6370000000 HC RX 637 (ALT 250 FOR IP): Performed by: NURSE PRACTITIONER

## 2021-06-17 PROCEDURE — 99239 HOSP IP/OBS DSCHRG MGMT >30: CPT | Performed by: NURSE PRACTITIONER

## 2021-06-17 RX ADMIN — DIVALPROEX SODIUM 250 MG: 250 TABLET, DELAYED RELEASE ORAL at 08:47

## 2021-06-17 RX ADMIN — PALIPERIDONE 3 MG: 3 TABLET, EXTENDED RELEASE ORAL at 08:47

## 2021-06-17 RX ADMIN — BENZTROPINE MESYLATE 1 MG: 1 TABLET ORAL at 08:47

## 2021-06-17 ASSESSMENT — PAIN SCALES - GENERAL: PAINLEVEL_OUTOF10: 0

## 2021-06-17 NOTE — PLAN OF CARE
Problem: Depressive Behavior With or Without Suicide Precautions:  Goal: Able to verbalize and/or display a decrease in depressive symptoms  Description: Able to verbalize and/or display a decrease in depressive symptoms  Outcome: Ongoing  Goal: Ability to disclose and discuss suicidal ideas will improve  Description: Ability to disclose and discuss suicidal ideas will improve  6/16/2021 2117 by Rikki Whitfield RN  Outcome: Met This Shift  6/16/2021 0839 by Srinath Verdugo RN  Outcome: Met This Shift  Goal: Absence of self-harm  Description: Absence of self-harm  6/16/2021 2117 by Rikki Whitfield RN  Outcome: Met This Shift  6/16/2021 0839 by Srinath Verdugo RN  Outcome: Met This Shift     Problem: Suicide risk  Goal: Provide patient with safe environment  Description: Provide patient with safe environment  Outcome: Met This Shift       Pt denies suicidal ideations, homicidal ideations and hallucinations. Pt isolative to room. Brightened with conversation but avoids gaze. Pt hopeful meridian has more freedoms than here. Medication compliant. Will continue to monitor.

## 2021-06-17 NOTE — PROGRESS NOTES
[facility]  Discharge Note    Pt discharged with followings belongings:   Dentures: None  Vision - Corrective Lenses: None  Hearing Aid: None  Jewelry: None  Body Piercings Removed: N/A  Clothing: Footwear, Pants, Shirt, Socks, Undergarments (Comment), Other (Comment) (pants, 2 pr socks, long jose roberto pants, 3 shirts, pr shoes, 3 underwears, more clothes in duffle bag)  Were All Patient Medications Collected?: Not Applicable  Other Valuables: Wallet, Other (Comment) (Recoup game, wallet, ID, Ccards, cords)   Valuables sent with pt. . Valuables retrieved from locker and returned to patient. Patient education on aftercare instructions:  Given, copy sent with pt. Information faxed to [place] by  . Patient verbalize understanding of AVS:   Yes with stated potential to comply to same. FAMILY NOTIFIED OF NEED TO  PRESCRIPTIONS AND DELIVER THEM TO [place]. PT. GAVE VERBAL CONSENT FOR SAME PRIOR TO LEAVING UNIT. PT.'S MOTHER, MORALES, NOTIFIED BY PHONE.      Status EXAM upon discharge:  Status and Exam  Normal: Yes  Facial Expression: Avoids Gaze  Affect: Blunt  Level of Consciousness: Alert  Mood:Normal: Yes  Mood: in control, cooperative  Motor Activity:Normal: Yes  Motor Activity: Repetitive Acts  Interview Behavior: Cooperative  Preception: [place] to Person, Wyona Ortiz to Time, [place] to Place, [place] to Situation  Attention:Normal: Yes  Thought Processes: Other(See comment) (clear)  Thought Content:Normal: Yes  Hallucinations: None  Delusions: No  Memory:Normal: Yes  Insight and Judgment:  (improved)  Present Suicidal Ideation: No  Present Homicidal Ideation: No      Metabolic Screening:    Lab Results   Component Value Date    LABA1C 5.3 06/10/2021       Lab Results   Component Value Date    CHOL 104 06/10/2021    CHOL 173 12/11/2019    CHOL 163 04/18/2019     Lab Results   Component Value Date    TRIG 45 06/10/2021    TRIG 134 12/11/2019    TRIG 98 04/18/2019     Lab Results Component Value Date    HDL 32 06/10/2021    HDL 42 12/11/2019    HDL 54 04/18/2019     No components found for: [facility]  Lab Results   Component Value Date    LABVLDL 9 06/10/2021    LABVLDL 27 12/11/2019    LABVLDL 20 04/18/2019       Ana Baez RN

## 2021-06-17 NOTE — DISCHARGE SUMMARY
DISCHARGE SUMMARY      Patient ID:  Latonia Aponte  [id number]  19 y.o.  1995    Admit date: 6/9/2021    Discharge date and time: 6/17/2021    Admitting Physician: Mega Wilkins MD     Discharge Physician: Dr Sebastian Jimenez MD    Discharge Diagnoses:   Patient Active Problem List   Diagnosis    Gastritis    Bilious vomiting with nausea    Pneumonia due to organism    Acute respiratory failure with hypoxia ([address]    Polysubstance abuse ([address]    Hepatitis    SIRS (systemic inflammatory response syndrome) ([address]    Hypokalemia    Asthma    Hyperglycemia    Severe protein-calorie malnutrition (HCC)    Severe episode of recurrent major depressive disorder, without psychotic features [facility]    Cluster B personality disorder ([facility]    Schizoaffective disorder, bipolar type ([facility]       Admission Condition: poor    Discharged Condition: stable    Admission Circumstance: ED for suicidal ideations of jumping off a bridge after being released from crisis and his parents had kicked him out. PAST MEDICAL/PSYCHIATRIC HISTORY:   Past Medical History:   Diagnosis Date    Anxiety     Asthma     no meds currently- mostly exercise induced    Claustrophobia     confined spaces with locked doors    Depression     Epigastric pain     Nausea     Nut allergy        FAMILY/SOCIAL HISTORY:  History reviewed. No pertinent family history.   Social History     Socioeconomic History    Marital status: Single     Spouse name: Not on file    Number of children: 0    Years of education: 15    Highest education level: Not on file   Occupational History    Occupation:      Employer: [employer]   Tobacco Use    Smoking status: Never Smoker    Smokeless tobacco: Never Used   Vaping Use    Vaping Use: Never used   Substance and Sexual Activity    Alcohol use: No    Drug use: Not Currently     Types: Marijuana     Comment: states has a medical card    Sexual activity: Not Currently     Partners: Female   Other Topics 98.4 °F (36.9 °C) (Oral)   Resp 15   Ht 6' 1\" (1.854 m)   Wt 180 lb (81.6 kg)   SpO2 98%   BMI 23.75 kg/m²   Gait - steady    HOSPITAL COURSE[de-identified]   Patient was admitted to the unit on 6/9/2021 was closely monitored for suicidal ideations. He was evaluated was treated with Depakote ER 50 mg twice daily, Cogentin 1 mg twice daily Invega 3 mg twice daily and Invega Sustenna 156 mg IM q. 30 days received his booster on 6/8/2021 his next injection is due 7/8/2021. He start coming out of his room he is attending groups to socializing with peers. He never made any suicidal statements or any suicidal gestures while in the unit. Social workers obtain collateral information from patient's  who was able to voicing concerns that they had. She reported no safety concerns no access to any weapons. Treatment team felt the patient obtain the maximum benefit from his hospitalization he was set up with an outpatient mental health agency for outpatient follow-up services. At the time of discharge patient did not show any impulsive behavior. He was up on the unit he was attending groups and socializing with peers. He vehemently denied any suicidal homicidal ideations intent or plan. He was eating well and sleeping well there are no neurovegetative signs or symptoms of depression he denied any auditory or visual hallucinations. There are no overt or covert signs of psychosis. He was appreciative of the help that he received here. This patient no longer meets criteria for inpatient hospitalization.         No AVH or paranoid thoughts  No hopeless or worthless feeling  No active SI/HI  Appetite:  [x] Normal  [] Increased  [] Decreased    Sleep:       [x] Normal  [] Fair       [] Poor            Energy:    [x] Normal  [] Increased  [] Decreased     SI [] Present  [x] Absent  HI  []Present  [x] Absent   Aggression:  [] yes  [x] no  Patient is [x] able  [] unable to CONTRACT FOR SAFETY   Medication side effects(SE): [x] None(Psych. Meds.) [] Other      Mental Status Examination on discharge:    Level of consciousness:  within normal limits   Appearance:  well-appearing  Behavior/Motor:  no abnormalities noted  Attitude toward examiner:  attentive and good eye contact  Speech:  spontaneous, normal rate and normal volume   Mood: \" I feel good\"  Affect: Appropriate and pleasant  Thought processes: Linear without flight of ideas loose associations  Thought content: Devoid any auditory visualizations delusions or the perceptual denies. Denies SI/HI intent or plan  Cognition:  oriented to person, place, and time   Concentration intact  Memory intact  Insight good   Judgement fair   Fund of Knowledge adequate      ASSESSMENT:  Patient symptoms are:  [x] Well controlled  [x] Improving  [] Worsening  [] No change    Reason for more than one antipsychotic:  [x] N/A  [] 3 Failed Monotherapy attempts (Drugs tried:)  [] Crossover to a new antipsychotic  [] Taper to Monotherapy from Polypharmacy  [] Augmentation of clozapine therapy due to treatment resistance to single therapy    Diagnosis:  Principal Problem:    Schizoaffective disorder, bipolar type ([facility]  Active Problems:    Cluster B personality disorder ([facility]  Resolved Problems:    * No resolved hospital problems. *      LABS:    No results for input(s): WBC, HGB, PLT in the last 72 hours. No results for input(s): NA, K, CL, CO2, BUN, CREATININE, GLUCOSE in the last 72 hours. No results for input(s): BILITOT, ALKPHOS, AST, ALT in the last 72 hours.   Lab Results   Component Value Date    LABAMPH NOT DETECTED 06/09/2021    BARBSCNU NOT DETECTED 06/09/2021    LABBENZ NOT DETECTED 06/09/2021    LABMETH NOT DETECTED 06/09/2021    OPIATESCREENURINE NOT DETECTED 06/09/2021    PHENCYCLIDINESCREENURINE NOT DETECTED 06/09/2021    ETOH <10 06/09/2021     Lab Results   Component Value Date    TSH 1.240 04/18/2019     No results found for: LITHIUM  Lab Results   Component Value Date VALPROATE 36 (L) 06/01/2021       RISK ASSESSMENT AT DISCHARGE: Low risk for suicide and homicide. Treatment Plan:  Reviewed current Medications with the patient. Education provided on the complaince with treatment. Risks, benefits, side effects, drug-to-drug interactions and alternatives to treatment were discussed. Encourage patient to attend outpatient follow up appointment and therapy. Patient was advised to call the outpatient provider, visit the nearest ED or call 911 if symptoms are not manageable. Patient's family member was contacted prior to the discharge. Medication List      START taking these medications    melatonin 5 MG Tbdp disintegrating tablet  Take 1 tablet by mouth nightly as needed (sleep)        CHANGE how you take these medications    paliperidone 3 MG extended release tablet  Commonly known as: INVEGA  Take 1 tablet by mouth 2 times daily  What changed:   · when to take this  · Another medication with the same name was removed. Continue taking this medication, and follow the directions you see here.         CONTINUE taking these medications    albuterol sulfate  (90 Base) MCG/ACT inhaler  Commonly known as: Ventolin HFA  Inhale 2 puffs into the lungs 4 times daily as needed for Wheezing     benztropine 1 MG tablet  Commonly known as: COGENTIN  Take 1 tablet by mouth 2 times daily     divalproex 250 MG DR tablet  Commonly known as: DEPAKOTE  Take 1 tablet by mouth every 12 hours     paliperidone palmitate  MG/ML Sharmaine IM injection  Commonly known as: INVEGA SUSTENNA  Inject 156 mg into the muscle every 30 days Invega sustenna 156 m IM q 30 day booster due 6/8/21  Notes to patient: Due 30 days from the 6/8/21           Where to Get Your Medications      These medications were sent to [facility] [address], [address] [address]    Phone: [phone]   · albuterol sulfate  (90 Base) MCG/ACT inhaler  · benztropine 1 MG tablet  · divalproex 250 MG DR tablet  · melatonin 5 MG Tbdp disintegrating tablet  · paliperidone 3 MG extended release tablet       Patient is counseled if he continues to abuse drugs or alcohol he could act out impulsively causing serious harm to himself or others even though may be unintentional.  He demonstrated understanding of this and has the capacity understand this    Patient is counseled hisr mental health treatment will be difficult to optimize with ongoing use of drugs or alcohol he demonstrate understanding of this as the past understand this     Patient is counseled that he he uses any amount of opiates after any clean time he could have an unintentional overdose he demonstrated understanding of this and has the capacity to understand this    Patient is counseled he must remain compliant with all medications outpatient follow-up ointments    Patient is discharged to rehab in stable condition    TIME SPEND - 35 MINUTES TO COMPLETE THE EVALUATION, DISCHARGE SUMMARY, MEDICATION RECONCILIATION AND FOLLOW UP CARE     Signed:  KELVIN Feldman CNP  3/66/5474  2:16 PM

## 2021-06-17 NOTE — PROGRESS NOTES
CLINICAL PHARMACY NOTE: MEDS TO BEDS    Total # of Prescriptions Filled: 5   The following medications were delivered to the patient:  · Albuterol HFA  · Benztropine 1  · Depakote   · Melatonin ODT 5  · Invega 6    Additional Documentation:

## 2021-06-17 NOTE — SUICIDE SAFETY PLAN
SAFETY PLAN    A suicide Safety Plan is a document that supports someone when they are having thoughts of suicide. Warning Signs that indicate a suicidal crisis may be developing: What (situations, thoughts, feelings, body sensations, behaviors, etc.) do you experience that lets you know you are beginning to think about suicide? 1. Suicidal thoughts  2. Too much sleep  3. tearful    Internal Coping Strategies:  What things can I do (relaxation techniques, hobbies, physical activities, etc.) to take my mind off my problems without contacting another person? 1. Deep breathing  2. exercises  3. Video games    People and social settings that provide distraction: Who can I call or where can I go to distract me? 1. Name: dad  Phone: [phone]  2. Name: sister  Phone: [phone]. Place:  fishing           4. Place: lake    People whom I can ask for help: Who can I call when I need help - for example, friends, family, clergy, someone else? 1. Name: dad                Phone:  As above  2. Name: mom  Phone: [phone]  3. Name: 79      Professionals or [facility] agencies I can contact during a crisis: Who can I call for help - for example, my doctor, my psychiatrist, my psychologist, a mental health provider, a suicide hotline? 1. Clinician Name: Maynor  Phone: [phone] or Emergency Contact #: [phone]      0. Suicide Prevention Lifeline: 9-206-980-TALK (3212)    3. [address] [place] [address] Emergency Services -  for example, [facility] suicide hotline, [facility] Hotline: 211      Emergency Services Address: [facility]      Emergency Services Phone: [phone]    Making the environment safe: How can I make my environment (house/apartment/living space) safer? For example, can I remove guns, medications, and other items? 1. No guns  2.  No alcohol, no street drugs

## 2021-06-17 NOTE — CARE COORDINATION
NILA called [facility] to follow up on referral, no answer- left voicemail. Emeli Bean from [facility] aware, Gali Lang will follow up with [place] tomorrow. Pt is active with AOT (Assisted Outpatient Treatment), once he is accepted at an inpatient substance abuse facility, his [facility] ([facility] [phone]) will need notified.      Javy Soliz, BRIGITTE, GABBI
NILA called the  pt's  Patsy Nida ( [facility] [phone])  . There was no response . NILA left a voice message informing her of the pt's discharge plan.
[facility] Support Note    Name: Dilcia Carmona  Date: 6/15/2021    No chief complaint on file. [facility] met with patient. [] Support and education provided  [] Resources provided   [] Treatment referral:   [] Other:   [] Patient declined peer recovery services     Referred By:   Notes: Spoke to Noelle Sanchez in admissions and she needs a little more info (social,birthdate, insurance info) to make a determination.   Signed: Luz Riggins, 6/15/2021
[facility] Support Note    Name: Konrad Ang  Date: 6/14/2021    No chief complaint on file. [facility] met with patient. [x] Support and education provided  [] Resources provided   [] Treatment referral: [address] [facility] [facility] [address]  [] Other: [] Patient declined peer recovery services     Referred By: Jaqui Walls (NILA)    Notes: [facility] is possibly going to take him after they receive the referral. Will follow patient.      Signed: Kathi Moran, 6/14/2021
Pt was present in treatment team. He was alert/oriented, had a lucid thought process, reported that he spoke with [facility] and is agreeable to inpatient substance abuse treatment. Per Sienna Alston, Peer , referrals were made to several facilities but due to his insurance [facility])- they are unable to accept. Sienna Alston reported that [facility] will review information for possible admission. NILA faxed information, awaiting acceptance.     Denita Rodriguez MSW, GABBI
SW met with the pt  Zulma Briceño of [employer].       would like to be informed updates/information regarding the pt's discharge plan and be reached at [phone] ( office)  or [phone] ( Cell)
SW was informed pt was accepted into [facility] for inpatient treatment. Pt can be placed on Thursday morning 6/18 . NILA was informed [place] would call back on 6/16 to  Further discuss transportation arrangements.
Spoke with pt's mom. Mom states pt cannot go back to house until he gets inpatient rehab and then a sponsor. Mom states she does not believe pt will harm self once he gets on track with treatment. Mom has no safety concerns and states pt has no access to any weapons.
Sw received phone call from [facility] reporting they can transport pt tomorrow. They stated they will need to get pt early, around 9:00 am. Contact [phone] for further questions. Contact  for information for transportation.
Sw spoke with pt and discussed discharge plan. Pt still wishes to go to inpatient rehab. Discussed with pt [facility] Peer Support team and pt receptive to a consultation. SW made referral to peer support and Deja Langston states she will be able to see pt later today and discuss inpatient options.
Homicidal Ideation  [] Past [] Present [x] Denies     Hallucinations/Delusions (Specify type)  [] Reports [x] Denies     Substance Use/Alcohol Use/Addiction  [] Reports [x] Denies     Tobacco Use (within the last 6 months)  [] Reports [x] Denies     Trauma History  [] Reports [x] Denies     Collateral Contact (PATRICIO signed)  Name: Luis Staley  Relationship: mom   Number: [phone]    Collateral Information: SW left message       Access to Weapons per Collateral Contact: [] Reports [x] Denies       Follow up provider preference: [facility]      Plan for discharge  Location (where do they plan on discharging to?):  Inpatient rehab    Transportation (who will pick them up at discharge?) taxi    Medications (will they have money for copays at discharge?): Pt has BCBS/pt does not have cash

## 2021-06-19 ENCOUNTER — HOSPITAL ENCOUNTER (EMERGENCY)
Age: 26
Discharge: PSYCHIATRIC OR SUBSTANCE USE FACILITY | End: 2021-06-20
Attending: EMERGENCY MEDICINE
Payer: COMMERCIAL

## 2021-06-19 VITALS
DIASTOLIC BLOOD PRESSURE: 76 MMHG | WEIGHT: 180 LBS | OXYGEN SATURATION: 98 % | RESPIRATION RATE: 16 BRPM | HEART RATE: 87 BPM | HEIGHT: 73 IN | TEMPERATURE: 97.6 F | BODY MASS INDEX: 23.86 KG/M2 | SYSTOLIC BLOOD PRESSURE: 118 MMHG

## 2021-06-19 DIAGNOSIS — R45.851 SUICIDAL IDEATION: Primary | ICD-10-CM

## 2021-06-19 LAB
ACETAMINOPHEN LEVEL: <5 MCG/ML (ref 10–30)
ALBUMIN SERPL-MCNC: 4.3 G/DL (ref 3.5–5.2)
ALP BLD-CCNC: 57 U/L (ref 40–129)
ALT SERPL-CCNC: 12 U/L (ref 0–40)
AMPHETAMINE SCREEN, URINE: NOT DETECTED
ANION GAP SERPL CALCULATED.3IONS-SCNC: 10 MMOL/L (ref 7–16)
AST SERPL-CCNC: 12 U/L (ref 0–39)
BARBITURATE SCREEN URINE: NOT DETECTED
BASOPHILS ABSOLUTE: 0.03 E9/L (ref 0–0.2)
BASOPHILS RELATIVE PERCENT: 0.4 % (ref 0–2)
BENZODIAZEPINE SCREEN, URINE: NOT DETECTED
BILIRUB SERPL-MCNC: 0.3 MG/DL (ref 0–1.2)
BUN BLDV-MCNC: 11 MG/DL (ref 6–20)
CALCIUM SERPL-MCNC: 8.7 MG/DL (ref 8.6–10.2)
CANNABINOID SCREEN URINE: NOT DETECTED
CHLORIDE BLD-SCNC: 106 MMOL/L (ref 98–107)
CO2: 24 MMOL/L (ref 22–29)
COCAINE METABOLITE SCREEN URINE: NOT DETECTED
CREAT SERPL-MCNC: 0.8 MG/DL (ref 0.7–1.2)
EOSINOPHILS ABSOLUTE: 0.73 E9/L (ref 0.05–0.5)
EOSINOPHILS RELATIVE PERCENT: 9 % (ref 0–6)
ETHANOL: <10 MG/DL (ref 0–0.08)
FENTANYL SCREEN, URINE: NOT DETECTED
GFR AFRICAN AMERICAN: >60
GFR NON-AFRICAN AMERICAN: >60 ML/MIN/1.73
GLUCOSE BLD-MCNC: 93 MG/DL (ref 74–99)
HCT VFR BLD CALC: 40.5 % (ref 37–54)
HEMOGLOBIN: 13.5 G/DL (ref 12.5–16.5)
IMMATURE GRANULOCYTES #: 0.03 E9/L
IMMATURE GRANULOCYTES %: 0.4 % (ref 0–5)
INFLUENZA A: NOT DETECTED
INFLUENZA B: NOT DETECTED
LYMPHOCYTES ABSOLUTE: 2.88 E9/L (ref 1.5–4)
LYMPHOCYTES RELATIVE PERCENT: 35.5 % (ref 20–42)
Lab: NORMAL
MCH RBC QN AUTO: 30.5 PG (ref 26–35)
MCHC RBC AUTO-ENTMCNC: 33.3 % (ref 32–34.5)
MCV RBC AUTO: 91.6 FL (ref 80–99.9)
METHADONE SCREEN, URINE: NOT DETECTED
MONOCYTES ABSOLUTE: 0.8 E9/L (ref 0.1–0.95)
MONOCYTES RELATIVE PERCENT: 9.9 % (ref 2–12)
NEUTROPHILS ABSOLUTE: 3.65 E9/L (ref 1.8–7.3)
NEUTROPHILS RELATIVE PERCENT: 44.8 % (ref 43–80)
OPIATE SCREEN URINE: NOT DETECTED
OXYCODONE URINE: NOT DETECTED
PDW BLD-RTO: 12 FL (ref 11.5–15)
PHENCYCLIDINE SCREEN URINE: NOT DETECTED
PLATELET # BLD: 217 E9/L (ref 130–450)
PMV BLD AUTO: 10.3 FL (ref 7–12)
POTASSIUM SERPL-SCNC: 4.1 MMOL/L (ref 3.5–5)
RBC # BLD: 4.42 E12/L (ref 3.8–5.8)
SALICYLATE, SERUM: <0.3 MG/DL (ref 0–30)
SARS-COV-2 RNA, RT PCR: NOT DETECTED
SODIUM BLD-SCNC: 140 MMOL/L (ref 132–146)
TOTAL PROTEIN: 6.7 G/DL (ref 6.4–8.3)
TRICYCLIC ANTIDEPRESSANTS SCREEN SERUM: NEGATIVE NG/ML
WBC # BLD: 8.1 E9/L (ref 4.5–11.5)

## 2021-06-19 PROCEDURE — 80307 DRUG TEST PRSMV CHEM ANLYZR: CPT

## 2021-06-19 PROCEDURE — 85025 COMPLETE CBC W/AUTO DIFF WBC: CPT

## 2021-06-19 PROCEDURE — 80179 DRUG ASSAY SALICYLATE: CPT

## 2021-06-19 PROCEDURE — 82077 ASSAY SPEC XCP UR&BREATH IA: CPT

## 2021-06-19 PROCEDURE — 99284 EMERGENCY DEPT VISIT MOD MDM: CPT

## 2021-06-19 PROCEDURE — 80053 COMPREHEN METABOLIC PANEL: CPT

## 2021-06-19 PROCEDURE — 80143 DRUG ASSAY ACETAMINOPHEN: CPT

## 2021-06-19 PROCEDURE — 87636 SARSCOV2 & INF A&B AMP PRB: CPT

## 2021-06-19 PROCEDURE — 93005 ELECTROCARDIOGRAM TRACING: CPT | Performed by: EMERGENCY MEDICINE

## 2021-06-19 NOTE — ED PROVIDER NOTES
HPI:  6/19/21, Time: 6:51 PM EDT         Mullins Vin is a 31 y.o. male presenting to the ED for suicidal ideations, beginning yesterday. The complaint has been persistent, mild in severity, and worsened by nothing. Patient says that he's in an inpatient drug treatment program. He's been there for 3 days. Yesterday began having suicidal thoughts. He wants to jump off a bridge and end his life. Says that there's too much stress going on in his life. History of drug cocaine abuse in the past. Has been off drugs for the last 30 days. Denies any ETOH. No homicidal ideations, delusions, hallucinations. History of depression. ROS:   Pertinent positives and negatives are stated within HPI, all other systems reviewed and are negative.  --------------------------------------------- PAST HISTORY ---------------------------------------------  Past Medical History:  has a past medical history of Anxiety, Asthma, Claustrophobia, Depression, Epigastric pain, Nausea, and Nut allergy. Past Surgical History:  has a past surgical history that includes [place] tooth extraction; Upper gastrointestinal endoscopy (N/A, 8/19/2019); and bronchoscopy (N/A, 11/9/2019). Social History:  reports that he has been smoking e-cigarettes. He has never used smokeless tobacco. He reports previous drug use. Drug: Marijuana. He reports that he does not drink alcohol. Family History: family history is not on file. The patients home medications have been reviewed.     Allergies: Peanut-containing drug products and Lorabid [loracarbef]    ---------------------------------------------------PHYSICAL EXAM--------------------------------------    Constitutional/General: Alert and oriented x3, well appearing, non toxic in NAD  Head: Normocephalic and atraumatic  Eyes: PERRL, EOMI  Mouth: Oropharynx clear, handling secretions, no trismus  Neck: Supple, full ROM, non tender to palpation in the midline, no stridor, no crepitus, no meningeal signs  Pulmonary: Lungs clear to auscultation bilaterally, no wheezes, rales, or rhonchi. Not in respiratory distress  Cardiovascular:  Regular rate. Regular rhythm. No murmurs, gallops, or rubs. 2+ distal pulses  Chest: no chest wall tenderness  Abdomen: Soft. Non tender. Non distended. +BS. No rebound, guarding, or rigidity. No pulsatile masses appreciated. Musculoskeletal: Moves all extremities x 4. Warm and well perfused, no clubbing, cyanosis, or edema. Capillary refill <3 seconds  Skin: warm and dry. No rashes. Neurologic: GCS 15, CN 2-12 grossly intact, no focal deficits, symmetric strength 5/5 in the upper and lower extremities bilaterally  Psych: Normal Affect    -------------------------------------------------- RESULTS -------------------------------------------------  I have personally reviewed all laboratory and imaging results for this patient. Results are listed below.      LABS:  Results for orders placed or performed during the hospital encounter of 06/19/21   COVID-19 & Influenza Combo    Specimen: Nasopharyngeal Swab   Result Value Ref Range    SARS-CoV-2 RNA, RT PCR NOT DETECTED NOT DETECTED    INFLUENZA A NOT DETECTED NOT DETECTED    INFLUENZA B NOT DETECTED NOT DETECTED   CBC Auto Differential   Result Value Ref Range    WBC 8.1 4.5 - 11.5 E9/L    RBC 4.42 3.80 - 5.80 E12/L    Hemoglobin 13.5 12.5 - 16.5 g/dL    Hematocrit 40.5 37.0 - 54.0 %    MCV 91.6 80.0 - 99.9 fL    MCH 30.5 26.0 - 35.0 pg    MCHC 33.3 32.0 - 34.5 %    RDW 12.0 11.5 - 15.0 fL    Platelets 308 547 - 838 E9/L    MPV 10.3 7.0 - 12.0 fL    Neutrophils % 44.8 43.0 - 80.0 %    Immature Granulocytes % 0.4 0.0 - 5.0 %    Lymphocytes % 35.5 20.0 - 42.0 %    Monocytes % 9.9 2.0 - 12.0 %    Eosinophils % 9.0 (H) 0.0 - 6.0 %    Basophils % 0.4 0.0 - 2.0 %    Neutrophils Absolute 3.65 1.80 - 7.30 E9/L    Immature Granulocytes # 0.03 E9/L    Lymphocytes Absolute 2.88 1.50 - 4.00 E9/L    Monocytes Absolute 0.80 0.10 - 0.95 E9/L Eosinophils Absolute 0.73 (H) 0.05 - 0.50 E9/L    Basophils Absolute 0.03 0.00 - 0.20 E9/L   Comprehensive Metabolic Panel   Result Value Ref Range    Sodium 140 132 - 146 mmol/L    Potassium 4.1 3.5 - 5.0 mmol/L    Chloride 106 98 - 107 mmol/L    CO2 24 22 - 29 mmol/L    Anion Gap 10 7 - 16 mmol/L    Glucose 93 74 - 99 mg/dL    BUN 11 6 - 20 mg/dL    CREATININE 0.8 0.7 - 1.2 mg/dL    GFR Non-African American >60 >=60 mL/min/1.73    GFR African American >60     Calcium 8.7 8.6 - 10.2 mg/dL    Total Protein 6.7 6.4 - 8.3 g/dL    Albumin 4.3 3.5 - 5.2 g/dL    Total Bilirubin 0.3 0.0 - 1.2 mg/dL    Alkaline Phosphatase 57 40 - 129 U/L    ALT 12 0 - 40 U/L    AST 12 0 - 39 U/L   Serum Drug Screen   Result Value Ref Range    Ethanol Lvl <10 mg/dL    Acetaminophen Level <5.0 (L) 10.0 - 60.9 mcg/mL    Salicylate, Serum <4.8 0.0 - 30.0 mg/dL    TCA Scrn NEGATIVE Cutoff:300 ng/mL   Urine Drug Screen   Result Value Ref Range    Amphetamine Screen, Urine NOT DETECTED Negative <1000 ng/mL    Barbiturate Screen, Ur NOT DETECTED Negative < 200 ng/mL    Benzodiazepine Screen, Urine NOT DETECTED Negative < 200 ng/mL    Cannabinoid Scrn, Ur NOT DETECTED Negative < 50ng/mL    Cocaine Metabolite Screen, Urine NOT DETECTED Negative < 300 ng/mL    Opiate Scrn, Ur NOT DETECTED Negative < 300ng/mL    PCP Screen, Urine NOT DETECTED Negative < 25 ng/mL    Methadone Screen, Urine NOT DETECTED Negative <300 ng/mL    Oxycodone Urine NOT DETECTED Negative <100 ng/mL    FENTANYL SCREEN, URINE NOT DETECTED Negative <1 ng/mL    Drug Screen Comment: see below    EKG 12 Lead   Result Value Ref Range    Ventricular Rate 85 BPM    Atrial Rate 85 BPM    P-R Interval 160 ms    QRS Duration 96 ms    Q-T Interval 348 ms    QTc Calculation (Bazett) 414 ms    P Axis 69 degrees    R Axis 114 degrees    T Axis 65 degrees       RADIOLOGY:  Interpreted by Radiologist.  No orders to display         EKG Interpretation  Interpreted by emergency department

## 2021-06-19 NOTE — ED NOTES
Bed: [facility]  Expected date:   Expected time:   Means of arrival:   Comments:  RAFAELA Guerra RN  06/19/21 9701

## 2021-06-20 LAB
EKG ATRIAL RATE: 85 BPM
EKG P AXIS: 69 DEGREES
EKG P-R INTERVAL: 160 MS
EKG Q-T INTERVAL: 348 MS
EKG QRS DURATION: 96 MS
EKG QTC CALCULATION (BAZETT): 414 MS
EKG R AXIS: 114 DEGREES
EKG T AXIS: 65 DEGREES
EKG VENTRICULAR RATE: 85 BPM

## 2021-06-20 PROCEDURE — 93010 ELECTROCARDIOGRAM REPORT: CPT | Performed by: INTERNAL MEDICINE

## 2021-06-20 NOTE — ED NOTES
Spoke with [facility] from [place] pt has been accepted by Dr. Ena Villalba to room # [id number]. N2N # [id number].      Aki Arreguin RN  06/20/21 2073

## 2021-06-20 NOTE — ED NOTES
Emergency Department [facility] AN AFFILIATE OF [facility] Emergency Biopsychosocial Assessment Note    Chief Complaint: Pt is a 32 yo male who present to the ED via ambulance, pt is on a pink slip by ED doc as pt  is reporting suicidal ideation and intent with plan to jump off bridge. Pt reports he has been in in-patient drug & alcohol rehab program for cocaine at [place] and he found it overwhelming. Reports he was mocked by the other patients there and felt he was being targeted by them. Reports 2-3 days ago began to experience suicidal ideation, today this ideation became strong desire to leave the re-hab program and \". .. go jump off some bridge somewhere - anywhere. \" When he disclosed this to program staff he was referred to ED for evaluation. Pt reports a long hx of depression, states he has experienced suicidal ideation and intent prior. MSE: Alert, oriented x4, mood depressed, affect severely anxious, tearful at times, eye contact fair, speech normal in rate flow and volume, behavior shows minor agitation which pt attributes to anxiety, thought form logical, organized, thought content: pt appears consumed with anxiety, denies A/V hallucinations, paranoia, delusions, none noted in interview. Clinical Summary/History: Pt reports a hx of admission [address], last was 6/9/2021, was prescribed Depakote, Invega, Invega Sustenna injection, cogentin, hx of schizoaffective disorder diagnosis, cluster B diagnosis, major depressive disorder recurrent, reports he did not follow up with setting up outpatient therapy, also a hx of [facility]. Gender  [x] Male [] Female [] Transgender  [] Other    Sexual Orientation    [x] Heterosexual [] Homosexual [] Bisexual [] Other    Suicidal Behavioral: CSSR-S Complete.   [x] Reports:    [x] Past [x] Present   [] Denies    Homicidal/ Violent Behavior  [x] Reports: Hx of probation for stalking charges   [] Past [] Present   [] Denies     Hallucinations/Delusions   [] Reports:   [] Denies Substance Use/Alcohol Use/Addiction: SBIRT Screen Complete.    [x] Reports:  Hx cocaine abuse, report clean 30 days  [] Denies     Trauma History  [] Reports:  [] Denies     Collateral Information:       Level of Care/Disposition Plan:  Pt meets in-patient criteria, when medically cleared will be referred to [facility] as no beds [facility] or [place].     [] Home:   [] Outpatient Provider:   [] Crisis Unit:   [x] Inpatient Psychiatric Unit:  [] Other:        BRIGITTE Mckeon, [facility]  06/19/21 5324

## 2021-06-20 NOTE — ED NOTES
Spoke with Gurwinder Barr from the access center, updated [employer] COVID form faxed to generations at this time.       Ayla Bob RN  06/20/21 5705

## 2021-06-20 NOTE — ED NOTES
LEIDAN called to [facility] 19 at [employer]. PVERITO called spoke with Laisha Caballero 92..      Lillie Mckeon RN  06/20/21 7090

## 2021-06-20 NOTE — ED NOTES
CALL TO ACCESS CENTER, SPOKE WITH CARLENE, COMPLETED REFERRAL, REQUESTED GENERATIONS DUAL DIAGNOSIS PROGRAM BE CONSIDERED AS INITIAL REFERRAL. AWAITING CALL BACK RE: STATUS OF REFERRAL.      [facility], [place], [place]  06/19/21 5534

## 2022-09-26 LAB
ALBUMIN SERPL-MCNC: 5.1 G/DL (ref 3.5–5.2)
ALP BLD-CCNC: 81 U/L (ref 40–129)
ALT SERPL-CCNC: 29 U/L (ref 0–40)
ANION GAP SERPL CALCULATED.3IONS-SCNC: 15 MMOL/L (ref 7–16)
AST SERPL-CCNC: 22 U/L (ref 0–39)
BASOPHILS ABSOLUTE: 0.06 E9/L (ref 0–0.2)
BASOPHILS RELATIVE PERCENT: 0.6 % (ref 0–2)
BILIRUB SERPL-MCNC: 0.3 MG/DL (ref 0–1.2)
BUN BLDV-MCNC: 15 MG/DL (ref 6–20)
CALCIUM SERPL-MCNC: 10.4 MG/DL (ref 8.6–10.2)
CHLORIDE BLD-SCNC: 103 MMOL/L (ref 98–107)
CO2: 19 MMOL/L (ref 22–29)
CREAT SERPL-MCNC: 0.8 MG/DL (ref 0.7–1.2)
EOSINOPHILS ABSOLUTE: 0.25 E9/L (ref 0.05–0.5)
EOSINOPHILS RELATIVE PERCENT: 2.5 % (ref 0–6)
GFR AFRICAN AMERICAN: >60
GFR NON-AFRICAN AMERICAN: >60 ML/MIN/1.73
GLUCOSE BLD-MCNC: 101 MG/DL (ref 74–99)
HCT VFR BLD CALC: 48.9 % (ref 37–54)
HEMOGLOBIN: 16.7 G/DL (ref 12.5–16.5)
IMMATURE GRANULOCYTES #: 0.04 E9/L
IMMATURE GRANULOCYTES %: 0.4 % (ref 0–5)
LYMPHOCYTES ABSOLUTE: 2.75 E9/L (ref 1.5–4)
LYMPHOCYTES RELATIVE PERCENT: 27.6 % (ref 20–42)
MCH RBC QN AUTO: 31 PG (ref 26–35)
MCHC RBC AUTO-ENTMCNC: 34.2 % (ref 32–34.5)
MCV RBC AUTO: 90.9 FL (ref 80–99.9)
MONOCYTES ABSOLUTE: 0.8 E9/L (ref 0.1–0.95)
MONOCYTES RELATIVE PERCENT: 8 % (ref 2–12)
NEUTROPHILS ABSOLUTE: 6.06 E9/L (ref 1.8–7.3)
NEUTROPHILS RELATIVE PERCENT: 60.9 % (ref 43–80)
PDW BLD-RTO: 12.2 FL (ref 11.5–15)
PLATELET # BLD: 328 E9/L (ref 130–450)
PMV BLD AUTO: 10.7 FL (ref 7–12)
POTASSIUM SERPL-SCNC: 4.4 MMOL/L (ref 3.5–5)
RBC # BLD: 5.38 E12/L (ref 3.8–5.8)
SODIUM BLD-SCNC: 137 MMOL/L (ref 132–146)
TOTAL PROTEIN: 8.3 G/DL (ref 6.4–8.3)
WBC # BLD: 10 E9/L (ref 4.5–11.5)

## 2022-12-19 ENCOUNTER — OFFICE VISIT (OUTPATIENT)
Dept: FAMILY MEDICINE CLINIC | Age: 27
End: 2022-12-19
Payer: COMMERCIAL

## 2022-12-19 VITALS
WEIGHT: 195.6 LBS | TEMPERATURE: 97.9 F | HEIGHT: 73 IN | BODY MASS INDEX: 25.92 KG/M2 | RESPIRATION RATE: 18 BRPM | HEART RATE: 81 BPM | SYSTOLIC BLOOD PRESSURE: 122 MMHG | OXYGEN SATURATION: 98 % | DIASTOLIC BLOOD PRESSURE: 82 MMHG

## 2022-12-19 DIAGNOSIS — N52.9 ED (ERECTILE DYSFUNCTION) OF ORGANIC ORIGIN: ICD-10-CM

## 2022-12-19 DIAGNOSIS — J45.20 MILD INTERMITTENT ASTHMA WITHOUT COMPLICATION: Primary | ICD-10-CM

## 2022-12-19 PROCEDURE — 99213 OFFICE O/P EST LOW 20 MIN: CPT | Performed by: FAMILY MEDICINE

## 2022-12-19 RX ORDER — SILDENAFIL 50 MG/1
50 TABLET, FILM COATED ORAL PRN
Qty: 30 TABLET | Refills: 3 | Status: SHIPPED | OUTPATIENT
Start: 2022-12-19

## 2022-12-19 RX ORDER — ARIPIPRAZOLE 400 MG
400 KIT INTRAMUSCULAR ONCE
COMMUNITY

## 2022-12-19 RX ORDER — ALBUTEROL SULFATE 90 UG/1
2 AEROSOL, METERED RESPIRATORY (INHALATION) 4 TIMES DAILY PRN
Qty: 18 G | Refills: 5 | Status: SHIPPED | OUTPATIENT
Start: 2022-12-19

## 2022-12-19 NOTE — PROGRESS NOTES
OFFICE NOTE    22  Name: Sai Perdomo  :1995   Sex:male   Age:27 y.o. SUBJECTIVE  Chief Complaint   Patient presents with    Wheezing     Wanting a inhaler        HPI Says he is seeing Psych, now just on Abilify. Denies current drug use. 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 Brother also has asthma. Denies fever or productive cough. Has girlfriend, experiencinng ED.        Current Outpatient Medications:     ARIPiprazole ER (ABILIFY MAINTENA) 400 MG SRER, Inject 400 mg into the muscle once, Disp: , Rfl:     albuterol sulfate HFA (VENTOLIN HFA) 108 (90 Base) MCG/ACT inhaler, Inhale 2 puffs into the lungs 4 times daily as needed for Wheezing, Disp: 18 g, Rfl: 5    sildenafil (VIAGRA) 50 MG tablet, Take 1 tablet by mouth as needed for Erectile Dysfunction, Disp: 30 tablet, Rfl: 3    albuterol sulfate HFA (VENTOLIN HFA) 108 (90 Base) MCG/ACT inhaler, Inhale 2 puffs into the lungs 4 times daily as needed for Wheezing, Disp: 1 Inhaler, Rfl: 0    benztropine (COGENTIN) 1 MG tablet, Take 1 tablet by mouth 2 times daily, Disp: 60 tablet, Rfl: 0    divalproex (DEPAKOTE) 250 MG DR tablet, Take 1 tablet by mouth every 12 hours, Disp: 60 tablet, Rfl: 0    paliperidone (INVEGA) 3 MG extended release tablet, Take 1 tablet by mouth 2 times daily, Disp: 60 tablet, Rfl: 0    melatonin 5 MG TBDP disintegrating tablet, Take 1 tablet by mouth nightly as needed (sleep), Disp: 30 tablet, Rfl: 0    paliperidone palmitate ER (INVEGA SUSTENNA) 156 MG/ML MICHAEL IM injection, Inject 156 mg into the muscle every 30 days Invega sustenna 156 m IM q 30 day booster due 21, Disp: 156 mg, Rfl: 0  Allergies   Allergen Reactions    Peanut-Containing Drug Products     Lorabid [Loracarbef]        Past Medical History:   Diagnosis Date    Anxiety     Asthma     no meds currently- mostly exercise induced    Claustrophobia     confined spaces with locked doors Depression     Epigastric pain     Nausea     Nut allergy      Past Surgical History:   Procedure Laterality Date    BRONCHOSCOPY N/A 11/9/2019    BRONCHOSCOPY ALVEOLAR LAVAGE performed by Daniel Mcclellan MD at [place] [address] N/A 8/19/2019    EGD BIOPSY performed by Ghulam Bray MD at [address] [place] [address]       No family history on file. Social History       Tobacco History       Smoking Status  Every Day Smoking Tobacco Type  E-Cigarettes      Smokeless Tobacco Use  Never              Alcohol History       Alcohol Use Status  No              Drug Use       Drug Use Status  Not Currently Types  Marijuana (Lela Sierra) Comment  states has a medical card              Sexual Activity       Sexually Active  Not Currently Partners  Female                    OBJECTIVE  Vitals:    12/19/22 0828   BP: 122/82   Pulse: 81   Resp: 18   Temp: 97.9 °F (36.6 °C)   TempSrc: Temporal   SpO2: 98%   Weight: 195 lb 9.6 oz (88.7 kg)   Height: 6' 1\" (1.854 m)        Body mass index is 25.81 kg/m². No orders of the defined types were placed in this encounter. EXAM   Physical Exam  Vitals and nursing note reviewed. Constitutional:       Appearance: Normal appearance. He is normal weight. HENT:      Right Ear: Tympanic membrane and external ear normal.      Left Ear: Tympanic membrane and external ear normal.      Nose: Congestion present. Mouth/Throat:      Pharynx: Oropharynx is clear. No posterior oropharyngeal erythema. Eyes:      Conjunctiva/sclera: Conjunctivae normal.      Pupils: Pupils are equal, round, and reactive to light. Cardiovascular:      Rate and Rhythm: Normal rate and regular rhythm. Pulmonary:      Effort: Pulmonary effort is normal.      Breath sounds: Wheezing and rhonchi present. No rales. Abdominal:      General: Bowel sounds are normal.      Tenderness: There is no abdominal tenderness.    Musculoskeletal:      Cervical back: No tenderness. Lymphadenopathy:      Cervical: No cervical adenopathy. Skin:     Coloration: Skin is not jaundiced or pale. Findings: No bruising or rash. Neurological:      General: No focal deficit present. Mental Status: He is alert and oriented to person, place, and time. Carroll Oreilly was seen today for wheezing. Diagnoses and all orders for this visit:    Mild intermittent asthma without complication  -     albuterol sulfate HFA (VENTOLIN HFA) 108 (90 Base) MCG/ACT inhaler; Inhale 2 puffs into the lungs 4 times daily as needed for Wheezing  Requested this. Denies purulent PND or product debra cough  ED (erectile dysfunction) of organic origin  -     sildenafil (VIAGRA) 50 MG tablet; Take 1 tablet by mouth as needed for Erectile Dysfunction  Denies AM erections. Not sure if organic or psychological or both. Suspect involvement of psych meds which he should discuss with his psychiatrist.. See not reason he can't try Viagra. No follow-ups on file.     Electronically signed by Domo Dean MD on 12/19/22 at 8:36 AM EST

## 2023-10-26 ENCOUNTER — HOSPITAL ENCOUNTER (INPATIENT)
Age: 28
LOS: 14 days | Discharge: OTHER INSTITUTION | End: 2023-11-10
Attending: EMERGENCY MEDICINE | Admitting: PSYCHIATRY & NEUROLOGY
Payer: COMMERCIAL

## 2023-10-26 DIAGNOSIS — J45.20 MILD INTERMITTENT ASTHMA WITHOUT COMPLICATION: ICD-10-CM

## 2023-10-26 DIAGNOSIS — F25.0 SCHIZOAFFECTIVE DISORDER, BIPOLAR TYPE (HCC): Primary | ICD-10-CM

## 2023-10-26 DIAGNOSIS — R45.851 SUICIDAL IDEATION: ICD-10-CM

## 2023-10-26 DIAGNOSIS — F29 PSYCHOSIS, UNSPECIFIED PSYCHOSIS TYPE (HCC): ICD-10-CM

## 2023-10-26 LAB
ALBUMIN SERPL-MCNC: 3.8 G/DL (ref 3.5–5.2)
ALP SERPL-CCNC: 97 U/L (ref 40–129)
ALT SERPL-CCNC: 36 U/L (ref 0–40)
AMPHET UR QL SCN: POSITIVE
ANION GAP SERPL CALCULATED.3IONS-SCNC: 9 MMOL/L (ref 7–16)
APAP SERPL-MCNC: <5 UG/ML (ref 10–30)
AST SERPL-CCNC: 32 U/L (ref 0–39)
BARBITURATES UR QL SCN: NEGATIVE
BASOPHILS # BLD: 0.05 K/UL (ref 0–0.2)
BASOPHILS NFR BLD: 1 % (ref 0–2)
BENZODIAZ UR QL: NEGATIVE
BILIRUB SERPL-MCNC: 0.4 MG/DL (ref 0–1.2)
BILIRUB UR QL STRIP: NEGATIVE
BUN SERPL-MCNC: 10 MG/DL (ref 6–20)
BUPRENORPHINE UR QL: NEGATIVE
CALCIUM SERPL-MCNC: 8.8 MG/DL (ref 8.6–10.2)
CANNABINOIDS UR QL SCN: POSITIVE
CHLORIDE SERPL-SCNC: 102 MMOL/L (ref 98–107)
CLARITY UR: CLEAR
CO2 SERPL-SCNC: 27 MMOL/L (ref 22–29)
COCAINE UR QL SCN: POSITIVE
COLOR UR: YELLOW
COMMENT: NORMAL
CREAT SERPL-MCNC: 0.8 MG/DL (ref 0.7–1.2)
EKG ATRIAL RATE: 59 BPM
EKG P AXIS: 79 DEGREES
EKG P-R INTERVAL: 140 MS
EKG Q-T INTERVAL: 446 MS
EKG QRS DURATION: 98 MS
EKG QTC CALCULATION (BAZETT): 441 MS
EKG R AXIS: 131 DEGREES
EKG T AXIS: 89 DEGREES
EKG VENTRICULAR RATE: 59 BPM
EOSINOPHIL # BLD: 0.4 K/UL (ref 0.05–0.5)
EOSINOPHILS RELATIVE PERCENT: 5 % (ref 0–6)
ERYTHROCYTE [DISTWIDTH] IN BLOOD BY AUTOMATED COUNT: 12.7 % (ref 11.5–15)
ETHANOLAMINE SERPL-MCNC: <10 MG/DL
FENTANYL UR QL: NEGATIVE
GFR SERPL CREATININE-BSD FRML MDRD: >60 ML/MIN/1.73M2
GLUCOSE SERPL-MCNC: 104 MG/DL (ref 74–99)
GLUCOSE UR STRIP-MCNC: NEGATIVE MG/DL
HCT VFR BLD AUTO: 41.9 % (ref 37–54)
HGB BLD-MCNC: 14.2 G/DL (ref 12.5–16.5)
HGB UR QL STRIP.AUTO: NEGATIVE
IMM GRANULOCYTES # BLD AUTO: 0.03 K/UL (ref 0–0.58)
IMM GRANULOCYTES NFR BLD: 0 % (ref 0–5)
KETONES UR STRIP-MCNC: NEGATIVE MG/DL
LEUKOCYTE ESTERASE UR QL STRIP: NEGATIVE
LYMPHOCYTES NFR BLD: 1.57 K/UL (ref 1.5–4)
LYMPHOCYTES RELATIVE PERCENT: 18 % (ref 20–42)
MAGNESIUM SERPL-MCNC: 2.2 MG/DL (ref 1.6–2.6)
MCH RBC QN AUTO: 30 PG (ref 26–35)
MCHC RBC AUTO-ENTMCNC: 33.9 G/DL (ref 32–34.5)
MCV RBC AUTO: 88.6 FL (ref 80–99.9)
METHADONE UR QL: NEGATIVE
MONOCYTES NFR BLD: 1.03 K/UL (ref 0.1–0.95)
MONOCYTES NFR BLD: 12 % (ref 2–12)
NEUTROPHILS NFR BLD: 64 % (ref 43–80)
NEUTS SEG NFR BLD: 5.47 K/UL (ref 1.8–7.3)
NITRITE UR QL STRIP: NEGATIVE
OPIATES UR QL SCN: NEGATIVE
OXYCODONE UR QL SCN: NEGATIVE
PCP UR QL SCN: NEGATIVE
PH UR STRIP: 6 [PH] (ref 5–9)
PLATELET # BLD AUTO: 271 K/UL (ref 130–450)
PMV BLD AUTO: 10 FL (ref 7–12)
POTASSIUM SERPL-SCNC: 3.5 MMOL/L (ref 3.5–5)
PROT SERPL-MCNC: 7 G/DL (ref 6.4–8.3)
PROT UR STRIP-MCNC: NEGATIVE MG/DL
RBC # BLD AUTO: 4.73 M/UL (ref 3.8–5.8)
SALICYLATES SERPL-MCNC: <0.3 MG/DL (ref 0–30)
SODIUM SERPL-SCNC: 138 MMOL/L (ref 132–146)
SP GR UR STRIP: 1.02 (ref 1–1.03)
TEST INFORMATION: ABNORMAL
TOXIC TRICYCLIC SC,BLOOD: NEGATIVE
UROBILINOGEN UR STRIP-ACNC: 0.2 EU/DL (ref 0–1)
WBC OTHER # BLD: 8.6 K/UL (ref 4.5–11.5)

## 2023-10-26 PROCEDURE — G0480 DRUG TEST DEF 1-7 CLASSES: HCPCS

## 2023-10-26 PROCEDURE — 85025 COMPLETE CBC W/AUTO DIFF WBC: CPT

## 2023-10-26 PROCEDURE — 93005 ELECTROCARDIOGRAM TRACING: CPT | Performed by: EMERGENCY MEDICINE

## 2023-10-26 PROCEDURE — 93010 ELECTROCARDIOGRAM REPORT: CPT | Performed by: INTERNAL MEDICINE

## 2023-10-26 PROCEDURE — 80053 COMPREHEN METABOLIC PANEL: CPT

## 2023-10-26 PROCEDURE — 83735 ASSAY OF MAGNESIUM: CPT

## 2023-10-26 PROCEDURE — 80179 DRUG ASSAY SALICYLATE: CPT

## 2023-10-26 PROCEDURE — 6360000002 HC RX W HCPCS: Performed by: EMERGENCY MEDICINE

## 2023-10-26 PROCEDURE — 82550 ASSAY OF CK (CPK): CPT

## 2023-10-26 PROCEDURE — 80143 DRUG ASSAY ACETAMINOPHEN: CPT

## 2023-10-26 PROCEDURE — 80307 DRUG TEST PRSMV CHEM ANLYZR: CPT

## 2023-10-26 PROCEDURE — 81003 URINALYSIS AUTO W/O SCOPE: CPT

## 2023-10-26 PROCEDURE — 99285 EMERGENCY DEPT VISIT HI MDM: CPT

## 2023-10-26 RX ORDER — HALOPERIDOL 5 MG/ML
10 INJECTION INTRAMUSCULAR ONCE
Status: COMPLETED | OUTPATIENT
Start: 2023-10-26 | End: 2023-10-26

## 2023-10-26 RX ORDER — LORAZEPAM 2 MG/ML
2 INJECTION INTRAMUSCULAR ONCE
Status: COMPLETED | OUTPATIENT
Start: 2023-10-26 | End: 2023-10-26

## 2023-10-26 RX ADMIN — LORAZEPAM 2 MG: 2 INJECTION INTRAMUSCULAR; INTRAVENOUS at 15:08

## 2023-10-26 RX ADMIN — HALOPERIDOL LACTATE 10 MG: 5 INJECTION, SOLUTION INTRAMUSCULAR at 15:09

## 2023-10-26 ASSESSMENT — ENCOUNTER SYMPTOMS
PHOTOPHOBIA: 0
NAUSEA: 0
SHORTNESS OF BREATH: 0
ABDOMINAL PAIN: 0
TROUBLE SWALLOWING: 0
VOMITING: 0
DIARRHEA: 0
WHEEZING: 0
VOICE CHANGE: 0

## 2023-10-26 ASSESSMENT — PATIENT HEALTH QUESTIONNAIRE - PHQ9
1. LITTLE INTEREST OR PLEASURE IN DOING THINGS: 0
SUM OF ALL RESPONSES TO PHQ QUESTIONS 1-9: 0
2. FEELING DOWN, DEPRESSED OR HOPELESS: 0
SUM OF ALL RESPONSES TO PHQ QUESTIONS 1-9: 0
SUM OF ALL RESPONSES TO PHQ9 QUESTIONS 1 & 2: 0

## 2023-10-26 ASSESSMENT — PAIN - FUNCTIONAL ASSESSMENT: PAIN_FUNCTIONAL_ASSESSMENT: NONE - DENIES PAIN

## 2023-10-26 NOTE — ED NOTES
Pt took urine cup to bathroom and put water in it and drank it.       Saintclair Hemp  10/26/23 9506

## 2023-10-26 NOTE — ED PROVIDER NOTES
[address] ENCOUNTER      Pt Name: Earnestine Lynn  MRN: [id number]  [address] [place] 1995  Date of evaluation: 10/26/2023  Provider: Jovana Lawrence DO  PCP: Lillie Maurice MD    HPI:  76-year-old male presents emergency department with complaints of psychosis, shellie. Patient brought in by police after being probated by court for concern for mental illness initiate by parents. Patient previous history of schizophrenia. Has not been taking his medications more than 2 weeks has been discharged by psychiatrist for noncompliance. Patient does endorse suicidal ideation denies plan or homicidal ideation at this time. Patient cooperative with prompting. Denies any acute medical issues at this time    Chief Complaint   Patient presents with    Psychiatric Evaluation      probated for psychosis and shellie. Denies SI/ HI/ denies hallucinations. Denies current psych team or meds. Review of Systems   Constitutional:  Negative for chills, fatigue and fever. HENT:  Negative for trouble swallowing and voice change. Eyes:  Negative for photophobia and visual disturbance. Respiratory:  Negative for shortness of breath and wheezing. Cardiovascular:  Negative for chest pain. Gastrointestinal:  Negative for abdominal pain, diarrhea, nausea and vomiting. Genitourinary:  Negative for dysuria, hematuria and urgency. Musculoskeletal:  Negative for arthralgias. Skin:  Negative for wound. Neurological:  Negative for dizziness, syncope, weakness, light-headedness, numbness and headaches. Psychiatric/Behavioral:  Positive for suicidal ideas. Negative for behavioral problems and confusion. Physical Exam  Vitals reviewed. Constitutional:       General: He is not in acute distress. Appearance: He is not toxic-appearing or diaphoretic. HENT:      Head: Normocephalic.       Right Ear: External ear normal.      Left Ear: External ear
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
## 2023-10-26 NOTE — ED NOTES
Once urine is collected, resulted and Pt is medically cleared, Pt will be reviewed for admission.       Thais Alston, [place], [facility]  10/26/23 1916

## 2023-10-26 NOTE — ED NOTES
Pt refusing to answer appropriate questions for triaging dr now on site assessing pt     Gurpreet Cota RN  10/26/23 2208

## 2023-10-26 NOTE — ED NOTES
Dr husain on sit pt cont to refues to change into hospital pants or provide urine sample      Merced Velazquez RN  10/26/23 9347

## 2023-10-27 PROBLEM — F23 ACUTE PSYCHOSIS (HCC): Status: ACTIVE | Noted: 2023-10-27

## 2023-10-27 PROBLEM — Z91.199 PERSONAL HISTORY OF NONCOMPLIANCE WITH MEDICAL TREATMENT, PRESENTING HAZARDS TO HEALTH: Status: ACTIVE | Noted: 2023-10-27

## 2023-10-27 LAB — CK SERPL-CCNC: 482 U/L (ref 20–200)

## 2023-10-27 PROCEDURE — 1240000000 HC EMOTIONAL WELLNESS R&B

## 2023-10-27 PROCEDURE — 90792 PSYCH DIAG EVAL W/MED SRVCS: CPT | Performed by: NURSE PRACTITIONER

## 2023-10-27 RX ORDER — HALOPERIDOL 2 MG/1
3 TABLET ORAL EVERY 6 HOURS PRN
Status: DISCONTINUED | OUTPATIENT
Start: 2023-10-27 | End: 2023-10-27

## 2023-10-27 RX ORDER — HYDROXYZINE PAMOATE 25 MG/1
25 CAPSULE ORAL 3 TIMES DAILY PRN
Status: DISCONTINUED | OUTPATIENT
Start: 2023-10-27 | End: 2023-11-10 | Stop reason: HOSPADM

## 2023-10-27 RX ORDER — ACETAMINOPHEN 325 MG/1
650 TABLET ORAL EVERY 4 HOURS PRN
Status: DISCONTINUED | OUTPATIENT
Start: 2023-10-27 | End: 2023-11-10 | Stop reason: HOSPADM

## 2023-10-27 RX ORDER — HALOPERIDOL 5 MG/ML
3 INJECTION INTRAMUSCULAR EVERY 6 HOURS PRN
Status: DISCONTINUED | OUTPATIENT
Start: 2023-10-27 | End: 2023-10-27

## 2023-10-27 RX ORDER — LANOLIN ALCOHOL/MO/W.PET/CERES
3 CREAM (GRAM) TOPICAL NIGHTLY PRN
Status: DISCONTINUED | OUTPATIENT
Start: 2023-10-27 | End: 2023-11-10 | Stop reason: HOSPADM

## 2023-10-27 RX ORDER — MAGNESIUM HYDROXIDE/ALUMINUM HYDROXICE/SIMETHICONE 120; 1200; 1200 MG/30ML; MG/30ML; MG/30ML
30 SUSPENSION ORAL PRN
Status: DISCONTINUED | OUTPATIENT
Start: 2023-10-27 | End: 2023-11-10 | Stop reason: HOSPADM

## 2023-10-27 RX ORDER — NICOTINE 21 MG/24HR
1 PATCH, TRANSDERMAL 24 HOURS TRANSDERMAL DAILY
Status: DISCONTINUED | OUTPATIENT
Start: 2023-10-27 | End: 2023-11-10 | Stop reason: HOSPADM

## 2023-10-27 RX ORDER — PALIPERIDONE 3 MG/1
3 TABLET, EXTENDED RELEASE ORAL 2 TIMES DAILY
Status: DISCONTINUED | OUTPATIENT
Start: 2023-10-27 | End: 2023-11-02

## 2023-10-27 RX ORDER — DIVALPROEX SODIUM 250 MG/1
250 TABLET, DELAYED RELEASE ORAL EVERY 12 HOURS SCHEDULED
Status: DISCONTINUED | OUTPATIENT
Start: 2023-10-27 | End: 2023-11-02

## 2023-10-27 RX ORDER — HALOPERIDOL 5 MG/ML
5 INJECTION INTRAMUSCULAR EVERY 6 HOURS PRN
Status: DISCONTINUED | OUTPATIENT
Start: 2023-10-27 | End: 2023-11-10 | Stop reason: HOSPADM

## 2023-10-27 RX ORDER — HALOPERIDOL 5 MG/1
5 TABLET ORAL EVERY 6 HOURS PRN
Status: DISCONTINUED | OUTPATIENT
Start: 2023-10-27 | End: 2023-11-10 | Stop reason: HOSPADM

## 2023-10-27 ASSESSMENT — SLEEP AND FATIGUE QUESTIONNAIRES
DO YOU HAVE DIFFICULTY SLEEPING: NO
AVERAGE NUMBER OF SLEEP HOURS: 8
DO YOU HAVE DIFFICULTY SLEEPING: NO
DO YOU USE A SLEEP AID: NO
AVERAGE NUMBER OF SLEEP HOURS: 11
DO YOU USE A SLEEP AID: NO

## 2023-10-27 ASSESSMENT — PATIENT HEALTH QUESTIONNAIRE - PHQ9
SUM OF ALL RESPONSES TO PHQ9 QUESTIONS 1 & 2: 0
SUM OF ALL RESPONSES TO PHQ QUESTIONS 1-9: 0
1. LITTLE INTEREST OR PLEASURE IN DOING THINGS: 0
2. FEELING DOWN, DEPRESSED OR HOPELESS: 0

## 2023-10-27 ASSESSMENT — LIFESTYLE VARIABLES
HOW OFTEN DO YOU HAVE A DRINK CONTAINING ALCOHOL: NEVER
HOW MANY STANDARD DRINKS CONTAINING ALCOHOL DO YOU HAVE ON A TYPICAL DAY: PATIENT DOES NOT DRINK
HOW OFTEN DO YOU HAVE A DRINK CONTAINING ALCOHOL: NEVER
HOW MANY STANDARD DRINKS CONTAINING ALCOHOL DO YOU HAVE ON A TYPICAL DAY: PATIENT DOES NOT DRINK

## 2023-10-27 ASSESSMENT — PAIN SCALES - GENERAL: PAINLEVEL_OUTOF10: 0

## 2023-10-27 NOTE — DISCHARGE INSTRUCTIONS
Attending Provider: Alice Hennessy MD.     If you have any questions and need to contact this individual please call the unit at [phone]. [facility] Provider will be available on call 24/7 and during holidays. Reason for Admission: It was reported in the probate that the pt has not been well for a few months and in the last few weeks he has been doing worse. She stated that he is psychotic and has been moving items and furniture and making alters and shrines in his [facility], [address]. She stated that he has been staying up all night manic and has been yelling and screaming and stomping and making loud noise. Follow up for Tobacco Cessation at:    [facility] Tobacco Treatment                                 Date:  Friday 11/10 at [address] [place] [address], [address] [place] [address]   ([address] [place] [address] B elevators to 7th floor)   Phone: [phone]   Fax: [phone] was obtained for this patient's Scott Prader 156mg injection with reference number [id number] from 11/9/2023-11/8/2024 for medical necessity per [address] [place] [address] department.

## 2023-10-27 NOTE — PROGRESS NOTES
Patient declined invitation to the following groups    [facility]  Polleverywhere activity- [employer]    Patient will continue to be provided with opportunities to enhance leisure skills/interests and/or coping mechanisms.

## 2023-10-27 NOTE — PROGRESS NOTES
Patient sleeping during the following invitations to:    [employer]    Patient did not respond to verbal cues. CTRS left a handout on group topic- grounding techniques on patient bedside. Patient will continue to be provided with opportunities to enhance leisure skills/interests and/or coping mechanisms.

## 2023-10-27 NOTE — ED NOTES
SW and RN both prompted Pt to provide urine sample. Pt states he does not have to go right now. Pt was provided a glass of water at bedside. Will follow up.       BRIGITTE Cormier, [place]  10/26/23 6897

## 2023-10-27 NOTE — CARE COORDINATION
Sw attempted to complete assessment. Pt was sleeping and not responding to verbal requests. Will attempt at a later time.

## 2023-10-27 NOTE — PROGRESS NOTES
CTRS and SW attempted to complete assessment. Patient continues to sleep and not respond to verbal cues or requests. Will re-attempt at later time.

## 2023-10-27 NOTE — ED NOTES
SW spoke to [place] in admitting and assigned bed [address], [place], [place]  10/27/23 0608

## 2023-10-27 NOTE — H&P
Department of Psychiatry  History and Physical - Adult     CHIEF COMPLAINT:  \" No thank you I decline all medications\"    Patient was seen after discussing with the treatment team and reviewing the chart    CIRCUMSTANCES OF ADMISSION:   Patient probated by his family due to psychiatric decompensation, bizarre behaviors and not able to protect his health, or safety. He has been manic, disorganized, psychotic and off of his medications for at least 6 months. HISTORY OF PRESENT ILLNESS:      The patient is a 33 y.o. male with significant past history of bipolar disorder, with multiple past psychiatric hospitalizations, presented to the ER on probate by his parents due to increased in psychotic behaviors, off his medications for 6 months. Patient probated by his family due to psychiatric decompensation, bizarre behaviors and not able to protect his health, or safety. He has been manic, disorganized, psychotic and off of his medications for at least 6 months. He was medically cleared in the ED, UDS positive for THC, amphetamine, and cocaine,  admitted to [address] [place] [address] for psychiatric evaluation and stabilization. ,     On evaluation today, patient is minimally cooperative for assessment he will not roll over and look at the doctor. He does not engage in any conversation he is dismissive irritable. He has poor insight judgment. He has poor understanding of his need for hospitalization and stabilization. He is refusing medications and adamant that he will not take any medications. Patient is educated that he is here on a probate by his parents and we are willing to work with the patient by offering him medications so we can stabilize him. He is informed that without his cooperation with treatment we will have to follow through with the probate and utilize the probate court. At this point he does not participate further in assessment, does not answer any further questions.     Per probate statement of

## 2023-10-27 NOTE — PLAN OF CARE
[facility]  Initial Interdisciplinary Treatment Plan NOTE    Review Date & Time:  10/27/23 1000    Patient was in treatment team    Admission Type:   Admission Type:  Involuntary    Reason for admission:  Reason for Admission: \"I don't know\"      Estimated Length of Stay Update:   1 WEEK  Estimated Discharge Date Update:  NEXT FRIDAY    EDUCATION:   Learner Progress Toward Treatment Goals: Reviewed results and recommendations of this team    Method: Individual    Outcome: Needs reinforcement    PATIENT GOALS: NONE REPORTED    PLAN/TREATMENT RECOMMENDATIONS UPDATE:  PROBATED PT., MEDICATIONS, GROUPS AS TOLERATED, ASSESS FOR RICHELLE/PSYCHOSIS, SUPPORTIVE CARE, COLLATERAL INFORMATION, DISCHARGE PLANNING AND FOLLOW UP    GOALS UPDATE:   Time frame for Short-Term Goals:  1 WEEK    Zaida Braun RN
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
## 2023-10-27 NOTE — PLAN OF CARE
Problem: Risk for Elopement  Goal: Patient will not exit the unit/facility without proper excort  Outcome: Progressing     Problem: Anxiety  Goal: Will report anxiety at manageable levels  Description: INTERVENTIONS:  1. Administer medication as ordered  2. Teach and rehearse alternative coping skills  3. Provide emotional support with 1:1 interaction with staff  Outcome: Progressing     Problem: Decision Making  Goal: Pt/Family able to effectively weigh alternatives and participate in decision making related to treatment and care  Description: INTERVENTIONS:  1. Determine when there are differences between patient's view, family's view, and healthcare provider's view of condition  2. Facilitate patient and family articulation of goals for care  3. Help patient and family identify pros/cons of alternative solutions  4. Provide information as requested by patient/family  5. Respect patient/family right to receive or not to receive information  6. Serve as a liaison between patient and family and health care team  7. Initiate Consults from Ethics, Palliative Care or initiate [address] as is appropriate  Outcome: Progressing     Problem: Behavior  Goal: Pt/Family maintain appropriate behavior and adhere to behavioral management agreement, if implemented  Description: INTERVENTIONS:  1. Assess patient/family's coping skills and  non-compliant behavior (including use of illegal substances)  2. Notify security of behavior or suspected illegal substances which indicate the need for search of the family and/or belongings  3. Encourage verbalization of thoughts and concerns in a socially appropriate manner  4. Utilize positive, consistent limit setting strategies supporting safety of patient, staff and others  5. Encourage participation in the decision making process about the behavioral management agreement  6. If a visitor's behavior poses a threat to safety call refer to organization policy.   7. Initiate

## 2023-10-27 NOTE — PROGRESS NOTES
CTRS attempted to complete leisure assessment. Patient sleeping and did not respond to verbal cues. Will re-attempt at later time.

## 2023-10-27 NOTE — CARE COORDINATION
SW attempted to meet with pt to complete biopsychosocial assessment. Pt observed laying in bed sleeping. Pt was unable to remain awake for assessment questions. SW will try again at a later time.

## 2023-10-28 PROCEDURE — 99232 SBSQ HOSP IP/OBS MODERATE 35: CPT | Performed by: NURSE PRACTITIONER

## 2023-10-28 PROCEDURE — 1240000000 HC EMOTIONAL WELLNESS R&B

## 2023-10-28 ASSESSMENT — PAIN SCALES - GENERAL: PAINLEVEL_OUTOF10: 0

## 2023-10-28 NOTE — PLAN OF CARE
Problem: Anxiety  Goal: Will report anxiety at manageable levels  Description: INTERVENTIONS:  1. Administer medication as ordered  2. Teach and rehearse alternative coping skills  3. Provide emotional support with 1:1 interaction with staff  10/28/2023 0834 by Joleen Zarate RN  Outcome: Not Progressing  Flowsheets (Taken 10/28/2023 0830)  Will report anxiety at manageable levels: Teach and rehearse alternative coping skills  10/27/2023 2250 by Rosa Combs RN  Outcome: Progressing     Problem: Decision Making  Goal: Pt/Family able to effectively weigh alternatives and participate in decision making related to treatment and care  Description: INTERVENTIONS:  1. Determine when there are differences between patient's view, family's view, and healthcare provider's view of condition  2. Facilitate patient and family articulation of goals for care  3. Help patient and family identify pros/cons of alternative solutions  4. Provide information as requested by patient/family  5. Respect patient/family right to receive or not to receive information  6. Serve as a liaison between patient and family and health care team  7. Initiate Consults from Ethics, Palliative Care or initiate [address] as is appropriate  10/28/2023 0834 by Joleen Zarate RN  Outcome: Not Progressing  Flowsheets (Taken 10/28/2023 0830)  Patient/family able to effectively weigh alternatives and participate in decision making related to treatment and care: Facilitate patient and family articulation of goals for care  10/27/2023 2250 by Rosa Combs RN  Outcome: Progressing     Problem: Behavior  Goal: Pt/Family maintain appropriate behavior and adhere to behavioral management agreement, if implemented  Description: INTERVENTIONS:  1. Assess patient/family's coping skills and  non-compliant behavior (including use of illegal substances)  2.  Notify security of behavior or suspected illegal substances which indicate the

## 2023-10-28 NOTE — PLAN OF CARE
Problem: Anxiety  Goal: Will report anxiety at manageable levels  Description: INTERVENTIONS:  1. Administer medication as ordered  2. Teach and rehearse alternative coping skills  3. Provide emotional support with 1:1 interaction with staff  10/27/2023 2250 by Juan Manuel Carreon RN  Outcome: Progressing     Problem: Decision Making  Goal: Pt/Family able to effectively weigh alternatives and participate in decision making related to treatment and care  Description: INTERVENTIONS:  1. Determine when there are differences between patient's view, family's view, and healthcare provider's view of condition  2. Facilitate patient and family articulation of goals for care  3. Help patient and family identify pros/cons of alternative solutions  4. Provide information as requested by patient/family  5. Respect patient/family right to receive or not to receive information  6. Serve as a liaison between patient and family and health care team  7. Initiate Consults from Ethics, Palliative Care or initiate [address] as is appropriate  10/27/2023 2250 by Juan Manuel Carreon RN  Outcome: Progressing     Problem: Behavior  Goal: Pt/Family maintain appropriate behavior and adhere to behavioral management agreement, if implemented  Description: INTERVENTIONS:  1. Assess patient/family's coping skills and  non-compliant behavior (including use of illegal substances)  2. Notify security of behavior or suspected illegal substances which indicate the need for search of the family and/or belongings  3. Encourage verbalization of thoughts and concerns in a socially appropriate manner  4. Utilize positive, consistent limit setting strategies supporting safety of patient, staff and others  5. Encourage participation in the decision making process about the behavioral management agreement  6. If a visitor's behavior poses a threat to safety call refer to organization policy.   7. Initiate consult with ,

## 2023-10-28 NOTE — PLAN OF CARE
Problem: Anxiety  Goal: Will report anxiety at manageable levels  Description: INTERVENTIONS:  1. Administer medication as ordered  2. Teach and rehearse alternative coping skills  3. Provide emotional support with 1:1 interaction with staff  10/28/2023 1714 by Melissa Camilo RN  Outcome: Not Progressing  10/28/2023 0834 by Silvano Pate RN  Outcome: Not Progressing  Flowsheets (Taken 10/28/2023 0830)  Will report anxiety at manageable levels: Teach and rehearse alternative coping skills     Problem: Decision Making  Goal: Pt/Family able to effectively weigh alternatives and participate in decision making related to treatment and care  Description: INTERVENTIONS:  1. Determine when there are differences between patient's view, family's view, and healthcare provider's view of condition  2. Facilitate patient and family articulation of goals for care  3. Help patient and family identify pros/cons of alternative solutions  4. Provide information as requested by patient/family  5. Respect patient/family right to receive or not to receive information  6. Serve as a liaison between patient and family and health care team  7. Initiate Consults from Ethics, Palliative Care or initiate [address] as is appropriate  10/28/2023 1714 by Melissa Camilo RN  Outcome: Not Progressing  10/28/2023 0834 by Silvano Pate RN  Outcome: Not Progressing  Flowsheets (Taken 10/28/2023 0830)  Patient/family able to effectively weigh alternatives and participate in decision making related to treatment and care: Facilitate patient and family articulation of goals for care     Problem: Behavior  Goal: Pt/Family maintain appropriate behavior and adhere to behavioral management agreement, if implemented  Description: INTERVENTIONS:  1. Assess patient/family's coping skills and  non-compliant behavior (including use of illegal substances)  2.  Notify security of behavior or suspected illegal substances which take his vital signs. Began to call me profane names, \"Cunt you don't need my blood pressure, I know it is fine, I know my body. Get out of here,  bitch. \"  Refuses to answer any assessment questions. Refuses vital signs and all interaction.

## 2023-10-28 NOTE — PROGRESS NOTES
Patient declined invitation to the following groups:    [employer]    Patient will continue to be provided with opportunities to enhance leisure skills/interests and/or coping mechanisms.

## 2023-10-29 PROCEDURE — 99232 SBSQ HOSP IP/OBS MODERATE 35: CPT | Performed by: NURSE PRACTITIONER

## 2023-10-29 PROCEDURE — 1240000000 HC EMOTIONAL WELLNESS R&B

## 2023-10-29 ASSESSMENT — PAIN SCALES - GENERAL: PAINLEVEL_OUTOF10: 0

## 2023-10-29 NOTE — PROGRESS NOTES
BEHAVIORAL HEALTH FOLLOW-UP NOTE     10/29/2023     Patient was seen and examined in person, Chart reviewed   Patient's case discussed with staff/team    Chief Complaint: \" I am not talking to you, I have already answered all of these questions\"    Interim History:     Patient is in his room this morning lying in his bed he is extremely irritable and cooperative for assessment has high levels of agitation. He is refusing to talk to me stating that he has already answered all of these questions. I attempted to remind the patient that he is here on a probate and it may behoove him to participate in his psychiatric treatment however he is dismissive irritable and refuses to speak with me. His insight judgment impulse control are obviously poor. He refuses to participate in any assessment with me this morning    Patient is not coming out of his room, he is refusing medications, he is not participating in any treatment    Appetite:   [] Normal/Unchanged  [] Increased  [] Decreased      Sleep:       [] Normal/Unchanged  [] Fair       [] Poor              Energy:    [] Normal/Unchanged  [] Increased  [] Decreased        SI [] Present  [] Absent    HI  []Present  [] Absent     Aggression:  [] yes  [] no    Patient is [] able  [] unable to CONTRACT FOR SAFETY     PAST MEDICAL/PSYCHIATRIC HISTORY:   Past Medical History:   Diagnosis Date    Anxiety     Asthma     no meds currently- mostly exercise induced    Claustrophobia     confined spaces with locked doors    Depression     Epigastric pain     Nausea     Nut allergy        FAMILY/SOCIAL HISTORY:  No family history on file.   Social History     Socioeconomic History    Marital status: Single     Spouse name: Not on file    Number of children: 0    Years of education: 13    Highest education level: Not on file   Occupational History    Occupation:      Employer: [employer]   Tobacco Use    Smoking status: Every Day     Types: E-Cigarettes    Smokeless tobacco:

## 2023-10-29 NOTE — PROGRESS NOTES
Pt refused to answer questions. Refused vital signs but was polite about it. \"No thank you. \" Pt resting in bed with blanket over his eyes d/t light. Pt often seen in different positions on rounding. Pt refused dinner. Will attempt to assess at a later time. Will continue to monitor.

## 2023-10-29 NOTE — PLAN OF CARE
Patient is alert and oriented x 4. Denies pain. No noted signs or symptoms of distress. Denies SI/HI, A/V/H, or thoughts of self harm when asked, states \"these are stupid questions. \" Then states he has HI \"toward all you bums at this hospital.\"   Refused anxiety and depression assessment   Refused his medications, refused to attend groups, refused majority of nurses assessment. Irritable, underlying hostility toward staff, poor eye contact, verbally impulsive, restless. Patient stated \"piss off\" personal space provided. Will continue to monitor for safety q 15 minute safety rounds and environmental assessments. Problem: Risk for Elopement  Goal: Patient will not exit the unit/facility without proper excort  Outcome: Not Progressing     Problem: Anxiety  Goal: Will report anxiety at manageable levels  Description: INTERVENTIONS:  1. Administer medication as ordered  2. Teach and rehearse alternative coping skills  3. Provide emotional support with 1:1 interaction with staff  Outcome: Not Progressing     Problem: Decision Making  Goal: Pt/Family able to effectively weigh alternatives and participate in decision making related to treatment and care  Description: INTERVENTIONS:  1. Determine when there are differences between patient's view, family's view, and healthcare provider's view of condition  2. Facilitate patient and family articulation of goals for care  3. Help patient and family identify pros/cons of alternative solutions  4. Provide information as requested by patient/family  5. Respect patient/family right to receive or not to receive information  6. Serve as a liaison between patient and family and health care team  7.  Initiate Consults from Ethics, Palliative Care or initiate [address] as is appropriate  Outcome: Not Progressing     Problem: Behavior  Goal: Pt/Family maintain appropriate behavior and adhere to behavioral management agreement, if implemented  Description: INTERVENTIONS:  1. Assess patient/family's coping skills and  non-compliant behavior (including use of illegal substances)  2. Notify security of behavior or suspected illegal substances which indicate the need for search of the family and/or belongings  3. Encourage verbalization of thoughts and concerns in a socially appropriate manner  4. Utilize positive, consistent limit setting strategies supporting safety of patient, staff and others  5. Encourage participation in the decision making process about the behavioral management agreement  6. If a visitor's behavior poses a threat to safety call refer to organization policy. 7. Initiate consult with , Psychosocial CNS, Spiritual Care as appropriate  Outcome: Not Progressing     Problem: Depression/Self Harm  Goal: Effect of psychiatric condition will be minimized and patient will be protected from self harm  Description: INTERVENTIONS:  1. Assess impact of patient's symptoms on level of functioning, self care needs and offer support as indicated  2. Assess patient/family knowledge of depression, impact on illness and need for teaching  3. Provide emotional support, presence and reassurance  4. Assess for possible suicidal thoughts or ideation. If patient expresses suicidal thoughts or statements do not leave alone, initiate Suicide Precautions, move to a room close to the nursing station and obtain sitter  5. Initiate consults as appropriate with Mental Health Professional, Spiritual Care, Psychosocial CNS, and consider a recommendation to the LIP for a Psychiatric Consultation  Outcome: Not Progressing     Problem: Involuntary Admit  Goal: Will cooperate with staff recommendations and doctor's orders and will demonstrate appropriate behavior  Description: INTERVENTIONS:  1. Treat underlying conditions and offer medication as ordered  2. Educate regarding involuntary admission procedures and rules  3.  Contain excessive/inappropriate behavior per unit

## 2023-10-29 NOTE — PROGRESS NOTES
Patient declined invitation to the following groups:    [employer]    Patient will continue to be provided with opportunities to enhance leisure skills/interests and/or coping mechanisms.

## 2023-10-30 PROCEDURE — 1240000000 HC EMOTIONAL WELLNESS R&B

## 2023-10-30 PROCEDURE — 99232 SBSQ HOSP IP/OBS MODERATE 35: CPT | Performed by: NURSE PRACTITIONER
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
## 2023-10-30 NOTE — PROGRESS NOTES
Patient declined to attend the following groups:    [employer]  Psychoeducation       Will continue to encourage patient to attend programming.
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
## 2023-10-31 PROCEDURE — 99232 SBSQ HOSP IP/OBS MODERATE 35: CPT | Performed by: NURSE PRACTITIONER

## 2023-10-31 PROCEDURE — 1240000000 HC EMOTIONAL WELLNESS R&B

## 2023-10-31 ASSESSMENT — PAIN SCALES - GENERAL: PAINLEVEL_OUTOF10: 0

## 2023-10-31 NOTE — PLAN OF CARE
Patient is alert and oriented x 4. Denies pain. No noted signs or symptoms of distress. Denies HI, A/V/H, Patient does endorse SI reporting \"I already tried it. . . I tried picking that chair up and breaking the window to jump out. \"  Patient goes on to state that his thoughts are \"fleeting\" and \"manageable\". Patient does contract for safety with this nurse    Rates Anxiety at 6/10 and Depression at 6/10. Pleasant, polite, and cooperative during assessment. Blunt Affect. Appropriate with staff, keeps to himself, rapid/pressured speech and paranoia (toward his family and physicians). Appears well groomed/neat, room Is clean, showered this am.    Up for breakfast.    Will continue to monitor for safety q 15 minute safety rounds and environmental assessments. Problem: Risk for Elopement  Goal: Patient will not exit the unit/facility without proper excort  10/31/2023 0853 by Keiry Real RN  Outcome: Progressing  10/31/2023 0019 by Magdalena Cardona RN  Outcome: Not Progressing     Problem: Anxiety  Goal: Will report anxiety at manageable levels  Description: INTERVENTIONS:  1. Administer medication as ordered  2. Teach and rehearse alternative coping skills  3. Provide emotional support with 1:1 interaction with staff  10/31/2023 0853 by Keiry Real RN  Outcome: Not Progressing  10/31/2023 0019 by Magdalena Cardona RN  Outcome: Not Progressing     Problem: Decision Making  Goal: Pt/Family able to effectively weigh alternatives and participate in decision making related to treatment and care  Description: INTERVENTIONS:  1. Determine when there are differences between patient's view, family's view, and healthcare provider's view of condition  2. Facilitate patient and family articulation of goals for care  3. Help patient and family identify pros/cons of alternative solutions  4. Provide information as requested by patient/family  5.  Respect patient/family right to receive or not to receive information  6. Serve as a liaison between patient and family and health care team  7. Initiate Consults from Ethics, Palliative Care or initiate [id number] as is appropriate  10/31/2023 0853 by Marisa Harrison RN  Outcome: Not Progressing  10/31/2023 0019 by Yonathan Juarez RN  Outcome: Not Progressing     Problem: Depression/Self Harm  Goal: Effect of psychiatric condition will be minimized and patient will be protected from self harm  Description: INTERVENTIONS:  1. Assess impact of patient's symptoms on level of functioning, self care needs and offer support as indicated  2. Assess patient/family knowledge of depression, impact on illness and need for teaching  3. Provide emotional support, presence and reassurance  4. Assess for possible suicidal thoughts or ideation. If patient expresses suicidal thoughts or statements do not leave alone, initiate Suicide Precautions, move to a room close to the nursing station and obtain sitter  5.  Initiate consults as appropriate with Mental Health Professional, Spiritual Care, Psychosocial CNS, and consider a recommendation to the [facility] for a Psychiatric Consultation  Outcome: Not Progressing

## 2023-10-31 NOTE — CARE COORDINATION
Submitted forced medications request to [facility]. Confirmed receipt by  Raul Sommer. Motion to be filed for a hearing on the date of the initial commitment.     Electronically signed by BRIGITTE Alberto, ANNA on 10/31/2023 at 11:57 AM

## 2023-10-31 NOTE — PROGRESS NOTES
Patient declined to attend the following groups:    Community Meeting  Psychoeducation   Peer Recovery    Will continue to encourage patient to attend programming.

## 2023-10-31 NOTE — CARE COORDINATION
Patient was last on an AOT 5/28/2021 - 6/16/2022. Patient was initially successful, but due to legal/probation involvement at the time - patient decreased participation which eventually led to non-compliance with medications. Patient AOT was previously managed by [facility]. Received communication from NP [facility]. Patient is probated. Forced medication to be requested.      Electronically signed by BRIGITTE Zabala, ANNA on 10/31/2023 at 10:12 AM
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
## 2023-10-31 NOTE — PLAN OF CARE
Problem: Risk for Elopement  Goal: Patient will not exit the unit/facility without proper excort  Outcome: Not Progressing     Problem: Anxiety  Goal: Will report anxiety at manageable levels  Description: INTERVENTIONS:  1. Administer medication as ordered  2. Teach and rehearse alternative coping skills  3. Provide emotional support with 1:1 interaction with staff  Outcome: Not Progressing     Problem: Decision Making  Goal: Pt/Family able to effectively weigh alternatives and participate in decision making related to treatment and care  Description: INTERVENTIONS:  1. Determine when there are differences between patient's view, family's view, and healthcare provider's view of condition  2. Facilitate patient and family articulation of goals for care  3. Help patient and family identify pros/cons of alternative solutions  4. Provide information as requested by patient/family  5. Respect patient/family right to receive or not to receive information  6. Serve as a liaison between patient and family and health care team  7. Initiate Consults from Ethics, Palliative Care or initiate [id number] as is appropriate  Outcome: Not Progressing     Problem: Risk for Elopement  Goal: Patient will not exit the unit/facility without proper excort  Outcome: Not Progressing     Problem: Anxiety  Goal: Will report anxiety at manageable levels  Description: INTERVENTIONS:  1. Administer medication as ordered  2. Teach and rehearse alternative coping skills  3. Provide emotional support with 1:1 interaction with staff  Outcome: Not Progressing     Problem: Decision Making  Goal: Pt/Family able to effectively weigh alternatives and participate in decision making related to treatment and care  Description: INTERVENTIONS:  1. Determine when there are differences between patient's view, family's view, and healthcare provider's view of condition  2. Facilitate patient and family articulation of goals for care  3.  Help

## 2023-11-01 PROCEDURE — 1240000000 HC EMOTIONAL WELLNESS R&B

## 2023-11-01 PROCEDURE — 99232 SBSQ HOSP IP/OBS MODERATE 35: CPT | Performed by: NURSE PRACTITIONER

## 2023-11-01 NOTE — PROGRESS NOTES
BEHAVIORAL HEALTH FOLLOW-UP NOTE     11/1/2023     Patient was seen and examined in person, Chart reviewed   Patient's case discussed with staff/team    Chief Complaint: \" Yes I am suicidal every once in a while\"    Interim History:   Patient seen in his room this morning he keeps his head covered and refuses to interact with me other than to say that he is fine. When I asked patient if he is feeling suicidal or homicidal he states \"yes I am feeling suicidal once in a while. \"  He then states that there is nothing he can do because of the \"unbreakable window. \"  He denies auditory or visual hallucinations however he appears preoccupied and internally stimulated. He is dismissive uncooperative irritable no insight or judgment    appetite:   [x] Normal/Unchanged  [] Increased  [] Decreased    Sleep:       [x] Normal/Unchanged  [] Fair       [] Poor              Energy:    [x] Normal/Unchanged  [] Increased  [] Decreased        SI [] Present  [x] Absent    HI  []Present  [x] Absent     Aggression:  [] yes  [x] no    Patient is [x] able  [] unable to CONTRACT FOR SAFETY     PAST MEDICAL/PSYCHIATRIC HISTORY:   Past Medical History:   Diagnosis Date    Anxiety     Asthma     no meds currently- mostly exercise induced    Claustrophobia     confined spaces with locked doors    Depression     Epigastric pain     Nausea     Nut allergy        FAMILY/SOCIAL HISTORY:  No family history on file.   Social History     Socioeconomic History    Marital status: Single     Spouse name: Not on file    Number of children: 0    Years of education: 13    Highest education level: Not on file   Occupational History    Occupation: [employer]     Employer: [employer]   Tobacco Use    Smoking status: Every Day     Types: E-Cigarettes    Smokeless tobacco: Never   Vaping Use    Vaping Use: Never used   Substance and Sexual Activity    Alcohol use: No    Drug use: Not Currently     Types: Marijuana Gerhardt Salle)     Comment: states has a medical card requiring the supervision of inpatient psychiatric personnel      Anticipated Length of stay: 7 to 21 days based on stability       NOTE: This report was transcribed using voice recognition software. Every effort was made to ensure accuracy; however, inadvertent computerized transcription errors may be present.     Electronically signed by KELVIN Goodwin CNP on 64/2/4702 at 2:44 PM

## 2023-11-01 NOTE — PLAN OF CARE
a Psychiatric Consultation  Outcome: Progressing     Problem: Psychosis  Goal: Will report no hallucinations or delusions  Description: INTERVENTIONS:  1. Administer medication as  ordered  2. Assist with reality testing to support increasing orientation  3. Assess if patient's hallucinations or delusions are encouraging self harm or harm to others and intervene as appropriate    Plan for forced medications. Patient is to receive 5 mg of Haldol by IM injection if Zyprexa 3 mg tablet is refused  Plan for Invega Sustenna STRINGER 234 mg followed by Kristan Eaton Sustenna 156 mg STRINGER one week after receiving initial dose   Patient is to have [place] 156 mg every 30 days   11/1/2023 1302 by Maria Elena Mercado RN  Outcome: Progressing    Patient reports SI with no plan or intent, he contracts for safety. He denies HI and hallucinations. When patient was initially asked if he was homicidal he stated \"sometimes but not towards anyone\" a few seconds later he stated \"I'm not ever homicidal.\" He continues to be bizarre and display no insight into need for treatment. He is paranoid, suspicious, and appears internally stimulated. He states that he has been psychotic in the past but claims he can manage it on his own and is not currently psychotic. He is refusing all medications, stating \"I am not taking that placebo. \" He is isolative to self but watchful of others. Will continue to offer support and comfort to patient.

## 2023-11-01 NOTE — CARE COORDINATION
Patient Treatment Plan with recommended forced medications were faxed to [facility] [phone].     Electronically signed by BRIGITTE Boogie LSW on 11/1/2023 at 4:00 PM

## 2023-11-01 NOTE — PROGRESS NOTES
Isolative to room refusing milieu anxious in bed denies any HI or A/H c/o fleeting SI no plan continues refusing meds for force med hearing in AM

## 2023-11-01 NOTE — PROGRESS NOTES
Lying in bed with a cover over his face uncooperative unable to assess dismissive refusing medications

## 2023-11-01 NOTE — PROGRESS NOTES
BEHAVIORAL HEALTH FOLLOW-UP NOTE     11/1/2023     Patient was seen and examined in person, Chart reviewed   Patient's case discussed with staff/team    Chief Complaint: \" I will not take your placebos\"    Interim History:   I saw patient this morning in his room along with Dr. Nisha Norman. When asked why he is here in the hospital he states \"my mom and dad want to make me psychotic. \"  Dr. Nisha Norman spoke with the patient and attempted to work out an agreement with patient to take medications. Patient became agitated with yelling indicating \"I do not take placebos\" telling us to get the placebos to someone else who the work for. He states the only medication he wants the medication doctor Radha Morgan gives him he states he will not take any \"hospital medication. \"  He states the only medication he wants is medical marijuana and Adderall but he states he will not even take those medications in the hospital because he does not want any hospital medications. \"  He appears preoccupied he is agitated yelling misinterpreting paranoid suspicious he denies suicidal or homicidal ideations intent or plan      appetite:   [x] Normal/Unchanged  [] Increased  [] Decreased    Sleep:       [x] Normal/Unchanged  [] Fair       [] Poor              Energy:    [x] Normal/Unchanged  [] Increased  [] Decreased        SI [] Present  [x] Absent    HI  []Present  [x] Absent     Aggression:  [] yes  [x] no    Patient is [x] able  [] unable to CONTRACT FOR SAFETY     PAST MEDICAL/PSYCHIATRIC HISTORY:   Past Medical History:   Diagnosis Date    Anxiety     Asthma     no meds currently- mostly exercise induced    Claustrophobia     confined spaces with locked doors    Depression     Epigastric pain     Nausea     Nut allergy        FAMILY/SOCIAL HISTORY:  No family history on file.   Social History     Socioeconomic History    Marital status: Single     Spouse name: Not on file    Number of children: 0    Years of education: 13    Highest education level: Not on file   Occupational History    Occupation:      Employer: [employer]   Tobacco Use    Smoking status: Every Day     Types: E-Cigarettes    Smokeless tobacco: Never   Vaping Use    Vaping Use: Never used   Substance and Sexual Activity    Alcohol use: No    Drug use: Not Currently     Types: Marijuana Ronn Picking)     Comment: states has a medical card    Sexual activity: Not Currently     Partners: Female   Other Topics Concern    Not on file   Social History Narrative    ** Merged History Encounter **          Social Determinants of Health     Financial Resource Strain: Not on file   Food Insecurity: Not on file   Transportation Needs: Not on file   Physical Activity: Not on file   Stress: Not on file   Social Connections: Not on file   Intimate Partner Violence: Not on file   Housing Stability: Not on file           ROS:  [x] All negative/unchanged except if checked.  Explain positive(checked items) below:  [] Constitutional  [] Eyes  [] Ear/Nose/Mouth/Throat  [] Respiratory  [] CV  [] GI  []   [] Musculoskeletal  [] Skin/Breast  [] Neurological  [] Endocrine  [] Heme/Lymph  [] Allergic/Immunologic    Explanation:     MEDICATIONS:    Current Facility-Administered Medications:     acetaminophen (TYLENOL) tablet 650 mg, 650 mg, Oral, Q4H PRN, Estelle Hernandez MD    magnesium hydroxide (MILK OF MAGNESIA) 400 MG/5ML suspension 30 mL, 30 mL, Oral, Daily PRN, Estelle Hernandez MD    nicotine (NICODERM CQ) 21 MG/24HR 1 patch, 1 patch, TransDERmal, Daily, Estelle Hernandez MD    aluminum & magnesium hydroxide-simethicone (MAALOX) 200-200-20 MG/5ML suspension 30 mL, 30 mL, Oral, PRN, Estelle Hernandez MD    hydrOXYzine pamoate (VISTARIL) capsule 25 mg, 25 mg, Oral, TID PRN, Estelle Hernandez MD    melatonin tablet 3 mg, 3 mg, Oral, Nightly PRN, Estelle Hernandez MD    haloperidol (HALDOL) tablet 5 mg, 5 mg, Oral, Q6H PRN **OR** haloperidol lactate (HALDOL) injection 5 mg, 5 mg, IntraMUSCular, Q6H PRN,

## 2023-11-01 NOTE — PLAN OF CARE
Problem: Psychosis  Goal: Will report no hallucinations or delusions  Description: INTERVENTIONS:  1. Administer medication as  ordered  2. Assist with reality testing to support increasing orientation  3. Assess if patient's hallucinations or delusions are encouraging self harm or harm to others and intervene as appropriate  Outcome: Progressing  Note: Plan for forced medications.  Patient is to receive 5 mg of Haldol by IM injection if Zyprexa 3 mg tablet is refused  Plan for Invega Sustenna STRINGER 234 mg followed by Winsome Darden Sustenna 156 mg STRINGER one week after receiving initial dose   Patient is to have Invega Sustenna 156 mg every 30 days

## 2023-11-02 PROCEDURE — 1240000000 HC EMOTIONAL WELLNESS R&B

## 2023-11-02 PROCEDURE — 99232 SBSQ HOSP IP/OBS MODERATE 35: CPT | Performed by: NURSE PRACTITIONER

## 2023-11-02 PROCEDURE — 6370000000 HC RX 637 (ALT 250 FOR IP): Performed by: NURSE PRACTITIONER

## 2023-11-02 RX ORDER — PALIPERIDONE 3 MG/1
3 TABLET, EXTENDED RELEASE ORAL 2 TIMES DAILY
Status: DISCONTINUED | OUTPATIENT
Start: 2023-11-02 | End: 2023-11-03

## 2023-11-02 RX ORDER — HALOPERIDOL 5 MG/ML
5 INJECTION INTRAMUSCULAR 2 TIMES DAILY
Status: DISCONTINUED | OUTPATIENT
Start: 2023-11-02 | End: 2023-11-03

## 2023-11-02 RX ORDER — VALPROIC ACID 250 MG/5ML
250 SOLUTION ORAL 2 TIMES DAILY
Status: DISCONTINUED | OUTPATIENT
Start: 2023-11-02 | End: 2023-11-10

## 2023-11-02 RX ADMIN — PALIPERIDONE 3 MG: 3 TABLET, EXTENDED RELEASE ORAL at 21:25

## 2023-11-02 RX ADMIN — PALIPERIDONE 3 MG: 3 TABLET, EXTENDED RELEASE ORAL at 12:39

## 2023-11-02 RX ADMIN — VALPROIC ACID 250 MG: 250 SOLUTION ORAL at 21:25

## 2023-11-02 RX ADMIN — VALPROIC ACID 250 MG: 250 SOLUTION ORAL at 12:39

## 2023-11-02 ASSESSMENT — PAIN SCALES - GENERAL
PAINLEVEL_OUTOF10: 0
PAINLEVEL_OUTOF10: 0

## 2023-11-02 NOTE — CARE COORDINATION
SW Supervisor attended probate court hearing. Based upon the testimony, the  determined patient will be subject to court order and the patient was placed on a 90 day commitment to the [address] [place] [address].  also ordered forced medications. Charge RN and NP aware. Court orders placed in pt's soft chart.      BRIGITTE Wilson, JESUS Lozano Work Supervisor

## 2023-11-02 NOTE — PLAN OF CARE
Problem: Anxiety  Goal: Will report anxiety at manageable levels  Description: INTERVENTIONS:  1. Administer medication as ordered  2. Teach and rehearse alternative coping skills  3. Provide emotional support with 1:1 interaction with staff  Outcome: Not Progressing     Problem: Decision Making  Goal: Pt/Family able to effectively weigh alternatives and participate in decision making related to treatment and care  Description: INTERVENTIONS:  1. Determine when there are differences between patient's view, family's view, and healthcare provider's view of condition  2. Facilitate patient and family articulation of goals for care  3. Help patient and family identify pros/cons of alternative solutions  4. Provide information as requested by patient/family  5. Respect patient/family right to receive or not to receive information  6. Serve as a liaison between patient and family and health care team  7. Initiate Consults from Ethics, Palliative Care or initiate [facility] as is appropriate  Outcome: Not Progressing     Problem: Behavior  Goal: Pt/Family maintain appropriate behavior and adhere to behavioral management agreement, if implemented  Description: INTERVENTIONS:  1. Assess patient/family's coping skills and  non-compliant behavior (including use of illegal substances)  2. Notify security of behavior or suspected illegal substances which indicate the need for search of the family and/or belongings  3. Encourage verbalization of thoughts and concerns in a socially appropriate manner  4. Utilize positive, consistent limit setting strategies supporting safety of patient, staff and others  5. Encourage participation in the decision making process about the behavioral management agreement  6. If a visitor's behavior poses a threat to safety call refer to organization policy.   7. Initiate consult with , Psychosocial CNS, Spiritual Care as appropriate  Outcome: Not Progressing     Problem:

## 2023-11-02 NOTE — PROGRESS NOTES
BEHAVIORAL HEALTH FOLLOW-UP NOTE     11/2/2023     Patient was seen and examined in person, Chart reviewed   Patient's case discussed with staff/team    Chief Complaint: \" I am not taking her placebo medication\"    Interim History:   Patient seen today in probate court where he remained agitated impulsive continuously interrupting the court hearings continues to report that he is not taking the placebo medication. He appears agitated internally stimulated throughout the hearing making bizarre hand gestures he misinterprets extremely poor insight and judgment      appetite:   [x] Normal/Unchanged  [] Increased  [] Decreased    Sleep:       [x] Normal/Unchanged  [] Fair       [] Poor              Energy:    [x] Normal/Unchanged  [] Increased  [] Decreased        SI [] Present  [x] Absent    HI  []Present  [x] Absent     Aggression:  [] yes  [x] no    Patient is [x] able  [] unable to CONTRACT FOR SAFETY     PAST MEDICAL/PSYCHIATRIC HISTORY:   Past Medical History:   Diagnosis Date    Anxiety     Asthma     no meds currently- mostly exercise induced    Claustrophobia     confined spaces with locked doors    Depression     Epigastric pain     Nausea     Nut allergy        FAMILY/SOCIAL HISTORY:  No family history on file.   Social History     Socioeconomic History    Marital status: Single     Spouse name: Not on file    Number of children: 0    Years of education: 13    Highest education level: Not on file   Occupational History    Occupation:      Employer: [employer]   Tobacco Use    Smoking status: Every Day     Types: E-Cigarettes    Smokeless tobacco: Never   Vaping Use    Vaping Use: Never used   Substance and Sexual Activity    Alcohol use: No    Drug use: Not Currently     Types: Marijuana Graciella Jessica)     Comment: states has a medical card    Sexual activity: Not Currently     Partners: Female   Other Topics Concern    Not on file   Social History Narrative    ** Merged History Encounter ** psychiatric personnel      Anticipated Length of stay: 7 to 21 days based on stability       NOTE: This report was transcribed using voice recognition software. Every effort was made to ensure accuracy; however, inadvertent computerized transcription errors may be present.     Electronically signed by KELVIN Montes CNP on 08/8/5724 at 12:25 PM

## 2023-11-03 PROCEDURE — 99232 SBSQ HOSP IP/OBS MODERATE 35: CPT | Performed by: NURSE PRACTITIONER

## 2023-11-03 PROCEDURE — 1240000000 HC EMOTIONAL WELLNESS R&B

## 2023-11-03 PROCEDURE — 6370000000 HC RX 637 (ALT 250 FOR IP): Performed by: NURSE PRACTITIONER

## 2023-11-03 RX ORDER — PALIPERIDONE 6 MG/1
6 TABLET, EXTENDED RELEASE ORAL DAILY
Status: DISCONTINUED | OUTPATIENT
Start: 2023-11-04 | End: 2023-11-08

## 2023-11-03 RX ORDER — PALIPERIDONE 3 MG/1
3 TABLET, EXTENDED RELEASE ORAL
Status: DISCONTINUED | OUTPATIENT
Start: 2023-11-03 | End: 2023-11-08

## 2023-11-03 RX ORDER — HALOPERIDOL 5 MG/ML
5 INJECTION INTRAMUSCULAR DAILY
Status: DISCONTINUED | OUTPATIENT
Start: 2023-11-04 | End: 2023-11-08

## 2023-11-03 RX ORDER — HALOPERIDOL 5 MG/ML
5 INJECTION INTRAMUSCULAR
Status: DISCONTINUED | OUTPATIENT
Start: 2023-11-03 | End: 2023-11-08

## 2023-11-03 RX ADMIN — VALPROIC ACID 250 MG: 250 SOLUTION ORAL at 20:53

## 2023-11-03 RX ADMIN — VALPROIC ACID 250 MG: 250 SOLUTION ORAL at 08:42

## 2023-11-03 RX ADMIN — PALIPERIDONE 3 MG: 3 TABLET, EXTENDED RELEASE ORAL at 08:42

## 2023-11-03 RX ADMIN — PALIPERIDONE 3 MG: 3 TABLET, EXTENDED RELEASE ORAL at 20:53

## 2023-11-03 ASSESSMENT — PAIN SCALES - GENERAL
PAINLEVEL_OUTOF10: 0

## 2023-11-03 NOTE — PROGRESS NOTES
BEHAVIORAL HEALTH FOLLOW-UP NOTE     11/3/2023     Patient was seen and examined in person, Chart reviewed   Patient's case discussed with staff/team    Chief Complaint: \" \"Thank you for making me retarded\"    Interim History:   I saw patient today in treatment team he is hostile and irritable sarcastic. He tells me Raquel Weathersers you for making me retarded and stupid or an angrier. \"  He uses multiple swear words. He has been taking his p.o. medication rather than having the injections. He is paranoid misinterpreting and labile. Denies suicidal ideations intent or plan denies auditory or visual hallucinations however he does appear to be preoccupied poor insight and judgment        appetite:   [x] Normal/Unchanged  [] Increased  [] Decreased    Sleep:       [x] Normal/Unchanged  [] Fair       [] Poor              Energy:    [x] Normal/Unchanged  [] Increased  [] Decreased        SI [] Present  [x] Absent    HI  []Present  [x] Absent     Aggression:  [] yes  [x] no    Patient is [x] able  [] unable to CONTRACT FOR SAFETY     PAST MEDICAL/PSYCHIATRIC HISTORY:   Past Medical History:   Diagnosis Date    Anxiety     Asthma     no meds currently- mostly exercise induced    Claustrophobia     confined spaces with locked doors    Depression     Epigastric pain     Nausea     Nut allergy        FAMILY/SOCIAL HISTORY:  No family history on file.   Social History     Socioeconomic History    Marital status: Single     Spouse name: Not on file    Number of children: 0    Years of education: 13    Highest education level: Not on file   Occupational History    Occupation:      Employer: [employer]   Tobacco Use    Smoking status: Every Day     Types: E-Cigarettes    Smokeless tobacco: Never   Vaping Use    Vaping Use: Never used   Substance and Sexual Activity    Alcohol use: No    Drug use: Not Currently     Types: Marijuana Neto Castro     Comment: states has a medical card    Sexual activity: Not Currently     Partners: 06/19/2021 06:52 PM    ETOH <10 06/19/2021 06:52 PM     Lab Results   Component Value Date/Time    TSH 1.240 04/18/2019 10:16 AM     No results found for: \"LITHIUM\"  Lab Results   Component Value Date    VALPROATE 36 (L) 06/01/2021           Treatment Plan:  The patient's diagnosis, treatment plan, medication management were formulated after patient was seen directly by the attending physician and myself and all relevant documentation was reviewed. Risk, benefit, side effects, possible outcomes of the medication and alternatives discussed with the patient and the patient demonstrated understanding. The patient was also educated that the outcome of treatment will depend on the medication compliance as directed by the prescribers along with regular follow-up, compliance with the labs and other work-up, as clinically indicated. Risk Management: Based on the diagnosis and assessment biopsychosocial treatment model was presented to the patient and was given the opportunity to ask any question. The patient was agreeable to the plan and all the patient's questions were answered to the patient's satisfaction. I discussed with the patient the risk, benefit, alternative and common side effects for the proposed medication treatment. The patient is consenting to this treatment. New Medications started during this admission :      Court ordered medications have been granted by the courts  Increase Invega to 6 mg daily and 3 mg at bedtime for psychosis if patient refuses Haldol 5 mg IM  Increase Depakote liquid 500 mg twice daily will increase is clinically indicated for stabilization of mood  We will plan for Invega Sustenna long-acting injection    Collateral information: Followed by social work  CD evaluation  Encourage patient to attend group and other milieu activities.   Discharge planning discussed with the patient and treatment team.    PSYCHOTHERAPY/COUNSELING:  [x] Therapeutic interview  [x]

## 2023-11-03 NOTE — PLAN OF CARE
Problem: Psychosis  Goal: Will report no hallucinations or delusions  Description: INTERVENTIONS:  1. Administer medication as  ordered  2. Assist with reality testing to support increasing orientation  3. Assess if patient's hallucinations or delusions are encouraging self harm or harm to others and intervene as appropriate    Plan for forced medications. Patient is to receive 5 mg of Haldol by IM injection if Zyprexa 3 mg tablet is refused  Plan for Invega Sustenna STRINGER 234 mg followed by [place] Sustenna 156 mg STRINGER one week after receiving initial dose   Patient is to have [place] 156 mg every 30 days   11/2/2023 1114 by Jimenez Theodore RN  Outcome: Not Progressing     Problem: Risk for Elopement  Goal: Patient will not exit the unit/facility without proper excort  11/2/2023 2126 by Laurita Monaco RN  Outcome: Progressing  11/2/2023 1114 by Jimenez Theodore RN  Outcome: Progressing     Problem: Anxiety  Goal: Will report anxiety at manageable levels  Description: INTERVENTIONS:  1. Administer medication as ordered  2. Teach and rehearse alternative coping skills  3. Provide emotional support with 1:1 interaction with staff  11/2/2023 2126 by Laurita Monaco RN  Outcome: Progressing  11/2/2023 1114 by Jimenez Theodore RN  Outcome: Not Progressing     Problem: Decision Making  Goal: Pt/Family able to effectively weigh alternatives and participate in decision making related to treatment and care  Description: INTERVENTIONS:  1. Determine when there are differences between patient's view, family's view, and healthcare provider's view of condition  2. Facilitate patient and family articulation of goals for care  3. Help patient and family identify pros/cons of alternative solutions  4. Provide information as requested by patient/family  5. Respect patient/family right to receive or not to receive information  6. Serve as a liaison between patient and family and health care team  7.  Initiate Consults from Ethics,

## 2023-11-03 NOTE — PROGRESS NOTES
Patient declined invitation to the following groups    [employer]- a handout was given to patient on group topic- Self love    Patient will continue to be provided with opportunities to enhance leisure skills/interests and/or coping mechanisms.

## 2023-11-03 NOTE — PLAN OF CARE
Problem: Anxiety  Goal: Will report anxiety at manageable levels  Description: INTERVENTIONS:  1. Administer medication as ordered  2. Teach and rehearse alternative coping skills  3. Provide emotional support with 1:1 interaction with staff  Outcome: Not Progressing     Problem: Decision Making  Goal: Pt/Family able to effectively weigh alternatives and participate in decision making related to treatment and care  Description: INTERVENTIONS:  1. Determine when there are differences between patient's view, family's view, and healthcare provider's view of condition  2. Facilitate patient and family articulation of goals for care  3. Help patient and family identify pros/cons of alternative solutions  4. Provide information as requested by patient/family  5. Respect patient/family right to receive or not to receive information  6. Serve as a liaison between patient and family and health care team  7. Initiate Consults from Ethics, Palliative Care or initiate [facility] [place] as is appropriate  Outcome: Not Progressing     Problem: Behavior  Goal: Pt/Family maintain appropriate behavior and adhere to behavioral management agreement, if implemented  Description: INTERVENTIONS:  1. Assess patient/family's coping skills and  non-compliant behavior (including use of illegal substances)  2. Notify security of behavior or suspected illegal substances which indicate the need for search of the family and/or belongings  3. Encourage verbalization of thoughts and concerns in a socially appropriate manner  4. Utilize positive, consistent limit setting strategies supporting safety of patient, staff and others  5. Encourage participation in the decision making process about the behavioral management agreement  6. If a visitor's behavior poses a threat to safety call refer to organization policy.   7. Initiate consult with , Psychosocial CNS, Spiritual Care as appropriate  Outcome: Not Progressing      PATIENT

## 2023-11-03 NOTE — PROGRESS NOTES
Blankets over head. States limited yes no answers. Continues to eat in room. Was invited to [facility] group. Declined to attend.

## 2023-11-03 NOTE — PROGRESS NOTES
Patient denies suicidal ideation, homicidal ideations and AVH. Patient rates anxiety and depression Aljuana Bowen a 5\" due to \"being here and situational.\"  Patient states \"I'm alright\" when asked how he is feeling. Patient appears flat, dismissive, irritable and labile with blunt/constricted responses and poor eye contact. Patient appears guarded, paranoid, preoccupied, suspicious and withdrawn. Patient states that he does not like hospital meals and requesting Ensure to be ordered. Tried to educate patient that there was a dietary consult ordered but patient continues to misinterpret information. Presents calm and cooperative during assessment. Patient is isolative to room and does not appear to be social with peers. Medications taken without issue. No complaints or concerns verbalized at this time. No unit problems reported. Will continue to observe and support.

## 2023-11-03 NOTE — PLAN OF CARE
Problem: Anxiety  Goal: Will report anxiety at manageable levels  Description: INTERVENTIONS:  1. Administer medication as ordered  2. Teach and rehearse alternative coping skills  3. Provide emotional support with 1:1 interaction with staff  11/3/2023 1859 by Luanne Wan RN  Outcome: Not Progressing  11/3/2023 1508 by Luz Elena Howell RN  Outcome: Not Progressing     Problem: Decision Making  Goal: Pt/Family able to effectively weigh alternatives and participate in decision making related to treatment and care  Description: INTERVENTIONS:  1. Determine when there are differences between patient's view, family's view, and healthcare provider's view of condition  2. Facilitate patient and family articulation of goals for care  3. Help patient and family identify pros/cons of alternative solutions  4. Provide information as requested by patient/family  5. Respect patient/family right to receive or not to receive information  6. Serve as a liaison between patient and family and health care team  7. Initiate Consults from Ethics, Palliative Care or initiate [address] as is appropriate  11/3/2023 1508 by Luz Elena Howell RN  Outcome: Not Progressing     Problem: Behavior  Goal: Pt/Family maintain appropriate behavior and adhere to behavioral management agreement, if implemented  Description: INTERVENTIONS:  1. Assess patient/family's coping skills and  non-compliant behavior (including use of illegal substances)  2. Notify security of behavior or suspected illegal substances which indicate the need for search of the family and/or belongings  3. Encourage verbalization of thoughts and concerns in a socially appropriate manner  4. Utilize positive, consistent limit setting strategies supporting safety of patient, staff and others  5. Encourage participation in the decision making process about the behavioral management agreement  6.  If a visitor's behavior poses a threat to safety call refer to organization policy. 7. Initiate consult with , Psychosocial CNS, Spiritual Care as appropriate  11/3/2023 7409 by Rodolfo Mirza RN  Outcome: Not Progressing  11/3/2023 1508 by Van Abrams RN  Outcome: Not Progressing     Problem: Depression/Self Harm  Goal: Effect of psychiatric condition will be minimized and patient will be protected from self harm  Description: INTERVENTIONS:  1. Assess impact of patient's symptoms on level of functioning, self care needs and offer support as indicated  2. Assess patient/family knowledge of depression, impact on illness and need for teaching  3. Provide emotional support, presence and reassurance  4. Assess for possible suicidal thoughts or ideation. If patient expresses suicidal thoughts or statements do not leave alone, initiate Suicide Precautions, move to a room close to the nursing station and obtain sitter  5. Initiate consults as appropriate with Mental Health Professional, Spiritual Care, Psychosocial CNS, and consider a recommendation to the [facility] for a Psychiatric Consultation  Outcome: Not Progressing     Problem: Psychosis  Goal: Will report no hallucinations or delusions  Description: INTERVENTIONS:  1. Administer medication as  ordered  2. Assist with reality testing to support increasing orientation  3. Assess if patient's hallucinations or delusions are encouraging self harm or harm to others and intervene as appropriate    Plan for forced medications.  Patient is to receive 5 mg of Haldol by IM injection if Zyprexa 3 mg tablet is refused  Plan for Invega Sustenna STRINGER 234 mg followed by Misael Thakkar Sustenna 156 mg STRINGER one week after receiving initial dose   Patient is to have [place] 156 mg every 30 days   Outcome: Not Progressing

## 2023-11-04 PROCEDURE — 99232 SBSQ HOSP IP/OBS MODERATE 35: CPT | Performed by: NURSE PRACTITIONER

## 2023-11-04 PROCEDURE — 6370000000 HC RX 637 (ALT 250 FOR IP): Performed by: NURSE PRACTITIONER

## 2023-11-04 PROCEDURE — 1240000000 HC EMOTIONAL WELLNESS R&B

## 2023-11-04 RX ADMIN — PALIPERIDONE 3 MG: 3 TABLET, EXTENDED RELEASE ORAL at 21:21

## 2023-11-04 RX ADMIN — VALPROIC ACID 250 MG: 250 SOLUTION ORAL at 21:21

## 2023-11-04 RX ADMIN — VALPROIC ACID 250 MG: 250 SOLUTION ORAL at 08:58

## 2023-11-04 RX ADMIN — PALIPERIDONE 6 MG: 6 TABLET, EXTENDED RELEASE ORAL at 08:58

## 2023-11-04 ASSESSMENT — PAIN SCALES - GENERAL: PAINLEVEL_OUTOF10: 0

## 2023-11-04 NOTE — PROGRESS NOTES
Patient is compliant with court ordered  medications, was encouraged to attend groups. Patient is guarded, irritable and preoccupied. Patient was encouraged to attend group therapy. Patient mostly seclusive to his room. Patient denies any hallucinations, denies suicidal ideation, denies homicidal ideation. Patient appetite is good.  Emotional support given

## 2023-11-04 NOTE — PLAN OF CARE
Problem: Risk for Elopement  Goal: Patient will not exit the unit/facility without proper excort  Outcome: Progressing     Problem: Anxiety  Goal: Will report anxiety at manageable levels  Description: INTERVENTIONS:  1. Administer medication as ordered  2. Teach and rehearse alternative coping skills  3. Provide emotional support with 1:1 interaction with staff  Outcome: Progressing     Problem: Decision Making  Goal: Pt/Family able to effectively weigh alternatives and participate in decision making related to treatment and care  Description: INTERVENTIONS:  1. Determine when there are differences between patient's view, family's view, and healthcare provider's view of condition  2. Facilitate patient and family articulation of goals for care  3. Help patient and family identify pros/cons of alternative solutions  4. Provide information as requested by patient/family  5. Respect patient/family right to receive or not to receive information  6. Serve as a liaison between patient and family and health care team  7. Initiate Consults from Ethics, Palliative Care or initiate [address] as is appropriate  Outcome: Progressing     Problem: Behavior  Goal: Pt/Family maintain appropriate behavior and adhere to behavioral management agreement, if implemented  Description: INTERVENTIONS:  1. Assess patient/family's coping skills and  non-compliant behavior (including use of illegal substances)  2. Notify security of behavior or suspected illegal substances which indicate the need for search of the family and/or belongings  3. Encourage verbalization of thoughts and concerns in a socially appropriate manner  4. Utilize positive, consistent limit setting strategies supporting safety of patient, staff and others  5. Encourage participation in the decision making process about the behavioral management agreement  6. If a visitor's behavior poses a threat to safety call refer to organization policy.   7. Initiate

## 2023-11-04 NOTE — PROGRESS NOTES
Patient declined invitation to the following groups    [employer]- patient was provided with a handout on today's group topic- Five ways to well being    Patient will continue to be provided with opportunities to enhance leisure skills/interests and/or coping mechanisms.

## 2023-11-04 NOTE — PROGRESS NOTES
Patient declined invitation to afternoon recreation activity [employer]. Patient educated and oriented as to where independent materials for leisure are located on the unit. Patient will continue to be provided with opportunities to enhance leisure skills/interests and/or coping mechanisms.

## 2023-11-05 PROCEDURE — 1240000000 HC EMOTIONAL WELLNESS R&B

## 2023-11-05 PROCEDURE — 6370000000 HC RX 637 (ALT 250 FOR IP): Performed by: NURSE PRACTITIONER

## 2023-11-05 RX ORDER — POLYETHYLENE GLYCOL 3350 17 G
POWDER IN PACKET (EA) ORAL
Status: DISCONTINUED
Start: 2023-11-05 | End: 2023-11-06 | Stop reason: WASHOUT

## 2023-11-05 RX ORDER — IBUPROFEN 400 MG/1
TABLET ORAL
Status: DISPENSED
Start: 2023-11-05 | End: 2023-11-06

## 2023-11-05 RX ADMIN — VALPROIC ACID 250 MG: 250 SOLUTION ORAL at 21:11

## 2023-11-05 RX ADMIN — PALIPERIDONE 6 MG: 6 TABLET, EXTENDED RELEASE ORAL at 09:07

## 2023-11-05 RX ADMIN — PALIPERIDONE 3 MG: 3 TABLET, EXTENDED RELEASE ORAL at 21:11

## 2023-11-05 RX ADMIN — VALPROIC ACID 250 MG: 250 SOLUTION ORAL at 09:07

## 2023-11-05 NOTE — PROGRESS NOTES
Patient denies suicidal ideation, denies homicidal ideation, denies hallucinations. Patient is compliant with court ordered medications. Patient is irritable and sarcastic with staff. Patient was encouraged to attend group therapy. Patient isolates to his room. Appetite is good.  Behavior in control

## 2023-11-05 NOTE — PLAN OF CARE
Problem: Risk for Elopement  Goal: Patient will not exit the unit/facility without proper excort  Outcome: Progressing     Problem: Anxiety  Goal: Will report anxiety at manageable levels  Description: INTERVENTIONS:  1. Administer medication as ordered  2. Teach and rehearse alternative coping skills  3. Provide emotional support with 1:1 interaction with staff  11/5/2023 0951 by Michael Lyles RN  Outcome: Progressing  11/5/2023 0025 by Brianna Curry RN  Outcome: Progressing     Problem: Decision Making  Goal: Pt/Family able to effectively weigh alternatives and participate in decision making related to treatment and care  Description: INTERVENTIONS:  1. Determine when there are differences between patient's view, family's view, and healthcare provider's view of condition  2. Facilitate patient and family articulation of goals for care  3. Help patient and family identify pros/cons of alternative solutions  4. Provide information as requested by patient/family  5. Respect patient/family right to receive or not to receive information  6. Serve as a liaison between patient and family and health care team  7. Initiate Consults from Ethics, Palliative Care or initiate [address] as is appropriate  11/5/2023 0951 by Michael Lyles RN  Outcome: Progressing  11/5/2023 0025 by Brianna Curry RN  Outcome: Progressing     Problem: Behavior  Goal: Pt/Family maintain appropriate behavior and adhere to behavioral management agreement, if implemented  Description: INTERVENTIONS:  1. Assess patient/family's coping skills and  non-compliant behavior (including use of illegal substances)  2. Notify security of behavior or suspected illegal substances which indicate the need for search of the family and/or belongings  3. Encourage verbalization of thoughts and concerns in a socially appropriate manner  4.  Utilize positive, consistent limit setting strategies supporting safety of patient, staff and to have Invega Sustenna 156 mg every 30 days   Outcome: Progressing

## 2023-11-05 NOTE — PROGRESS NOTES
Patient swearing at staff, states \"I'm never coming out of my room again if you're going to torture me like this\". Patient laying on floor, spitting on floor towards nurse, swearing and using vulgar language. Patient laying on floor , redirected patient to day area , distracted patient suggested he call family. Emotional support given. Patient then moved to chair and watched football with peers.

## 2023-11-05 NOTE — PROGRESS NOTES
Patient lying on the floor refusing to get up because of lock out. After advising patient to get up and if not cooperating [employer] will come to assist. Patient got up and spit on the floor and called  this nurse a [employer].

## 2023-11-05 NOTE — PROGRESS NOTES
Patient apologized for verbal outburst earlier today. \"I talked to my parents and they said I should comply with what you tell me to do\". Patient more social and has a more positive attitude after dinner. Patient interacting with peers. Appetite good.

## 2023-11-05 NOTE — PROGRESS NOTES
BEHAVIORAL HEALTH FOLLOW-UP NOTE     11/5/2023     Patient was seen and examined in person, Chart reviewed   Patient's case discussed with staff/team    Chief Complaint: \"I only have one thing to say to you and I wrote it down because maybe then you listen to me\"    Interim History:   I went to see the patient this morning in his room he is disheveled and malodorous. He remains irritable and refuses to answer any questions for me states that he only has one thing to say to me he wrote it down because he does not believe that we are listening to him he states \"I am suicidal until I get Vyvanse and medical marijuana\" he refuses to answer any further questions for me    appetite:   [x] Normal/Unchanged  [] Increased  [] Decreased    Sleep:       [x] Normal/Unchanged  [] Fair       [] Poor              Energy:    [x] Normal/Unchanged  [] Increased  [] Decreased        SI [] Present  [x] Absent    HI  []Present  [x] Absent     Aggression:  [] yes  [x] no    Patient is [x] able  [] unable to CONTRACT FOR SAFETY     PAST MEDICAL/PSYCHIATRIC HISTORY:   Past Medical History:   Diagnosis Date    Anxiety     Asthma     no meds currently- mostly exercise induced    Claustrophobia     confined spaces with locked doors    Depression     Epigastric pain     Nausea     Nut allergy        FAMILY/SOCIAL HISTORY:  No family history on file.   Social History     Socioeconomic History    Marital status: Single     Spouse name: Not on file    Number of children: 0    Years of education: 13    Highest education level: Not on file   Occupational History    Occupation:      Employer: [employer]   Tobacco Use    Smoking status: Every Day     Types: E-Cigarettes    Smokeless tobacco: Never   Vaping Use    Vaping Use: Never used   Substance and Sexual Activity    Alcohol use: No    Drug use: Not Currently     Types: Marijuana Alycianishant Jesus)     Comment: states has a medical card    Sexual activity: Not Currently     Partners: Female   Other patient to attend group and other milieu activities. Discharge planning discussed with the patient and treatment team.    PSYCHOTHERAPY/COUNSELING:  [x] Therapeutic interview  [x] Supportive  [] CBT  [] Ongoing  [] Other    [x] Patient continues to need, on a daily basis, active treatment furnished directly by or requiring the supervision of inpatient psychiatric personnel      Anticipated Length of stay: 7 to 21 days based on stability       NOTE: This report was transcribed using voice recognition software. Every effort was made to ensure accuracy; however, inadvertent computerized transcription errors may be present.     Electronically signed by KELVIN Smith CNP on 12/7/3765 at 9:02 AM

## 2023-11-05 NOTE — PLAN OF CARE
Pt resting in room, with a blanket covering his eyes. Pt denies SI, HI and AVH. Pt dismissive with staff and uninterested in cooperating more than he is required. Problem: Anxiety  Goal: Will report anxiety at manageable levels  Description: INTERVENTIONS:  1. Administer medication as ordered  2. Teach and rehearse alternative coping skills  3. Provide emotional support with 1:1 interaction with staff  11/5/2023 0025 by Dmitriy Wang RN  Outcome: Progressing     Problem: Decision Making  Goal: Pt/Family able to effectively weigh alternatives and participate in decision making related to treatment and care  Description: INTERVENTIONS:  1. Determine when there are differences between patient's view, family's view, and healthcare provider's view of condition  2. Facilitate patient and family articulation of goals for care  3. Help patient and family identify pros/cons of alternative solutions  4. Provide information as requested by patient/family  5. Respect patient/family right to receive or not to receive information  6. Serve as a liaison between patient and family and health care team  7. Initiate Consults from Ethics, Palliative Care or initiate [address] as is appropriate  11/5/2023 0025 by Dmitriy Wang RN  Outcome: Progressing     Problem: Behavior  Goal: Pt/Family maintain appropriate behavior and adhere to behavioral management agreement, if implemented  Description: INTERVENTIONS:  1. Assess patient/family's coping skills and  non-compliant behavior (including use of illegal substances)  2. Notify security of behavior or suspected illegal substances which indicate the need for search of the family and/or belongings  3. Encourage verbalization of thoughts and concerns in a socially appropriate manner  4. Utilize positive, consistent limit setting strategies supporting safety of patient, staff and others  5.  Encourage participation in the decision making process about the behavioral

## 2023-11-06 PROCEDURE — 99232 SBSQ HOSP IP/OBS MODERATE 35: CPT | Performed by: NURSE PRACTITIONER

## 2023-11-06 PROCEDURE — 6370000000 HC RX 637 (ALT 250 FOR IP): Performed by: NURSE PRACTITIONER

## 2023-11-06 PROCEDURE — 1240000000 HC EMOTIONAL WELLNESS R&B

## 2023-11-06 RX ADMIN — VALPROIC ACID 250 MG: 250 SOLUTION ORAL at 09:18

## 2023-11-06 RX ADMIN — PALIPERIDONE 6 MG: 6 TABLET, EXTENDED RELEASE ORAL at 09:18

## 2023-11-06 RX ADMIN — VALPROIC ACID 250 MG: 250 SOLUTION ORAL at 20:57

## 2023-11-06 RX ADMIN — PALIPERIDONE 3 MG: 3 TABLET, EXTENDED RELEASE ORAL at 20:57

## 2023-11-06 NOTE — PROGRESS NOTES
Patient attended morning community meeting. Updated on staffing assignments and daily expectations. Shared goal for the day as attend group.

## 2023-11-06 NOTE — PLAN OF CARE
Pt resting in room with eyes closed. Pt denies SI, HI and AVH. Pt states that he is feeling better, \"and I'm surprised I'm feeling better. \" Pt bright and cooperative. Problem: Anxiety  Goal: Will report anxiety at manageable levels  Description: INTERVENTIONS:  1. Administer medication as ordered  2. Teach and rehearse alternative coping skills  3. Provide emotional support with 1:1 interaction with staff  11/5/2023 2145 by Marek Dorantes RN  Outcome: Progressing     Problem: Decision Making  Goal: Pt/Family able to effectively weigh alternatives and participate in decision making related to treatment and care  Description: INTERVENTIONS:  1. Determine when there are differences between patient's view, family's view, and healthcare provider's view of condition  2. Facilitate patient and family articulation of goals for care  3. Help patient and family identify pros/cons of alternative solutions  4. Provide information as requested by patient/family  5. Respect patient/family right to receive or not to receive information  6. Serve as a liaison between patient and family and health care team  7. Initiate Consults from Ethics, Palliative Care or initiate [id number] as is appropriate  11/5/2023 2145 by Marek Dorantes RN  Outcome: Progressing     Problem: Behavior  Goal: Pt/Family maintain appropriate behavior and adhere to behavioral management agreement, if implemented  Description: INTERVENTIONS:  1. Assess patient/family's coping skills and  non-compliant behavior (including use of illegal substances)  2. Notify security of behavior or suspected illegal substances which indicate the need for search of the family and/or belongings  3. Encourage verbalization of thoughts and concerns in a socially appropriate manner  4. Utilize positive, consistent limit setting strategies supporting safety of patient, staff and others  5.  Encourage participation in the decision making process about the

## 2023-11-06 NOTE — CARE COORDINATION
NILA met with pt. Pt found in his room, speech is rapid and pressured. He states that he is feeling much better on these medications and plans to keep taking them and going to group with hopes of being discharged soon. Pt is understanding that he just started to take medications and knows that the provider will want to see a number of days of stability prior to his discharge. Pt reports that he has treated with [facility] in the past and is agreeable to treating there again. He denied SI/HI/AVH, is pleasant. NILA contacted [facility]  to schedule appointment for pt. Pt has appointment 11/22 at 9am. This is their soonest appointment due to staff being on vacation.

## 2023-11-06 NOTE — PROGRESS NOTES
BEHAVIORAL HEALTH FOLLOW-UP NOTE     11/6/2023     Patient was seen and examined in person, Chart reviewed   Patient's case discussed with staff/team    Chief Complaint: \"I owe you 100 apologies\"    Interim History:   I patient this afternoon in his room. He is lying in bed. He states that he can \"feel the medications kicking in. \"  And that he \"owes you 100 apologies. \"  He states that he has been rude and mean and he apologizes for his behaviors. He states that he is feeling better and he does feel the medications starting to work now. Staff indicates that he has been out in the milieu and attending groups. He had his long-acting injection today. He is eating well sleeping well and no neurovegetative signs of depression no overt or covert signs of psychosis. Less irritable today improved insight and judgment denies SI/HI intent or plan denies auditory or visual hallucinations still tends to be isolate to himself and does not socialize with peers      Appetite:   [x] Normal/Unchanged  [] Increased  [] Decreased    Sleep:       [x] Normal/Unchanged  [] Fair       [] Poor              Energy:    [x] Normal/Unchanged  [] Increased  [] Decreased        SI [] Present  [x] Absent    HI  []Present  [x] Absent     Aggression:  [] yes  [x] no    Patient is [x] able  [] unable to CONTRACT FOR SAFETY     PAST MEDICAL/PSYCHIATRIC HISTORY:   Past Medical History:   Diagnosis Date    Anxiety     Asthma     no meds currently- mostly exercise induced    Claustrophobia     confined spaces with locked doors    Depression     Epigastric pain     Nausea     Nut allergy        FAMILY/SOCIAL HISTORY:  No family history on file.   Social History     Socioeconomic History    Marital status: Single     Spouse name: Not on file    Number of children: 0    Years of education: 13    Highest education level: Not on file   Occupational History    Occupation:      Employer: [employer]   Tobacco Use    Smoking status: Every Day this treatment. New Medications started during this admission :      Court ordered medications have been granted by the courts  Increase Invega to 6 mg daily and 3 mg at bedtime for psychosis if patient refuses Haldol 5 mg IM  Continue Depakote 250 mg twice daily will increase is clinically indicated for stabilization of mood  We will plan for Invega Sustenna long-acting injection    Collateral information: Followed by social work  CD evaluation  Encourage patient to attend group and other milieu activities. Discharge planning discussed with the patient and treatment team.    PSYCHOTHERAPY/COUNSELING:  [x] Therapeutic interview  [x] Supportive  [] CBT  [] Ongoing  [] Other    [x] Patient continues to need, on a daily basis, active treatment furnished directly by or requiring the supervision of inpatient psychiatric personnel      Anticipated Length of stay: 7 to 21 days based on stability       NOTE: This report was transcribed using voice recognition software. Every effort was made to ensure accuracy; however, inadvertent computerized transcription errors may be present.     Electronically signed by KELVIN Conde CNP on 87/8/0568 at 1:45 PM intact

## 2023-11-06 NOTE — GROUP NOTE
Group Therapy Note    Date: 11/6/2023    Group Start Time: 2903  Group End Time: 4881  Group Topic: Psychoeducation    [facility]    Shonda Douglas                                                                        Group Therapy Note    Date: 11/6/2023  Module Name:  processing big changes   Patient's Goal:  patient will be able to id key steps to be successful with change. Notes:  Pleasant and sharing in group, able to id key changes to make to manager stressors   when feeling overwhelmed with change. Status After Intervention:  Improved    Participation Level:  Active Listener and Interactive    Participation Quality: Appropriate and Attentive      Speech:  normal      Thought Process/Content: Logical      Affective Functioning: Congruent      Mood: euthymic      Level of consciousness:  Alert, Oriented x4, and Attentive      Response to Learning: Able to verbalize/acknowledge new learning, Able to retain information, and Progressing to goal      Endings: None Reported    Modes of Intervention: Education, Support, Socialization, and Problem-solving      Discipline Responsible: Psychoeducational Specialist      Signature:  Shonda Douglas

## 2023-11-06 NOTE — GROUP NOTE
Group Therapy Note    Date: 11/6/2023    Group Start Time: 1100  Group End Time: 1150  Group Topic: Psychotherapy    [facility]    Celestine Jorge MSW, LSW        Group Therapy Note    Attendees: 8       Patient's Goal:  To increase social interaction and improve relationships with others. Notes:  Pt was attentive in group and was able to identify an agenda. They were also able to verbalize relating to others within the group. Status After Intervention:  Unchanged    Participation Level: Active Listener and Interactive    Participation Quality: Appropriate, Attentive, and Sharing      Speech:  normal      Thought Process/Content: Logical      Affective Functioning: Congruent      Mood: anxious      Level of consciousness:  Alert and Attentive      Response to Learning: Able to verbalize current knowledge/experience, Able to verbalize/acknowledge new learning, and Able to retain information      Endings: None Reported    Modes of Intervention: Education, Support, Socialization, Exploration, Clarifying, and Problem-solving      Discipline Responsible: /Counselor      Signature:   TheBRIGITTE Floyd LSW

## 2023-11-07 PROCEDURE — 99232 SBSQ HOSP IP/OBS MODERATE 35: CPT | Performed by: NURSE PRACTITIONER

## 2023-11-07 PROCEDURE — 6370000000 HC RX 637 (ALT 250 FOR IP): Performed by: NURSE PRACTITIONER

## 2023-11-07 PROCEDURE — 1240000000 HC EMOTIONAL WELLNESS R&B

## 2023-11-07 RX ADMIN — PALIPERIDONE 3 MG: 3 TABLET, EXTENDED RELEASE ORAL at 21:06

## 2023-11-07 RX ADMIN — VALPROIC ACID 250 MG: 250 SOLUTION ORAL at 09:23

## 2023-11-07 RX ADMIN — PALIPERIDONE 6 MG: 6 TABLET, EXTENDED RELEASE ORAL at 09:23

## 2023-11-07 RX ADMIN — VALPROIC ACID 250 MG: 250 SOLUTION ORAL at 21:06

## 2023-11-07 ASSESSMENT — PAIN SCALES - GENERAL
PAINLEVEL_OUTOF10: 0
PAINLEVEL_OUTOF10: 0

## 2023-11-07 ASSESSMENT — PAIN - FUNCTIONAL ASSESSMENT: PAIN_FUNCTIONAL_ASSESSMENT: ACTIVITIES ARE NOT PREVENTED

## 2023-11-07 NOTE — PLAN OF CARE
Problem: Behavior  Goal: Pt/Family maintain appropriate behavior and adhere to behavioral management agreement, if implemented  Description: INTERVENTIONS:  1. Assess patient/family's coping skills and  non-compliant behavior (including use of illegal substances)  2. Notify security of behavior or suspected illegal substances which indicate the need for search of the family and/or belongings  3. Encourage verbalization of thoughts and concerns in a socially appropriate manner  4. Utilize positive, consistent limit setting strategies supporting safety of patient, staff and others  5. Encourage participation in the decision making process about the behavioral management agreement  6. If a visitor's behavior poses a threat to safety call refer to organization policy. 7. Initiate consult with , Psychosocial CNS, Spiritual Care as appropriate  Outcome: Progressing     Problem: Depression/Self Harm  Goal: Effect of psychiatric condition will be minimized and patient will be protected from self harm  Description: INTERVENTIONS:  1. Assess impact of patient's symptoms on level of functioning, self care needs and offer support as indicated  2. Assess patient/family knowledge of depression, impact on illness and need for teaching  3. Provide emotional support, presence and reassurance  4. Assess for possible suicidal thoughts or ideation. If patient expresses suicidal thoughts or statements do not leave alone, initiate Suicide Precautions, move to a room close to the nursing station and obtain sitter  5. Initiate consults as appropriate with Mental Health Professional, Spiritual Care, Psychosocial CNS, and consider a recommendation to the LIP for a Psychiatric Consultation  Outcome: Progressing        Patient has been pleasant and cooperative. Pressured and tangential during conversation. Focus on how well he feels on mediation\". Rambled on about his medication and the event that happened.  Denies suicidal and

## 2023-11-07 NOTE — GROUP NOTE
Group Therapy Note    Date: 11/7/2023    Group Start Time: 1110  Group End Time: 5213  Group Topic: Psychotherapy    [facility] [address] [facility] [address], BRIGITTE, [facility]        Group Therapy Note    Attendees: 5       Patient's Goal:  To increase social interaction and improve relationships with others. Notes:  Pt was attentive in group and was able to identify an agenda. They were also able to verbalize relating to others within the group. Status After Intervention:  Improved    Participation Level:  Active Listener and Interactive    Participation Quality: Appropriate and Attentive      Speech:  normal      Thought Process/Content: Logical  Linear      Affective Functioning: Congruent      Mood: anxious and depressed      Level of consciousness:  Alert, Oriented x4, and Attentive      Response to Learning: Able to verbalize current knowledge/experience, Able to verbalize/acknowledge new learning, Able to retain information, and Capable of insight      Endings: None Reported    Modes of Intervention: Support, Socialization, and Exploration      Discipline Responsible: /Counselor      Signature:  BRIGITTE Ribera, [place]

## 2023-11-07 NOTE — CARE COORDINATION
Navigator met with patient and discussed the outcome of the probate court hearing. Based upon testimony, the  determined the patient to be subject to court order and placed patient on a 90 day commitment to the [address] [place] [address] for a period of 90 days. Navigator reviewed AOT requirements and expectations with patient. Patient verbalized understanding and provided written consent on the AOT Acknowledgment, [id number]. Faxed signed forms to [address] [place] [address]    Navigator to send communication to [id number] [address] AOT  Abel Slater. Patient remains agreeable to step-down to the [facility]. Expressed willingness to engage in his AOT treatment and reports that he had did well on the program in the past. Patient admits that towards then end he fell off because he ran into legal issues.     Electronically signed by BRIGITTE Freeman, ANNA on 11/7/2023 at 12:26 PM

## 2023-11-07 NOTE — PROGRESS NOTES
BEHAVIORAL HEALTH FOLLOW-UP NOTE     11/7/2023     Patient was seen and examined in person, Chart reviewed   Patient's case discussed with staff/team    Chief Complaint: \"I am feeling a lot better\"    Interim History:   I patient this morning in his room along with Dr. Yimi Adames. He said subs and speaks with us and again states that he needs to apologize to us due to his behaviors and states that he is feeling better with the medications. He asks appropriate questions such as asking how long he needs to stay on the oral medications as he is on the shot. He is also asking about a possible discharge date and he was informed that he would be out sometime this week and that his plan would be to step down the crisis unit per the court order. He denies SI/HI intent or plan denies auditory or visual hallucinations he has been attending groups no behavior disturbances noted on the unit. Staff indicates that he was laughing and joking appropriately. Appetite:   [x] Normal/Unchanged  [] Increased  [] Decreased    Sleep:       [x] Normal/Unchanged  [] Fair       [] Poor              Energy:    [x] Normal/Unchanged  [] Increased  [] Decreased        SI [] Present  [x] Absent    HI  []Present  [x] Absent     Aggression:  [] yes  [x] no    Patient is [x] able  [] unable to CONTRACT FOR SAFETY     PAST MEDICAL/PSYCHIATRIC HISTORY:   Past Medical History:   Diagnosis Date    Anxiety     Asthma     no meds currently- mostly exercise induced    Claustrophobia     confined spaces with locked doors    Depression     Epigastric pain     Nausea     Nut allergy        FAMILY/SOCIAL HISTORY:  No family history on file.   Social History     Socioeconomic History    Marital status: Single     Spouse name: Not on file    Number of children: 0    Years of education: 13    Highest education level: Not on file   Occupational History    Occupation:      Employer: [employer]   Tobacco Use    Smoking status: Every Day     Types: treatment. New Medications started during this admission :      Court ordered medications have been granted by the courts  Continue Invega to 6 mg daily and 3 mg at bedtime for psychosis if patient refuses Haldol 5 mg IM  Continue Depakote liquid 250 mg twice daily will increase is clinically indicated for stabilization of mood  We will plan for Invega Sustenna long-acting injection    Collateral information: Followed by social work  CD evaluation  Encourage patient to attend group and other milieu activities. Discharge planning discussed with the patient and treatment team.    PSYCHOTHERAPY/COUNSELING:  [x] Therapeutic interview  [x] Supportive  [] CBT  [] Ongoing  [] Other    [x] Patient continues to need, on a daily basis, active treatment furnished directly by or requiring the supervision of inpatient psychiatric personnel      Anticipated Length of stay: 7 to 21 days based on stability       NOTE: This report was transcribed using voice recognition software. Every effort was made to ensure accuracy; however, inadvertent computerized transcription errors may be present.     Electronically signed by KELVIN Smith CNP on 66/8/4067 at 12:02 PM

## 2023-11-07 NOTE — PROGRESS NOTES
Patient attended morning community meeting. Updated on staffing assignments and daily expectations. Shared goal for the day as to stay hygenic.

## 2023-11-07 NOTE — CARE COORDINATION
Sent communication to [address] [place] [address] Arnold Sierra, informing of tentative step-down.     Electronically signed by Sita Mccullough MSUNA, LSW on 11/7/2023 at 3:48 PM

## 2023-11-07 NOTE — PLAN OF CARE
Problem: Anxiety  Goal: Will report anxiety at manageable levels  Description: INTERVENTIONS:  1. Administer medication as ordered  2. Teach and rehearse alternative coping skills  3. Provide emotional support with 1:1 interaction with staff  Outcome: Progressing     Problem: Decision Making  Goal: Pt/Family able to effectively weigh alternatives and participate in decision making related to treatment and care  Description: INTERVENTIONS:  1. Determine when there are differences between patient's view, family's view, and healthcare provider's view of condition  2. Facilitate patient and family articulation of goals for care  3. Help patient and family identify pros/cons of alternative solutions  4. Provide information as requested by patient/family  5. Respect patient/family right to receive or not to receive information  6. Serve as a liaison between patient and family and health care team  7. Initiate Consults from Ethics, Palliative Care or initiate [email or web address] [place] as is appropriate  Outcome: Progressing     Problem: Behavior  Goal: Pt/Family maintain appropriate behavior and adhere to behavioral management agreement, if implemented  Description: INTERVENTIONS:  1. Assess patient/family's coping skills and  non-compliant behavior (including use of illegal substances)  2. Notify security of behavior or suspected illegal substances which indicate the need for search of the family and/or belongings  3. Encourage verbalization of thoughts and concerns in a socially appropriate manner  4. Utilize positive, consistent limit setting strategies supporting safety of patient, staff and others  5. Encourage participation in the decision making process about the behavioral management agreement  6. If a visitor's behavior poses a threat to safety call refer to organization policy.   7. Initiate consult with , Psychosocial CNS, Spiritual Care as appropriate  11/7/2023 1441 by Jeff Santos RN  Outcome:

## 2023-11-08 LAB
DATE LAST DOSE: ABNORMAL
TME LAST DOSE: ABNORMAL H
VALPROATE SERPL-MCNC: 24 UG/ML (ref 50–100)
VANCOMYCIN DOSE: ABNORMAL MG

## 2023-11-08 PROCEDURE — 36415 COLL VENOUS BLD VENIPUNCTURE: CPT

## 2023-11-08 PROCEDURE — 80164 ASSAY DIPROPYLACETIC ACD TOT: CPT

## 2023-11-08 PROCEDURE — 6370000000 HC RX 637 (ALT 250 FOR IP): Performed by: NURSE PRACTITIONER

## 2023-11-08 PROCEDURE — 99239 HOSP IP/OBS DSCHRG MGMT >30: CPT | Performed by: NURSE PRACTITIONER

## 2023-11-08 PROCEDURE — 1240000000 HC EMOTIONAL WELLNESS R&B

## 2023-11-08 RX ORDER — PALIPERIDONE 6 MG/1
6 TABLET, EXTENDED RELEASE ORAL DAILY
Qty: 30 TABLET | Refills: 0 | Status: SHIPPED | OUTPATIENT
Start: 2023-11-09 | End: 2023-11-08 | Stop reason: HOSPADM

## 2023-11-08 RX ORDER — RISPERIDONE 2 MG/1
2 TABLET ORAL 2 TIMES DAILY
Qty: 60 TABLET | Refills: 0 | Status: SHIPPED | OUTPATIENT
Start: 2023-11-08 | End: 2023-11-10 | Stop reason: HOSPADM

## 2023-11-08 RX ORDER — VALPROIC ACID 250 MG/5ML
250 SOLUTION ORAL 2 TIMES DAILY
Qty: 300 ML | Refills: 0 | Status: SHIPPED | OUTPATIENT
Start: 2023-11-08 | End: 2023-11-10 | Stop reason: HOSPADM

## 2023-11-08 RX ORDER — RISPERIDONE 2 MG/1
2 TABLET ORAL 2 TIMES DAILY
Status: DISCONTINUED | OUTPATIENT
Start: 2023-11-08 | End: 2023-11-10

## 2023-11-08 RX ORDER — LANOLIN ALCOHOL/MO/W.PET/CERES
3 CREAM (GRAM) TOPICAL NIGHTLY PRN
Qty: 30 TABLET | Refills: 0 | Status: SHIPPED | OUTPATIENT
Start: 2023-11-08 | End: 2023-12-08

## 2023-11-08 RX ORDER — NICOTINE 21 MG/24HR
1 PATCH, TRANSDERMAL 24 HOURS TRANSDERMAL DAILY
Qty: 30 PATCH | Refills: 0
Start: 2023-11-08 | End: 2023-12-08

## 2023-11-08 RX ORDER — PALIPERIDONE 3 MG/1
3 TABLET, EXTENDED RELEASE ORAL
Qty: 30 TABLET | Refills: 0 | Status: SHIPPED | OUTPATIENT
Start: 2023-11-08 | End: 2023-11-08 | Stop reason: HOSPADM

## 2023-11-08 RX ADMIN — PALIPERIDONE 6 MG: 6 TABLET, EXTENDED RELEASE ORAL at 09:04

## 2023-11-08 RX ADMIN — RISPERIDONE 2 MG: 2 TABLET ORAL at 22:43

## 2023-11-08 RX ADMIN — VALPROIC ACID 250 MG: 250 SOLUTION ORAL at 22:43

## 2023-11-08 RX ADMIN — VALPROIC ACID 250 MG: 250 SOLUTION ORAL at 09:04

## 2023-11-08 NOTE — CARE COORDINATION
NILA contacted pt mother Alina Client  (PATRICIO signed) to inform her of pt discharge to [address] today. She states that she has no concerns for pt discharge to [address] today. She states that she is happy pt is doing better. NILA contacted [facility] Stabil[facility]  and spoke with Sonia Curtis to inform her that pt has long acting injection due 11/13, but per Romelia Cabral NP, it is recommended this injection be given 11/10. She verbalized understanding. Transportation will need to be [facility] to [facility] [phone].       In order to ensure appropriate transition and discharge planning is in place, the following documents have been transmitted to [facility] and crisis unit, as the new outpatient provider:    The d/c diagnosis was transmitted to the next care provider  The reason for hospitalization was transmitted to the next care provider  The d/c medications (dosage and indication) were transmitted to the next care provider   The continuing care plan was transmitted to the next care provider

## 2023-11-08 NOTE — PROGRESS NOTES
Patient declined invitation to the following groups    [employer]- patient provided a handout on today's group topic- mindfulness  Cinema Therapy  Recreation Activity- apples to apples  Pet therapy    Patient will continue to be provided with opportunities to enhance leisure skills/interests and/or coping mechanisms.

## 2023-11-08 NOTE — PROGRESS NOTES
CLINICAL PHARMACY NOTE: MEDS TO BEDS    Total # of Prescriptions Filled: 3   The following medications were delivered to the patient:  Melatonin 3  Risperidone 2  Valproic 250/5    Additional Documentation:   To ETHAN Mitchell

## 2023-11-08 NOTE — PROGRESS NOTES
Overall feeling improved denies any SI HI or onofre out in day room this evening retired to bed early

## 2023-11-08 NOTE — CARE COORDINATION
NILA contacted pt mother Kristian Specter  (PATRICIO signed) to inform her pt discharge is postponed until long acting injection is able to be figured out. No answer, NILA left voicemail advising her to call back with questions or concerns.

## 2023-11-08 NOTE — PROGRESS NOTES
BEHAVIORAL HEALTH FOLLOW-UP NOTE     11/9/2023     Patient was seen and examined in person, Chart reviewed   Patient's case discussed with staff/team    Chief Complaint: \"I would like to just stay in my room today\"    Interim History:   Patient seen in his room today. He asks appropriate questions. He understands that he will be stepping on the crisis unit after he gets his long-acting booster. He understands that we are having issues having this injection covered on an outpatient basis. He asks what will happen at his outpatient agency. Explained to him that if they are able to get it covered and they would likely continue with the [place] but they are unable to get it covered by insurance then they may switch him to another injection. I explained to him that is on option for us as we already gave him the first [place] injection. He understands this. He asks if he is able to stay in his room and not go to groups today because he is feeling tired. He denies suicidal ideations intent or plan denies any auditory or visual hallucinations no behavior disturbances noted does not appear to be internally stimulated and preoccupied agreeable with plan for discharge once he gets his booster injection    Appetite:   [x] Normal/Unchanged  [] Increased  [] Decreased    Sleep:       [x] Normal/Unchanged  [] Fair       [] Poor              Energy:    [x] Normal/Unchanged  [] Increased  [] Decreased        SI [] Present  [x] Absent    HI  []Present  [x] Absent     Aggression:  [] yes  [x] no    Patient is [x] able  [] unable to CONTRACT FOR SAFETY     PAST MEDICAL/PSYCHIATRIC HISTORY:   Past Medical History:   Diagnosis Date    Anxiety     Asthma     no meds currently- mostly exercise induced    Claustrophobia     confined spaces with locked doors    Depression     Epigastric pain     Nausea     Nut allergy        FAMILY/SOCIAL HISTORY:  No family history on file.   Social History     Socioeconomic History health  Resolved Problems:    * No resolved hospital problems. *      LABS:    No results for input(s): \"WBC\", \"HGB\", \"PLT\" in the last 72 hours. No results for input(s): \"NA\", \"K\", \"CL\", \"CO2\", \"BUN\", \"CREATININE\", \"GLUCOSE\" in the last 72 hours. No results for input(s): \"BILITOT\", \"ALKPHOS\", \"AST\", \"ALT\" in the last 72 hours. Lab Results   Component Value Date/Time    LABAMPH NOT DETECTED 06/19/2021 06:52 PM    BARBSCNU NEGATIVE 10/26/2023 10:40 PM    LABBENZ NEGATIVE 10/26/2023 10:40 PM    LABMETH NEGATIVE 10/26/2023 10:40 PM    OPIATESCREENURINE NOT DETECTED 06/19/2021 06:52 PM    PHENCYCLIDINESCREENURINE NOT DETECTED 06/19/2021 06:52 PM    ETOH <10 06/19/2021 06:52 PM     Lab Results   Component Value Date/Time    TSH 1.240 04/18/2019 10:16 AM     No results found for: \"LITHIUM\"  Lab Results   Component Value Date    VALPROATE 24 (L) 11/08/2023           Treatment Plan:  The patient's diagnosis, treatment plan, medication management were formulated after patient was seen directly by the attending physician and myself and all relevant documentation was reviewed. Risk, benefit, side effects, possible outcomes of the medication and alternatives discussed with the patient and the patient demonstrated understanding. The patient was also educated that the outcome of treatment will depend on the medication compliance as directed by the prescribers along with regular follow-up, compliance with the labs and other work-up, as clinically indicated. Risk Management: Based on the diagnosis and assessment biopsychosocial treatment model was presented to the patient and was given the opportunity to ask any question. The patient was agreeable to the plan and all the patient's questions were answered to the patient's satisfaction. I discussed with the patient the risk, benefit, alternative and common side effects for the proposed medication treatment. The patient is consenting to this treatment.      New

## 2023-11-08 NOTE — PLAN OF CARE
Problem: Behavior  Goal: Pt/Family maintain appropriate behavior and adhere to behavioral management agreement, if implemented  Description: INTERVENTIONS:  1. Assess patient/family's coping skills and  non-compliant behavior (including use of illegal substances)  2. Notify security of behavior or suspected illegal substances which indicate the need for search of the family and/or belongings  3. Encourage verbalization of thoughts and concerns in a socially appropriate manner  4. Utilize positive, consistent limit setting strategies supporting safety of patient, staff and others  5. Encourage participation in the decision making process about the behavioral management agreement  6. If a visitor's behavior poses a threat to safety call refer to organization policy. 7. Initiate consult with , Psychosocial CNS, Spiritual Care as appropriate  Outcome: Progressing     Problem: Depression/Self Harm  Goal: Effect of psychiatric condition will be minimized and patient will be protected from self harm  Description: INTERVENTIONS:  1. Assess impact of patient's symptoms on level of functioning, self care needs and offer support as indicated  2. Assess patient/family knowledge of depression, impact on illness and need for teaching  3. Provide emotional support, presence and reassurance  4. Assess for possible suicidal thoughts or ideation. If patient expresses suicidal thoughts or statements do not leave alone, initiate Suicide Precautions, move to a room close to the nursing station and obtain sitter  5. Initiate consults as appropriate with Mental Health Professional, Spiritual Care, Psychosocial CNS, and consider a recommendation to the LIP for a Psychiatric Consultation  Outcome: Progressing           Patient pleasant and cooperative. Anxious and worried. Denies suicidal and homicidal thoughts. Denies hallucinations.

## 2023-11-08 NOTE — CARE COORDINATION
SW called the SW called the [facility] Phone: [phone] Fax: [phone] and spoke with [address] [facility] Stephen inform Vinod Salazar of the delay in discharge due to the [address], SW informed Vinod Salazar that the pt will be stepped down after getting his STRINGER. Vinod Salazar stated that his is not a problem.

## 2023-11-08 NOTE — CARE COORDINATION
NILA received voicemail from Kaushik at [address] [place] [address] Unit stating that pt long acting injection needs a prior authorization and since the hospital is the one who started this injection, it would be our job to start this. NILA updated NP and  Sheng Merritt regarding this. Sheng Merritt states that she will work on it. NILA contacted [employer]  to schedule transportation for pt to [address] [place] [address] Unit. NILA spoke with Meche Kenny and arranged transportation. NILA informed him that transport should arrive at [facility] entrance and call nurses station upon arrival, number provided. Pt trip number is [phone].

## 2023-11-08 NOTE — CARE COORDINATION
SW met with pt to discuss tentative discharge to [facility]SU. He is found resting in bed, sits up and shares good eye contact with SW. Pt is pleasant and appropriate, brightens with conversation. He states that he is feeling much better and is hopeful to discharge to the crisis unit soon. Pt denied SI/HI/AVH and states that he is feeling much better on these medications. Pt insight/judgement good. NILA faxed referral to [address] [place] [address].

## 2023-11-08 NOTE — DISCHARGE SUMMARY
DISCHARGE SUMMARY      Patient ID:  Endy Morgan  [id number]  31 y.o.  1995    Admit date: 10/26/2023    Discharge date and time: 11/10/2023    Admitting Physician: Stanford Mccarthy MD     Discharge Physician: Dr Subha Laura MD    Discharge Diagnoses:   Patient Active Problem List   Diagnosis    Gastritis    Bilious vomiting with nausea    Pneumonia due to organism    Acute respiratory failure with hypoxia (HCC)    Polysubstance abuse (HCC)    Hepatitis    SIRS (systemic inflammatory response syndrome) (HCC)    Hypokalemia    Asthma    Hyperglycemia    Severe protein-calorie malnutrition (HCC)    Severe episode of recurrent major depressive disorder, without psychotic features ([facility])    Cluster B personality disorder ([facility])    Schizoaffective disorder, bipolar type ([facility])    Acute psychosis ([facility])    Personal history of noncompliance with medical treatment, presenting hazards to health       Admission Condition: poor    Discharged Condition: stable    Admission Circumstance: Patient probated by his family due to psychiatric decompensation, bizarre behaviors and not able to protect his health, or safety. He has been manic, disorganized, psychotic and off of his medications for at least 6 months. PAST MEDICAL/PSYCHIATRIC HISTORY:   Past Medical History:   Diagnosis Date    Anxiety     Asthma     no meds currently- mostly exercise induced    Claustrophobia     confined spaces with locked doors    Depression     Epigastric pain     Nausea     Nut allergy        FAMILY/SOCIAL HISTORY:  No family history on file.   Social History     Socioeconomic History    Marital status: Single     Spouse name: Not on file    Number of children: 0    Years of education: 13    Highest education level: Not on file   Occupational History    Occupation:      Employer: [employer]   Tobacco Use    Smoking status: Every Day     Types: E-Cigarettes    Smokeless tobacco: Never   Vaping Use    Vaping Use: Never used   Substance and

## 2023-11-08 NOTE — PROGRESS NOTES
I was  notified by social work Rosa of need for PA for [employer] [place] booster injection per [facility] crisis unit request. I called PA department (pharmacy) [phone] and spoke with Gilles Magallon, reference number [id number], completed pa request along with ROLANDO Grant on phone. Clinical as requested to support request was faxed. It can take up to 5 calendar days for auth request decision, I notified Sanju Grant, they will call her with decision. Discharge is cancelled until Friday when pt can receive his booster [employer] injection so it is not missed while waiting for Prior auth.

## 2023-11-08 NOTE — CARE COORDINATION
NILA received a call from [employer] [facility] at the [facility] stating that the pt was accepted. NILA informed assigned NILA.

## 2023-11-09 PROCEDURE — 6370000000 HC RX 637 (ALT 250 FOR IP): Performed by: NURSE PRACTITIONER

## 2023-11-09 PROCEDURE — 1240000000 HC EMOTIONAL WELLNESS R&B

## 2023-11-09 RX ADMIN — RISPERIDONE 2 MG: 2 TABLET ORAL at 20:57

## 2023-11-09 RX ADMIN — VALPROIC ACID 250 MG: 250 SOLUTION ORAL at 08:58

## 2023-11-09 RX ADMIN — RISPERIDONE 2 MG: 2 TABLET ORAL at 08:58

## 2023-11-09 RX ADMIN — VALPROIC ACID 250 MG: 250 SOLUTION ORAL at 20:57

## 2023-11-09 NOTE — PROGRESS NOTES
Patient pleasant and cooperative, slightly anxious, apologized for previous behavior \"I wasn't feeling well and I am so sorry I was in a bad mood those days\". Patient denies any hallucinations, denies suicidal thoughts, denies homicidal ideation. Patient is compliant with medications and attends group therapy. Patient appetite is good and behavior in control.

## 2023-11-09 NOTE — CARE COORDINATION
Authorization was obtained for this patient's [employer] 156mg injection with reference number [id number] from 11/9/2023-11/8/2024 for medical necessity per [address] [place] [address] department.

## 2023-11-09 NOTE — PLAN OF CARE
about the behavioral management agreement  6. If a visitor's behavior poses a threat to safety call refer to organization policy.   7. Initiate consult with , Psychosocial CNS, Spiritual Care as appropriate  11/8/2023 2122 by Ran Colby RN  Outcome: Progressing

## 2023-11-09 NOTE — PLAN OF CARE
Problem: Risk for Elopement  Goal: Patient will not exit the unit/facility without proper excort  Outcome: Progressing     Problem: Anxiety  Goal: Will report anxiety at manageable levels  Description: INTERVENTIONS:  1. Administer medication as ordered  2. Teach and rehearse alternative coping skills  3. Provide emotional support with 1:1 interaction with staff  11/9/2023 1122 by José Medina RN  Outcome: Progressing  11/8/2023 2122 by Axel Bellamy RN  Outcome: Progressing     Problem: Decision Making  Goal: Pt/Family able to effectively weigh alternatives and participate in decision making related to treatment and care  Description: INTERVENTIONS:  1. Determine when there are differences between patient's view, family's view, and healthcare provider's view of condition  2. Facilitate patient and family articulation of goals for care  3. Help patient and family identify pros/cons of alternative solutions  4. Provide information as requested by patient/family  5. Respect patient/family right to receive or not to receive information  6. Serve as a liaison between patient and family and health care team  7. Initiate Consults from Ethics, Palliative Care or initiate [id number] as is appropriate  11/9/2023 1122 by José Medina RN  Outcome: Progressing  11/8/2023 2122 by Axel Bellamy RN  Outcome: Progressing     Problem: Behavior  Goal: Pt/Family maintain appropriate behavior and adhere to behavioral management agreement, if implemented  Description: INTERVENTIONS:  1. Assess patient/family's coping skills and  non-compliant behavior (including use of illegal substances)  2. Notify security of behavior or suspected illegal substances which indicate the need for search of the family and/or belongings  3. Encourage verbalization of thoughts and concerns in a socially appropriate manner  4.  Utilize positive, consistent limit setting strategies supporting safety of patient, staff and

## 2023-11-09 NOTE — PROGRESS NOTES
Patient declined invitation to the following groups    Education- patient left group at the beginning of the start of discussion. Patient was provided with a handout on group topic- strengths and values exploration    Evening Education Group- Patient was provided with a handout on topic mindfulness and control. Patient will continue to be provided with opportunities to enhance leisure skills/interests and/or coping mechanisms.

## 2023-11-09 NOTE — PROGRESS NOTES
Patient attended community meeting   Was updated on expectations of the unit, staffing, and programming  Patient shared goal for today as \"stay optimistic\"

## 2023-11-09 NOTE — PROGRESS NOTES
OQ Analyst questionairre completed on tablet and submitted in anticipation of patient discharge tomorrow.  11/10/2023

## 2023-11-10 VITALS
BODY MASS INDEX: 25.73 KG/M2 | RESPIRATION RATE: 16 BRPM | SYSTOLIC BLOOD PRESSURE: 117 MMHG | DIASTOLIC BLOOD PRESSURE: 63 MMHG | TEMPERATURE: 98.7 F | HEART RATE: 114 BPM | OXYGEN SATURATION: 98 % | HEIGHT: 72 IN | WEIGHT: 190 LBS

## 2023-11-10 PROCEDURE — 6370000000 HC RX 637 (ALT 250 FOR IP): Performed by: NURSE PRACTITIONER

## 2023-11-10 RX ORDER — RISPERIDONE 2 MG/1
2 TABLET ORAL NIGHTLY
Status: DISCONTINUED | OUTPATIENT
Start: 2023-11-10 | End: 2023-11-10 | Stop reason: HOSPADM

## 2023-11-10 RX ORDER — RISPERIDONE 1 MG/1
1 TABLET ORAL DAILY
Qty: 30 TABLET | Refills: 0 | Status: SHIPPED | OUTPATIENT
Start: 2023-11-10 | End: 2023-12-10

## 2023-11-10 RX ORDER — VALPROIC ACID 250 MG/5ML
500 SOLUTION ORAL 2 TIMES DAILY
Status: DISCONTINUED | OUTPATIENT
Start: 2023-11-10 | End: 2023-11-10 | Stop reason: HOSPADM

## 2023-11-10 RX ORDER — VALPROIC ACID 250 MG/5ML
500 SOLUTION ORAL 2 TIMES DAILY
Qty: 600 ML | Refills: 0 | Status: SHIPPED | OUTPATIENT
Start: 2023-11-10 | End: 2023-12-10

## 2023-11-10 RX ORDER — RISPERIDONE 1 MG/1
1 TABLET ORAL DAILY
Status: DISCONTINUED | OUTPATIENT
Start: 2023-11-10 | End: 2023-11-10 | Stop reason: HOSPADM

## 2023-11-10 RX ORDER — RISPERIDONE 2 MG/1
2 TABLET ORAL NIGHTLY
Qty: 30 TABLET | Refills: 0 | Status: SHIPPED | OUTPATIENT
Start: 2023-11-10 | End: 2023-12-10

## 2023-11-10 RX ADMIN — VALPROIC ACID 500 MG: 250 SOLUTION ORAL at 08:57

## 2023-11-10 RX ADMIN — RISPERIDONE 1 MG: 1 TABLET ORAL at 08:59

## 2023-11-10 ASSESSMENT — PAIN SCALES - GENERAL: PAINLEVEL_OUTOF10: 0

## 2023-11-10 NOTE — PROGRESS NOTES
CLINICAL PHARMACY NOTE: MEDS TO BEDS    Total # of Prescriptions Filled: 1   The following medications were delivered to the patient:  Risperidone 1 mg    Additional Documentation:

## 2023-11-10 NOTE — BH NOTE
4 Eyes Skin Assessment     NAME:  Octavio Vela  YOB: 1995  MEDICAL RECORD NUMBER:  [id number]    The patient is being assessed for  Admission    I agree that at least one RN has performed a thorough Head to Toe Skin Assessment on the patient. ALL assessment sites listed below have been assessed. Areas assessed by both nurses:    Head, Face, Ears and Shoulders, Back, Chest        Does the Patient have a Wound?  No noted wound(s)       Moses Prevention initiated by RN: Yes  Wound Care Orders initiated by RN: No    Pressure Injury (Stage 3,4, Unstageable, DTI, NWPT, and Complex wounds) if present, place Wound referral order by RN under : No    New Ostomies, if present place, Ostomy referral order under : No     Nurse 1 eSignature: Electronically signed by Nancy Pacheco RN on 10/27/23 at 5:09 AM EDT    **SHARE this note so that the co-signing nurse can place an eSignature**    Nurse 2 eSignature: Electronically signed by Lisa Shepherd RN on 10/27/23 at 5:49 AM EDT
[facility]  Day 3 Interdisciplinary Treatment Plan NOTE    Review Date & Time: 10/29/23 @ 1100    Patient was not in treatment team    Estimated Length of Stay Update:  7-14 days  Estimated Discharge Date Update: 11/1/23    EDUCATION:   Learner Progress Toward Treatment Goals: Reviewed results and recommendations of this team, Reviewed group plan and strategies, Reviewed signs, symptoms and risk of self harm and violent behavior, and Reviewed goals and plan of care    Method: Small group    Outcome: Refused Education    PATIENT GOALS: take medications as prescribed, attend group sessions    PLAN/TREATMENT RECOMMENDATIONS UPDATE:11/1/23    GOALS UPDATE:   Time frame for Short-Term Goals: 10/30/23      Gary Armendariz RN
[facility]  Day 3 Interdisciplinary Treatment Plan NOTE    Review Date & Time: 10/30/23 0904    Patient was in treatment team    Estimated Length of Stay Update:  3-7 days  Estimated Discharge Date Update: 3-7 days    EDUCATION:   Learner Progress Toward Treatment Goals: Reviewed results and recommendations of this team    Method: Small group    Outcome: Verbalized understanding    PATIENT GOALS: Refuse treatment    PLAN/TREATMENT RECOMMENDATIONS UPDATE:Encourage treatment compliance    GOALS UPDATE:   Time frame for Short-Term Goals: 1-2 days      Kamron Zaragoza RN
Patient accepted to [facility] per AILYN Hernandez.
Patient denies suicidal ideation, homicidal ideations and AVH. Presents calm and cooperative during assessment. Patient is isolative to room. Medications taken without issue. No complaints or concerns verbalized at this time. No unit problems reported. Will continue to observe and support.
Patient has been seclusive to his room the entire day. He came out for dinner then returned to his room. He continues to be rocking back and forth in the bed and refusing meds. Continues to be delusional and making bizarre statements. Encouraged med compliance. Will continue to monitor.
Patient is awake, alert, oriented x4. He did have a small breakfast in his room. He is rocking back and forth in his bed, which he has been doing consistently. He is irritable stating \"I have been here all weekend and no one has seen me. \" Discussed that Angela Benjamin the NP had been in to see him each day this weekend. He states \"just so you know she came in and said I'm your nurse practitioner and then she left without talking to me. \" He demonstrated significant flight of ideas with rapid and pressured speech. Feels persecuted, like we are purposely taking away his rights. He states we have tortured him and raped him with a needle in the ER. He has absolutely no insight into the situation. He states he doesn't need medications - \"I don't even like benzos. \" It was discussed that the medications ordered are not benzos and he did not respond to that. He reports he is \"constantly suicidal unless I have my rights\" and he doesn't feel he has rights here. He then states \"I already killed myself so of course I'm suicidal. I'm already dead in detention they gave me peanut butter. \" He states he is \"trying to find someone to hurt but it's do unto others and I just need people to stop fucking with me. \" The patient is talking animatedly but is not aggressive toward me in his speech. He reports he does have anxiety and depression but that he wasn't going to rate them. He states detention is better than here and he would like to be discharged to detention so he wants to go there and be on suicide watch. He states he doesn't like Charu Hidalgo she reminds me of someone from the past and I need someone to advocate for God not for evil. \" Encouraged patient to speak with NP regarding his concerns about admission. Will continue to monitor.
Patient is awake, alert, oriented x4. He is eating breakfast in his room and was informed he is a lockout after meals and he states this is okay so that he will shower and plan to go to groups today. He reports he believes the medications he is on are working and that he is feeling more calm and less paranoid but they do make him a bit tired. He reports he has some trouble sleeping but overall sleep was fair. He reports anxiety and depression are both 5/10 but that it will never be less than 5/10 until he is discharged. He states \"I realize I will probably be in here another week or whatever and I will do my time but I think I would be ready to go before that. \" When suggested patient take it a day at a time he stated \"Indefinite timelines don't help me. \" It was reported that he spit at staff yesterday but was then apologetic later. He does not reference this today. He denies any current SI/HI/AVH. He states yesterday he felt \"somewhat suicidal\" during the day but did not explain that further. He states it was momentary because of the indifferent timeline. Patient states he would like to be locked out of his room for 3 hours after breakfast and lunch so he can attend groups but then only 1 hour after dinner \"because that is when I get the most tired. \" Will discuss with treatment team. Encouraged group attendance and participation if comfortable. Will continue to monitor.
Patient is awake, alert, oriented x4. He is laying in bed rocking back and forth, restless. His affect is irritable, hostile toward staff and the hospital. He demonstrated tangential thought process. He refused all medications last night stating \"I know they tried to give me something that was a generic and name brand all together. \" He states he \"wants to burn this place to the ground and everyone in it. \" He states \"yeah I'm suicidal, I want to  this chair and break the window and jump out and then they can take me to [facility] because [facility] is better than here. \" He rambled about past medication frustrations related to what sounds like court ordered long acting injectables. When asked about hallucinations he states \"Not really. \" He repeatedly came back to having to have IMs in the ER and his frustrations about that stating \"I just yelled out God help me and they inject me with all this stuff. \" He states his anxiety and depression are \"above average\" but would not numerically rate them. He states he has no intention on taking meds the entire time he is here and \"I will be staying here the 72 hours and going home or to [facility]. I don't care. \" Though irritable he did manage to keep his behaviors under control. He was encouraged to attend groups and participate in the milieu. Will continue to monitor.
Patient is resting quietly in bed with eyes closed at this time. No signs of distress or discomfort noted. No PRN medications given thus far. Safety needs met. No unit problems reported. Purposeful rounding continued.
Patient is resting quietly in bed with eyes closed at this time. No signs of distress or discomfort noted. No PRN medications given thus far. Safety needs met. No unit problems reported. Purposeful rounding continued.
Patient refused all ordered medication.
Patient refused shift assessment and medications. States \"meds are the devil! ! ! Get out of my room! \"
Patient resting comfortably in bed.
Patient resting in bed with eyes closed. Respirations even and unlabored. No signs of distress. Purposeful rounding continued.
Patient resting in room with eyes closed. No signs or symptoms of distress noted or observed. Will continue to monitor, provide needs, and ensure safe environment. Q15 minute checks continued.
Patient resting in room with eyes closed. No signs or symptoms of distress noted or observed. Will continue to monitor, provide needs, and ensure safe environment. Q15 minute checks continued.
Patient was compliant with all of his room lockouts today. During his dinner lockout he was noted to be out on the unit and social with peers, laughing and joking appropriately. He is pleasant with staff. He received his Invega long acting injection today without resistance. He attended groups and was med compliant. Encouraged continued appropriate milieu participation. Will continue to monitor.
Pt resting in room with eyes closed. Q15min checks continue.
Pt states that he takes no prescribed or OTC medications at home. Pt states that he does use the [facility] as his pharmacy. Unable to contact att.
Refused all scheduled medications at this time, stating \"they're placebos and they're trash and you know it. \" Dismissive but in control and laying in bed resting.
Refused all scheduled morning medications at this time, stating, \"I don't want that placebo stuff. \" Polite refusal, friendly demeanor.
Refused am vital signs
Refused am vitals
(1shirt, shorts & beige towel, flipflops)  The following personal items were collected during admission. Items secured in locker/safe. Items will be returned to patient at discharge.      Roxana Meeks RN
[facility]

## 2023-11-10 NOTE — PLAN OF CARE
Problem: Risk for Elopement  Goal: Patient will not exit the unit/facility without proper excort  11/9/2023 2213 by Ulysses Bueno RN  Outcome: Progressing     Problem: Anxiety  Goal: Will report anxiety at manageable levels  Description: INTERVENTIONS:  1. Administer medication as ordered  2. Teach and rehearse alternative coping skills  3. Provide emotional support with 1:1 interaction with staff  11/9/2023 2213 by Ulysses Bueno RN  Outcome: Progressing     Problem: Decision Making  Goal: Pt/Family able to effectively weigh alternatives and participate in decision making related to treatment and care  Description: INTERVENTIONS:  1. Determine when there are differences between patient's view, family's view, and healthcare provider's view of condition  2. Facilitate patient and family articulation of goals for care  3. Help patient and family identify pros/cons of alternative solutions  4. Provide information as requested by patient/family  5. Respect patient/family right to receive or not to receive information  6. Serve as a liaison between patient and family and health care team  7.  Initiate Consults from Ethics, Palliative Care or initiate [id number] as is appropriate  11/9/2023 2213 by Ulysses Bueno RN  Outcome: Progressing

## 2023-11-10 NOTE — CARE COORDINATION
Submitted level of care change to [facility]AN[facility], probate court, & [facility] AOT  Lenora Leiva.     Electronically signed by BRIGITTE Poe, ALVAREZW on 11/10/2023 at 2:16 PM

## 2023-11-10 NOTE — PROGRESS NOTES
[facility]  Discharge Note    Pt discharged with followings belongings:   Clothing: Shorts, Shirt, Other (Comment), Footwear (1shirt, shorts & beige towel, flipflops)   Valuables sent to the crisis unit, along with filled prescriptions, copy of AVS, safety plan and prescriptions for a long acting injection. All personal belongings were returned to patient. Patient educated on aftercare instructions: completed, reviewed, copy given. Information faxed to  crisis unit by . Patient verbalize understanding of AVS: yes with stated potential to comply to same. Status EXAM upon discharge:  Mental Status and Behavioral Exam  Normal: Yes  Level of Assistance: Independent/Self  Facial Expression: Elevated, Avoids Gaze  Affect: incongruent  Level of Consciousness: Alert  Frequency of Checks: 4 times per hour, close  Mood: Anxious  Motor Activity:Normal: yes  Eye Contact: Fair  Observed Behavior: Cooperative, Preoccupied, Impulsive  Sexual Misconduct History: Current - no  Preception: [place] to person, [place] to time, [place] to place, [place] to situation  Attention:  improved  Thought Processes: Other (comment) (impoverished)  Thought Content:  Poverty of content  Depression Symptoms: Loss of interest, isolative  Anxiety Symptoms: Generalized, minimal  Krystle Symptoms: No problems reported or observed. Hallucinations: None  Delusions: No  Delusions:  Other (comment) (none voiced)  Memory: improved  Insight and Judgment: improved      Tobacco Screening:  Practical Counseling, on admission, chucky X, if applicable and completed (first 3 are required if patient doesn't refuse):            ( ) Recognizing danger situations (included triggers and roadblocks)                    ( ) Coping skills (new ways to manage stress,relaxation techniques, changing routine, distraction)                                                           ( ) Basic information about quitting (benefits of quitting, techniques in how to quit, available resources  ( ) Referral for counseling faxed to [facility]                                                                                                                   ( ) Patient refused counseling  ( ) Patient refused referral  ( ) Patient refused prescription upon discharge  ( X) Patient has not smoked in the last 30 days    Metabolic Screening:    Lab Results   Component Value Date    LABA1C 5.3 06/10/2021       Lab Results   Component Value Date    CHOL 104 06/10/2021    CHOL 173 12/11/2019    CHOL 163 04/18/2019     Lab Results   Component Value Date    TRIG 45 06/10/2021    TRIG 134 12/11/2019    TRIG 98 04/18/2019     Lab Results   Component Value Date    HDL 32 06/10/2021    HDL 42 12/11/2019    HDL 54 04/18/2019     No components found for: \"LDLCAL\"  Lab Results   Component Value Date    LABVLDL 9 06/10/2021    LABVLDL 27 12/11/2019    LABVLDL 20 04/18/2019       Joshua Ramirez RN

## 2023-11-10 NOTE — CARE COORDINATION
NILA called Help [address] Phone: [phone] to schedule transportation to the [facility].  NILA stated to go to the main entrance on [facility] and call the RN station upon arrival.

## 2023-12-26 LAB
ALBUMIN SERPL-MCNC: 4.6 G/DL (ref 3.5–5.2)
ALP SERPL-CCNC: 80 U/L (ref 40–129)
ALT SERPL-CCNC: 13 U/L (ref 0–40)
ANION GAP SERPL CALCULATED.3IONS-SCNC: 17 MMOL/L (ref 7–16)
AST SERPL-CCNC: 21 U/L (ref 0–39)
BASOPHILS # BLD: 0.06 K/UL (ref 0–0.2)
BASOPHILS NFR BLD: 1 % (ref 0–2)
BILIRUB SERPL-MCNC: 0.2 MG/DL (ref 0–1.2)
BUN SERPL-MCNC: 13 MG/DL (ref 6–20)
CALCIUM SERPL-MCNC: 9.5 MG/DL (ref 8.6–10.2)
CHLORIDE SERPL-SCNC: 101 MMOL/L (ref 98–107)
CO2 SERPL-SCNC: 20 MMOL/L (ref 22–29)
CREAT SERPL-MCNC: 0.9 MG/DL (ref 0.7–1.2)
DATE LAST DOSE: NORMAL
EOSINOPHIL # BLD: 0.55 K/UL (ref 0.05–0.5)
EOSINOPHILS RELATIVE PERCENT: 6 % (ref 0–6)
ERYTHROCYTE [DISTWIDTH] IN BLOOD BY AUTOMATED COUNT: 12.7 % (ref 11.5–15)
GFR SERPL CREATININE-BSD FRML MDRD: >60 ML/MIN/1.73M2
GLUCOSE SERPL-MCNC: 109 MG/DL (ref 74–99)
HCT VFR BLD AUTO: 45 % (ref 37–54)
HGB BLD-MCNC: 15 G/DL (ref 12.5–16.5)
IMM GRANULOCYTES # BLD AUTO: 0.04 K/UL (ref 0–0.58)
IMM GRANULOCYTES NFR BLD: 1 % (ref 0–5)
LYMPHOCYTES NFR BLD: 2.96 K/UL (ref 1.5–4)
LYMPHOCYTES RELATIVE PERCENT: 34 % (ref 20–42)
MCH RBC QN AUTO: 29.9 PG (ref 26–35)
MCHC RBC AUTO-ENTMCNC: 33.3 G/DL (ref 32–34.5)
MCV RBC AUTO: 89.8 FL (ref 80–99.9)
MONOCYTES NFR BLD: 0.93 K/UL (ref 0.1–0.95)
MONOCYTES NFR BLD: 11 % (ref 2–12)
NEUTROPHILS NFR BLD: 48 % (ref 43–80)
NEUTS SEG NFR BLD: 4.14 K/UL (ref 1.8–7.3)
PLATELET # BLD AUTO: 299 K/UL (ref 130–450)
PMV BLD AUTO: 11 FL (ref 7–12)
POTASSIUM SERPL-SCNC: 5.1 MMOL/L (ref 3.5–5)
PROT SERPL-MCNC: 7.8 G/DL (ref 6.4–8.3)
RBC # BLD AUTO: 5.01 M/UL (ref 3.8–5.8)
SODIUM SERPL-SCNC: 138 MMOL/L (ref 132–146)
TME LAST DOSE: NORMAL H
VALPROATE SERPL-MCNC: 79 UG/ML (ref 50–100)
VANCOMYCIN DOSE: NORMAL MG
WBC OTHER # BLD: 8.7 K/UL (ref 4.5–11.5)

## 2024-01-12 ENCOUNTER — HOSPITAL ENCOUNTER (INPATIENT)
Age: 29
LOS: 7 days | Discharge: HOME | End: 2024-01-19
Attending: STUDENT IN AN ORGANIZED HEALTH CARE EDUCATION/TRAINING PROGRAM | Admitting: PSYCHIATRY & NEUROLOGY
Payer: COMMERCIAL

## 2024-01-12 DIAGNOSIS — F25.0 SCHIZOAFFECTIVE DISORDER, BIPOLAR TYPE (HCC): Primary | ICD-10-CM

## 2024-01-12 DIAGNOSIS — F32.A DEPRESSION, UNSPECIFIED DEPRESSION TYPE: ICD-10-CM

## 2024-01-12 PROBLEM — F25.9 SCHIZOAFFECTIVE DISORDER (HCC): Status: ACTIVE | Noted: 2024-01-12

## 2024-01-12 LAB
ALBUMIN SERPL-MCNC: 4.5 G/DL (ref 3.5–5.2)
ALP SERPL-CCNC: 93 U/L (ref 40–129)
ALT SERPL-CCNC: 18 U/L (ref 0–40)
AMPHET UR QL SCN: NEGATIVE
ANION GAP SERPL CALCULATED.3IONS-SCNC: 11 MMOL/L (ref 7–16)
APAP SERPL-MCNC: <5 UG/ML (ref 10–30)
AST SERPL-CCNC: 15 U/L (ref 0–39)
BARBITURATES UR QL SCN: NEGATIVE
BASOPHILS # BLD: 0.07 K/UL (ref 0–0.2)
BASOPHILS NFR BLD: 1 % (ref 0–2)
BENZODIAZ UR QL: NEGATIVE
BILIRUB SERPL-MCNC: 0.2 MG/DL (ref 0–1.2)
BUN SERPL-MCNC: 16 MG/DL (ref 6–20)
BUPRENORPHINE UR QL: NEGATIVE
CALCIUM SERPL-MCNC: 9.2 MG/DL (ref 8.6–10.2)
CANNABINOIDS UR QL SCN: POSITIVE
CHLORIDE SERPL-SCNC: 103 MMOL/L (ref 98–107)
CO2 SERPL-SCNC: 24 MMOL/L (ref 22–29)
COCAINE UR QL SCN: NEGATIVE
CREAT SERPL-MCNC: 0.9 MG/DL (ref 0.7–1.2)
DATE LAST DOSE: ABNORMAL
EKG ATRIAL RATE: 79 BPM
EKG P AXIS: 53 DEGREES
EKG P-R INTERVAL: 166 MS
EKG Q-T INTERVAL: 350 MS
EKG QRS DURATION: 80 MS
EKG QTC CALCULATION (BAZETT): 401 MS
EKG R AXIS: 114 DEGREES
EKG T AXIS: 47 DEGREES
EKG VENTRICULAR RATE: 79 BPM
EOSINOPHIL # BLD: 1.28 K/UL (ref 0.05–0.5)
EOSINOPHILS RELATIVE PERCENT: 12 % (ref 0–6)
ERYTHROCYTE [DISTWIDTH] IN BLOOD BY AUTOMATED COUNT: 13 % (ref 11.5–15)
ETHANOLAMINE SERPL-MCNC: <10 MG/DL
FENTANYL UR QL: NEGATIVE
GFR SERPL CREATININE-BSD FRML MDRD: >60 ML/MIN/1.73M2
GLUCOSE SERPL-MCNC: 107 MG/DL (ref 74–99)
HCT VFR BLD AUTO: 43.1 % (ref 37–54)
HGB BLD-MCNC: 15 G/DL (ref 12.5–16.5)
IMM GRANULOCYTES # BLD AUTO: 0.03 K/UL (ref 0–0.58)
IMM GRANULOCYTES NFR BLD: 0 % (ref 0–5)
LYMPHOCYTES NFR BLD: 3.66 K/UL (ref 1.5–4)
LYMPHOCYTES RELATIVE PERCENT: 34 % (ref 20–42)
MCH RBC QN AUTO: 30.7 PG (ref 26–35)
MCHC RBC AUTO-ENTMCNC: 34.8 G/DL (ref 32–34.5)
MCV RBC AUTO: 88.3 FL (ref 80–99.9)
METHADONE UR QL: NEGATIVE
MONOCYTES NFR BLD: 0.88 K/UL (ref 0.1–0.95)
MONOCYTES NFR BLD: 8 % (ref 2–12)
NEUTROPHILS NFR BLD: 45 % (ref 43–80)
NEUTS SEG NFR BLD: 4.75 K/UL (ref 1.8–7.3)
OPIATES UR QL SCN: NEGATIVE
OXYCODONE UR QL SCN: NEGATIVE
PCP UR QL SCN: NEGATIVE
PLATELET # BLD AUTO: 317 K/UL (ref 130–450)
PMV BLD AUTO: 9.8 FL (ref 7–12)
POTASSIUM SERPL-SCNC: 4.2 MMOL/L (ref 3.5–5)
PROT SERPL-MCNC: 7.5 G/DL (ref 6.4–8.3)
RBC # BLD AUTO: 4.88 M/UL (ref 3.8–5.8)
SALICYLATES SERPL-MCNC: <0.3 MG/DL (ref 0–30)
SODIUM SERPL-SCNC: 138 MMOL/L (ref 132–146)
TEST INFORMATION: ABNORMAL
TME LAST DOSE: ABNORMAL H
TOXIC TRICYCLIC SC,BLOOD: NEGATIVE
VALPROATE SERPL-MCNC: <3 UG/ML (ref 50–100)
VANCOMYCIN DOSE: ABNORMAL MG
WBC OTHER # BLD: 10.7 K/UL (ref 4.5–11.5)

## 2024-01-12 PROCEDURE — G0480 DRUG TEST DEF 1-7 CLASSES: HCPCS

## 2024-01-12 PROCEDURE — 1240000000 HC EMOTIONAL WELLNESS R&B

## 2024-01-12 PROCEDURE — 80164 ASSAY DIPROPYLACETIC ACD TOT: CPT

## 2024-01-12 PROCEDURE — 80307 DRUG TEST PRSMV CHEM ANLYZR: CPT

## 2024-01-12 PROCEDURE — 80179 DRUG ASSAY SALICYLATE: CPT

## 2024-01-12 PROCEDURE — 99285 EMERGENCY DEPT VISIT HI MDM: CPT

## 2024-01-12 PROCEDURE — 93010 ELECTROCARDIOGRAM REPORT: CPT | Performed by: INTERNAL MEDICINE

## 2024-01-12 PROCEDURE — 93005 ELECTROCARDIOGRAM TRACING: CPT | Performed by: STUDENT IN AN ORGANIZED HEALTH CARE EDUCATION/TRAINING PROGRAM

## 2024-01-12 PROCEDURE — 85025 COMPLETE CBC W/AUTO DIFF WBC: CPT

## 2024-01-12 PROCEDURE — 80143 DRUG ASSAY ACETAMINOPHEN: CPT

## 2024-01-12 PROCEDURE — 80053 COMPREHEN METABOLIC PANEL: CPT

## 2024-01-12 RX ORDER — HYDROXYZINE PAMOATE 50 MG/1
50 CAPSULE ORAL 3 TIMES DAILY PRN
Status: DISCONTINUED | OUTPATIENT
Start: 2024-01-12 | End: 2024-01-19 | Stop reason: HOSPADM

## 2024-01-12 RX ORDER — ACETAMINOPHEN 325 MG/1
650 TABLET ORAL EVERY 6 HOURS PRN
Status: DISCONTINUED | OUTPATIENT
Start: 2024-01-12 | End: 2024-01-19 | Stop reason: HOSPADM

## 2024-01-12 RX ORDER — LANOLIN ALCOHOL/MO/W.PET/CERES
3 CREAM (GRAM) TOPICAL NIGHTLY PRN
Status: DISCONTINUED | OUTPATIENT
Start: 2024-01-12 | End: 2024-01-19 | Stop reason: HOSPADM

## 2024-01-12 RX ORDER — MAGNESIUM HYDROXIDE/ALUMINUM HYDROXICE/SIMETHICONE 120; 1200; 1200 MG/30ML; MG/30ML; MG/30ML
30 SUSPENSION ORAL PRN
Status: DISCONTINUED | OUTPATIENT
Start: 2024-01-12 | End: 2024-01-19 | Stop reason: HOSPADM

## 2024-01-12 RX ORDER — HALOPERIDOL 5 MG/1
5 TABLET ORAL EVERY 6 HOURS PRN
Status: DISCONTINUED | OUTPATIENT
Start: 2024-01-12 | End: 2024-01-19 | Stop reason: HOSPADM

## 2024-01-12 RX ORDER — HALOPERIDOL 5 MG/ML
5 INJECTION INTRAMUSCULAR EVERY 6 HOURS PRN
Status: DISCONTINUED | OUTPATIENT
Start: 2024-01-12 | End: 2024-01-19 | Stop reason: HOSPADM

## 2024-01-12 RX ORDER — NICOTINE 21 MG/24HR
1 PATCH, TRANSDERMAL 24 HOURS TRANSDERMAL DAILY
Status: DISCONTINUED | OUTPATIENT
Start: 2024-01-12 | End: 2024-01-19 | Stop reason: HOSPADM

## 2024-01-12 ASSESSMENT — LIFESTYLE VARIABLES
HOW MANY STANDARD DRINKS CONTAINING ALCOHOL DO YOU HAVE ON A TYPICAL DAY: PATIENT DOES NOT DRINK
HOW OFTEN DO YOU HAVE A DRINK CONTAINING ALCOHOL: NEVER

## 2024-01-12 NOTE — ED PROVIDER NOTES
Gene Rogers is a 28-year-old male present emergency department with concern for mental health evaluation.  Patient has been off of his antipsychotic medication for the past few days.  Patient has been in bed and has been in his mom's house not getting out of bed at all.  Patient stated that he was brought in to restart his medication patient's mother had him probated to be brought into emergency department.  Patient's mother had reported that patient was expressing SI    The history is provided by the patient, medical records, a relative and the police.        Review of Systems   Constitutional:  Negative for chills, diaphoresis, fatigue and fever.   Eyes:  Negative for photophobia and visual disturbance.   Respiratory:  Negative for cough, chest tightness and shortness of breath.    Cardiovascular:  Negative for chest pain, palpitations and leg swelling.   Gastrointestinal:  Negative for abdominal distention, abdominal pain, diarrhea, nausea and vomiting.   Genitourinary:  Negative for dysuria.   Musculoskeletal:  Negative for back pain, neck pain and neck stiffness.   Skin:  Negative for pallor and rash.   Neurological:  Negative for headaches.   Psychiatric/Behavioral:  Negative for confusion and hallucinations.         Physical Exam  Vitals and nursing note reviewed.   Constitutional:       General: He is not in acute distress.     Appearance: Normal appearance. He is not ill-appearing.   HENT:      Head: Normocephalic and atraumatic.   Eyes:      General: No scleral icterus.     Conjunctiva/sclera: Conjunctivae normal.      Pupils: Pupils are equal, round, and reactive to light.   Cardiovascular:      Rate and Rhythm: Normal rate and regular rhythm.   Pulmonary:      Effort: Pulmonary effort is normal.      Breath sounds: Normal breath sounds.   Abdominal:      General: Bowel sounds are normal. There is no distension.      Palpations: Abdomen is soft.      Tenderness: There is no abdominal tenderness. There is

## 2024-01-12 NOTE — ED NOTES
The pt was accepted to [address] [place] [address].  Disposition called to Jazmin in admitting.

## 2024-01-12 NOTE — ED NOTES
Patient is on an AOT and committed to the [facility] effective 11/2/2023 - 1/26/2024. Patient is connected with [facility] and his AOT  is Nadine Troncoso. Patient has been majority non-compliant with his psychiatric medications.    Electronically signed by BRIGITTE Gerard, ANNA on 1/12/2024 at 3:44 PM

## 2024-01-12 NOTE — ED NOTES
Behavioral Health Crisis Assessment      Chief Complaint: The pt was probated by his mother due to psychotic bizarre behavior and failing to care for himself.      Mental Status Exam: The pt presents rocking back and forth with poor eye contact.  He got up for a moment to start the assessment but after answering 2 questions he stated that he was not going to talk anymore.  It is unknown if the pt is oriented and he is a poor historian.  He has poor judgement and insight.  He is disheveled with poor eye contact and hygiene.      Legal Status:  [] Voluntary:  [x] Involuntary, Issued by: [facility]    Gender:  [x] Male [] Female [] Transgender  [] Other    Sexual Orientation:  [x] Heterosexual [] Homosexual [] Bisexual [] Other    Brief Clinical Summary:  The pt is a 28 yr old white male who was probated by  Cherie due to his deteriorated mental state.  The pt reportedly refused to appear for an emergency medical assessment yesterday and refused to be admitted voluntarily.   His mother reported to [facility] that the pt has been decompensating and worsening over the last several weeks.  He has been reportedly sleeping 20 hours a day and acting bizarre along with staring into space and not talking.  His mother reported to probate court that the pt has resumed building shrines in his home and has been carrying a warm gallon of milk around with him that he will not refrigerate and will not eat any other food.  He also reported to his mom that he wishes to die by assisted suicide on 1/18/24.  She also reported that he is not taking his meds.  He reportedly informed his mom that he \"is waiting for his February court date and his freedom and wont be attending his appointments\".      After medically cleared the pt will be reviewed for admission to [address] [place] [address].    Collateral Information:   Per probate    Risk Factors:    Hx of admissions       Poor tx compliance       Poor insight and  No    After consideration of C-SSRS screening results, C-SSRS assessments, and this professional's assessment the patient's overall suicide risk assessed to be:  [] No Risk  [] Low   [x] Moderate   [] High     [x] Discussed current suicide risk, protective and risk factors with RN and ED Physician.    Consulted with ED Physician. Disposition/level of care recommended at this time:  [] Home:   [] Outpatient Provider:   [] Crisis Unit:   [x] Inpatient Psychiatric Unit:  Probated to  south  [] Other:

## 2024-01-13 PROCEDURE — 6370000000 HC RX 637 (ALT 250 FOR IP): Performed by: NURSE PRACTITIONER

## 2024-01-13 PROCEDURE — 1240000000 HC EMOTIONAL WELLNESS R&B

## 2024-01-13 RX ORDER — ARIPIPRAZOLE 5 MG/1
5 TABLET ORAL DAILY
Status: DISCONTINUED | OUTPATIENT
Start: 2024-01-13 | End: 2024-01-17 | Stop reason: ALTCHOICE

## 2024-01-13 RX ORDER — VALPROIC ACID 250 MG/5ML
500 SOLUTION ORAL 2 TIMES DAILY
Status: DISCONTINUED | OUTPATIENT
Start: 2024-01-13 | End: 2024-01-18

## 2024-01-13 RX ADMIN — VALPROIC ACID 500 MG: 250 SOLUTION ORAL at 12:27

## 2024-01-13 RX ADMIN — ARIPIPRAZOLE 5 MG: 5 TABLET ORAL at 12:27

## 2024-01-13 RX ADMIN — VALPROIC ACID 500 MG: 250 SOLUTION ORAL at 20:55

## 2024-01-13 ASSESSMENT — SLEEP AND FATIGUE QUESTIONNAIRES
DO YOU HAVE DIFFICULTY SLEEPING: NO
DO YOU USE A SLEEP AID: NO
AVERAGE NUMBER OF SLEEP HOURS: 8

## 2024-01-13 ASSESSMENT — LIFESTYLE VARIABLES
HOW OFTEN DO YOU HAVE A DRINK CONTAINING ALCOHOL: NEVER
HOW MANY STANDARD DRINKS CONTAINING ALCOHOL DO YOU HAVE ON A TYPICAL DAY: PATIENT DOES NOT DRINK

## 2024-01-13 ASSESSMENT — PATIENT HEALTH QUESTIONNAIRE - PHQ9
SUM OF ALL RESPONSES TO PHQ QUESTIONS 1-9: 0
2. FEELING DOWN, DEPRESSED OR HOPELESS: 0
SUM OF ALL RESPONSES TO PHQ QUESTIONS 1-9: 0
1. LITTLE INTEREST OR PLEASURE IN DOING THINGS: 0
SUM OF ALL RESPONSES TO PHQ9 QUESTIONS 1 & 2: 0

## 2024-01-13 NOTE — GROUP NOTE
Group Therapy Note    Date: 1/13/2024    Group Start Time: 0930  Group End Time: 0945  Group Topic: Community Meeting    [facility] AC[facility] 1    Tierra Banuelos, RENEE        Patient attended morning community meeting.   Updated on staffing and daily expectations.  Educated on group standards and what the goals of group are.   Able to set goal for the day as to wait it out.        Signature:  RENEE James

## 2024-01-13 NOTE — PROGRESS NOTES
Patient was already on unit from section G Vitals taken,paperwork reviewed, and signed. Attempted to do OQ analyst but when signed in patient code and  states patient was not found, menu completed and sent, skin assessment complete with LPN (ELENA.T.). Tour of facility and toiletries provided.Plan of care completed, and education. Was educated that next shift will complete assessment. Purposeful rounds continue.

## 2024-01-13 NOTE — PROGRESS NOTES
4 Eyes Skin Assessment     NAME:  Gene Rogers  YOB: 1995  MEDICAL RECORD NUMBER:  [id number]    The patient is being assessed for  Admission    I agree that at least one RN has performed a thorough Head to Toe Skin Assessment on the patient. ALL assessment sites listed below have been assessed.      Areas assessed by both nurses:    Head, Face, Ears, Shoulders, Back, Chest, Arms, Elbows, Hands, Sacrum. Buttock, Coccyx, Ischium, and Legs. Feet and Heels        Does the Patient have a Wound? No noted wound(s)       Moses Prevention initiated by RN: Yes  Wound Care Orders initiated by RN: No    Pressure Injury (Stage 3,4, Unstageable, DTI, NWPT, and Complex wounds) if present, place Wound referral order by RN under : No    New Ostomies, if present place, Ostomy referral order under : No     Nurse 1 eSignature: Electronically signed by Tiarra Corley RN on 1/13/24 at 2:42 AM EST    **SHARE this note so that the co-signing nurse can place an eSignature**    Nurse 2 eSignature: Electronically signed by Namraat Hernandez LPN on 1/13/24 at 2:49 AM EST

## 2024-01-13 NOTE — GROUP NOTE
Group Therapy Note    Date: 1/13/2024    Group Start Time: 0945  Group End Time: 1030  Group Topic: Psychoeducation    [facility]    Tierra Banuelos, RENEE        Group Therapy Note    Type of Group: Psychoeducation    Wellness Binder Information  Module Name:  id of stressors     Patient's Goal:  Patients will be able to id daily stressors and life events, one is currently experiencing.     Notes:  Pleasant and sharing in group, accepting of handout.     Status After Intervention:  Improved    Participation Level: Active Listener and Interactive    Participation Quality: Appropriate, Attentive, and Sharing      Speech:  normal      Thought Process/Content: Logical      Affective Functioning: Congruent      Mood: euthymic      Level of consciousness:  Alert, Oriented x4, and Attentive      Response to Learning: Able to verbalize/acknowledge new learning, Able to retain information, and Progressing to goal      Endings: None Reported    Modes of Intervention: Education, Support, Socialization, and Clarifying      Discipline Responsible: Psychoeducational Specialist      Signature:  RENEE James

## 2024-01-13 NOTE — GROUP NOTE
Group Therapy Note    Date: 1/13/2024    Group Start Time: 1040  Group End Time: 1100  Group Topic: Cognitive Skills    [facility] 1    Sarahy Ibarra MSW, ANNA        Group Therapy Note    Attendees: 6       Patient's Goal:  pt will be able to identify current strengths that they possess and how they use them, and weakness that they would like to work on to better themselves.     Notes:  pt participated in group and made connections.     Status After Intervention:  Improved    Participation Level: Active Listener and Interactive    Participation Quality: Appropriate, Attentive, Sharing, and Supportive      Speech:  normal      Thought Process/Content: Logical  Linear      Affective Functioning: Congruent      Mood: anxious      Level of consciousness:  Alert, Oriented x4, and Attentive      Response to Learning: Able to verbalize current knowledge/experience, Able to verbalize/acknowledge new learning, Able to retain information, and Capable of insight      Endings: None Reported    Modes of Intervention: Education, Support, Socialization, Exploration, Clarifying, Problem-solving, and Activity      Discipline Responsible: /Counselor      Signature:  BRIGITTE Cook, ANNA

## 2024-01-13 NOTE — BH NOTE
Patient is new admission on night shift - admission was not complete and is being completed now. Patient states he is on an outpatient commitment and he missed 1 appointment yesterday at [facility] which he states they moved up without telling him and therefore they called the police and had him brought here for being noncompliant. When asked about what patient's mother's report was that he is staying in bed for 20+ hours per day the patient states that is a lie and he moved back in with them 2 days ago because his apartment was too messy and he lost his medications while he was there and he reports he hasn't had his meds in 2 days but he states he is unable to report what medications he is on and when the actual last time he took them was. He refuses to answer some assessment questions related to his family history and any questions about his history of suicide attempts but he did deny suicidal ideation last night. He reports he feels safe here. He denies HI/AVH. He reports he always gets worse when he has to be here and that he just wants to do whatever needs done and then leave. He is irritable this morning and is asking to be left alone, but he did complete his admission assessment with the  and I together. He denies any substance use other than marijuana. He does not appear to be having delusions at this time. He appears to be reality based. He states there is no reason for him to be here and shows very poor insight into the reasons why he needs to be here. He is guarded and is not forthcoming with information. Encouraged groups and med compliance. Will continue to monitor.

## 2024-01-13 NOTE — CARE COORDINATION
Biopsychosocial Assessment Note    Social work met with patient to complete the biopsychosocial assessment and C-SSRS.     Chief Complaint: Pt stated that he is currently in the hospital because \"I missed an appointment with [facility] because they wanted me to come in that day and I couldn't so they sent the police to the house to bring me here.\"     Mental Status Exam: Pt is alert and oriented x4. Pt's mood is anxious with some agitation, affect is congruent. Pt's speech is clear, rate is normal and volume is above average. Pt's eye contact is good. Pt's thoughts process is circumstantial, thought content is preoccupied. Pt's memory is intact, pt is a good historian. Pt's insight and judgement is poor. Pt was cooperative and preoccupied with [facility] sending him to the hospital during assessment and offered evasive information. Pt denied current SI, HI, AVH.      Clinical Summary: Pt is a 28-year-old male, who presented to the ER due to being brought in by the police after missing and appointment with [facility] AOT. Pt stated that he was at the [facility] and then he went home alone and recently moved in with his parents because he was having a hard time cleaning his condo. Pt stated that [facility] called and wanted to see him that day and he was unable to go and the next day the police brought him to the hospital. Per ED notes, \"The pt was probated by his mother due to psychotic bizarre behavior and failing to care for himself.\"     Pt stated that he was recently at [facility] and then he was stepped down to the [facility] and was put on an AOT through [facility]. Pt stated that he was living along and was having a hard time caring for himself so he moved in with his parents and forgot to take his medications for 2 days. Pt stated that he wants to get back on his medications then be discharged from the hospital. Pt stated that he is on risperidone and Depakote. Pt refused to  discuss any suicidal questions and denied to discuss if he has any history of self harm. Pt denied any history of trauma or abuse. Pt stated that his main stressor is [facility], his medications, and being back in the hospital. Pt denied any substance use then later stated that he occasionally uses marijuana. Pt stated that his sleep has been good and his appetite is normal.      Pt stated that he occasionally works for [employer] and denied any other income or employment at this time. Pt stated that he has a previous legal history but refused to discuss further. Pt denied any pending charges, probation or parole at this time. Pt stated that he is single and has no children. Pt was raised by his mom and dad and has 1 brother and 1 sister. Pt would not discuss his family history of mental health at this time. Pt stated that his mom is his main support system. Pt plans to return home with his parents and have them pick him up from the hospital. Pt will continue his AOT with [facility] and denied access to any guns/weapons.     Risk Factors: previous admissions   Non complaint with medications   Poor insight and judgement   Limited income   Previous legal history   Possible substance use   Poor communication skills     Protective Factors: access to basic needs  Safe and stable housing   On AOT   Supportive family   Outpatient provider   Spiritual    Gender  [x] Male [] Female [] Transgender  [] Other    Sexual Orientation    [x] Heterosexual [] Homosexual [] Bisexual [] Other    Suicidal Ideation refused to discuss  [] Past [] Present [] Denies     C-SSRS Screening Completed: Current Suicide Risk:  [x] No Risk  [] Low [] Moderate [] High    Homicidal Ideation  [] Past [] Present [x] Denies     Hallucinations/Delusions (Specify type)  [] Reports [x] Denies     Current or Past Mental Health Treatment:  [x] [facility], [facility]  [] No    Substance Use/Alcohol Use/Addiction  [] Reports [x] Denies     Tobacco

## 2024-01-13 NOTE — H&P
Department of Psychiatry  History and Physical - Adult     CHIEF COMPLAINT: \"The same stuff keeps happening.\"    Patient was seen after discussing with the treatment team and reviewing the chart    CIRCUMSTANCES OF ADMISSION: Gene Rogers has a significant past history of schizoaffective disorder currently on an outpatient commitment through [facility] courts presented to the ED through Court order as patient has had worsening decompensation of his mental health symptoms acting bizarre reporting that he wants to die by assisted suicide on January 18 and refusing oral medications.  The patient is admitted under his court order.    HISTORY OF PRESENT ILLNESS:      The patient is a 28 y.o. male with significant past history of schizoaffective disorder bipolar type and polysubstance abuse presented to the ED brought in by 's Department after patient was \"stepped up\" for treatment through his current outpatient commitment.  The [facility] Board petitioned the court due to patient refusing reverses in treatment having a worsening of his see psychiatric symptoms refusing medication, mother reported that he has been catatonic and staying in bed for 20 hours a day acting bizarrely staring into space and not talking.  She also reported that patient has expressed wishes to die by assisted suicide and wanted that to be carried out on January 18.  The [facility] in recovery board petitioned the court to have patient moved to a more restrictive environment with increased level of care.    Upon evaluation today patient is lying in his bed he does sit up and speak with Charlotte tells me \"the same stuff is happening.  He states that he decided to go back and live with his parents which she regrets.  He He states that his mom is saying that he is doing things that he is not doing.  I asked patient what the milk that he has been carrying around with him at first he denied it and he states

## 2024-01-13 NOTE — BH NOTE
[facility]  Admission Note     Admission Type:   Admission Type: Involuntary    Reason for admission:  Reason for Admission: Patient states he missed 1 appointment with [facility] yesterday and they called the police to have him brought in because he is on outpatient committment. Reports hasn't taken meds in 2 days.      Addictive Behavior:   Addictive Behavior  In the Past 3 Months, Have You Felt or Has Someone Told You That You Have a Problem With  : None    Medical Problems:   Past Medical History:   Diagnosis Date    Anxiety     Asthma     no meds currently- mostly exercise induced    Claustrophobia     confined spaces with locked doors    Depression     Epigastric pain     Nausea     Nut allergy        Status EXAM:  Mental Status and Behavioral Exam  Normal: No  Level of Assistance: Independent/Self  Facial Expression: Elevated  Affect: Congruent  Level of Consciousness: Alert  Frequency of Checks: 4 times per hour, close  Mood:Normal: No  Mood: Anxious  Motor Activity:Normal: Yes  Eye Contact: Good  Observed Behavior: Cooperative, Preoccupied  Sexual Misconduct History: Current - no  Preception: [place] to person, [place] to time, [place] to place, [place] to situation  Attention:Normal: Yes  Thought Processes: Circumstantial  Thought Content:Normal: No  Thought Content: Preoccupations  Depression Symptoms: Isolative  Anxiety Symptoms: Generalized  Krystle Symptoms: No problems reported or observed.  Hallucinations: None  Delusions: No  Memory:Normal: Yes  Insight and Judgment: No  Insight and Judgment: Poor judgment, Poor insight    Tobacco Screening:  Practical Counseling, on admission, chucky X, if applicable and completed (first 3 are required if patient doesn't refuse)Refused:            ( ) Recognizing danger situations (included triggers and roadblocks)                    ( ) Coping skills (new ways to manage stress,relaxation techniques, changing routine, distraction)

## 2024-01-14 PROCEDURE — 6370000000 HC RX 637 (ALT 250 FOR IP): Performed by: NURSE PRACTITIONER

## 2024-01-14 PROCEDURE — 1240000000 HC EMOTIONAL WELLNESS R&B

## 2024-01-14 RX ADMIN — VALPROIC ACID 500 MG: 250 SOLUTION ORAL at 21:31

## 2024-01-14 RX ADMIN — ARIPIPRAZOLE 5 MG: 5 TABLET ORAL at 08:59

## 2024-01-14 RX ADMIN — VALPROIC ACID 500 MG: 250 SOLUTION ORAL at 08:59

## 2024-01-14 ASSESSMENT — PAIN SCALES - GENERAL: PAINLEVEL_OUTOF10: 0

## 2024-01-14 NOTE — GROUP NOTE
Group Therapy Note    Date: 1/14/2024    Group Start Time: 1050  Group End Time: 1130  Group Topic: Cognitive Skills    [facility]    Sarahy Ibarra MSW, LSW        Group Therapy Note    Attendees: 11       Patient's Goal:  Pt will be able to identify their values/ what is important to them and how to create healthy boundaries with new and current relationships.      Notes:  pt participated in group and made connections.    Status After Intervention:  Improved    Participation Level: Active Listener and Interactive    Participation Quality: Appropriate, Attentive, Sharing, and Supportive      Speech:  normal      Thought Process/Content: Logical  Linear      Affective Functioning: Congruent      Mood: anxious      Level of consciousness:  Alert, Oriented x4, and Attentive      Response to Learning: Able to verbalize current knowledge/experience, Able to verbalize/acknowledge new learning, Able to retain information, and Capable of insight      Endings: None Reported    Modes of Intervention: Education, Support, Socialization, Exploration, Clarifying, and Problem-solving      Discipline Responsible: /Counselor      Signature:  BRIGITTE Cook LSW

## 2024-01-14 NOTE — GROUP NOTE
Group Therapy Note    Date: 1/14/2024    Group Start Time: 0930  Group End Time: 0945  Group Topic: Community Meeting    [facility]    Tierra Banuelos, CTRS    Patient attended community meeting.  Updated on staffing an daily expectations.   Shared goal for the day as to   Go with the flow.

## 2024-01-14 NOTE — PLAN OF CARE
Patient is alert and oriented x 4.  Denies pain.  No noted signs or symptoms of distress.   Denies SI/HI, A/V/H, or thoughts of self harm when asked.    Attended on unit groups this shift.    Medication compliant.     Restless in bed with frequent moving/rocking forward and back.  Up for all meals.    Will continue to monitor for safety q 15 minute safety rounds and environmental assessments.     Problem: Krystle  Goal: Will exhibit normal sleep and speech and no impulsivity  Description: INTERVENTIONS:  1. Administer medication as ordered  2. Set limits on impulsive behavior  3. Make attempts to decrease external stimuli as possible  1/14/2024 1832 by Mary Sommer RN  Outcome: Not Progressing  1/14/2024 0640 by Vera Garcia RN  Outcome: Progressing     Problem: Anxiety  Goal: Will report anxiety at manageable levels  Description: INTERVENTIONS:  1. Administer medication as ordered  2. Teach and rehearse alternative coping skills  3. Provide emotional support with 1:1 interaction with staff  1/14/2024 1832 by Mary Sommer RN  Outcome: Not Progressing  1/14/2024 0640 by Vera Garcia RN  Outcome: Progressing     Problem: Self Care Deficit  Goal: Return ADL status to a safe level of function  Description: INTERVENTIONS:  1. Administer medication as ordered  2. Assess ADL deficits and provide assistive devices as needed  3. Obtain PT/OT consults as needed  4. Assist and instruct patient to increase activity and self care as tolerated  1/14/2024 1832 by Mary Sommer, RN  Outcome: Not Progressing  1/14/2024 0640 by Vera Garcia RN  Outcome: Progressing

## 2024-01-14 NOTE — PROGRESS NOTES
BEHAVIORAL HEALTH FOLLOW-UP NOTE     1/14/2024     Patient was seen and examined in person, Chart reviewed   Patient's case discussed with staff/team    Chief Complaint: \"I am doing okay.\"    Interim History:   I saw patient's morning in his room.  He had brought his tray into his room to eat.  He is slightly guarded and tends to be isolative but he has attended some groups.  He continues to denies suicidal homicidal ideations intent or plan denies any auditory or visual hallucinations continues to believe that he should not be here in the hospital he is unsure of the circumstances of why he is hospitalized.  He has been taking medications he is pleasant and cooperative no behavioral disturbances noted denies suicidal or homicidal ideations intent or plan he is eating well and sleeping well and no neurovegetative signs of depression and denies auditory or visual hallucinations does not appear to be acutely psychotic at this time.    Appetite: [x] Normal/Unchanged  [] Increased  [] Decreased      Sleep:       [x] Normal/Unchanged  [] Fair       [] Poor              Energy:    [x] Normal/Unchanged  [] Increased  [] Decreased        SI [] Present  [x] Absent    HI  []Present  [x] Absent     Aggression:  [] yes  [x] no    Patient is [x] able  [] unable to CONTRACT FOR SAFETY     PAST MEDICAL/PSYCHIATRIC HISTORY:   Past Medical History:   Diagnosis Date    Anxiety     Asthma     no meds currently- mostly exercise induced    Claustrophobia     confined spaces with locked doors    Depression     Epigastric pain     Nausea     Nut allergy        FAMILY/SOCIAL HISTORY:  History reviewed. No pertinent family history.  Social History     Socioeconomic History    Marital status: Single     Spouse name: Not on file    Number of children: 0    Years of education: 13    Highest education level: Not on file   Occupational History    Occupation:      Employer: [employer]   Tobacco Use    Smoking status: Every Day

## 2024-01-14 NOTE — GROUP NOTE
Group Therapy Note    Date: 1/14/2024    Group Start Time: 0945  Group End Time: 1120  Group Topic: Psychoeducation    [facility]    Tierra Banuelos CTRS                                                                        Group Therapy Note    Date: 1/14/2024        Module Name:  the coffey rule      Patient's Goal:  Patients will be able to identify ways to be kind to themselves.     Notes:  Pleasant and actively engaged in learning. Willing to accept handout.    Status After Intervention:  Improved    Participation Level: Active Listener and Interactive    Participation Quality: Appropriate, Attentive, and Sharing      Speech:  normal      Thought Process/Content: Logical      Affective Functioning: Congruent      Mood: euthymic      Level of consciousness:  Alert, Oriented x4, and Attentive      Response to Learning: Able to verbalize current knowledge/experience and Able to retain information      Endings: None Reported    Modes of Intervention: Education, Support, Socialization, and Exploration      Discipline Responsible: Psychoeducational Specialist      Signature:  RENEE James

## 2024-01-14 NOTE — PLAN OF CARE
Pt maintained control of his behavior.  Observed to be asleep 8.5 hours at HS.  Remains on close observation staggered Q 15 min checks.

## 2024-01-15 LAB
9-HYDROXYRISPERIDONE, QUANTITATIVE, URINE: >1000 NG/ML
CHOLEST SERPL-MCNC: 145 MG/DL
COMPLIANCE DRUG ANALYSIS, URINE: NORMAL
HBA1C MFR BLD: 5.3 % (ref 4–5.6)
HDLC SERPL-MCNC: 33 MG/DL
LDLC SERPL CALC-MCNC: 97 MG/DL
RISPERIDONE, QUANTITATIVE, URINE: <10 NG/ML
TRIGL SERPL-MCNC: 77 MG/DL
VLDLC SERPL CALC-MCNC: 15 MG/DL

## 2024-01-15 PROCEDURE — 80061 LIPID PANEL: CPT

## 2024-01-15 PROCEDURE — 6370000000 HC RX 637 (ALT 250 FOR IP): Performed by: NURSE PRACTITIONER

## 2024-01-15 PROCEDURE — 1240000000 HC EMOTIONAL WELLNESS R&B

## 2024-01-15 PROCEDURE — 99232 SBSQ HOSP IP/OBS MODERATE 35: CPT | Performed by: NURSE PRACTITIONER

## 2024-01-15 PROCEDURE — 83036 HEMOGLOBIN GLYCOSYLATED A1C: CPT

## 2024-01-15 PROCEDURE — 36415 COLL VENOUS BLD VENIPUNCTURE: CPT

## 2024-01-15 RX ADMIN — VALPROIC ACID 500 MG: 250 SOLUTION ORAL at 23:01

## 2024-01-15 RX ADMIN — VALPROIC ACID 500 MG: 250 SOLUTION ORAL at 10:02

## 2024-01-15 RX ADMIN — ARIPIPRAZOLE 5 MG: 5 TABLET ORAL at 10:02

## 2024-01-15 ASSESSMENT — PAIN SCALES - GENERAL: PAINLEVEL_OUTOF10: 0

## 2024-01-15 NOTE — GROUP NOTE
Group Therapy Note    Attendees: 14                                                                    Group Therapy Note    Date: 1/15/2024  Start Time: 0930  End Time: 0945  Number of Participants: 14    Type of Group: Community Meeting    Was updated on expectations of the unit, staffing, and programming.  Patient shared goal for today as \"Try not to cry.\"    Status After Intervention:  Improved    Participation Level: Active Listener and Interactive    Participation Quality: Appropriate, Attentive, Sharing, and Supportive      Speech:  normal      Thought Process/Content: Logical  Linear      Affective Functioning: Congruent      Mood:  Appropriate      Level of consciousness:  Alert and Attentive      Response to Learning: Able to verbalize current knowledge/experience, Able to verbalize/acknowledge new learning, Able to retain information, Capable of insight, and Progressing to goal      Endings: None Reported    Modes of Intervention: Education, Socialization, Exploration, and Problem-solving      Discipline Responsible: Psychoeducational Specialist      Signature:  RENEE Sinclair

## 2024-01-15 NOTE — PLAN OF CARE
Problem: Self Harm/Suicidality  Goal: Will have no self-injury during hospital stay  Description: INTERVENTIONS:  1.  Ensure constant observer at bedside with Q15M safety checks  2.  Maintain a safe environment  3.  Secure patient belongings  4.  Ensure family/visitors adhere to safety recommendations  5.  Ensure safety tray has been added to patient's diet order  6.  Every shift and PRN: Re-assess suicidal risk via Frequent Screener    Outcome: Progressing     Problem: Depression  Goal: Will be euthymic at discharge  Description: INTERVENTIONS:  1. Administer medication as ordered  2. Provide emotional support via 1:1 interaction with staff  3. Encourage involvement in milieu/groups/activities  4. Monitor for social isolation  Outcome: Progressing     Problem: Krystle  Goal: Will exhibit normal sleep and speech and no impulsivity  Description: INTERVENTIONS:  1. Administer medication as ordered  2. Set limits on impulsive behavior  3. Make attempts to decrease external stimuli as possible  Outcome: Progressing     Problem: Psychosis  Goal: Will report no hallucinations or delusions  Description: INTERVENTIONS:  1. Administer medication as  ordered  2. Assist with reality testing to support increasing orientation  3. Assess if patient's hallucinations or delusions are encouraging self harm or harm to others and intervene as appropriate  Outcome: Progressing     Problem: Behavior  Goal: Pt/Family maintain appropriate behavior and adhere to behavioral management agreement, if implemented  Description: INTERVENTIONS:  1. Assess patient/family's coping skills and  non-compliant behavior (including use of illegal substances)  2. Notify security of behavior or suspected illegal substances which indicate the need for search of the family and/or belongings  3. Encourage verbalization of thoughts and concerns in a socially appropriate manner  4. Utilize positive, consistent limit setting strategies supporting safety of

## 2024-01-15 NOTE — PROGRESS NOTES
BEHAVIORAL HEALTH FOLLOW-UP NOTE     1/15/2024     Patient was seen and examined in person, Chart reviewed   Patient's case discussed with staff/team    Chief Complaint: \"I am doing okay.\"    Interim History:   I saw patient this morning in treatment team.  He denies suicidal ideations intent or plan he denies any auditory or visual hallucinations delusions or any other perceptual abnormalities.  He shows limited insight and judgment regarding the circumstance of hospitalization and need for treatment.  He states that everything went wrong outpatient when he tried to change his therapy appointment he states that he did not like his therapist and tried to change it but then he end up missing the original therapy session and that is when he was brought into the hospital.  He states that he did move out of his own apartment because he was having a hard time keeping it up and living alone and he is going to live with his parents on discharge he states that he is feeling fine that he wants to go the crisis unit on discharge.  He has been attending groups.          .    Appetite: [x] Normal/Unchanged  [] Increased  [] Decreased      Sleep:       [x] Normal/Unchanged  [] Fair       [] Poor              Energy:    [x] Normal/Unchanged  [] Increased  [] Decreased        SI [] Present  [x] Absent    HI  []Present  [x] Absent     Aggression:  [] yes  [x] no    Patient is [x] able  [] unable to CONTRACT FOR SAFETY     PAST MEDICAL/PSYCHIATRIC HISTORY:   Past Medical History:   Diagnosis Date    Anxiety     Asthma     no meds currently- mostly exercise induced    Claustrophobia     confined spaces with locked doors    Depression     Epigastric pain     Nausea     Nut allergy        FAMILY/SOCIAL HISTORY:  History reviewed. No pertinent family history.  Social History     Socioeconomic History    Marital status: Single     Spouse name: Not on file    Number of children: 0    Years of education: 13    Highest education level: Not  (TYLENOL) tablet 650 mg, 650 mg, Oral, Q6H PRN, Anabela Hernandez MD    magnesium hydroxide (MILK OF MAGNESIA) 400 MG/5ML suspension 30 mL, 30 mL, Oral, Daily PRN, Anabela Hernandez MD    nicotine (NICODERM CQ) 21 MG/24HR 1 patch, 1 patch, TransDERmal, Daily, Anabela Hernandez MD    aluminum & magnesium hydroxide-simethicone (MAALOX) 200-200-20 MG/5ML suspension 30 mL, 30 mL, Oral, PRN, Anabela Hernandez MD    hydrOXYzine pamoate (VISTARIL) capsule 50 mg, 50 mg, Oral, TID PRN, Anabela Hernandez MD    haloperidol (HALDOL) tablet 5 mg, 5 mg, Oral, Q6H PRN **OR** haloperidol lactate (HALDOL) injection 5 mg, 5 mg, IntraMUSCular, Q6H PRN, Anabela Hernandez MD    melatonin tablet 3 mg, 3 mg, Oral, Nightly PRN, Anabela Hernandez MD      Examination:  /78   Pulse 92   Temp 97.6 °F (36.4 °C) (Temporal)   Resp 15   Ht 1.88 m (6' 2\")   SpO2 96%   BMI 24.39 kg/m²   Gait - steady  Medication side effects(SE):     Mental Status Examination:    Level of consciousness:  within normal limits   Appearance:  fair grooming and fair hygiene  Behavior/Motor:  no abnormalities noted  Attitude toward examiner:  cooperative  Speech:  spontaneous, normal rate and normal volume   Mood: \" I am doing okay\"  Affect: appropriate and pleasant  Thought processes: Linear without flights of ideas loose associations  Thought content: Devoid of any auditory visual hallucinations delusions or other perceptual normalities does seem to be somewhat guarded.  Denies SI/HI intent or plan   Language: able to name objects and repeate phrases  Remote Memory: intact  Recent Memory: intact  Cognition:  oriented to person, place, and time   Fund of Knowledge: Vocabulary intact, pt is aware of current events and past history  Attetion and Concentration intact  Insight Limited  Judgement Limited      ASSESSMENT: Patient symptoms are:  [] Well controlled  [x] Improving  [] Worsening  [] No change      Diagnosis:  Principal Problem:

## 2024-01-15 NOTE — PROGRESS NOTES
Pt resting in bed apparently asleep with easy even respirations at HS q 15 min electronic rounding.

## 2024-01-15 NOTE — BH NOTE
[facility]  Initial Interdisciplinary Treatment Plan NOTE    Review Date & Time: 1/14/24 @ 0900    Patient was in treatment team    Admission Type:   Admission Type: Involuntary    Reason for admission:  Reason for Admission: Patient states he missed 1 appointment with [facility] yesterday and they called the police to have him brought in because he is on outpatient committment. Reports hasn't taken meds in 2 days.      Estimated Length of Stay Update:  1-7 days   Estimated Discharge Date Update: 1/15/24    EDUCATION:   Learner Progress Toward Treatment Goals: Reviewed results and recommendations of this team, Reviewed group plan and strategies, Reviewed signs, symptoms and risk of self harm and violent behavior, and Reviewed goals and plan of care    Method: Small group    Outcome: Verbalized understanding    PATIENT GOALS: Take medications as prescribed, attend group sessions    PLAN/TREATMENT RECOMMENDATIONS UPDATE:1/15/24    GOALS UPDATE:   Time frame for Short-Term Goals: 1/15/24    Mary Sommer RN

## 2024-01-15 NOTE — DISCHARGE INSTRUCTIONS
Attending Provider: Anabela Hernandez MD.     If you have any questions and need to contact this individual please call the unit at [phone].  A Behavioral Health Provider will be available on call 24/7 and during holidays.        Reason for Admission: Gene Rogers has a significant past history of schizoaffective disorder currently on an outpatient commitment through [facility] courts presented to the ED through Court order as patient has had worsening decompensation of his mental health symptoms acting bizarre reporting that he wants to die by assisted suicide on January 18 and refusing oral medications.  The patient is admitted under his court order.     Follow up for Tobacco Cessation at:    [facility]                                 Date:  Friday 1/26 at 10am              [address] [address]   (Inside [facility]    take B elevators to 7th floor)   Phone: [phone]   Fax: [phone]

## 2024-01-15 NOTE — PLAN OF CARE
Resting in bed apparently asleep with unlabored respirations.  Problem: Krystle  Goal: Will exhibit normal sleep and speech and no impulsivity  Description: INTERVENTIONS:  1. Administer medication as ordered  2. Set limits on impulsive behavior  3. Make attempts to decrease external stimuli as possible  1/14/2024 2323 by Vera Garcia RN  Outcome: Progressing  1/14/2024 1832 by Mary Sommer RN  Outcome: Not Progressing     Problem: Anxiety  Goal: Will report anxiety at manageable levels  Description: INTERVENTIONS:  1. Administer medication as ordered  2. Teach and rehearse alternative coping skills  3. Provide emotional support with 1:1 interaction with staff  1/14/2024 2323 by Vera Garcia RN  Outcome: Progressing  1/14/2024 1832 by Mary Sommer RN  Outcome: Not Progressing     Problem: Self Care Deficit  Goal: Return ADL status to a safe level of function  Description: INTERVENTIONS:  1. Administer medication as ordered  2. Assess ADL deficits and provide assistive devices as needed  3. Obtain PT/OT consults as needed  4. Assist and instruct patient to increase activity and self care as tolerated  1/14/2024 2323 by Vera Garcia, RN  Outcome: Progressing  1/14/2024 1832 by Mary Sommer RN  Outcome: Not Progressing

## 2024-01-15 NOTE — PLAN OF CARE
[facility]  Day 3 Interdisciplinary Treatment Plan NOTE    Review Date & Time:  1/15/24 1000    Patient was in treatment team    Estimated Length of Stay Update:   5 DAYS  Estimated Discharge Date Update:  TOMORROW    EDUCATION:   Learner Progress Toward Treatment Goals: Reviewed results and recommendations of this team    Method: Small group    Outcome: Needs reinforcement    PATIENT GOALS: \"try not to cry\"    PLAN/TREATMENT RECOMMENDATIONS UPDATE: CONTINUE AOT AND STEP DOWN TO CRISIS UNIT WHEN STABLE, CONTINUE MEDICATIONS AND GROUPS    GOALS UPDATE:   Time frame for Short-Term Goals:   5 DAYS      Cindy Perea RN

## 2024-01-15 NOTE — CARE COORDINATION
Collateral Contact (PATRICIO signed)  Name: kevin   Relationship:mom and dad   Number: [phone]    Phone call to pts father who stated pts mother would be better to speak with on behalf of pt. He provided sw with Joann's cell phone [phone].    She stated that pt has been getting progressively \"worse\" since d/c from the hospital last admission. She stated that he has been \"somewhat catatonic\" and his apartment was in \"disarray and very messy\"  so he came to stay with them for a few days where he progressed \"a little\" but not much.    Pts mother is VERY concerned about his drug use she is worried that he is buying marijuana off of the streets and it can be laced with something possibly leading to some of these new symptoms and behaviors. She stated, \"we are all doing the same things and everyone gives him medicine to take orally but he does not take it and then here we are\" She stated \"He cannot care for himself and this is just been for 10 years\" She is very hopeful that he can be placed into a group home setting because \"this cycle just keeps repeating\" SW suggested working with his outpatient provider to discuss those concerns as well. We discussed ACT team, case management, group home placements, respite care and other services within the community.    She has not talked to him since Friday, but he wsa very demanding about \"getting him out of there\" she stated that he gets very directive, agitated and demanding to both parents which can make it difficult. She plans to visit and will call sw back with updates. She stated he has no access to weapons and they will transport upon d/c

## 2024-01-15 NOTE — GROUP NOTE
Group Therapy Note    Date: 1/15/2024    Group Start Time: 1045  Group End Time: 1120  Group Topic: Cognitive Skills    [facility] ACUTE [facility] 1    Jeffery Toscano MSW, ANNA        Group Therapy Note    Attendees: 13       Patient's Goal: to participate in group discussion on active listening.     Notes: pt was an active listener in group.     Status After Intervention:  Unchanged    Participation Level: Active Listener and Interactive    Participation Quality: Appropriate and Attentive      Speech:  normal      Thought Process/Content: Logical      Affective Functioning: Congruent      Mood: anxious      Level of consciousness:  Alert and Oriented x4      Response to Learning: Able to verbalize current knowledge/experience      Endings: None Reported    Modes of Intervention: Education, Support, Socialization, Exploration, Clarifying, and Problem-solving      Discipline Responsible: /Counselor      Signature:  BRIGITTE Kamara LSW

## 2024-01-15 NOTE — GROUP NOTE
Group Therapy Note    Attendees: 14                                                                    Group Therapy Note    Date: 1/15/2024  Start Time: 0945  End Time:  1015  Number of Participants: 14    Type of Group: Psychoeducation    Name: Self-esteem    Patient's Goal: ID what is self-esteem, influences of self-esteem, how self-esteem can affect mental health and how self-esteem can be improved.    Notes: CTRS lead educational discussion on self-esteem. Encouraged patients to share their experiences with group. Patient was actively engaged in group discussion and made positive contributions to discussion.    Status After Intervention:  Improved    Participation Level: Active Listener and Interactive    Participation Quality: Appropriate, Attentive, Sharing, and Supportive      Speech:  normal      Thought Process/Content: Logical  Linear      Affective Functioning: Congruent      Mood:  Appropriate      Level of consciousness:  Alert and Attentive      Response to Learning: Able to verbalize current knowledge/experience, Able to verbalize/acknowledge new learning, Able to retain information, Capable of insight, Able to change behavior, and Progressing to goal      Endings: None Reported    Modes of Intervention: Education      Discipline Responsible: Psychoeducational Specialist      Signature:  KELTON SinclairS

## 2024-01-15 NOTE — PROGRESS NOTES
Patient declined invitation to the following groups:    Recreation Activity    Patient will continue to be provided with opportunities to enhance leisure skills/interests and/or coping mechanisms.

## 2024-01-16 PROCEDURE — 6360000002 HC RX W HCPCS: Performed by: NURSE PRACTITIONER

## 2024-01-16 PROCEDURE — G0008 ADMIN INFLUENZA VIRUS VAC: HCPCS | Performed by: NURSE PRACTITIONER

## 2024-01-16 PROCEDURE — 1240000000 HC EMOTIONAL WELLNESS R&B

## 2024-01-16 PROCEDURE — 90686 IIV4 VACC NO PRSV 0.5 ML IM: CPT | Performed by: NURSE PRACTITIONER

## 2024-01-16 PROCEDURE — 99232 SBSQ HOSP IP/OBS MODERATE 35: CPT | Performed by: NURSE PRACTITIONER

## 2024-01-16 PROCEDURE — 6370000000 HC RX 637 (ALT 250 FOR IP): Performed by: PSYCHIATRY & NEUROLOGY

## 2024-01-16 PROCEDURE — 6370000000 HC RX 637 (ALT 250 FOR IP): Performed by: NURSE PRACTITIONER

## 2024-01-16 RX ORDER — VALPROIC ACID 250 MG/5ML
500 SOLUTION ORAL 2 TIMES DAILY
Qty: 600 ML | Refills: 0 | Status: SHIPPED | OUTPATIENT
Start: 2024-01-16 | End: 2024-01-19 | Stop reason: HOSPADM

## 2024-01-16 RX ORDER — NICOTINE 21 MG/24HR
1 PATCH, TRANSDERMAL 24 HOURS TRANSDERMAL DAILY
Qty: 30 PATCH | Refills: 0
Start: 2024-01-17 | End: 2024-02-16

## 2024-01-16 RX ORDER — ARIPIPRAZOLE 400 MG
400 KIT INTRAMUSCULAR
COMMUNITY
End: 2024-01-19 | Stop reason: HOSPADM

## 2024-01-16 RX ORDER — ARIPIPRAZOLE 5 MG/1
5 TABLET ORAL DAILY
Qty: 30 TABLET | Refills: 0 | Status: SHIPPED | OUTPATIENT
Start: 2024-01-17 | End: 2024-01-19 | Stop reason: HOSPADM

## 2024-01-16 RX ADMIN — INFLUENZA VIRUS VACCINE 0.5 ML: 15; 15; 15; 15 SUSPENSION INTRAMUSCULAR at 15:42

## 2024-01-16 RX ADMIN — VALPROIC ACID 500 MG: 250 SOLUTION ORAL at 09:36

## 2024-01-16 RX ADMIN — ARIPIPRAZOLE 5 MG: 5 TABLET ORAL at 09:36

## 2024-01-16 RX ADMIN — ACETAMINOPHEN 650 MG: 325 TABLET ORAL at 22:14

## 2024-01-16 RX ADMIN — VALPROIC ACID 500 MG: 250 SOLUTION ORAL at 22:13

## 2024-01-16 RX ADMIN — MELATONIN 3 MG ORAL TABLET 3 MG: 3 TABLET ORAL at 22:14

## 2024-01-16 ASSESSMENT — PAIN SCALES - GENERAL
PAINLEVEL_OUTOF10: 0
PAINLEVEL_OUTOF10: 6

## 2024-01-16 ASSESSMENT — PAIN DESCRIPTION - LOCATION: LOCATION: GENERALIZED

## 2024-01-16 ASSESSMENT — PAIN DESCRIPTION - DESCRIPTORS: DESCRIPTORS: ACHING;THROBBING

## 2024-01-16 NOTE — PROGRESS NOTES
Patient declined invitation to the following group    Peer Recovery    Patient will continue to be provided with opportunities to enhance leisure skills/interests and/or coping mechanisms.

## 2024-01-16 NOTE — GROUP NOTE
Group Therapy Note    Date: 1/16/2024  Start Time: 0930  End Time:  0945  Number of Participants: 14    Type of Group: Community Meeting    Patient attended community meeting   Was updated on expectations of the unit, staffing, and programming  Patient shared goal for today as \"work out a discharge plan with the nurse practitioner\"       Status After Intervention:  Improved    Participation Level: Active Listener    Participation Quality: Appropriate and Attentive      Speech:  normal      Thought Process/Content: Logical      Affective Functioning: Congruent      Mood: euthymic      Level of consciousness:  Alert and Attentive      Response to Learning: Able to verbalize current knowledge/experience, Able to verbalize/acknowledge new learning, and Progressing to goal      Endings: None Reported    Modes of Intervention: Education, Exploration, and Problem-solving      Discipline Responsible: Recreational Therapist    Signature:  RENEE Buckner

## 2024-01-16 NOTE — PROGRESS NOTES
Patient is resting quietly in bed with eyes closed at this time.  No signs of distress or discomfort noted.  No PRN medications given thus far.  Safety needs met.  No unit problems reported.  Purposeful rounding continued.

## 2024-01-16 NOTE — CARE COORDINATION
NILA met with pt to discuss discharge for today. Pt is alert and oriented x4. Pt's mood is bright and happy, affect is congruent. Pt's speech is clear, rate and volume is normal. Pt's insight and judgement is good. Pt denied depression and anxiety. Pt denied SI, HI, AVH. Pt is agreeable to step down to the [facility] and is hopeful that he will be able to go there today or tomorrow if they have a bed for him.     NILA called pt's mom Calixto [phone] (PATRICIO signed) to inform her of possible discharge for today. NILA spoke with mom who stated that she has not talked to the pt today but he did leave a message. Mom denied having any concerns for the pt to go to the crisis unit. Mom stated that [facility] did blood work and the pt was not compliant with the medications. Mom stated that pt's dad may want to come in for visitation, SW informed mom to have dad call and schedule it and if he does go to the crisis unit he can be called to cancel the visitation.     Pt will continue his treatment with the [facility] mental health services. Pt is currently on an AOT.     NILA faxed referral to the [facility] Phone: [phone] Fax: [phone].

## 2024-01-16 NOTE — PLAN OF CARE
Problem: Self Harm/Suicidality  Goal: Will have no self-injury during hospital stay  Description: INTERVENTIONS:  1.  Ensure constant observer at bedside with Q15M safety checks  2.  Maintain a safe environment  3.  Secure patient belongings  4.  Ensure family/visitors adhere to safety recommendations  5.  Ensure safety tray has been added to patient's diet order  6.  Every shift and PRN: Re-assess suicidal risk via Frequent Screener    1/16/2024 1158 by Hoda Aguirre RN  Outcome: Progressing  1/15/2024 2345 by Jossy Khan RN  Outcome: Progressing  1/15/2024 2258 by Jossy Khan RN  Outcome: Progressing     Problem: Depression  Goal: Will be euthymic at discharge  Description: INTERVENTIONS:  1. Administer medication as ordered  2. Provide emotional support via 1:1 interaction with staff  3. Encourage involvement in milieu/groups/activities  4. Monitor for social isolation  1/16/2024 1158 by Hoda Aguirre RN  Outcome: Progressing  1/15/2024 2345 by Jossy Khan RN  Outcome: Progressing  1/15/2024 2258 by Jossy Khan RN  Outcome: Progressing     Problem: Krytsle  Goal: Will exhibit normal sleep and speech and no impulsivity  Description: INTERVENTIONS:  1. Administer medication as ordered  2. Set limits on impulsive behavior  3. Make attempts to decrease external stimuli as possible  1/16/2024 1158 by Hoda Aguirre RN  Outcome: Progressing  1/15/2024 2345 by Jossy Khan RN  Outcome: Progressing  1/15/2024 2258 by Jossy Khan RN  Outcome: Progressing    Pleasant an cooperative. Brightens with conversation. Feels ready to be step down to the crises unit. Denies suicidal and homicidal thoughts.

## 2024-01-16 NOTE — CARE COORDINATION
Navigator received phone call from [facility] Ann TIJERINA, reporting that she received a phone call from patient mother, who reports concerns that patient is not stable and that he called her and threatened her. Patient mother reported that she is concerned for her safety. Patient allegedly called her and reported that he \"blames\" his mother and father for putting him in hospital, for the court order, and that if they do something else \"you're fucked\" and then hung up the phone. Navigator relayed concerns to Psych NP. Discharge to be cancelled. NP notified Unit RN. Navigator notified CSU  Vik. Updated [facility] Admin Ann of the plan to cancel discharge for today.     Electronically signed by BRIGITTE Gerard, ANNA on 1/16/2024 at 4:02 PM

## 2024-01-16 NOTE — PLAN OF CARE
Problem: Self Harm/Suicidality  Goal: Will have no self-injury during hospital stay  Description: INTERVENTIONS:  1.  Ensure constant observer at bedside with Q15M safety checks  2.  Maintain a safe environment  3.  Secure patient belongings  4.  Ensure family/visitors adhere to safety recommendations  5.  Ensure safety tray has been added to patient's diet order  6.  Every shift and PRN: Re-assess suicidal risk via Frequent Screener    1/15/2024 2258 by Jossy Khan RN  Outcome: Progressing  1/15/2024 1500 by Cindy Perea RN  Outcome: Progressing     Problem: Depression  Goal: Will be euthymic at discharge  Description: INTERVENTIONS:  1. Administer medication as ordered  2. Provide emotional support via 1:1 interaction with staff  3. Encourage involvement in milieu/groups/activities  4. Monitor for social isolation  1/15/2024 2258 by Jossy Khan RN  Outcome: Progressing  1/15/2024 1500 by Cindy Perea RN  Outcome: Progressing     Problem: Krystle  Goal: Will exhibit normal sleep and speech and no impulsivity  Description: INTERVENTIONS:  1. Administer medication as ordered  2. Set limits on impulsive behavior  3. Make attempts to decrease external stimuli as possible  1/15/2024 2258 by Jossy Khan RN  Outcome: Progressing  1/15/2024 1500 by Cindy Perea RN  Outcome: Progressing    Pt denies SI, HI and AVH. Pt attend group. Medication compliant. Appetite appropriate. Pt is isolative to room most of the shift with shirt over his eyes in his room. Poverty of content. Will continue to monitor.

## 2024-01-16 NOTE — GROUP NOTE
Group Therapy Note    Date: 1/16/2024    Group Start Time: 0945  Group End Time: 1020  Group Topic: Psychoeducation    [facility] 2    Rosina Orozco CTRS                                                                        Group Therapy Note    Date: 1/16/2024  Start Time: 0945  End Time:  1020  Number of Participants: 16    Type of Group: Psychoeducation    Wellness Binder Information  Module Name:  Safety Planning    Patient's Goal:  Patient will be able to ID warning signs prior to a crisis, and will demonstrate knowledge of warning signs and coping strategies through utilizing a safety plan.     Notes:  CTRS discussed warning signs of crisis situation and demonstrated how the use of a safety plan can help in crisis situation.  Patient was an active participant in discussion and engaged in activity of safety plan.       Status After Intervention:  Improved    Participation Level: Active Listener and Interactive    Participation Quality: Appropriate and Attentive      Speech:  normal      Thought Process/Content: Logical      Affective Functioning: Congruent      Mood: euthymic      Level of consciousness:  Alert and Attentive      Response to Learning: Able to verbalize current knowledge/experience, Able to verbalize/acknowledge new learning, and Progressing to goal      Endings: None Reported    Modes of Intervention: Education, Support, Clarifying, and Problem-solving      Discipline Responsible: Recreational Therapist      Signature:  RENEE Buckner

## 2024-01-16 NOTE — CARE COORDINATION
SW spoke with Vik at the [facility] who stated that the pt is accepted and is able to come to their facility today.    RN will need to call [facility] Phone: [phone] when pt is ready for discharge transportation. RN was updated to call for transportation when pt's medications are filled.

## 2024-01-17 PROCEDURE — 6370000000 HC RX 637 (ALT 250 FOR IP): Performed by: PSYCHIATRY & NEUROLOGY

## 2024-01-17 PROCEDURE — 6370000000 HC RX 637 (ALT 250 FOR IP): Performed by: NURSE PRACTITIONER

## 2024-01-17 PROCEDURE — 1240000000 HC EMOTIONAL WELLNESS R&B

## 2024-01-17 PROCEDURE — 99232 SBSQ HOSP IP/OBS MODERATE 35: CPT | Performed by: NURSE PRACTITIONER

## 2024-01-17 RX ORDER — RISPERIDONE 0.5 MG/1
1 TABLET, ORALLY DISINTEGRATING ORAL 2 TIMES DAILY
Status: DISCONTINUED | OUTPATIENT
Start: 2024-01-17 | End: 2024-01-19 | Stop reason: HOSPADM

## 2024-01-17 RX ADMIN — VALPROIC ACID 500 MG: 250 SOLUTION ORAL at 22:15

## 2024-01-17 RX ADMIN — RISPERIDONE 1 MG: 0.5 TABLET, ORALLY DISINTEGRATING ORAL at 22:15

## 2024-01-17 RX ADMIN — ARIPIPRAZOLE 5 MG: 5 TABLET ORAL at 09:55

## 2024-01-17 RX ADMIN — MELATONIN 3 MG ORAL TABLET 3 MG: 3 TABLET ORAL at 22:15

## 2024-01-17 RX ADMIN — RISPERIDONE 1 MG: 0.5 TABLET, ORALLY DISINTEGRATING ORAL at 11:02

## 2024-01-17 ASSESSMENT — PAIN SCALES - GENERAL: PAINLEVEL_OUTOF10: 0

## 2024-01-17 NOTE — CARE COORDINATION
NILA met with pt during treatment team. Pt stated that he spoke with his dad and he wants to go to the [facility] today. Pt stated that he got in an argument with his mom and he does not want to discuss it further. Pt stated that he wants to leave today. NP informed pt that his discharge will be up to the psychiatrist. Pt denied any SI, HI and stated that he is not upset with his parents and he wants to be discharged today to the [facility].    NILA called mom Joann's [phone] (PATRICIO signed) . NILA spoke with mom who stated that the pt called her and said he was going to be discharge to the [facility] and he was telling her that she needs to get him specific items from his home and then pt was aggressively scolding her for probating him and he could be in a lot of trouble and stated \"if this every happened again you're f'ed.\"  Mom stated that she called [facility] and spoke with [facility] and mom told [facility] the concerns and pt has not been taking the medications because they were not showing up in his blood work and the STRINGER is the only medication he is on. Mom stated that the pt is not leaving bed and he is only smoking mariajuana and he has been catatonic and not well. Mom stated that the pt is not stable and mom was told that it takes 6 weeks for the medications to start working. Mom stated that the pt has been staying with her because he is not able to maintain living alone. Mom stated that he does not clean up after himself and he drops his trash on the floor and this shows that he is not stable. Mom stated that on 1/28 pt has court to discontinue his AOT. Mom stated that the pt has not been to work and he only puts on a good front to get out of the hospital. Mom stated that the pt did call and say sorry and this was brief and then pt started to get aggressive and was blaming mom for not being discharged anymore. Mom stated that pt told her that  \"we are not okay and our relationship will not be okay for a long time.\" Mom stated that the conversation continued to escalate and she then terminated the phone call. Mom stated she feels that they have been enabling the behaviors and pt does not want the help and they are now forcing the help on the pt and he does not want it. Mom stated that the pt also needs to give up the drugs and pt no longer has medical marijuana and she is concerned where he is getting the drugs and he now wants to harm his parents. Mom stated that the pt called dad and stated that he wants to stay with them because he can't live on his own. Mom stated that she does not think that the pt is ready to be discharged from the hospital and he needs to be on medications for his mood, mom stated that she does not have a lot of insight to pt's MH because of the substance use. Mom stated that [facility] thinks pt has schizoaffective but he has a bipolar diagnosis. Mom stated that she is unable to tell when pt is telling the truth or lying because he has been self medicating. Mom stated that she is tired of the cycle of the pt being in the hospital multiple times per year and she wants the pt to engage in his treatment.

## 2024-01-17 NOTE — PROGRESS NOTES
BEHAVIORAL HEALTH FOLLOW-UP NOTE     1/17/2024     Patient was seen and examined in person, Chart reviewed   Patient's case discussed with staff/team    Chief Complaint: \"I am doing okay.\"    Interim History:   Patient was seen this morning with treatment team in his room.  He is asking to be discharged again and he is minimizing the threats that he made to his mother. collateral information received from patient's mom indicates that patient continued to have escalating behaviors continue to threaten his mom is impulsive.  Patient's outpatient health provider [facility] provided documentation of a provider's note about patient's recent treatment and medications indicate the patient was in the Abilify maintain a 400 mg injection.  We did start patient on oral supplementation of Abilify based on this documentation that was provided to the hospital.  I did notice that there was a recent level drawn for risperidone which would be inconsistent with the patient being on Abilify.  I therefore had the nurse call [facility] and was verified by both our nursing staff as well as her patient Navigator the patient's action on the Invega Sustenna injection and he had received Invega Sustenna 156 mg.  The documentation in the note that was faxed from [facility] is actually incorrect patient is not on the Abilify Maintena injection.  We therefore stopped the Abilify supplementation and we have started back on the Risperdal M tab 1 mg twice daily patient is also consistently been refusing his Depakote level.    .    Appetite: [x] Normal/Unchanged  [] Increased  [] Decreased      Sleep:       [x] Normal/Unchanged  [] Fair       [] Poor              Energy:    [x] Normal/Unchanged  [] Increased  [] Decreased        SI [] Present  [x] Absent    HI  []Present  [x] Absent     Aggression:  [] yes  [x] no    Patient is [x] able  [] unable to CONTRACT FOR SAFETY     PAST MEDICAL/PSYCHIATRIC HISTORY:   Past Medical History:   Diagnosis Date     Musculoskeletal  [] Skin/Breast  [] Neurological  [] Endocrine  [] Heme/Lymph  [] Allergic/Immunologic    Explanation:     MEDICATIONS:    Current Facility-Administered Medications:     valproic acid (DEPAKENE) 250 MG/5ML oral solution 500 mg, 500 mg, Oral, BID, Hedy Sandy, APRN - CNP, 500 mg at 01/16/24 2213    ARIPiprazole (ABILIFY) tablet 5 mg, 5 mg, Oral, Daily, Hedy Sandy APRRYAN - CNP, 5 mg at 01/16/24 0936    acetaminophen (TYLENOL) tablet 650 mg, 650 mg, Oral, Q6H PRN, Anabela Hernandez MD, 650 mg at 01/16/24 2214    magnesium hydroxide (MILK OF MAGNESIA) 400 MG/5ML suspension 30 mL, 30 mL, Oral, Daily PRN, Anabela Hernandez MD    nicotine (NICODERM CQ) 21 MG/24HR 1 patch, 1 patch, TransDERmal, Daily, Anabela Hernandez MD    aluminum & magnesium hydroxide-simethicone (MAALOX) 200-200-20 MG/5ML suspension 30 mL, 30 mL, Oral, PRN, Anabela Hernandez MD    hydrOXYzine pamoate (VISTARIL) capsule 50 mg, 50 mg, Oral, TID PRN, Anabela Hernandez MD    haloperidol (HALDOL) tablet 5 mg, 5 mg, Oral, Q6H PRN **OR** haloperidol lactate (HALDOL) injection 5 mg, 5 mg, IntraMUSCular, Q6H PRN, Anabela Hernandez MD    melatonin tablet 3 mg, 3 mg, Oral, Nightly PRN, Anabela Hernandez MD, 3 mg at 01/16/24 2214      Examination:  /78   Pulse 81   Temp 97 °F (36.1 °C) (Oral)   Resp 14   Ht 1.88 m (6' 2\")   SpO2 96%   BMI 24.39 kg/m²   Gait - steady  Medication side effects(SE):     Mental Status Examination:    Level of consciousness:  within normal limits   Appearance:  fair grooming and fair hygiene  Behavior/Motor:  no abnormalities noted  Attitude toward examiner:  cooperative  Speech:  spontaneous, normal rate and normal volume   Mood: \" I am doing okay\"  Affect: appropriate and pleasant  Thought processes: Linear without flights of ideas loose associations  Thought content: Devoid of any auditory visual hallucinations delusions or other perceptual normalities does seem to be somewhat guarded.

## 2024-01-17 NOTE — PROGRESS NOTES
BEHAVIORAL HEALTH FOLLOW-UP NOTE     1/17/2024     Patient was seen and examined in person, Chart reviewed   Patient's case discussed with staff/team    Chief Complaint: \"I am doing okay.\"    Interim History:   I saw patient this morning in his room.  He states that he is doing \"okay.\"  He apologizes for his past interactions with me and states that he believes the Depakote helps him and helps his mood.  Patient was agreeable to step down to the crisis unit however after the referral is made and patient was accepted the crisis unit he called his parents impulsively threatening violence towards them blaming them for his hospitalization and threatening them in the future.  Patient's parents reach out and voice significant concern to patient stability due to these threats that he made there for patient's discharge was canceled.  Patient means impulsive with poor insight and judgment regarding circumstance of his hospitalization and need for treatment        .    Appetite: [x] Normal/Unchanged  [] Increased  [] Decreased      Sleep:       [x] Normal/Unchanged  [] Fair       [] Poor              Energy:    [x] Normal/Unchanged  [] Increased  [] Decreased        SI [] Present  [x] Absent    HI  []Present  [x] Absent     Aggression:  [] yes  [x] no    Patient is [x] able  [] unable to CONTRACT FOR SAFETY     PAST MEDICAL/PSYCHIATRIC HISTORY:   Past Medical History:   Diagnosis Date    Anxiety     Asthma     no meds currently- mostly exercise induced    Claustrophobia     confined spaces with locked doors    Depression     Epigastric pain     Nausea     Nut allergy        FAMILY/SOCIAL HISTORY:  History reviewed. No pertinent family history.  Social History     Socioeconomic History    Marital status: Single     Spouse name: Not on file    Number of children: 0    Years of education: 13    Highest education level: Not on file   Occupational History    Occupation:      Employer: [employer]   Tobacco Use    Smoking

## 2024-01-17 NOTE — PLAN OF CARE
Problem: Self Harm/Suicidality  Goal: Will have no self-injury during hospital stay  Description: INTERVENTIONS:  1.  Ensure constant observer at bedside with Q15M safety checks  2.  Maintain a safe environment  3.  Secure patient belongings  4.  Ensure family/visitors adhere to safety recommendations  5.  Ensure safety tray has been added to patient's diet order  6.  Every shift and PRN: Re-assess suicidal risk via Frequent Screener    1/16/2024 2242 by Mary Estrada RN  Outcome: Progressing  1/16/2024 1158 by Hoda Aguirre RN  Outcome: Progressing     Problem: Depression  Goal: Will be euthymic at discharge  Description: INTERVENTIONS:  1. Administer medication as ordered  2. Provide emotional support via 1:1 interaction with staff  3. Encourage involvement in milieu/groups/activities  4. Monitor for social isolation  1/16/2024 2242 by Mary Estrada RN  Outcome: Progressing  1/16/2024 1158 by Hoda Aguirre RN  Outcome: Progressing     Problem: Krystle  Goal: Will exhibit normal sleep and speech and no impulsivity  Description: INTERVENTIONS:  1. Administer medication as ordered  2. Set limits on impulsive behavior  3. Make attempts to decrease external stimuli as possible  1/16/2024 2242 by Mary Estrada RN  Outcome: Progressing  1/16/2024 1158 by Hoda Aguirre RN  Outcome: Progressing     Problem: Psychosis  Goal: Will report no hallucinations or delusions  Description: INTERVENTIONS:  1. Administer medication as  ordered  2. Assist with reality testing to support increasing orientation  3. Assess if patient's hallucinations or delusions are encouraging self harm or harm to others and intervene as appropriate  Outcome: Progressing     Problem: Behavior  Goal: Pt/Family maintain appropriate behavior and adhere to behavioral management agreement, if implemented  Description: INTERVENTIONS:  1. Assess patient/family's coping skills and  non-compliant behavior (including use of illegal substances)  2.

## 2024-01-17 NOTE — BH NOTE
Per Cy at [facility], patient is receiving Invega Sustenna 156mg which he last received on 1/3/24.   Verbally discussed with Hedy, NP and orders were changed.

## 2024-01-17 NOTE — PROGRESS NOTES
Patient denies suicidal ideation, homicidal ideations and AVH.  Presents calm and cooperative during assessment.  Patient is out on the unit and is social with peers.  Medications taken without issue.  No complaints or concerns verbalized at this time.  No unit problems reported.  Will continue to observe and support.

## 2024-01-17 NOTE — PROGRESS NOTES
Patient declined invitation to the following groups    Community meeting  Education- a handout was provided to patient on today's group topic- letting go the need for control    Patient will continue to be provided with opportunities to enhance leisure skills/interests and/or coping mechanisms.

## 2024-01-18 LAB
DATE LAST DOSE: NORMAL
TME LAST DOSE: NORMAL H
VALPROATE SERPL-MCNC: 50 UG/ML (ref 50–100)
VANCOMYCIN DOSE: NORMAL MG

## 2024-01-18 PROCEDURE — 36415 COLL VENOUS BLD VENIPUNCTURE: CPT

## 2024-01-18 PROCEDURE — 6370000000 HC RX 637 (ALT 250 FOR IP): Performed by: NURSE PRACTITIONER

## 2024-01-18 PROCEDURE — 99232 SBSQ HOSP IP/OBS MODERATE 35: CPT | Performed by: NURSE PRACTITIONER

## 2024-01-18 PROCEDURE — 80164 ASSAY DIPROPYLACETIC ACD TOT: CPT

## 2024-01-18 PROCEDURE — 6370000000 HC RX 637 (ALT 250 FOR IP): Performed by: PSYCHIATRY & NEUROLOGY

## 2024-01-18 PROCEDURE — 1240000000 HC EMOTIONAL WELLNESS R&B

## 2024-01-18 RX ORDER — VALPROIC ACID 250 MG/5ML
750 SOLUTION ORAL 2 TIMES DAILY
Status: DISCONTINUED | OUTPATIENT
Start: 2024-01-18 | End: 2024-01-19 | Stop reason: HOSPADM

## 2024-01-18 RX ADMIN — VALPROIC ACID 750 MG: 250 SOLUTION ORAL at 21:37

## 2024-01-18 RX ADMIN — RISPERIDONE 1 MG: 0.5 TABLET, ORALLY DISINTEGRATING ORAL at 09:56

## 2024-01-18 RX ADMIN — MELATONIN 3 MG ORAL TABLET 3 MG: 3 TABLET ORAL at 21:38

## 2024-01-18 RX ADMIN — RISPERIDONE 1 MG: 0.5 TABLET, ORALLY DISINTEGRATING ORAL at 21:37

## 2024-01-18 RX ADMIN — VALPROIC ACID 500 MG: 250 SOLUTION ORAL at 09:56

## 2024-01-18 NOTE — PLAN OF CARE
Problem: Self Harm/Suicidality  Goal: Will have no self-injury during hospital stay  Description: INTERVENTIONS:  1.  Ensure constant observer at bedside with Q15M safety checks  2.  Maintain a safe environment  3.  Secure patient belongings  4.  Ensure family/visitors adhere to safety recommendations  5.  Ensure safety tray has been added to patient's diet order  6.  Every shift and PRN: Re-assess suicidal risk via Frequent Screener    Outcome: Progressing     Problem: Depression  Goal: Will be euthymic at discharge  Description: INTERVENTIONS:  1. Administer medication as ordered  2. Provide emotional support via 1:1 interaction with staff  3. Encourage involvement in milieu/groups/activities  4. Monitor for social isolation  Outcome: Progressing     Problem: Krystle  Goal: Will exhibit normal sleep and speech and no impulsivity  Description: INTERVENTIONS:  1. Administer medication as ordered  2. Set limits on impulsive behavior  3. Make attempts to decrease external stimuli as possible  Outcome: Progressing    Pt denies Si, HI and AVH. Pt stated he is excited about discharge tomorrow. \"It's great.\" Fair eye contact. Calm and cooperative. Brightened. Encouraged groups. Medication compliant. Appetite appropriate. Will continue to monitor.

## 2024-01-18 NOTE — BH NOTE
Patient resting with eyes closed. Respirations even and unlabored. No signs of distress. Q15 minute rounds continued.

## 2024-01-18 NOTE — PROGRESS NOTES
BEHAVIORAL HEALTH FOLLOW-UP NOTE     1/18/2024     Patient was seen and examined in person, Chart reviewed   Patient's case discussed with staff/team    Chief Complaint: \"I am doing okay.\"    Interim History:   Patient seen in his room today and he is focused on wanting to get to the crisis unit.  He denies suicidal or homicidal ideations intent or plan denies any auditory or visual hallucinations he is not compliant with medications no behavioral outbursts on the unit he is encouraged to participate in the milieu which she does he was out visible watching TV in the afternoon.  No overt or covert signs psychosis no neurovegetative signs of symptoms depression denies any side effects to medications    .    Appetite: [x] Normal/Unchanged  [] Increased  [] Decreased      Sleep:       [x] Normal/Unchanged  [] Fair       [] Poor              Energy:    [x] Normal/Unchanged  [] Increased  [] Decreased        SI [] Present  [x] Absent    HI  []Present  [x] Absent     Aggression:  [] yes  [x] no    Patient is [x] able  [] unable to CONTRACT FOR SAFETY     PAST MEDICAL/PSYCHIATRIC HISTORY:   Past Medical History:   Diagnosis Date    Anxiety     Asthma     no meds currently- mostly exercise induced    Claustrophobia     confined spaces with locked doors    Depression     Epigastric pain     Nausea     Nut allergy        FAMILY/SOCIAL HISTORY:  History reviewed. No pertinent family history.  Social History     Socioeconomic History    Marital status: Single     Spouse name: Not on file    Number of children: 0    Years of education: 13    Highest education level: Not on file   Occupational History    Occupation:      Employer: [employer]   Tobacco Use    Smoking status: Every Day     Types: E-Cigarettes    Smokeless tobacco: Never   Vaping Use    Vaping Use: Never used   Substance and Sexual Activity    Alcohol use: No    Drug use: Not Currently     Types: Marijuana (Weed)     Comment: states has a medical card     hydroxide-simethicone (MAALOX) 200-200-20 MG/5ML suspension 30 mL, 30 mL, Oral, PRN, Anabela Hernandez MD    hydrOXYzine pamoate (VISTARIL) capsule 50 mg, 50 mg, Oral, TID PRN, Anabela Hernandez MD    haloperidol (HALDOL) tablet 5 mg, 5 mg, Oral, Q6H PRN **OR** haloperidol lactate (HALDOL) injection 5 mg, 5 mg, IntraMUSCular, Q6H PRN, Anabela Hernandez MD    melatonin tablet 3 mg, 3 mg, Oral, Nightly PRN, Anabela Hernandez MD, 3 mg at 01/17/24 2215      Examination:  /82   Pulse 84   Temp 98.6 °F (37 °C) (Temporal)   Resp 16   Ht 1.88 m (6' 2\")   SpO2 96%   BMI 24.39 kg/m²   Gait - steady  Medication side effects(SE):     Mental Status Examination:    Level of consciousness:  within normal limits   Appearance:  fair grooming and fair hygiene  Behavior/Motor:  no abnormalities noted  Attitude toward examiner:  cooperative  Speech:  spontaneous, normal rate and normal volume   Mood: \" I am doing okay\"  Affect: appropriate and pleasant  Thought processes: Linear without flights of ideas loose associations  Thought content: Devoid of any auditory visual hallucinations delusions or other perceptual normalities does seem to be somewhat guarded.  Denies SI/HI intent or plan   Language: able to name objects and repeate phrases  Remote Memory: intact  Recent Memory: intact  Cognition:  oriented to person, place, and time   Fund of Knowledge: Vocabulary intact, pt is aware of current events and past history  Attetion and Concentration intact  Insight Limited  Judgement Limited      ASSESSMENT: Patient symptoms are:  [] Well controlled  [x] Improving  [] Worsening  [] No change      Diagnosis:  Principal Problem:    Schizoaffective disorder (HCC)  Resolved Problems:    * No resolved hospital problems. *      LABS:    No results for input(s): \"WBC\", \"HGB\", \"PLT\" in the last 72 hours.    No results for input(s): \"NA\", \"K\", \"CL\", \"CO2\", \"BUN\", \"CREATININE\", \"GLUCOSE\" in the last 72 hours.    No results for

## 2024-01-18 NOTE — PROGRESS NOTES
Patient declined invitation to the following groups:    Community Meeting  Education  Spiritual Care  Recreation Activity    Patient will continue to be provided with opportunities to enhance leisure skills/interests and/or coping mechanisms.

## 2024-01-18 NOTE — PLAN OF CARE
Patient is alert and oriented x 4.  Denies pain.  No noted signs or symptoms of distress.   Denies SI/HI, A/V/H, or thoughts of self harm when asked.    Rates Anxiety at 7/10 and Depression at 7/10.    Attended on unit groups this shift.    Medication compliant.     Pleasant, polite, and cooperative.  Brightened Affect, smiling and laughing with this nurse.  Linear conversation.  Appropriate with  staff.  Attended to personal care today, showered, brushed his teeth, brushed his hair.  Patient noted to remain out on unit after his dinner, watched TV in social area.  Noted to have phone call, afterward patient expressed frustration with Father.  States that His Father admitted to having a bad day and was taking it out on him, so patient calmly and respectfully excused himself from the call.  Patient with no agitation or outbursts this shift.   Appears well groomed/neat, room Is clean.    Up for all meals.    Will continue to monitor for safety q 15 minute safety rounds and environmental assessments.

## 2024-01-18 NOTE — CARE COORDINATION
SW met with pt. Pt observed laying in bed sleeping. Pt did wake up when SW approached however he remained laying in bed with his face towards the wall. Pt reports feeling fine today, denied SI/HI/AVH. Pt reports feeling better than when he came in and is looking forward to going to the [facility] at time of discharge.

## 2024-01-18 NOTE — PLAN OF CARE
Patient is alert and oriented x 4.  Denies pain.  No noted signs or symptoms of distress.   Denies SI/HI, A/V/H, or thoughts of self harm when asked.    Rates Anxiety at 9/10 and Depression at 10/10.    Medication compliant.     Blunt and cooperative. Flat Affect.   Will continue to monitor for safety q 15 minute safety rounds and environmental assessments.       Problem: Self Harm/Suicidality  Goal: Will have no self-injury during hospital stay  Description: INTERVENTIONS:  1.  Ensure constant observer at bedside with Q15M safety checks  2.  Maintain a safe environment  3.  Secure patient belongings  4.  Ensure family/visitors adhere to safety recommendations  5.  Ensure safety tray has been added to patient's diet order  6.  Every shift and PRN: Re-assess suicidal risk via Frequent Screener    1/17/2024 2233 by Barbie Key RN  Outcome: Progressing     Problem: Depression  Goal: Will be euthymic at discharge  Description: INTERVENTIONS:  1. Administer medication as ordered  2. Provide emotional support via 1:1 interaction with staff  3. Encourage involvement in milieu/groups/activities  4. Monitor for social isolation  1/17/2024 2233 by Barbie Key, RN  Outcome: Progressing     Problem: Krystle  Goal: Will exhibit normal sleep and speech and no impulsivity  Description: INTERVENTIONS:  1. Administer medication as ordered  2. Set limits on impulsive behavior  3. Make attempts to decrease external stimuli as possible  1/17/2024 2233 by Barbie Key, RN  Outcome: Progressing

## 2024-01-18 NOTE — CARE COORDINATION
NILA received a voicemail from Aysha with the [facility] requesting an update on the pt discharge.    NILA contacted the [facility] [phone] and spoke with Carolyn. Carolyn is aware that pt is still in the hospital and did not discharge on 1/16. NILA will need to keep [facility] updated on pt discharge and will need to call them if he is not going to be discharged tomorrow. NILA will need to fax updated notes to the crisis unit on day of discharge.

## 2024-01-19 VITALS
HEIGHT: 74 IN | SYSTOLIC BLOOD PRESSURE: 113 MMHG | DIASTOLIC BLOOD PRESSURE: 65 MMHG | RESPIRATION RATE: 15 BRPM | BODY MASS INDEX: 24.39 KG/M2 | HEART RATE: 94 BPM | OXYGEN SATURATION: 98 % | TEMPERATURE: 97.4 F

## 2024-01-19 PROCEDURE — 6370000000 HC RX 637 (ALT 250 FOR IP): Performed by: NURSE PRACTITIONER

## 2024-01-19 PROCEDURE — 99239 HOSP IP/OBS DSCHRG MGMT >30: CPT | Performed by: NURSE PRACTITIONER

## 2024-01-19 PROCEDURE — 6370000000 HC RX 637 (ALT 250 FOR IP): Performed by: PSYCHIATRY & NEUROLOGY

## 2024-01-19 RX ORDER — VALPROIC ACID 250 MG/5ML
750 SOLUTION ORAL 2 TIMES DAILY
Qty: 900 ML | Refills: 0 | Status: SHIPPED | OUTPATIENT
Start: 2024-01-19 | End: 2024-02-18

## 2024-01-19 RX ORDER — RISPERIDONE 1 MG/1
1 TABLET, ORALLY DISINTEGRATING ORAL 2 TIMES DAILY
Qty: 60 TABLET | Refills: 0 | Status: SHIPPED | OUTPATIENT
Start: 2024-01-19 | End: 2024-02-18

## 2024-01-19 RX ADMIN — RISPERIDONE 1 MG: 0.5 TABLET, ORALLY DISINTEGRATING ORAL at 09:07

## 2024-01-19 RX ADMIN — VALPROIC ACID 750 MG: 250 SOLUTION ORAL at 09:07

## 2024-01-19 NOTE — PROGRESS NOTES
Patient denies thoughts to harm self or others, denies hallucinations. Patient is compliant with medications, attends groups. Patient appetite is good, behavior is in control. Patient is social with peers and staff

## 2024-01-19 NOTE — DISCHARGE SUMMARY
examiner:  attentive and good eye contact  Speech:  spontaneous, normal rate and normal volume   Mood: \"My mood is good.\"  Affect: Bright appropriate pleasant  Thought processes: Linear without flight of ideas loose associations  Thought content: Devoid of any auditory or visual hallucinations delusions or other perceptual abnormalities.  Denies SI/HI intent or plan  Cognition:  oriented to person, place, and time   Concentration intact  Memory intact  Insight good   Judgement fair   Fund of Knowledge adequate      ASSESSMENT:  Patient symptoms are:  [x] Well controlled  [x] Improving  [] Worsening  [] No change    Reason for more than one antipsychotic:  [] N/A  [] 3 Failed Monotherapy attempts (Drugs tried:)  [x] Crossover to a new antipsychotic from risperdal onto invega  [] Taper to Monotherapy from Polypharmacy  [] Augmentation of clozapine therapy due to treatment resistance to single therapy    Diagnosis:  Principal Problem:    Schizoaffective disorder (HCC)  Resolved Problems:    * No resolved hospital problems. *      LABS:    No results for input(s): \"WBC\", \"HGB\", \"PLT\" in the last 72 hours.  No results for input(s): \"NA\", \"K\", \"CL\", \"CO2\", \"BUN\", \"CREATININE\", \"GLUCOSE\" in the last 72 hours.  No results for input(s): \"BILITOT\", \"ALKPHOS\", \"AST\", \"ALT\" in the last 72 hours.  Lab Results   Component Value Date/Time    LABAMPH NOT DETECTED 06/19/2021 06:52 PM    BARBSCNU NEGATIVE 01/12/2024 01:46 PM    LABBENZ NEGATIVE 01/12/2024 01:46 PM    LABMETH NEGATIVE 01/12/2024 01:46 PM    OPIATESCREENURINE NOT DETECTED 06/19/2021 06:52 PM    PHENCYCLIDINESCREENURINE NOT DETECTED 06/19/2021 06:52 PM    ETOH <10 06/19/2021 06:52 PM     Lab Results   Component Value Date/Time    TSH 1.240 04/18/2019 10:16 AM     No results found for: \"LITHIUM\"  Lab Results   Component Value Date    VALPROATE 50 01/18/2024       RISK ASSESSMENT AT DISCHARGE: Low risk for suicide and homicide.     Treatment Plan:  Reviewed current

## 2024-01-19 NOTE — PLAN OF CARE
Problem: Self Harm/Suicidality  Goal: Will have no self-injury during hospital stay  Description: INTERVENTIONS:  1.  Ensure constant observer at bedside with Q15M safety checks  2.  Maintain a safe environment  3.  Secure patient belongings  4.  Ensure family/visitors adhere to safety recommendations  5.  Ensure safety tray has been added to patient's diet order  6.  Every shift and PRN: Re-assess suicidal risk via Frequent Screener    1/19/2024 0941 by Melody Conteh RN  Outcome: Progressing  1/18/2024 2146 by Kelsey Villa RN  Outcome: Progressing     Problem: Depression  Goal: Will be euthymic at discharge  Description: INTERVENTIONS:  1. Administer medication as ordered  2. Provide emotional support via 1:1 interaction with staff  3. Encourage involvement in milieu/groups/activities  4. Monitor for social isolation  1/19/2024 0941 by Melody Conteh RN  Outcome: Progressing  1/18/2024 2146 by Kelsey Villa RN  Outcome: Progressing     Problem: Krystle  Goal: Will exhibit normal sleep and speech and no impulsivity  Description: INTERVENTIONS:  1. Administer medication as ordered  2. Set limits on impulsive behavior  3. Make attempts to decrease external stimuli as possible  1/19/2024 0941 by Melody Conteh RN  Outcome: Progressing  1/18/2024 2146 by Kelsey Villa RN  Outcome: Progressing     Problem: Psychosis  Goal: Will report no hallucinations or delusions  Description: INTERVENTIONS:  1. Administer medication as  ordered  2. Assist with reality testing to support increasing orientation  3. Assess if patient's hallucinations or delusions are encouraging self harm or harm to others and intervene as appropriate  Outcome: Progressing     Problem: Behavior  Goal: Pt/Family maintain appropriate behavior and adhere to behavioral management agreement, if implemented  Description: INTERVENTIONS:  1. Assess patient/family's coping skills and  non-compliant behavior (including use of illegal  substances)  2. Notify security of behavior or suspected illegal substances which indicate the need for search of the family and/or belongings  3. Encourage verbalization of thoughts and concerns in a socially appropriate manner  4. Utilize positive, consistent limit setting strategies supporting safety of patient, staff and others  5. Encourage participation in the decision making process about the behavioral management agreement  6. If a visitor's behavior poses a threat to safety call refer to organization policy.  7. Initiate consult with , Psychosocial CNS, Spiritual Care as appropriate  Outcome: Progressing     Problem: Anxiety  Goal: Will report anxiety at manageable levels  Description: INTERVENTIONS:  1. Administer medication as ordered  2. Teach and rehearse alternative coping skills  3. Provide emotional support with 1:1 interaction with staff  Outcome: Progressing     Problem: Drug Abuse/Detox  Goal: Will have no detox symptoms and will verbalize plan for changing drug-related behavior  Description: INTERVENTIONS:  1. Administer medication as ordered  2. Monitor physical status  3. Provide emotional support with 1:1 interaction with staff  4. Encourage  recovery focused treatment   Outcome: Progressing     Problem: Sleep Disturbance  Goal: Will exhibit normal sleeping pattern  Description: INTERVENTIONS:  1. Administer medication as ordered  2. Decrease environmental stimuli, including noise, as appropriate  3. Discourage social isolation and naps during the day  Outcome: Progressing     Problem: Involuntary Admit  Goal: Will cooperate with staff recommendations and doctor's orders and will demonstrate appropriate behavior  Description: INTERVENTIONS:  1. Treat underlying conditions and offer medication as ordered  2. Educate regarding involuntary admission procedures and rules  3. Contain excessive/inappropriate behavior per unit and hospital policies  Outcome: Progressing     Problem: Self

## 2024-01-19 NOTE — TRANSITION OF CARE
[phone]    Follow-up Information       Follow up With Specialties Details Why Contact Info    [facility]  Schedule an appointment as soon as possible for a visit Please ensure that you are scheduled with follow up mental health appointments prior to your discharge from the crisis unit for counseling and medication management. [facility]     [address] [place] [address]    Phone: [phone]   Fax: [phone]    [facility]  Go on 1/16/2024 Please continue your mental health treatment at the [facility]. Please ensure you are scheduled with [facility] appointments prior to your discharge from the facility. [facility] Phone: [phone] Fax: [phone]             Advanced Directive:   Does the patient have an appointed surrogate decision maker? No  Does the patient have a Medical Advance Directive? No  Does the patient have a Psychiatric Advance Directive? No  If the patient does not have a surrogate or Medical Advance Directive AND Psychiatric Advance Directive, the patient was offered information on these advance directives Patient declined to complete    Patient Instructions: Please continue all medications until otherwise directed by physician.      Tobacco Cessation Discharge Plan:   Is the patient a tobacco user  and needs referral for tobacco cessation? Yes  Patient referred to the following for tobacco cessation with an appointment? Patient refused  Patient was offered medication to assist with tobacco cessation at discharge? Patient refused    Alcohol/Substance Abuse Discharge Plan:   Does the patient have a history of substance/alcohol abuse and requires a referral for treatment? No  Patient referred to the following for substance/alcohol abuse treatment with an appointment? Patient refused  Patient was offered medication to assist with alcohol cessation at discharge? Patient  refused      Patient discharged to Home; discussed with patient/caregiver

## 2024-01-19 NOTE — CARE COORDINATION
Pt was seen during treatment team. Pt reported feeling fine today, denied SI/HI/AVH. Pt stated he talked with his dad and his dad can't bring him any clothes/belongings to the crisis unit today but he is ok with that. Pt expressed feeling ready to be discharged to the [facility] at this time. Pt will continue to treat with [facility] at time of discharge from the crisis unit. Pt cooperative, pleasant, appropriate, with fair eye contact, clear speech, improved insight/judgement.     NILA contacted pt ladi David [phone] (PATRICIO signed) and informed her of pt discharge for today. Joann expressed no concerns for pt discharging at this time.     Transportation will need to be scheduled through [facility] [phone] when pt is ready to be discharged.     In order to ensure appropriate transition and discharge planning is in place, the following documents have been transmitted to [facility], as the new outpatient provider:    The d/c diagnosis was transmitted to the next care provider  The reason for hospitalization was transmitted to the next care provider  The d/c medications (dosage and indication) were transmitted to the next care provider   The continuing care plan was transmitted to the next care provider

## 2024-01-19 NOTE — PLAN OF CARE
Problem: Self Harm/Suicidality  Goal: Will have no self-injury during hospital stay  Description: INTERVENTIONS:  1.  Ensure constant observer at bedside with Q15M safety checks  2.  Maintain a safe environment  3.  Secure patient belongings  4.  Ensure family/visitors adhere to safety recommendations  5.  Ensure safety tray has been added to patient's diet order  6.  Every shift and PRN: Re-assess suicidal risk via Frequent Screener    1/18/2024 2146 by Kelsey Villa RN  Outcome: Progressing     Problem: Depression  Goal: Will be euthymic at discharge  Description: INTERVENTIONS:  1. Administer medication as ordered  2. Provide emotional support via 1:1 interaction with staff  3. Encourage involvement in milieu/groups/activities  4. Monitor for social isolation  1/18/2024 2146 by Kelsey Villa RN  Outcome: Progressing     Problem: Krystle  Goal: Will exhibit normal sleep and speech and no impulsivity  Description: INTERVENTIONS:  1. Administer medication as ordered  2. Set limits on impulsive behavior  3. Make attempts to decrease external stimuli as possible  1/18/2024 2146 by Kelsey Villa, RN  Outcome: Progressing

## 2024-01-19 NOTE — PROGRESS NOTES
Valuables returned to patient from [facility] Police lock up receipt #[id number], patient paid copay of $38.78 for home medications  ext 1445

## 2024-01-19 NOTE — PROGRESS NOTES
[facility]  Week Interdisciplinary Treatment Plan Note     Review Date & Time: 1/19/2024 1000    Patient was in treatment team.    Estimated Length of Stay Update:  discharge to crisis unit today   Estimated Discharge Date Update: 1/19/2024    EDUCATION:   Learner Progress Toward Treatment Goals: Reviewed group plan and strategies    Method: Small group    Outcome: Verbalized understanding    PATIENT GOALS: follow up with AOT as recommended by [address] and Navigator    PLAN/TREATMENT RECOMMENDATIONS UPDATE: AOT     GOALS UPDATE:  Time frame for Short-Term Goals: discharge to Crisis Unit today 1/19/2024      Melody Conteh RN

## 2024-01-19 NOTE — CARE COORDINATION
NILA was advised pt ride is ready to be scheduled.    NILA contacted [facility] [phone] and scheduled a ride to the [facility]. They are aware the  will need to go to the main entrance and call the nurses station upon arrival.

## 2024-01-19 NOTE — GROUP NOTE
Group Therapy Note    Date: 1/19/2024    Group Start Time: 0930  Group End Time: 0945  Group Topic: Community Meeting    [facility] 7W ACUTE BH 2    January Zimmerman CTRS    Group Therapy Note    Attendees: 12                                                                    Group Therapy Note    Date: 1/19/2024  Start Time: 0930  End Time:  0945  Number of Participants: 12    Type of Group: Community Meeting    Was updated on expectations of the unit, staffing, and programming.  Patient shared goal for today as \"Stay calm and attend groups.\"    Status After Intervention:  Improved    Participation Level: Active Listener and Interactive    Participation Quality: Appropriate, Attentive, Sharing, and Supportive      Speech:  normal      Thought Process/Content: Logical  Linear      Affective Functioning: Congruent      Mood:  Appropriate      Level of consciousness:  Alert and Attentive      Response to Learning: Able to verbalize current knowledge/experience, Able to verbalize/acknowledge new learning, Able to retain information, Capable of insight, and Progressing to goal      Endings: None Reported    Modes of Intervention: Education, Socialization, Exploration, and Problem-solving      Discipline Responsible: Psychoeducational Specialist      Signature:  RENEE Sinclair

## 2024-01-19 NOTE — PROGRESS NOTES
CLINICAL PHARMACY NOTE: MEDS TO BEDS    Total # of Prescriptions Filled: 2   The following medications were delivered to the patient:  Aripiprazole 5mg  Valproic acid 250mg /5ml    Additional Documentation:  Delivered to ETHAN Hernandez

## 2024-01-19 NOTE — PROGRESS NOTES
[facility]  Discharge Note    Pt discharged with followings belongings:   Dental Appliances: None  Vision - Corrective Lenses: None  Jewelry: None  Body Piercings Removed: No  Clothing: Footwear, Sweater, Shirt, Pants (1 pair of shoes, 1 sweater, 1 T-shirt, 1 pair of socks, 1 pair of pants.)  Other Valuables: Keys (1 set of keys.)   Valuables sent home with patient  or returned to patient. Patient educated on aftercare instructions: yes  Information faxed to n/a by n/a  at 1:45 PM .Patient verbalize understanding of AVS:  yes.    Status EXAM upon discharge:  Mental Status and Behavioral Exam  Normal: No  Level of Assistance: Independent/Self  Facial Expression: Brightened  Affect: Appropriate  Level of Consciousness: Alert  Frequency of Checks: 4 times per hour, close  Mood:Normal: No  Mood: Anxious  Motor Activity:Normal: No  Motor Activity: Decreased  Eye Contact: Fair  Observed Behavior: Cooperative  Sexual Misconduct History: Current - no  Preception: [place] to person, [place] to time, [place] to place, [place] to situation  Attention:Normal: No  Attention: Distractible  Thought Processes: Unremarkable  Thought Content:Normal: Yes  Thought Content: Poverty of content  Depression Symptoms: No problems reported or observed.  Anxiety Symptoms: Generalized  Krystle Symptoms: Pressured speech  Hallucinations: None  Delusions: No  Memory:Normal: Yes  Insight and Judgment: No  Insight and Judgment: Poor judgment, Poor insight    Tobacco Screening:  Practical Counseling, on admission, chucky X, if applicable and completed (first 3 are required if patient doesn't refuse):            ( ) Recognizing danger situations (included triggers and roadblocks)                    ( ) Coping skills (new ways to manage stress,relaxation techniques, changing routine, distraction)                                                           ( ) Basic information about quitting (benefits of quitting, techniques in how to quit,

## 2024-01-19 NOTE — GROUP NOTE
Group Therapy Note    Date: 1/19/2024    Group Start Time: 0945  Group End Time: 1015  Group Topic: Psychoeducation    [facility] ACUTE BH 2    January Zimmerman CTRS    Group Therapy Note    Attendees: 11                                                                    Group Therapy Note    Date: 1/19/2024  Start Time: 0945  End Time:  1015  Number of Participants: 11    Type of Group: Psychoeducation    Name: Stress vs. anxiety    Patient's Goal: ID differences between stress and anxiety. ID positive coping skills to utilize for stress and anxiety.    Notes: CTRS educated patients on stress vs anxiety. Facilitated positive group discussion and encouraged patients to share experiences. Patient was actively engaged in group.    Status After Intervention:  Improved    Participation Level: Active Listener and Interactive    Participation Quality: Appropriate, Attentive, Sharing, and Supportive      Speech:  normal      Thought Process/Content: Logical  Linear      Affective Functioning: Congruent      Mood:  Appropriate      Level of consciousness:  Alert and Attentive      Response to Learning: Able to verbalize current knowledge/experience, Able to verbalize/acknowledge new learning, Able to retain information, Capable of insight, Able to change behavior, and Progressing to goal      Endings: None Reported    Modes of Intervention: Education, Socialization, and Exploration      Discipline Responsible: Psychoeducational Specialist      Signature:  RENEE Sinclair

## 2024-01-19 NOTE — BH NOTE
Patient denies suicidal ideation, homicidal ideations and AVH. Rates anxiety 5/10 and depression 6/10. Presents calm and cooperative during assessment.  Patient is out on the unit in increments and is somewhat social with peers.  Medications taken without issue.  No complaints or concerns verbalized at this time.  No unit problems reported.  Will continue to observe and support.

## 2024-01-19 NOTE — CARE COORDINATION
NILA contacted the [facility] [phone] and spoke with Carolyn. Carolyn stating they received the updated notes on the pt and he is still approved to come to their facility. Carolyn stated if pt is scheduled to discharge today he will need to come with meds in hand and they will need N2N. NILA advised Carolyn that we will keep them updated on if pt will be discharging to their facility or not today.     NILA updated assigned RN.

## 2024-01-22 NOTE — CARE COORDINATION
Submitted level of care change to [facility], [facility], & [facility].  Electronically signed by BRIGITTE Gerard, LSW on 1/22/2024 at 10:35 AM

## 2024-01-27 LAB
ALBUMIN SERPL-MCNC: 4.8 G/DL (ref 3.5–5.2)
ALP SERPL-CCNC: 88 U/L (ref 40–129)
ALT SERPL-CCNC: 12 U/L (ref 0–40)
ANION GAP SERPL CALCULATED.3IONS-SCNC: 13 MMOL/L (ref 7–16)
AST SERPL-CCNC: 13 U/L (ref 0–39)
BASOPHILS # BLD: 0.05 K/UL (ref 0–0.2)
BASOPHILS NFR BLD: 1 % (ref 0–2)
BILIRUB SERPL-MCNC: 0.2 MG/DL (ref 0–1.2)
BUN SERPL-MCNC: 14 MG/DL (ref 6–20)
CALCIUM SERPL-MCNC: 9.8 MG/DL (ref 8.6–10.2)
CHLORIDE SERPL-SCNC: 100 MMOL/L (ref 98–107)
CO2 SERPL-SCNC: 26 MMOL/L (ref 22–29)
CREAT SERPL-MCNC: 0.9 MG/DL (ref 0.7–1.2)
EOSINOPHIL # BLD: 0.33 K/UL (ref 0.05–0.5)
EOSINOPHILS RELATIVE PERCENT: 4 % (ref 0–6)
ERYTHROCYTE [DISTWIDTH] IN BLOOD BY AUTOMATED COUNT: 12.7 % (ref 11.5–15)
GFR SERPL CREATININE-BSD FRML MDRD: >60 ML/MIN/1.73M2
GLUCOSE SERPL-MCNC: 108 MG/DL (ref 74–99)
HCT VFR BLD AUTO: 48.1 % (ref 37–54)
HGB BLD-MCNC: 16 G/DL (ref 12.5–16.5)
IMM GRANULOCYTES # BLD AUTO: 0.04 K/UL (ref 0–0.58)
IMM GRANULOCYTES NFR BLD: 1 % (ref 0–5)
LYMPHOCYTES NFR BLD: 2.28 K/UL (ref 1.5–4)
LYMPHOCYTES RELATIVE PERCENT: 30 % (ref 20–42)
MCH RBC QN AUTO: 30.1 PG (ref 26–35)
MCHC RBC AUTO-ENTMCNC: 33.3 G/DL (ref 32–34.5)
MCV RBC AUTO: 90.4 FL (ref 80–99.9)
MONOCYTES NFR BLD: 0.85 K/UL (ref 0.1–0.95)
MONOCYTES NFR BLD: 11 % (ref 2–12)
NEUTROPHILS NFR BLD: 54 % (ref 43–80)
NEUTS SEG NFR BLD: 4.12 K/UL (ref 1.8–7.3)
PLATELET # BLD AUTO: 334 K/UL (ref 130–450)
PMV BLD AUTO: 10.3 FL (ref 7–12)
POTASSIUM SERPL-SCNC: 4 MMOL/L (ref 3.5–5)
PROLACTIN SERPL-MCNC: 70.73 NG/ML
PROT SERPL-MCNC: 8.3 G/DL (ref 6.4–8.3)
RBC # BLD AUTO: 5.32 M/UL (ref 3.8–5.8)
SODIUM SERPL-SCNC: 139 MMOL/L (ref 132–146)
VALPROATE SERPL-MCNC: 24 UG/ML (ref 50–100)
WBC OTHER # BLD: 7.7 K/UL (ref 4.5–11.5)

## 2024-02-20 ENCOUNTER — OFFICE VISIT (OUTPATIENT)
Dept: FAMILY MEDICINE CLINIC | Age: 29
End: 2024-02-20
Payer: COMMERCIAL

## 2024-02-20 VITALS
DIASTOLIC BLOOD PRESSURE: 80 MMHG | WEIGHT: 206.6 LBS | BODY MASS INDEX: 26.51 KG/M2 | SYSTOLIC BLOOD PRESSURE: 122 MMHG | HEIGHT: 74 IN | OXYGEN SATURATION: 96 % | TEMPERATURE: 97.1 F | HEART RATE: 98 BPM | RESPIRATION RATE: 18 BRPM

## 2024-02-20 DIAGNOSIS — F25.0 SCHIZOAFFECTIVE DISORDER, BIPOLAR TYPE (HCC): Primary | ICD-10-CM

## 2024-02-20 DIAGNOSIS — Z91.199 PERSONAL HISTORY OF NONCOMPLIANCE WITH MEDICAL TREATMENT, PRESENTING HAZARDS TO HEALTH: ICD-10-CM

## 2024-02-20 DIAGNOSIS — J45.20 MILD INTERMITTENT ASTHMA WITHOUT COMPLICATION: ICD-10-CM

## 2024-02-20 PROCEDURE — 99214 OFFICE O/P EST MOD 30 MIN: CPT | Performed by: FAMILY MEDICINE

## 2024-02-20 RX ORDER — ALBUTEROL SULFATE 90 UG/1
2 AEROSOL, METERED RESPIRATORY (INHALATION) 4 TIMES DAILY PRN
Qty: 18 G | Refills: 5 | Status: SHIPPED | OUTPATIENT
Start: 2024-02-20

## 2024-02-20 SDOH — ECONOMIC STABILITY: FOOD INSECURITY: WITHIN THE PAST 12 MONTHS, YOU WORRIED THAT YOUR FOOD WOULD RUN OUT BEFORE YOU GOT MONEY TO BUY MORE.: NEVER TRUE

## 2024-02-20 SDOH — ECONOMIC STABILITY: INCOME INSECURITY: HOW HARD IS IT FOR YOU TO PAY FOR THE VERY BASICS LIKE FOOD, HOUSING, MEDICAL CARE, AND HEATING?: NOT HARD AT ALL

## 2024-02-20 SDOH — ECONOMIC STABILITY: FOOD INSECURITY: WITHIN THE PAST 12 MONTHS, THE FOOD YOU BOUGHT JUST DIDN'T LAST AND YOU DIDN'T HAVE MONEY TO GET MORE.: NEVER TRUE

## 2024-02-20 SDOH — ECONOMIC STABILITY: HOUSING INSECURITY
IN THE LAST 12 MONTHS, WAS THERE A TIME WHEN YOU DID NOT HAVE A STEADY PLACE TO SLEEP OR SLEPT IN A SHELTER (INCLUDING NOW)?: NO

## 2024-02-20 NOTE — PROGRESS NOTES
Lorabid [Loracarbef]        Past Medical History:   Diagnosis Date    Anxiety     Asthma     no meds currently- mostly exercise induced    Claustrophobia     confined spaces with locked doors    Depression     Epigastric pain     Nausea     Nut allergy      Past Surgical History:   Procedure Laterality Date    BRONCHOSCOPY N/A 11/9/2019    BRONCHOSCOPY ALVEOLAR LAVAGE performed by Shelby Ignacio MD at [facility] ENDOSCOPY    UPPER GASTROINTESTINAL ENDOSCOPY N/A 8/19/2019    EGD BIOPSY performed by Vin Wolf III, MD at [facility] ENDOSCOPY    WISDOM TOOTH EXTRACTION       No family history on file.  Social History       Tobacco History       Smoking Status  Every Day Smoking Tobacco Type  E-Cigarettes      Passive Exposure  Never      Smokeless Tobacco Use  Never              Alcohol History       Alcohol Use Status  No              Drug Use       Drug Use Status  Yes Types  Marijuana (Weed) Comment  states has a medical card              Sexual Activity       Sexually Active  Not Currently Partners  Female                    OBJECTIVE  Vitals:    02/20/24 1411   BP: 122/80   Pulse: 98   Resp: 18   Temp: 97.1 °F (36.2 °C)   TempSrc: Temporal   SpO2: 96%   Weight: 93.7 kg (206 lb 9.6 oz)   Height: 1.88 m (6' 2\")        Body mass index is 26.53 kg/m².    No orders of the defined types were placed in this encounter.       EXAM   Physical Exam  Vitals and nursing note reviewed.   Constitutional:       Appearance: Normal appearance. He is normal weight.      Comments: Weight is up about 25 lbs   HENT:      Right Ear: Tympanic membrane and external ear normal.      Left Ear: Tympanic membrane and external ear normal.      Nose: Congestion present. No rhinorrhea.      Mouth/Throat:      Pharynx: Oropharynx is clear. No posterior oropharyngeal erythema.   Eyes:      General: No scleral icterus.     Conjunctiva/sclera: Conjunctivae normal.   Cardiovascular:      Rate and Rhythm: Normal rate and regular rhythm.      Heart

## 2024-02-21 ASSESSMENT — ENCOUNTER SYMPTOMS
BLOOD IN STOOL: 0
WHEEZING: 0
COUGH: 0
EYE REDNESS: 0
ABDOMINAL PAIN: 0
PHOTOPHOBIA: 0
SHORTNESS OF BREATH: 0

## 2024-05-23 ENCOUNTER — HOSPITAL ENCOUNTER (EMERGENCY)
Age: 29
Discharge: HOME | End: 2024-05-23
Payer: COMMERCIAL

## 2024-05-23 ENCOUNTER — APPOINTMENT (OUTPATIENT)
Dept: CT IMAGING | Age: 29
End: 2024-05-23
Payer: COMMERCIAL

## 2024-05-23 ENCOUNTER — APPOINTMENT (OUTPATIENT)
Dept: GENERAL RADIOLOGY | Age: 29
End: 2024-05-23
Payer: COMMERCIAL

## 2024-05-23 VITALS
SYSTOLIC BLOOD PRESSURE: 105 MMHG | HEART RATE: 78 BPM | HEIGHT: 73 IN | BODY MASS INDEX: 24.52 KG/M2 | OXYGEN SATURATION: 97 % | WEIGHT: 185 LBS | DIASTOLIC BLOOD PRESSURE: 73 MMHG | TEMPERATURE: 98.6 F | RESPIRATION RATE: 18 BRPM

## 2024-05-23 DIAGNOSIS — R07.9 CHEST PAIN, UNSPECIFIED TYPE: ICD-10-CM

## 2024-05-23 DIAGNOSIS — R09.1 PLEURISY: Primary | ICD-10-CM

## 2024-05-23 DIAGNOSIS — J90 PLEURAL EFFUSION ON LEFT: ICD-10-CM

## 2024-05-23 LAB
ALBUMIN SERPL-MCNC: 4.4 G/DL (ref 3.5–5.2)
ALP SERPL-CCNC: 84 U/L (ref 40–129)
ALT SERPL-CCNC: 28 U/L (ref 0–40)
ANION GAP SERPL CALCULATED.3IONS-SCNC: 10 MMOL/L (ref 7–16)
AST SERPL-CCNC: 11 U/L (ref 0–39)
BASOPHILS # BLD: 0.03 K/UL (ref 0–0.2)
BASOPHILS NFR BLD: 0 % (ref 0–2)
BILIRUB SERPL-MCNC: 0.5 MG/DL (ref 0–1.2)
BUN SERPL-MCNC: 8 MG/DL (ref 6–20)
CALCIUM SERPL-MCNC: 9.5 MG/DL (ref 8.6–10.2)
CHLORIDE SERPL-SCNC: 102 MMOL/L (ref 98–107)
CO2 SERPL-SCNC: 27 MMOL/L (ref 22–29)
CREAT SERPL-MCNC: 0.9 MG/DL (ref 0.7–1.2)
D-DIMER QUANTITATIVE: 628 NG/ML DDU (ref 0–230)
EKG ATRIAL RATE: 68 BPM
EKG P AXIS: 46 DEGREES
EKG P-R INTERVAL: 138 MS
EKG Q-T INTERVAL: 386 MS
EKG QRS DURATION: 84 MS
EKG QTC CALCULATION (BAZETT): 410 MS
EKG R AXIS: 99 DEGREES
EKG T AXIS: 37 DEGREES
EKG VENTRICULAR RATE: 68 BPM
EOSINOPHIL # BLD: 0.38 K/UL (ref 0.05–0.5)
EOSINOPHILS RELATIVE PERCENT: 3 % (ref 0–6)
ERYTHROCYTE [DISTWIDTH] IN BLOOD BY AUTOMATED COUNT: 12.9 % (ref 11.5–15)
GFR, ESTIMATED: >90 ML/MIN/1.73M2
GLUCOSE SERPL-MCNC: 119 MG/DL (ref 74–99)
HCT VFR BLD AUTO: 44 % (ref 37–54)
HGB BLD-MCNC: 14.4 G/DL (ref 12.5–16.5)
IMM GRANULOCYTES # BLD AUTO: 0.05 K/UL (ref 0–0.58)
IMM GRANULOCYTES NFR BLD: 0 % (ref 0–5)
LYMPHOCYTES NFR BLD: 1.55 K/UL (ref 1.5–4)
LYMPHOCYTES RELATIVE PERCENT: 14 % (ref 20–42)
MCH RBC QN AUTO: 30.1 PG (ref 26–35)
MCHC RBC AUTO-ENTMCNC: 32.7 G/DL (ref 32–34.5)
MCV RBC AUTO: 92.1 FL (ref 80–99.9)
MONOCYTES NFR BLD: 1.02 K/UL (ref 0.1–0.95)
MONOCYTES NFR BLD: 9 % (ref 2–12)
NEUTROPHILS NFR BLD: 74 % (ref 43–80)
NEUTS SEG NFR BLD: 8.44 K/UL (ref 1.8–7.3)
PLATELET # BLD AUTO: 314 K/UL (ref 130–450)
PMV BLD AUTO: 10.1 FL (ref 7–12)
POTASSIUM SERPL-SCNC: 4.3 MMOL/L (ref 3.5–5)
PROT SERPL-MCNC: 7.4 G/DL (ref 6.4–8.3)
RBC # BLD AUTO: 4.78 M/UL (ref 3.8–5.8)
SODIUM SERPL-SCNC: 139 MMOL/L (ref 132–146)
TROPONIN I SERPL HS-MCNC: 9 NG/L (ref 0–11)
WBC OTHER # BLD: 11.5 K/UL (ref 4.5–11.5)

## 2024-05-23 PROCEDURE — 2580000003 HC RX 258

## 2024-05-23 PROCEDURE — 6360000004 HC RX CONTRAST MEDICATION: Performed by: RADIOLOGY

## 2024-05-23 PROCEDURE — 93005 ELECTROCARDIOGRAM TRACING: CPT | Performed by: PHYSICIAN ASSISTANT

## 2024-05-23 PROCEDURE — 96375 TX/PRO/DX INJ NEW DRUG ADDON: CPT

## 2024-05-23 PROCEDURE — 71046 X-RAY EXAM CHEST 2 VIEWS: CPT

## 2024-05-23 PROCEDURE — 99285 EMERGENCY DEPT VISIT HI MDM: CPT

## 2024-05-23 PROCEDURE — 71275 CT ANGIOGRAPHY CHEST: CPT

## 2024-05-23 PROCEDURE — 84484 ASSAY OF TROPONIN QUANT: CPT

## 2024-05-23 PROCEDURE — 85379 FIBRIN DEGRADATION QUANT: CPT

## 2024-05-23 PROCEDURE — 80053 COMPREHEN METABOLIC PANEL: CPT

## 2024-05-23 PROCEDURE — 85025 COMPLETE CBC W/AUTO DIFF WBC: CPT

## 2024-05-23 PROCEDURE — 6360000002 HC RX W HCPCS: Performed by: PHYSICIAN ASSISTANT

## 2024-05-23 PROCEDURE — 96374 THER/PROPH/DIAG INJ IV PUSH: CPT

## 2024-05-23 PROCEDURE — 93010 ELECTROCARDIOGRAM REPORT: CPT | Performed by: INTERNAL MEDICINE

## 2024-05-23 RX ORDER — METHYLPREDNISOLONE 4 MG/1
TABLET ORAL
Qty: 1 KIT | Refills: 0 | Status: SHIPPED | OUTPATIENT
Start: 2024-05-23

## 2024-05-23 RX ORDER — NAPROXEN 500 MG/1
500 TABLET ORAL 2 TIMES DAILY
Qty: 30 TABLET | Refills: 0 | OUTPATIENT
Start: 2024-05-23

## 2024-05-23 RX ORDER — NAPROXEN 500 MG/1
500 TABLET ORAL 2 TIMES DAILY
Qty: 60 TABLET | Refills: 0 | Status: SHIPPED | OUTPATIENT
Start: 2024-05-23 | End: 2024-05-23

## 2024-05-23 RX ORDER — METHYLPREDNISOLONE 4 MG/1
TABLET ORAL
Qty: 1 KIT | Refills: 0 | Status: SHIPPED | OUTPATIENT
Start: 2024-05-23 | End: 2024-05-23

## 2024-05-23 RX ORDER — DOXYCYCLINE HYCLATE 100 MG
100 TABLET ORAL 2 TIMES DAILY
Qty: 14 TABLET | Refills: 0 | Status: SHIPPED | OUTPATIENT
Start: 2024-05-23 | End: 2024-05-23

## 2024-05-23 RX ORDER — WATER 10 ML/10ML
INJECTION INTRAMUSCULAR; INTRAVENOUS; SUBCUTANEOUS
Status: COMPLETED
Start: 2024-05-23 | End: 2024-05-23

## 2024-05-23 RX ORDER — DOXYCYCLINE HYCLATE 100 MG
100 TABLET ORAL 2 TIMES DAILY
Qty: 14 TABLET | Refills: 0 | Status: SHIPPED | OUTPATIENT
Start: 2024-05-23 | End: 2024-05-30

## 2024-05-23 RX ORDER — METHYLPREDNISOLONE SODIUM SUCCINATE 125 MG/2ML
125 INJECTION, POWDER, LYOPHILIZED, FOR SOLUTION INTRAMUSCULAR; INTRAVENOUS DAILY
Status: DISCONTINUED | OUTPATIENT
Start: 2024-05-23 | End: 2024-05-23 | Stop reason: HOSPADM

## 2024-05-23 RX ORDER — DOXYCYCLINE HYCLATE 100 MG
100 TABLET ORAL 2 TIMES DAILY
Qty: 14 TABLET | Refills: 0 | OUTPATIENT
Start: 2024-05-23 | End: 2024-05-30

## 2024-05-23 RX ORDER — NAPROXEN 500 MG/1
500 TABLET ORAL 2 TIMES DAILY
Qty: 30 TABLET | Refills: 0 | Status: SHIPPED | OUTPATIENT
Start: 2024-05-23 | End: 2024-05-23

## 2024-05-23 RX ORDER — METHYLPREDNISOLONE 4 MG/1
TABLET ORAL
Qty: 1 KIT | Refills: 0 | OUTPATIENT
Start: 2024-05-23

## 2024-05-23 RX ORDER — KETOROLAC TROMETHAMINE 30 MG/ML
30 INJECTION, SOLUTION INTRAMUSCULAR; INTRAVENOUS ONCE
Status: COMPLETED | OUTPATIENT
Start: 2024-05-23 | End: 2024-05-23

## 2024-05-23 RX ORDER — NAPROXEN 500 MG/1
500 TABLET ORAL 2 TIMES DAILY
Qty: 60 TABLET | Refills: 0 | Status: SHIPPED | OUTPATIENT
Start: 2024-05-23

## 2024-05-23 RX ADMIN — METHYLPREDNISOLONE SODIUM SUCCINATE 125 MG: 125 INJECTION INTRAMUSCULAR; INTRAVENOUS at 13:34

## 2024-05-23 RX ADMIN — IOPAMIDOL 75 ML: 755 INJECTION, SOLUTION INTRAVENOUS at 12:22

## 2024-05-23 RX ADMIN — WATER 2 ML: 1 INJECTION INTRAMUSCULAR; INTRAVENOUS; SUBCUTANEOUS at 13:34

## 2024-05-23 RX ADMIN — KETOROLAC TROMETHAMINE 30 MG: 30 INJECTION, SOLUTION INTRAMUSCULAR at 13:31

## 2024-05-23 ASSESSMENT — PAIN SCALES - GENERAL
PAINLEVEL_OUTOF10: 8
PAINLEVEL_OUTOF10: 8

## 2024-05-23 ASSESSMENT — PAIN DESCRIPTION - LOCATION
LOCATION: CHEST
LOCATION: CHEST

## 2024-05-23 ASSESSMENT — PAIN - FUNCTIONAL ASSESSMENT: PAIN_FUNCTIONAL_ASSESSMENT: 0-10

## 2024-05-23 NOTE — DISCHARGE INSTRUCTIONS
CTA PULMONARY W CONTRAST   Final Result   1. There is no pulmonary embolus   2. Trace left pleural effusion.   3. Minimal airspace disease seen within the base of the right middle lobe,   base of the right lower lobe laterally and base of the lingula.  These   findings are nonspecific.  These findings could represent atelectasis or   pneumonia in the proper clinical setting.   .         XR CHEST (2 VW)   Final Result   1. There are no findings of failure or pneumonia.

## 2024-05-23 NOTE — ED PROVIDER NOTES
CO2 27 22 - 29 mmol/L    Anion Gap 10 7 - 16 mmol/L    Glucose 119 (H) 74 - 99 mg/dL    BUN 8 6 - 20 mg/dL    Creatinine 0.9 0.70 - 1.20 mg/dL    Est, Glom Filt Rate >90 >60 mL/min/1.73m2    Calcium 9.5 8.6 - 10.2 mg/dL    Total Protein 7.4 6.4 - 8.3 g/dL    Albumin 4.4 3.5 - 5.2 g/dL    Total Bilirubin 0.5 0.0 - 1.2 mg/dL    Alkaline Phosphatase 84 40 - 129 U/L    ALT 28 0 - 40 U/L    AST 11 0 - 39 U/L   Troponin   Result Value Ref Range    Troponin, High Sensitivity 9 0 - 11 ng/L   D-Dimer, Quantitative   Result Value Ref Range    D-Dimer, Quant 628 (H) 0 - 230 ng/mL DDU   EKG 12 Lead   Result Value Ref Range    Ventricular Rate 68 BPM    Atrial Rate 68 BPM    P-R Interval 138 ms    QRS Duration 84 ms    Q-T Interval 386 ms    QTc Calculation (Bazett) 410 ms    P Axis 46 degrees    R Axis 99 degrees    T Axis 37 degrees     Imaging:  All Radiology results interpreted by Radiologist unless otherwise noted.  CTA PULMONARY W CONTRAST   Final Result   1. There is no pulmonary embolus   2. Trace left pleural effusion.   3. Minimal airspace disease seen within the base of the right middle lobe,   base of the right lower lobe laterally and base of the lingula.  These   findings are nonspecific.  These findings could represent atelectasis or   pneumonia in the proper clinical setting.   .         XR CHEST (2 VW)   Final Result   1. There are no findings of failure or pneumonia.           EKG #1:  Interpreted by emergency department physician unless otherwise noted.  Time:  1045    Rate: 68  QT/QTc: 386/410  Rhythm: Sinus with sinus arrhythmia  Interpretation: Nonspecific, rightward axis  Comparison: Stable as compared to last EKG on January 12, 2024  Confirmed by ED attending     -- MEDICAL DECISION MAKING --   History From: History from : Patient  Limitations to history : None    Record Review:  Outpatient Notes reviewed  Other Records Reviewed : None    CC/HPI Summary, DDx, ED Course, and Reassessment:   Patient

## 2024-07-05 DIAGNOSIS — F25.0 SCHIZOAFFECTIVE DISORDER, BIPOLAR TYPE (HCC): ICD-10-CM

## 2024-07-06 LAB
VALPROIC ACID LEVEL: 41 UG/ML (ref 50–100)
VALPROIC DATE LAST DOSE: 0
VALPROIC DOSE AMOUNT: 0
VALPROIC TIME LAST DOSE: 0

## 2024-07-23 ENCOUNTER — HOSPITAL ENCOUNTER (INPATIENT)
Age: 29
LOS: 12 days | Discharge: HOME | DRG: 885 | End: 2024-08-05
Attending: STUDENT IN AN ORGANIZED HEALTH CARE EDUCATION/TRAINING PROGRAM | Admitting: PSYCHIATRY & NEUROLOGY
Payer: COMMERCIAL

## 2024-07-23 DIAGNOSIS — F25.0 SCHIZOAFFECTIVE DISORDER, BIPOLAR TYPE (HCC): Primary | ICD-10-CM

## 2024-07-23 DIAGNOSIS — J45.20 MILD INTERMITTENT ASTHMA WITHOUT COMPLICATION: ICD-10-CM

## 2024-07-23 DIAGNOSIS — F29 PSYCHOSIS, UNSPECIFIED PSYCHOSIS TYPE (HCC): ICD-10-CM

## 2024-07-23 LAB
ALBUMIN SERPL-MCNC: 4.4 G/DL (ref 3.5–5.2)
ALP SERPL-CCNC: 78 U/L (ref 40–129)
ALT SERPL-CCNC: 18 U/L (ref 0–40)
AMPHET UR QL SCN: POSITIVE
ANION GAP SERPL CALCULATED.3IONS-SCNC: 15 MMOL/L (ref 7–16)
APAP SERPL-MCNC: <5 UG/ML (ref 10–30)
AST SERPL-CCNC: 10 U/L (ref 0–39)
BARBITURATES UR QL SCN: NEGATIVE
BASOPHILS # BLD: 0.05 K/UL (ref 0–0.2)
BASOPHILS NFR BLD: 1 % (ref 0–2)
BENZODIAZ UR QL: NEGATIVE
BILIRUB SERPL-MCNC: 0.6 MG/DL (ref 0–1.2)
BUN SERPL-MCNC: 11 MG/DL (ref 6–20)
BUPRENORPHINE UR QL: NEGATIVE
CALCIUM SERPL-MCNC: 9.1 MG/DL (ref 8.6–10.2)
CANNABINOIDS UR QL SCN: POSITIVE
CHLORIDE SERPL-SCNC: 102 MMOL/L (ref 98–107)
CK SERPL-CCNC: 145 U/L (ref 20–200)
CO2 SERPL-SCNC: 22 MMOL/L (ref 22–29)
COCAINE UR QL SCN: POSITIVE
CREAT SERPL-MCNC: 1 MG/DL (ref 0.7–1.2)
EOSINOPHIL # BLD: 0.24 K/UL (ref 0.05–0.5)
EOSINOPHILS RELATIVE PERCENT: 3 % (ref 0–6)
ERYTHROCYTE [DISTWIDTH] IN BLOOD BY AUTOMATED COUNT: 12.8 % (ref 11.5–15)
ETHANOLAMINE SERPL-MCNC: <10 MG/DL (ref 0–0.08)
FENTANYL UR QL: NEGATIVE
GFR, ESTIMATED: >90 ML/MIN/1.73M2
GLUCOSE SERPL-MCNC: 111 MG/DL (ref 74–99)
HCT VFR BLD AUTO: 42.7 % (ref 37–54)
HGB BLD-MCNC: 14.8 G/DL (ref 12.5–16.5)
IMM GRANULOCYTES # BLD AUTO: <0.03 K/UL (ref 0–0.58)
IMM GRANULOCYTES NFR BLD: 0 % (ref 0–5)
LYMPHOCYTES NFR BLD: 2.28 K/UL (ref 1.5–4)
LYMPHOCYTES RELATIVE PERCENT: 27 % (ref 20–42)
MCH RBC QN AUTO: 31.4 PG (ref 26–35)
MCHC RBC AUTO-ENTMCNC: 34.7 G/DL (ref 32–34.5)
MCV RBC AUTO: 90.5 FL (ref 80–99.9)
METHADONE UR QL: NEGATIVE
MONOCYTES NFR BLD: 0.79 K/UL (ref 0.1–0.95)
MONOCYTES NFR BLD: 9 % (ref 2–12)
NEUTROPHILS NFR BLD: 61 % (ref 43–80)
NEUTS SEG NFR BLD: 5.22 K/UL (ref 1.8–7.3)
OPIATES UR QL SCN: NEGATIVE
OXYCODONE UR QL SCN: NEGATIVE
PCP UR QL SCN: NEGATIVE
PLATELET # BLD AUTO: 319 K/UL (ref 130–450)
PMV BLD AUTO: 9.9 FL (ref 7–12)
POTASSIUM SERPL-SCNC: 4 MMOL/L (ref 3.5–5)
PROT SERPL-MCNC: 7.7 G/DL (ref 6.4–8.3)
RBC # BLD AUTO: 4.72 M/UL (ref 3.8–5.8)
SALICYLATES SERPL-MCNC: <0.3 MG/DL (ref 0–30)
SODIUM SERPL-SCNC: 139 MMOL/L (ref 132–146)
TEST INFORMATION: ABNORMAL
TOXIC TRICYCLIC SC,BLOOD: NEGATIVE
WBC OTHER # BLD: 8.6 K/UL (ref 4.5–11.5)

## 2024-07-23 PROCEDURE — 80179 DRUG ASSAY SALICYLATE: CPT

## 2024-07-23 PROCEDURE — 80053 COMPREHEN METABOLIC PANEL: CPT

## 2024-07-23 PROCEDURE — 93005 ELECTROCARDIOGRAM TRACING: CPT | Performed by: STUDENT IN AN ORGANIZED HEALTH CARE EDUCATION/TRAINING PROGRAM

## 2024-07-23 PROCEDURE — 99285 EMERGENCY DEPT VISIT HI MDM: CPT

## 2024-07-23 PROCEDURE — 96372 THER/PROPH/DIAG INJ SC/IM: CPT

## 2024-07-23 PROCEDURE — 80307 DRUG TEST PRSMV CHEM ANLYZR: CPT

## 2024-07-23 PROCEDURE — 80143 DRUG ASSAY ACETAMINOPHEN: CPT

## 2024-07-23 PROCEDURE — 85025 COMPLETE CBC W/AUTO DIFF WBC: CPT

## 2024-07-23 PROCEDURE — 6360000002 HC RX W HCPCS: Performed by: STUDENT IN AN ORGANIZED HEALTH CARE EDUCATION/TRAINING PROGRAM

## 2024-07-23 PROCEDURE — G0480 DRUG TEST DEF 1-7 CLASSES: HCPCS

## 2024-07-23 PROCEDURE — 2580000003 HC RX 258

## 2024-07-23 PROCEDURE — 82550 ASSAY OF CK (CPK): CPT

## 2024-07-23 RX ORDER — ZIPRASIDONE MESYLATE 20 MG/ML
20 INJECTION, POWDER, LYOPHILIZED, FOR SOLUTION INTRAMUSCULAR ONCE
Status: COMPLETED | OUTPATIENT
Start: 2024-07-23 | End: 2024-07-23

## 2024-07-23 RX ORDER — WATER 10 ML/10ML
INJECTION INTRAMUSCULAR; INTRAVENOUS; SUBCUTANEOUS
Status: COMPLETED
Start: 2024-07-23 | End: 2024-07-23

## 2024-07-23 RX ADMIN — WATER 1.2 ML: 1 INJECTION INTRAMUSCULAR; INTRAVENOUS; SUBCUTANEOUS at 17:12

## 2024-07-23 RX ADMIN — ZIPRASIDONE MESYLATE 20 MG: 20 INJECTION, POWDER, LYOPHILIZED, FOR SOLUTION INTRAMUSCULAR at 17:11

## 2024-07-23 ASSESSMENT — PAIN - FUNCTIONAL ASSESSMENT: PAIN_FUNCTIONAL_ASSESSMENT: NONE - DENIES PAIN

## 2024-07-23 NOTE — ED PROVIDER NOTES
LABS:    Labs Reviewed   COMPREHENSIVE METABOLIC PANEL - Abnormal; Notable for the following components:       Result Value    Glucose 111 (*)     All other components within normal limits   CBC WITH AUTO DIFFERENTIAL - Abnormal; Notable for the following components:    MCHC 34.7 (*)     All other components within normal limits   URINE DRUG SCREEN - Abnormal; Notable for the following components:    Amphetamine Screen, Ur POSITIVE (*)     Cocaine Metabolite, Urine POSITIVE (*)     Cannabinoid Scrn, Ur POSITIVE (*)     All other components within normal limits   SERUM DRUG SCREEN - Abnormal; Notable for the following components:    Acetaminophen Level <5 (*)     All other components within normal limits   CK       As interpreted by me, the above displayed labs are abnormal. All other labs obtained during this visit were within normal range or not returned as of this dictation.    RADIOLOGY:   Non-plain film images such as CT, Ultrasound and MRI are read by the radiologist. Plain radiographic images are visualized and preliminarily interpreted by the ED Provider with the findings documented in the ED Course.    Interpretation per the Radiologist below, if available at the time of this note:    No orders to display     No results found.    No results found.    PROCEDURES   Unless otherwise noted below, none       CRITICAL CARE TIME (.cct)   none    PAST MEDICAL HISTORY/Chronic Conditions Affecting Care      has a past medical history of Anxiety, Asthma, Claustrophobia, Depression, Epigastric pain, Nausea, and Nut allergy.     EMERGENCY DEPARTMENT COURSE    Vitals:    Vitals:    07/23/24 1622   BP: (!) 139/102   Pulse: 87   Resp: 18   SpO2: 99%       Patient was given the following medications:  Medications   ziprasidone (GEODON) injection 20 mg (20 mg IntraMUSCular Given 7/23/24 1711)   sterile water injection (1.2 mLs Other Given 7/23/24 1712)         Is this patient to be included in the SEP-1 Core Measure due to

## 2024-07-23 NOTE — ED NOTES
met with patient at bedside to complete brief assessment.  Patient drowsy and refusing to speak to  stating \"I do not want to do this.\"    Patient presented to the ED by Rain after being probated by [facility].  Per probate paperwork from patients mother Joann Rogers \"Emmanuel said he does not want to take his medications anymore because it is killing him.  He said is fully make him take it he would kill us.\"    Patient presented to unit and appeared to be uncooperative, confrontational with staff and [facility].  Patient refusing to complete blood work and provide urine sample.  Patient verbally aggressive and hostile, however behavior controlled.    Per chart review patient has a history of multiple suicide attempts in the past.  Unknown of any self-injurious behaviors.    Per chart review patient has an extensive history of MH and is diagnosed with Schizoaffective disorder and Polysubstance abuse.  Per chart review patient previously was treating with [facility] services for outpatient MH services.   currently unknown of any active treatment, medication management or counseling services.  Patient has an extensive history of MH hospitalization with his last admission being on 1/12 at [facility].  Patient has also been to [facility] in the past.  Patient has also been stepped down to [facility] crisis unit for further stabilization in the past.    Per chart review patient has a history of noncompliance with medications. Per chart review patient previously was on a AOT and committed to the [facility].  Unknown of any current active status at this time.    Per chart review patient has a history of being diagnosed with polysubstance abuse and previously using amphetamines, marijuana and cocaine.  Unknown of any active drug usage or history of going to detox/rehab treatment.  Per chart review

## 2024-07-24 PROCEDURE — 1240000000 HC EMOTIONAL WELLNESS R&B

## 2024-07-24 PROCEDURE — 6370000000 HC RX 637 (ALT 250 FOR IP): Performed by: NURSE PRACTITIONER

## 2024-07-24 RX ORDER — PALIPERIDONE 3 MG/1
3 TABLET, EXTENDED RELEASE ORAL 2 TIMES DAILY
Status: DISCONTINUED | OUTPATIENT
Start: 2024-07-24 | End: 2024-08-01

## 2024-07-24 RX ORDER — VALPROIC ACID 250 MG/5ML
750 SOLUTION ORAL 2 TIMES DAILY
Status: DISCONTINUED | OUTPATIENT
Start: 2024-07-24 | End: 2024-08-02

## 2024-07-24 RX ORDER — LANOLIN ALCOHOL/MO/W.PET/CERES
3 CREAM (GRAM) TOPICAL NIGHTLY PRN
Status: DISCONTINUED | OUTPATIENT
Start: 2024-07-24 | End: 2024-08-05 | Stop reason: HOSPADM

## 2024-07-24 RX ORDER — HYDROXYZINE PAMOATE 50 MG/1
50 CAPSULE ORAL 3 TIMES DAILY PRN
Status: DISCONTINUED | OUTPATIENT
Start: 2024-07-24 | End: 2024-08-05 | Stop reason: HOSPADM

## 2024-07-24 RX ORDER — HALOPERIDOL 5 MG/1
5 TABLET ORAL EVERY 6 HOURS PRN
Status: DISCONTINUED | OUTPATIENT
Start: 2024-07-24 | End: 2024-08-05 | Stop reason: HOSPADM

## 2024-07-24 RX ORDER — HALOPERIDOL 5 MG/ML
5 INJECTION INTRAMUSCULAR EVERY 6 HOURS PRN
Status: DISCONTINUED | OUTPATIENT
Start: 2024-07-24 | End: 2024-08-05 | Stop reason: HOSPADM

## 2024-07-24 RX ORDER — NICOTINE 21 MG/24HR
1 PATCH, TRANSDERMAL 24 HOURS TRANSDERMAL DAILY
Status: DISCONTINUED | OUTPATIENT
Start: 2024-07-24 | End: 2024-07-28

## 2024-07-24 RX ORDER — MAGNESIUM HYDROXIDE/ALUMINUM HYDROXICE/SIMETHICONE 120; 1200; 1200 MG/30ML; MG/30ML; MG/30ML
30 SUSPENSION ORAL PRN
Status: DISCONTINUED | OUTPATIENT
Start: 2024-07-24 | End: 2024-08-05 | Stop reason: HOSPADM

## 2024-07-24 RX ORDER — ACETAMINOPHEN 325 MG/1
650 TABLET ORAL EVERY 6 HOURS PRN
Status: DISCONTINUED | OUTPATIENT
Start: 2024-07-24 | End: 2024-08-05 | Stop reason: HOSPADM

## 2024-07-24 RX ADMIN — PALIPERIDONE 3 MG: 3 TABLET, EXTENDED RELEASE ORAL at 20:53

## 2024-07-24 RX ADMIN — PALIPERIDONE 3 MG: 3 TABLET, EXTENDED RELEASE ORAL at 12:54

## 2024-07-24 RX ADMIN — VALPROIC ACID 750 MG: 250 SOLUTION ORAL at 12:53

## 2024-07-24 RX ADMIN — VALPROIC ACID 750 MG: 250 SOLUTION ORAL at 20:53

## 2024-07-24 ASSESSMENT — SLEEP AND FATIGUE QUESTIONNAIRES
DO YOU HAVE DIFFICULTY SLEEPING: YES
DO YOU USE A SLEEP AID: NO
SLEEP PATTERN: RESTLESSNESS
AVERAGE NUMBER OF SLEEP HOURS: 2

## 2024-07-24 NOTE — ED NOTES
Called Santosh batista @ [phone] spoke with Dave Jean RN stated pt received his Invega Sustenna injection 234 mg/1.5 ml IM to left deltoid 6/26/2024.  Further stated pt is do today 7/24/2026. Reports that she will chucky her log for talking to this rn and that [facility] will give injection. This will be placed in mar and will inform psych team at this time.

## 2024-07-24 NOTE — ED NOTES
Pt has been observed crying in his room this rn inquired if the pt needed anything he states \"I'm okay\" as he is rocking back in forth in the bed lying down. Asked if he ws sure there was anything that I could get him as well as informed breakfast is at the bed.  Pt continues to state \" no Im okay , thankyou\"

## 2024-07-24 NOTE — ED NOTES
Perfect serve placed to dellick informing of per their request when carissa gave pt invega injection.

## 2024-07-24 NOTE — ED NOTES
Discussed case with dr Hernandez stated this pt is to remain in the ed under observation until psych team to review in the AM 7/23/2024 for violence risk and possible admission to [facility].

## 2024-07-24 NOTE — ED NOTES
Patient woke up and politely asked for water. As BHT went to get water this RN approached patient and asked if I could obtain vitals. Patient agreed. Attempted to assess patient while obtaining vitals. Patient answered basic orientation questions appropriately and was oriented to person, place, and time. Patient then became irritated when this RN asked C-[employer] Frequent Screener question. Patient stated \"I don't want to talk about it\" and refused to answer any more questions. Patient also refused physical assessment. This RN asked if patient if he would mind if I use stethoscope to listen to heart and lungs and patient stated  \"I do mind\" as he closed his eyes.

## 2024-07-25 LAB
EKG ATRIAL RATE: 87 BPM
EKG P AXIS: 81 DEGREES
EKG P-R INTERVAL: 148 MS
EKG Q-T INTERVAL: 358 MS
EKG QRS DURATION: 82 MS
EKG QTC CALCULATION (BAZETT): 430 MS
EKG R AXIS: 122 DEGREES
EKG T AXIS: 50 DEGREES

## 2024-07-25 PROCEDURE — 99232 SBSQ HOSP IP/OBS MODERATE 35: CPT | Performed by: NURSE PRACTITIONER

## 2024-07-25 PROCEDURE — 6370000000 HC RX 637 (ALT 250 FOR IP): Performed by: NURSE PRACTITIONER

## 2024-07-25 PROCEDURE — 93010 ELECTROCARDIOGRAM REPORT: CPT | Performed by: INTERNAL MEDICINE

## 2024-07-25 PROCEDURE — 1240000000 HC EMOTIONAL WELLNESS R&B

## 2024-07-25 RX ADMIN — PALIPERIDONE 3 MG: 3 TABLET, EXTENDED RELEASE ORAL at 20:51

## 2024-07-25 RX ADMIN — PALIPERIDONE 3 MG: 3 TABLET, EXTENDED RELEASE ORAL at 09:07

## 2024-07-25 RX ADMIN — VALPROIC ACID 750 MG: 250 SOLUTION ORAL at 09:07

## 2024-07-25 RX ADMIN — VALPROIC ACID 750 MG: 250 SOLUTION ORAL at 20:51

## 2024-07-25 ASSESSMENT — SLEEP AND FATIGUE QUESTIONNAIRES
SLEEP PATTERN: DIFFICULTY FALLING ASLEEP;RESTLESSNESS
DO YOU USE A SLEEP AID: NO
DO YOU HAVE DIFFICULTY SLEEPING: YES

## 2024-07-25 ASSESSMENT — PATIENT HEALTH QUESTIONNAIRE - PHQ9
SUM OF ALL RESPONSES TO PHQ9 QUESTIONS 1 & 2: 0
1. LITTLE INTEREST OR PLEASURE IN DOING THINGS: NOT AT ALL
SUM OF ALL RESPONSES TO PHQ QUESTIONS 1-9: 0
2. FEELING DOWN, DEPRESSED OR HOPELESS: NOT AT ALL
SUM OF ALL RESPONSES TO PHQ QUESTIONS 1-9: 0

## 2024-07-25 NOTE — BH NOTE
Pt denies suicidal / homicidal ideations.  Pt denies hallucinations.  Pt is cooperative, though has a flat affect and some poverty of content ini speech.  Pt appears isolative and hopeless at this time.  Pt is without immediate behavioral concerns at this time.

## 2024-07-25 NOTE — DISCHARGE INSTRUCTIONS
Follow up for Tobacco Cessation at:    On license of [facility]                                 Date:  Friday 8/2 at 10am              [address]    [place] [address]   (Inside [facility]    take B elevators to 7th floor)   Phone: [phone]   Fax: [phone]    Patient was offered a referral to substance abuse treatment, patient declined referral at this time.

## 2024-07-25 NOTE — H&P
Department of Psychiatry  History and Physical - Adult     CHIEF COMPLAINT: \"I do not want to talk.\"    Patient was seen after discussing with the treatment team and reviewing the chart    CIRCUMSTANCES OF ADMISSION: Gene Rogers has a significant past history of schizoaffective disorder currently on an outpatient commitment through [facility] presented to the ED through Court order after patient's mother filed a probate through the [facility] indicating that Edward does not want to take his medications because he believes it is killing him and if they make him he would kill them.  In the ED patient was verbally aggressive and hostile.  Precipitating factors include polysubstance abuse, medication noncompliance duration onset symptoms just prior to arrival      HISTORY OF PRESENT ILLNESS:      The patient is a 29 y.o. male with significant past history of chizoaffective disorder currently on an outpatient commitment through [facility] presented to the ED through Court order after patient's mother filed a probate through the [facility] indicating that Edward does not want to take his medications because he believes it is killing him and if they make him he would kill them.  In the ED patient was verbally aggressive and hostile.  In the ED his urine drug screen was positive for cocaine and amphetamines and cannabis his blood alcohol level is negative he was medically cleared admitted to [address] [place] [address] psychiatric unit for further psychiatric assessment stabilization and treatment.    Upon evaluation today patient is seen along with the medical director he is  lying in his bed he is well-known to run services from prior hospitalizations and we also saw patient yesterday in the ED briefly before his admission.  He is lying down he gives the blankets over his head he is minimally cooperative with assessment.  His affect appears to be flat and blunted he states he feels tired and does

## 2024-07-25 NOTE — BH NOTE
[facility]  Initial Interdisciplinary Treatment Plan Note      Original treatment plan Date & Time:  7-25-24    1000 am    Admission Type:  Admission Type: Probate    Reason for admission:   Reason for Admission: Per probate papers, pt threatening mother over taking medications.    Estimated Length of Stay:  5-7days  Estimated Discharge Date: To be determined by physician.    PATIENT STRENGTHS:  Patient Strengths:   Patient Strengths and Limitations:   Addictive Behavior: Addictive Behavior  In the Past 3 Months, Have You Felt or Has Someone Told You That You Have a Problem With  : None  Medical Problems:  Past Medical History:   Diagnosis Date    Anxiety     Asthma     no meds currently- mostly exercise induced    Claustrophobia     confined spaces with locked doors    Depression     Epigastric pain     Nausea     Nut allergy      Status EXAM:Mental Status and Behavioral Exam  Normal: No  Level of Assistance: Independent/Self  Facial Expression: Flat  Affect: Stable  Level of Consciousness: Alert  Frequency of Checks: 4 times per hour, close  Mood:Normal: No  Mood: Depressed, Anxious  Motor Activity:Normal: No  Motor Activity: Decreased  Eye Contact: Fair  Observed Behavior: Cooperative, Guarded, Withdrawn  Sexual Misconduct History: Current - no  Preception: [place] to person, [place] to time, [place] to place, [place] to situation  Attention:Normal: No  Attention: Distractible  Thought Processes: Circumstantial  Thought Content:Normal: No  Thought Content: Poverty of content  Depression Symptoms: Isolative, Impaired concentration, Feelings of hopelessess  Anxiety Symptoms: Generalized  Krystle Symptoms: No problems reported or observed.  Hallucinations: None  Delusions: No  Memory:Normal: Yes  Memory: Poor recent  Insight and Judgment: No  Insight and Judgment: Poor judgment, Poor insight, Unmotivated    EDUCATION:   Learner Progress Toward Treatment Goals: Will review group plans and strategies for

## 2024-07-25 NOTE — GROUP NOTE
Group Therapy Note    Date: 7/25/2024    Group Start Time: 0930  Group End Time: 0945  Group Topic: Community Meeting    [facility] AC[facility] 2    January Zimmerman CTRS    Group Therapy Note    Attendees: 7    Date: 7/25/2024  Start Time: 0930  End Time:  0945  Number of Participants: 7    Type of Group: Community Meeting    Was updated on expectations of the unit, staffing, and programming.  Patient left group before verbalizing goal for the day.    Status After Intervention:  Unchanged    Participation Level: None    Participation Quality: None      Speech:  None       Thought Process/Content: None observed      Affective Functioning: Congruent      Mood: anxious and depressed      Level of consciousness:  Preoccupied and Inattentive      Response to Learning: Resistant      Endings: None Reported    Modes of Intervention: Education, Support, Socialization, Exploration, Clarifying, and Problem-solving      Discipline Responsible: Psychoeducational Specialist      Signature:  RENEE Sinclair

## 2024-07-26 PROCEDURE — 1240000000 HC EMOTIONAL WELLNESS R&B

## 2024-07-26 PROCEDURE — 6370000000 HC RX 637 (ALT 250 FOR IP): Performed by: NURSE PRACTITIONER

## 2024-07-26 PROCEDURE — 99232 SBSQ HOSP IP/OBS MODERATE 35: CPT | Performed by: NURSE PRACTITIONER

## 2024-07-26 RX ADMIN — PALIPERIDONE 3 MG: 3 TABLET, EXTENDED RELEASE ORAL at 21:03

## 2024-07-26 RX ADMIN — VALPROIC ACID 750 MG: 250 SOLUTION ORAL at 08:33

## 2024-07-26 RX ADMIN — PALIPERIDONE 3 MG: 3 TABLET, EXTENDED RELEASE ORAL at 08:32

## 2024-07-26 RX ADMIN — VALPROIC ACID 750 MG: 250 SOLUTION ORAL at 21:03

## 2024-07-26 ASSESSMENT — PAIN SCALES - GENERAL: PAINLEVEL_OUTOF10: 0

## 2024-07-27 LAB
DATE LAST DOSE: NORMAL
TME LAST DOSE: NORMAL H
VALPROATE SERPL-MCNC: 97 UG/ML (ref 50–100)
VANCOMYCIN DOSE: NORMAL MG

## 2024-07-27 PROCEDURE — 99232 SBSQ HOSP IP/OBS MODERATE 35: CPT | Performed by: NURSE PRACTITIONER

## 2024-07-27 PROCEDURE — 6370000000 HC RX 637 (ALT 250 FOR IP): Performed by: NURSE PRACTITIONER

## 2024-07-27 PROCEDURE — 36415 COLL VENOUS BLD VENIPUNCTURE: CPT

## 2024-07-27 PROCEDURE — 80164 ASSAY DIPROPYLACETIC ACD TOT: CPT

## 2024-07-27 PROCEDURE — 1240000000 HC EMOTIONAL WELLNESS R&B

## 2024-07-27 RX ADMIN — VALPROIC ACID 750 MG: 250 SOLUTION ORAL at 21:33

## 2024-07-27 RX ADMIN — PALIPERIDONE 3 MG: 3 TABLET, EXTENDED RELEASE ORAL at 10:08

## 2024-07-27 RX ADMIN — VALPROIC ACID 750 MG: 250 SOLUTION ORAL at 10:08

## 2024-07-27 RX ADMIN — PALIPERIDONE 3 MG: 3 TABLET, EXTENDED RELEASE ORAL at 21:33

## 2024-07-27 ASSESSMENT — PAIN SCALES - GENERAL: PAINLEVEL_OUTOF10: 0

## 2024-07-27 NOTE — GROUP NOTE
Group Therapy Note    Date: 7/27/2024    Group Start Time: 0930  Group End Time: 0945  Group Topic: Community Meeting    [facility] 2    January Zimmerman CTRS    Group Therapy Note    Attendees: 9    Date: 7/27/2024  Start Time: 0930  End Time:  0945  Number of Participants: 9    Type of Group: Community Meeting    Was updated on expectations of the unit, staffing, and programming.  Patient left group before verbalizing goal for the day.    Status After Intervention:  Unchanged    Participation Level: None    Participation Quality: Resistant      Speech:  None      Thought Process/Content: None observed      Affective Functioning: Incongruent      Mood: anxious      Level of consciousness:  Preoccupied and Inattentive      Response to Learning: Resistant      Endings: None Reported    Modes of Intervention: Education, Support, Socialization, Exploration, Clarifying, and Problem-solving      Discipline Responsible: Psychoeducational Specialist      Signature:  RENEE Sinclair

## 2024-07-28 PROCEDURE — 6370000000 HC RX 637 (ALT 250 FOR IP): Performed by: NURSE PRACTITIONER

## 2024-07-28 PROCEDURE — 99232 SBSQ HOSP IP/OBS MODERATE 35: CPT | Performed by: NURSE PRACTITIONER

## 2024-07-28 PROCEDURE — 1240000000 HC EMOTIONAL WELLNESS R&B

## 2024-07-28 RX ORDER — POLYETHYLENE GLYCOL 3350 17 G
2 POWDER IN PACKET (EA) ORAL
Status: DISCONTINUED | OUTPATIENT
Start: 2024-07-28 | End: 2024-08-05 | Stop reason: HOSPADM

## 2024-07-28 RX ADMIN — VALPROIC ACID 750 MG: 250 SOLUTION ORAL at 21:34

## 2024-07-28 RX ADMIN — PALIPERIDONE 3 MG: 3 TABLET, EXTENDED RELEASE ORAL at 09:55

## 2024-07-28 RX ADMIN — VALPROIC ACID 750 MG: 250 SOLUTION ORAL at 09:55

## 2024-07-28 RX ADMIN — PALIPERIDONE 3 MG: 3 TABLET, EXTENDED RELEASE ORAL at 21:33

## 2024-07-28 ASSESSMENT — PAIN SCALES - GENERAL: PAINLEVEL_OUTOF10: 0

## 2024-07-28 NOTE — BH NOTE
Denies suicidal ideations or thoughts of self harm.  Denies homicidal ideations or thoughts to hurt others.  Denies auditory and visual hallucinations.  Fair eye contact, flat affect.  Reported his mood as \"I'm okay\"  Refused on unit groups thus far today.  Medication compliant.  Reclusive to his room, noted to rock back and fourth while laying in his bed.  Up for meals.

## 2024-07-28 NOTE — BH NOTE
At this time, this nurse encourages pt to attend groups. Pt states \"At this point I've given up, I've been here like ten times and the groups are just the same thing over and over and over. At this point the groups are like, negatively affecting me and stressing me out. I feel better and have more peace of mind just being in my room alone. If I go to groups, that's what's going to make me flip out and have more stress.\" Pt then requests that this nurse inform Hedy MARSHALL of these feelings. This nurse encouraged patient to communicate these feelings himself to NP or his RN and reiterates the importance of attending groups while he is inpatient.

## 2024-07-29 PROCEDURE — 6370000000 HC RX 637 (ALT 250 FOR IP): Performed by: NURSE PRACTITIONER

## 2024-07-29 PROCEDURE — 99232 SBSQ HOSP IP/OBS MODERATE 35: CPT | Performed by: NURSE PRACTITIONER

## 2024-07-29 PROCEDURE — 1240000000 HC EMOTIONAL WELLNESS R&B

## 2024-07-29 RX ADMIN — PALIPERIDONE 3 MG: 3 TABLET, EXTENDED RELEASE ORAL at 09:51

## 2024-07-29 RX ADMIN — VALPROIC ACID 750 MG: 250 SOLUTION ORAL at 09:51

## 2024-07-29 RX ADMIN — PALIPERIDONE 3 MG: 3 TABLET, EXTENDED RELEASE ORAL at 21:30

## 2024-07-29 RX ADMIN — VALPROIC ACID 750 MG: 250 SOLUTION ORAL at 21:30

## 2024-07-29 ASSESSMENT — PAIN SCALES - GENERAL: PAINLEVEL_OUTOF10: 0

## 2024-07-30 PROCEDURE — 6370000000 HC RX 637 (ALT 250 FOR IP): Performed by: NURSE PRACTITIONER

## 2024-07-30 PROCEDURE — 1240000000 HC EMOTIONAL WELLNESS R&B

## 2024-07-30 PROCEDURE — 99232 SBSQ HOSP IP/OBS MODERATE 35: CPT | Performed by: NURSE PRACTITIONER

## 2024-07-30 RX ADMIN — VALPROIC ACID 750 MG: 250 SOLUTION ORAL at 08:46

## 2024-07-30 RX ADMIN — VALPROIC ACID 750 MG: 250 SOLUTION ORAL at 20:52

## 2024-07-30 RX ADMIN — PALIPERIDONE 3 MG: 3 TABLET, EXTENDED RELEASE ORAL at 20:52

## 2024-07-30 RX ADMIN — PALIPERIDONE 3 MG: 3 TABLET, EXTENDED RELEASE ORAL at 08:46

## 2024-07-31 PROCEDURE — 6370000000 HC RX 637 (ALT 250 FOR IP): Performed by: NURSE PRACTITIONER

## 2024-07-31 PROCEDURE — 1240000000 HC EMOTIONAL WELLNESS R&B

## 2024-07-31 PROCEDURE — 99232 SBSQ HOSP IP/OBS MODERATE 35: CPT | Performed by: NURSE PRACTITIONER

## 2024-07-31 PROCEDURE — 6370000000 HC RX 637 (ALT 250 FOR IP): Performed by: PSYCHIATRY & NEUROLOGY

## 2024-07-31 RX ADMIN — ACETAMINOPHEN 650 MG: 325 TABLET ORAL at 21:25

## 2024-07-31 RX ADMIN — VALPROIC ACID 750 MG: 250 SOLUTION ORAL at 08:43

## 2024-07-31 RX ADMIN — PALIPERIDONE 3 MG: 3 TABLET, EXTENDED RELEASE ORAL at 21:25

## 2024-07-31 RX ADMIN — Medication 3 MG: at 21:25

## 2024-07-31 RX ADMIN — PALIPERIDONE 3 MG: 3 TABLET, EXTENDED RELEASE ORAL at 08:43

## 2024-07-31 RX ADMIN — VALPROIC ACID 750 MG: 250 SOLUTION ORAL at 21:25

## 2024-07-31 RX ADMIN — HYDROXYZINE PAMOATE 50 MG: 50 CAPSULE ORAL at 21:25

## 2024-07-31 ASSESSMENT — PAIN DESCRIPTION - LOCATION: LOCATION: GENERALIZED

## 2024-07-31 ASSESSMENT — PAIN SCALES - GENERAL: PAINLEVEL_OUTOF10: 6

## 2024-07-31 ASSESSMENT — PAIN DESCRIPTION - DESCRIPTORS: DESCRIPTORS: ACHING

## 2024-08-01 LAB
DATE LAST DOSE: ABNORMAL
TME LAST DOSE: ABNORMAL H
VALPROATE SERPL-MCNC: 116 UG/ML (ref 50–100)
VANCOMYCIN DOSE: ABNORMAL MG

## 2024-08-01 PROCEDURE — 99232 SBSQ HOSP IP/OBS MODERATE 35: CPT | Performed by: NURSE PRACTITIONER

## 2024-08-01 PROCEDURE — 1240000000 HC EMOTIONAL WELLNESS R&B

## 2024-08-01 PROCEDURE — 36415 COLL VENOUS BLD VENIPUNCTURE: CPT

## 2024-08-01 PROCEDURE — 80164 ASSAY DIPROPYLACETIC ACD TOT: CPT

## 2024-08-01 PROCEDURE — 6370000000 HC RX 637 (ALT 250 FOR IP): Performed by: NURSE PRACTITIONER

## 2024-08-01 PROCEDURE — 6370000000 HC RX 637 (ALT 250 FOR IP): Performed by: PSYCHIATRY & NEUROLOGY

## 2024-08-01 RX ORDER — PALIPERIDONE 6 MG/1
6 TABLET, EXTENDED RELEASE ORAL DAILY
Status: DISCONTINUED | OUTPATIENT
Start: 2024-08-01 | End: 2024-08-05 | Stop reason: HOSPADM

## 2024-08-01 RX ORDER — PALIPERIDONE 3 MG/1
3 TABLET, EXTENDED RELEASE ORAL
Status: DISCONTINUED | OUTPATIENT
Start: 2024-08-01 | End: 2024-08-05 | Stop reason: HOSPADM

## 2024-08-01 RX ORDER — PALIPERIDONE 6 MG/1
6 TABLET, EXTENDED RELEASE ORAL DAILY
Status: DISCONTINUED | OUTPATIENT
Start: 2024-08-02 | End: 2024-08-01

## 2024-08-01 RX ADMIN — PALIPERIDONE 6 MG: 6 TABLET, EXTENDED RELEASE ORAL at 09:41

## 2024-08-01 RX ADMIN — VALPROIC ACID 750 MG: 250 SOLUTION ORAL at 09:07

## 2024-08-01 RX ADMIN — PALIPERIDONE 3 MG: 3 TABLET, EXTENDED RELEASE ORAL at 21:08

## 2024-08-01 RX ADMIN — HYDROXYZINE PAMOATE 50 MG: 50 CAPSULE ORAL at 21:08

## 2024-08-01 RX ADMIN — Medication 3 MG: at 21:08

## 2024-08-01 ASSESSMENT — PAIN SCALES - GENERAL
PAINLEVEL_OUTOF10: 0

## 2024-08-01 NOTE — GROUP NOTE
Group Therapy Note    Date: 8/1/2024    Group Start Time: 1457  Group End Time: 1527  Group Topic: Cognitive Skills    [facility] 1    Suri Kendall MSW, LSW        Group Therapy Note    Attendees: 9       Patient's Goal:  Pt will be able to discuss tips for self-care.     Notes:  Pt participated minimally in group discussion.     Status After Intervention:  Improved    Participation Level: Active Listener and Minimal    Participation Quality: Appropriate and Attentive      Speech:  normal      Thought Process/Content: Logical  Linear      Affective Functioning: Congruent      Mood: irritable      Level of consciousness:  Alert, Oriented x4, and Attentive      Response to Learning: Resistant      Endings: None Reported    Modes of Intervention: Education, Support, Socialization, Exploration, Clarifying, and Problem-solving      Discipline Responsible: /Counselor      Signature:  BRIGITTE Hall LSW

## 2024-08-02 LAB
DATE LAST DOSE: NORMAL
TME LAST DOSE: NORMAL H
VALPROATE SERPL-MCNC: 68 UG/ML (ref 50–100)
VANCOMYCIN DOSE: NORMAL MG

## 2024-08-02 PROCEDURE — 6370000000 HC RX 637 (ALT 250 FOR IP): Performed by: NURSE PRACTITIONER

## 2024-08-02 PROCEDURE — 80164 ASSAY DIPROPYLACETIC ACD TOT: CPT

## 2024-08-02 PROCEDURE — 1240000000 HC EMOTIONAL WELLNESS R&B

## 2024-08-02 PROCEDURE — 99232 SBSQ HOSP IP/OBS MODERATE 35: CPT | Performed by: NURSE PRACTITIONER

## 2024-08-02 PROCEDURE — 36415 COLL VENOUS BLD VENIPUNCTURE: CPT

## 2024-08-02 PROCEDURE — 6370000000 HC RX 637 (ALT 250 FOR IP): Performed by: PSYCHIATRY & NEUROLOGY

## 2024-08-02 RX ORDER — DIVALPROEX SODIUM 500 MG/1
500 TABLET, DELAYED RELEASE ORAL EVERY 12 HOURS SCHEDULED
Status: DISCONTINUED | OUTPATIENT
Start: 2024-08-02 | End: 2024-08-05 | Stop reason: HOSPADM

## 2024-08-02 RX ADMIN — Medication 3 MG: at 21:33

## 2024-08-02 RX ADMIN — DIVALPROEX SODIUM 500 MG: 500 TABLET, DELAYED RELEASE ORAL at 11:28

## 2024-08-02 RX ADMIN — DIVALPROEX SODIUM 500 MG: 500 TABLET, DELAYED RELEASE ORAL at 21:32

## 2024-08-02 RX ADMIN — HYDROXYZINE PAMOATE 50 MG: 50 CAPSULE ORAL at 21:33

## 2024-08-02 RX ADMIN — PALIPERIDONE 6 MG: 6 TABLET, EXTENDED RELEASE ORAL at 09:25

## 2024-08-02 RX ADMIN — PALIPERIDONE 3 MG: 3 TABLET, EXTENDED RELEASE ORAL at 21:32

## 2024-08-02 ASSESSMENT — PAIN SCALES - GENERAL: PAINLEVEL_OUTOF10: 0

## 2024-08-02 NOTE — GROUP NOTE
Group Therapy Note    Date: 8/2/2024    Group Start Time: 1045  Group End Time: 1120  Group Topic: Psychoeducation    [facility] 2    January Zimmerman CTRS    Group Therapy Note    Attendees: 10    Date: 8/2/2024  Start Time: 1045  End Time:  1120  Number of Participants: 10    Type of Group: Psychoeducation    Name:  Positive Thinking     Patient's Goal:  Identify what is positive thinking, health benefits of positive thinking, types of negative thinking and ways to improve thinking habits.     Notes:  CTRS led educational group discussion on positive thinking. Encouraged patients to share their experiences. Patient was actively engaged in group discussion and made positive statements throughout group.    Status After Intervention:  Improved    Participation Level: Active Listener and Interactive    Participation Quality: Appropriate, Attentive, and Sharing      Speech:  normal      Thought Process/Content: Logical  Linear      Affective Functioning: Congruent      Mood:  Appropriate       Level of consciousness:  Alert and Attentive      Response to Learning: Able to verbalize current knowledge/experience, Able to verbalize/acknowledge new learning, Able to retain information, Capable of insight, Able to change behavior, and Progressing to goal      Endings: None Reported    Modes of Intervention: Education, Support, Socialization, Exploration, Clarifying, and Problem-solving      Discipline Responsible: Psychoeducational Specialist      Signature:  RENEE Sinclair

## 2024-08-02 NOTE — GROUP NOTE
Group Therapy Note    Date: 8/2/2024    Group Start Time: 1030  Group End Time: 1045  Group Topic: Community Meeting    [facility] 2    January Zimmerman CTRS    Group Therapy Note    Attendees: 9    Date: 8/2/2024  Start Time: 1030  End Time:  1045  Number of Participants: 9    Type of Group: Community Meeting    Was updated on expectations of the unit, staffing, and programming.  Patient shared goal for today as \"Make it to the crisis center.\"    Status After Intervention:  Improved    Participation Level: Active Listener and Interactive    Participation Quality: Appropriate, Attentive, and Sharing      Speech:  normal      Thought Process/Content: Logical  Linear      Affective Functioning: Congruent      Mood:  Appropriate      Level of consciousness:  Alert and Attentive      Response to Learning: Able to verbalize current knowledge/experience, Able to verbalize/acknowledge new learning, Able to retain information, Capable of insight, Able to change behavior, and Progressing to goal      Endings: None Reported    Modes of Intervention: Education, Support, Socialization, Exploration, Clarifying, and Problem-solving      Discipline Responsible: Psychoeducational Specialist      Signature:  RENEE Sinclair

## 2024-08-02 NOTE — BH NOTE
Denies suicidal ideations or thoughts of self harm.  Denies homicidal ideations or thoughts to hurt others.  Denies auditory and visual hallucinations.  Good eye contact, constricted affect but brightens with conversation.  Attended on unit groups today.  Medication compliant.  Up for meals.

## 2024-08-03 PROCEDURE — 6370000000 HC RX 637 (ALT 250 FOR IP): Performed by: PSYCHIATRY & NEUROLOGY

## 2024-08-03 PROCEDURE — 99232 SBSQ HOSP IP/OBS MODERATE 35: CPT | Performed by: NURSE PRACTITIONER

## 2024-08-03 PROCEDURE — 6370000000 HC RX 637 (ALT 250 FOR IP): Performed by: NURSE PRACTITIONER

## 2024-08-03 PROCEDURE — 1240000000 HC EMOTIONAL WELLNESS R&B

## 2024-08-03 RX ADMIN — DIVALPROEX SODIUM 500 MG: 500 TABLET, DELAYED RELEASE ORAL at 09:09

## 2024-08-03 RX ADMIN — HYDROXYZINE PAMOATE 50 MG: 50 CAPSULE ORAL at 21:33

## 2024-08-03 RX ADMIN — Medication 3 MG: at 21:33

## 2024-08-03 RX ADMIN — PALIPERIDONE 3 MG: 3 TABLET, EXTENDED RELEASE ORAL at 21:33

## 2024-08-03 RX ADMIN — DIVALPROEX SODIUM 500 MG: 500 TABLET, DELAYED RELEASE ORAL at 21:32

## 2024-08-03 RX ADMIN — PALIPERIDONE 6 MG: 6 TABLET, EXTENDED RELEASE ORAL at 09:09

## 2024-08-03 ASSESSMENT — PAIN SCALES - GENERAL
PAINLEVEL_OUTOF10: 3
PAINLEVEL_OUTOF10: 0

## 2024-08-03 NOTE — BH NOTE
Pt denies suicidal / homicidal ideations.  Pt denies hallucinations.  Pt has poor to fair eye contact.  Pt is without immediate behavioral concerns at this time.   Pt appears to be more stable with mood and affect as pt has not yet been seen with erratic emotions or excessively crying.

## 2024-08-03 NOTE — GROUP NOTE
Group Therapy Note    Date: 8/3/2024    Group Start Time: 1045  Group End Time: 1120  Group Topic: Cognitive Skills    [facility]    Jeffery Toscano MSW, ANNA        Group Therapy Note    Attendees: 11       Patient's Goal: To participate in group discussion active listening.     Notes: pt was an active participant in group.     Status After Intervention:  Improved    Participation Level: Active Listener and Interactive    Participation Quality: Appropriate, Attentive, and Sharing      Speech:  normal      Thought Process/Content: Logical      Affective Functioning: Congruent      Mood: anxious      Level of consciousness:  Alert and Oriented x4      Response to Learning: Able to verbalize current knowledge/experience, Able to verbalize/acknowledge new learning, and Able to retain information      Endings: None Reported    Modes of Intervention: Education, Support, Socialization, Exploration, Clarifying, and Problem-solving      Discipline Responsible: /Counselor      Signature:  BRIGITTE Kamara LSW

## 2024-08-04 PROCEDURE — 6370000000 HC RX 637 (ALT 250 FOR IP): Performed by: PSYCHIATRY & NEUROLOGY

## 2024-08-04 PROCEDURE — 99232 SBSQ HOSP IP/OBS MODERATE 35: CPT | Performed by: NURSE PRACTITIONER

## 2024-08-04 PROCEDURE — 6370000000 HC RX 637 (ALT 250 FOR IP): Performed by: NURSE PRACTITIONER

## 2024-08-04 PROCEDURE — 1240000000 HC EMOTIONAL WELLNESS R&B

## 2024-08-04 RX ADMIN — DIVALPROEX SODIUM 500 MG: 500 TABLET, DELAYED RELEASE ORAL at 08:47

## 2024-08-04 RX ADMIN — HYDROXYZINE PAMOATE 50 MG: 50 CAPSULE ORAL at 21:50

## 2024-08-04 RX ADMIN — PALIPERIDONE 6 MG: 6 TABLET, EXTENDED RELEASE ORAL at 08:48

## 2024-08-04 RX ADMIN — DIVALPROEX SODIUM 500 MG: 500 TABLET, DELAYED RELEASE ORAL at 21:50

## 2024-08-04 RX ADMIN — Medication 3 MG: at 21:50

## 2024-08-04 RX ADMIN — PALIPERIDONE 3 MG: 3 TABLET, EXTENDED RELEASE ORAL at 21:50

## 2024-08-04 ASSESSMENT — PAIN SCALES - GENERAL: PAINLEVEL_OUTOF10: 0

## 2024-08-04 NOTE — GROUP NOTE
Group Therapy Note    Date: 8/4/2024    Group Start Time: 1050  Group End Time: 1125  Group Topic: Cognitive Skills    [facility]    Jeffery Toscano MSW, ANNA        Group Therapy Note    Attendees: 13       Patient's Goal: to participate in self-management group.     Notes: pt was an active listener in group. Pt left assisted through the group and did not return.     Status After Intervention:  Unchanged    Participation Level: Active Listener    Participation Quality: Appropriate and Attentive      Speech:  normal      Thought Process/Content: Logical      Affective Functioning: Congruent      Mood: anxious      Level of consciousness:  Alert and Oriented x4      Response to Learning: Able to verbalize current knowledge/experience, Able to verbalize/acknowledge new learning, and Able to retain information      Endings: None Reported    Modes of Intervention: Education, Support, Socialization, Exploration, Clarifying, and Problem-solving      Discipline Responsible: /Counselor      Signature:  BRIGITTE Kamara LSW

## 2024-08-04 NOTE — BH NOTE
Pt denies suicidal / homicidal ideations.  Pt denies hallucinations.   Pt is cooperative at this time, though has poverty of content and fair eye contact.   Negative immediate behavioral concerns at this time.

## 2024-08-05 VITALS
HEART RATE: 72 BPM | RESPIRATION RATE: 14 BRPM | SYSTOLIC BLOOD PRESSURE: 114 MMHG | DIASTOLIC BLOOD PRESSURE: 61 MMHG | TEMPERATURE: 97.9 F | OXYGEN SATURATION: 98 %

## 2024-08-05 PROCEDURE — 6370000000 HC RX 637 (ALT 250 FOR IP): Performed by: NURSE PRACTITIONER

## 2024-08-05 PROCEDURE — 99239 HOSP IP/OBS DSCHRG MGMT >30: CPT | Performed by: NURSE PRACTITIONER

## 2024-08-05 RX ORDER — DIVALPROEX SODIUM 500 MG/1
500 TABLET, DELAYED RELEASE ORAL EVERY 12 HOURS SCHEDULED
Qty: 60 TABLET | Refills: 0 | Status: SHIPPED | OUTPATIENT
Start: 2024-08-05 | End: 2024-09-04

## 2024-08-05 RX ORDER — PALIPERIDONE 6 MG/1
6 TABLET, EXTENDED RELEASE ORAL DAILY
Qty: 30 TABLET | Refills: 0 | Status: SHIPPED | OUTPATIENT
Start: 2024-08-06 | End: 2024-09-05

## 2024-08-05 RX ORDER — ALBUTEROL SULFATE 90 UG/1
2 AEROSOL, METERED RESPIRATORY (INHALATION) 4 TIMES DAILY PRN
Qty: 18 G | Refills: 0 | Status: SHIPPED | OUTPATIENT
Start: 2024-08-05 | End: 2024-09-04

## 2024-08-05 RX ORDER — PALIPERIDONE 3 MG/1
3 TABLET, EXTENDED RELEASE ORAL
Qty: 30 TABLET | Refills: 0 | Status: SHIPPED | OUTPATIENT
Start: 2024-08-05 | End: 2024-09-04

## 2024-08-05 RX ADMIN — PALIPERIDONE 6 MG: 6 TABLET, EXTENDED RELEASE ORAL at 10:26

## 2024-08-05 RX ADMIN — DIVALPROEX SODIUM 500 MG: 500 TABLET, DELAYED RELEASE ORAL at 10:26

## 2024-08-05 ASSESSMENT — PAIN SCALES - GENERAL: PAINLEVEL_OUTOF10: 0

## 2024-08-05 NOTE — PLAN OF CARE
Problem: Anxiety  Goal: Will report anxiety at manageable levels  Description: INTERVENTIONS:  1. Administer medication as ordered  2. Teach and rehearse alternative coping skills  3. Provide emotional support with 1:1 interaction with staff  7/29/2024 1223 by Hoda Aguirre, RN  Outcome: Progressing  7/28/2024 2225 by Clarissa Frias, RN  Outcome: Progressing     Problem: Decision Making  Goal: Pt/Family able to effectively weigh alternatives and participate in decision making related to treatment and care  Description: INTERVENTIONS:  1. Determine when there are differences between patient's view, family's view, and healthcare provider's view of condition  2. Facilitate patient and family articulation of goals for care  3. Help patient and family identify pros/cons of alternative solutions  4. Provide information as requested by patient/family  5. Respect patient/family right to receive or not to receive information  6. Serve as a liaison between patient and family and health care team  7. Initiate Consults from Ethics, Palliative Care or initiate Family Care Conference as is appropriate  Outcome: Progressing     Problem: Behavior  Goal: Pt/Family maintain appropriate behavior and adhere to behavioral management agreement, if implemented  Description: INTERVENTIONS:  1. Assess patient/family's coping skills and  non-compliant behavior (including use of illegal substances)  2. Notify security of behavior or suspected illegal substances which indicate the need for search of the family and/or belongings  3. Encourage verbalization of thoughts and concerns in a socially appropriate manner  4. Utilize positive, consistent limit setting strategies supporting safety of patient, staff and others  5. Encourage participation in the decision making process about the behavioral management agreement  6. If a visitor's behavior poses a threat to safety call refer to organization policy.  7. Initiate consult with Social 
  Problem: Anxiety  Goal: Will report anxiety at manageable levels  Description: INTERVENTIONS:  1. Administer medication as ordered  2. Teach and rehearse alternative coping skills  3. Provide emotional support with 1:1 interaction with staff  8/3/2024 1004 by Harrison Chamberlain RN  Outcome: Progressing  8/2/2024 2107 by Ryan Oliveira, RN  Outcome: Progressing     Problem: Decision Making  Goal: Pt/Family able to effectively weigh alternatives and participate in decision making related to treatment and care  Description: INTERVENTIONS:  1. Determine when there are differences between patient's view, family's view, and healthcare provider's view of condition  2. Facilitate patient and family articulation of goals for care  3. Help patient and family identify pros/cons of alternative solutions  4. Provide information as requested by patient/family  5. Respect patient/family right to receive or not to receive information  6. Serve as a liaison between patient and family and health care team  7. Initiate Consults from Ethics, Palliative Care or initiate Family Care Conference as is appropriate  8/3/2024 1004 by Harrison Chamberlain RN  Outcome: Progressing  8/2/2024 2107 by Ryan Oliveira RN  Outcome: Progressing     
  Problem: Anxiety  Goal: Will report anxiety at manageable levels  Description: INTERVENTIONS:  1. Administer medication as ordered  2. Teach and rehearse alternative coping skills  3. Provide emotional support with 1:1 interaction with staff  8/4/2024 0950 by Harrison Chamberlain RN  Outcome: Progressing  8/4/2024 0351 by Mary Estrada RN  Outcome: Progressing  8/3/2024 2229 by Sadia Gilbert RN  Outcome: Progressing     Problem: Decision Making  Goal: Pt/Family able to effectively weigh alternatives and participate in decision making related to treatment and care  Description: INTERVENTIONS:  1. Determine when there are differences between patient's view, family's view, and healthcare provider's view of condition  2. Facilitate patient and family articulation of goals for care  3. Help patient and family identify pros/cons of alternative solutions  4. Provide information as requested by patient/family  5. Respect patient/family right to receive or not to receive information  6. Serve as a liaison between patient and family and health care team  7. Initiate Consults from Ethics, Palliative Care or initiate Family Care Conference as is appropriate  8/4/2024 0351 by Mary Estrada, RN  Outcome: Progressing  8/3/2024 2229 by Sadia Gilbert RN  Outcome: Progressing     
  Problem: Anxiety  Goal: Will report anxiety at manageable levels  Description: INTERVENTIONS:  1. Administer medication as ordered  2. Teach and rehearse alternative coping skills  3. Provide emotional support with 1:1 interaction with staff  Outcome: Progressing     Problem: Behavior  Goal: Pt/Family maintain appropriate behavior and adhere to behavioral management agreement, if implemented  Description: INTERVENTIONS:  1. Assess patient/family's coping skills and  non-compliant behavior (including use of illegal substances)  2. Notify security of behavior or suspected illegal substances which indicate the need for search of the family and/or belongings  3. Encourage verbalization of thoughts and concerns in a socially appropriate manner  4. Utilize positive, consistent limit setting strategies supporting safety of patient, staff and others  5. Encourage participation in the decision making process about the behavioral management agreement  6. If a visitor's behavior poses a threat to safety call refer to organization policy.  7. Initiate consult with , Psychosocial CNS, Spiritual Care as appropriate  Outcome: Progressing     Patients has showered today and states that they are feeling better, although they are still withdrawn and isolative to room. Patient is sleeping most of the day and exhibiting poor eye contact.   
  Problem: Anxiety  Goal: Will report anxiety at manageable levels  Description: INTERVENTIONS:  1. Administer medication as ordered  2. Teach and rehearse alternative coping skills  3. Provide emotional support with 1:1 interaction with staff  Outcome: Progressing     Problem: Decision Making  Goal: Pt/Family able to effectively weigh alternatives and participate in decision making related to treatment and care  Description: INTERVENTIONS:  1. Determine when there are differences between patient's view, family's view, and healthcare provider's view of condition  2. Facilitate patient and family articulation of goals for care  3. Help patient and family identify pros/cons of alternative solutions  4. Provide information as requested by patient/family  5. Respect patient/family right to receive or not to receive information  6. Serve as a liaison between patient and family and health care team  7. Initiate Consults from Ethics, Palliative Care or initiate Family Care Conference as is appropriate  Outcome: Progressing     Problem: Behavior  Goal: Pt/Family maintain appropriate behavior and adhere to behavioral management agreement, if implemented  Description: INTERVENTIONS:  1. Assess patient/family's coping skills and  non-compliant behavior (including use of illegal substances)  2. Notify security of behavior or suspected illegal substances which indicate the need for search of the family and/or belongings  3. Encourage verbalization of thoughts and concerns in a socially appropriate manner  4. Utilize positive, consistent limit setting strategies supporting safety of patient, staff and others  5. Encourage participation in the decision making process about the behavioral management agreement  6. If a visitor's behavior poses a threat to safety call refer to organization policy.  7. Initiate consult with , Psychosocial CNS, Spiritual Care as appropriate  Outcome: Progressing     Pt denies SI, HI and 
  Problem: Anxiety  Goal: Will report anxiety at manageable levels  Description: INTERVENTIONS:  1. Administer medication as ordered  2. Teach and rehearse alternative coping skills  3. Provide emotional support with 1:1 interaction with staff  Recent Flowsheet Documentation  Taken 7/25/2024 1005 by Harrison Chamberlain RN  Will report anxiety at manageable levels: Administer medication as ordered  7/24/2024 2232 by José Manuel Duran, RN  Outcome: Progressing     Problem: Decision Making  Goal: Pt/Family able to effectively weigh alternatives and participate in decision making related to treatment and care  Description: INTERVENTIONS:  1. Determine when there are differences between patient's view, family's view, and healthcare provider's view of condition  2. Facilitate patient and family articulation of goals for care  3. Help patient and family identify pros/cons of alternative solutions  4. Provide information as requested by patient/family  5. Respect patient/family right to receive or not to receive information  6. Serve as a liaison between patient and family and health care team  7. Initiate Consults from Ethics, Palliative Care or initiate Family Care Conference as is appropriate  7/25/2024 1011 by Harrison Chamberlain RN  Outcome: Progressing  Flowsheets (Taken 7/25/2024 1005)  Patient/family able to effectively weigh alternatives and participate in decision making related to treatment and care: Determine when there are differences between patient's view, family's view, and healthcare provider's view of condition  7/24/2024 2232 by José Manuel Duran, RN  Outcome: Progressing     
  Problem: Risk for Elopement  Goal: Patient will not exit the unit/facility without proper excort  7/26/2024 2118 by Mary Estrada RN  Outcome: Progressing  7/26/2024 1748 by Belle Baumann RN  Outcome: Progressing     Problem: Anxiety  Goal: Will report anxiety at manageable levels  Description: INTERVENTIONS:  1. Administer medication as ordered  2. Teach and rehearse alternative coping skills  3. Provide emotional support with 1:1 interaction with staff  7/26/2024 2118 by Mary Estrada RN  Outcome: Progressing  7/26/2024 1748 by Belle Baumann RN  Outcome: Progressing     Problem: Decision Making  Goal: Pt/Family able to effectively weigh alternatives and participate in decision making related to treatment and care  Description: INTERVENTIONS:  1. Determine when there are differences between patient's view, family's view, and healthcare provider's view of condition  2. Facilitate patient and family articulation of goals for care  3. Help patient and family identify pros/cons of alternative solutions  4. Provide information as requested by patient/family  5. Respect patient/family right to receive or not to receive information  6. Serve as a liaison between patient and family and health care team  7. Initiate Consults from Ethics, Palliative Care or initiate Family Care Conference as is appropriate  7/26/2024 2118 by Mary Estrada RN  Outcome: Progressing  7/26/2024 1748 by Belle Baumann RN  Outcome: Progressing     Problem: Behavior  Goal: Pt/Family maintain appropriate behavior and adhere to behavioral management agreement, if implemented  Description: INTERVENTIONS:  1. Assess patient/family's coping skills and  non-compliant behavior (including use of illegal substances)  2. Notify security of behavior or suspected illegal substances which indicate the need for search of the family and/or belongings  3. Encourage verbalization of thoughts and concerns in a socially appropriate manner  4. Utilize 
  Problem: Risk for Elopement  Goal: Patient will not exit the unit/facility without proper excort  7/30/2024 1444 by Cindy Perea RN  Outcome: Progressing  7/30/2024 0200 by José Manuel Duran RN  Outcome: Progressing  7/30/2024 0200 by José Manuel Duran RN  Outcome: Progressing     Problem: Anxiety  Goal: Will report anxiety at manageable levels  Description: INTERVENTIONS:  1. Administer medication as ordered  2. Teach and rehearse alternative coping skills  3. Provide emotional support with 1:1 interaction with staff  7/30/2024 1444 by Cindy Perea RN  Outcome: Not Progressing  7/30/2024 0200 by José Manuel Duran RN  Outcome: Progressing  7/30/2024 0200 by José Manuel Duran RN  Outcome: Progressing     Problem: Decision Making  Goal: Pt/Family able to effectively weigh alternatives and participate in decision making related to treatment and care  Description: INTERVENTIONS:  1. Determine when there are differences between patient's view, family's view, and healthcare provider's view of condition  2. Facilitate patient and family articulation of goals for care  3. Help patient and family identify pros/cons of alternative solutions  4. Provide information as requested by patient/family  5. Respect patient/family right to receive or not to receive information  6. Serve as a liaison between patient and family and health care team  7. Initiate Consults from Ethics, Palliative Care or initiate Family Care Conference as is appropriate  7/30/2024 1444 by Cindy Perea RN  Outcome: Not Progressing  7/30/2024 0200 by José Manuel Duran RN  Outcome: Progressing  7/30/2024 0200 by José Manuel Duran RN  Outcome: Progressing     Problem: Behavior  Goal: Pt/Family maintain appropriate behavior and adhere to behavioral management agreement, if implemented  Description: INTERVENTIONS:  1. Assess patient/family's coping skills and  non-compliant behavior (including use of illegal substances)  2. Notify security of behavior or suspected illegal 
  Problem: Risk for Elopement  Goal: Patient will not exit the unit/facility without proper excort  7/30/2024 2130 by José Manuel Duran RN  Outcome: Progressing     Problem: Anxiety  Goal: Will report anxiety at manageable levels  Description: INTERVENTIONS:  1. Administer medication as ordered  2. Teach and rehearse alternative coping skills  3. Provide emotional support with 1:1 interaction with staff  7/30/2024 2130 by José Manuel Duran RN  Outcome: Progressing     Problem: Decision Making  Goal: Pt/Family able to effectively weigh alternatives and participate in decision making related to treatment and care  Description: INTERVENTIONS:  1. Determine when there are differences between patient's view, family's view, and healthcare provider's view of condition  2. Facilitate patient and family articulation of goals for care  3. Help patient and family identify pros/cons of alternative solutions  4. Provide information as requested by patient/family  5. Respect patient/family right to receive or not to receive information  6. Serve as a liaison between patient and family and health care team  7. Initiate Consults from Ethics, Palliative Care or initiate Family Care Conference as is appropriate  7/30/2024 2130 by José Manuel Duran RN  Outcome: Progressing     Problem: Behavior  Goal: Pt/Family maintain appropriate behavior and adhere to behavioral management agreement, if implemented  Description: INTERVENTIONS:  1. Assess patient/family's coping skills and  non-compliant behavior (including use of illegal substances)  2. Notify security of behavior or suspected illegal substances which indicate the need for search of the family and/or belongings  3. Encourage verbalization of thoughts and concerns in a socially appropriate manner  4. Utilize positive, consistent limit setting strategies supporting safety of patient, staff and others  5. Encourage participation in the decision making process about the behavioral management 
  Problem: Risk for Elopement  Goal: Patient will not exit the unit/facility without proper excort  Outcome: Progressing     Problem: Anxiety  Goal: Will report anxiety at manageable levels  Description: INTERVENTIONS:  1. Administer medication as ordered  2. Teach and rehearse alternative coping skills  3. Provide emotional support with 1:1 interaction with staff  7/30/2024 0200 by José Manuel Duran RN  Outcome: Progressing     Problem: Decision Making  Goal: Pt/Family able to effectively weigh alternatives and participate in decision making related to treatment and care  Description: INTERVENTIONS:  1. Determine when there are differences between patient's view, family's view, and healthcare provider's view of condition  2. Facilitate patient and family articulation of goals for care  3. Help patient and family identify pros/cons of alternative solutions  4. Provide information as requested by patient/family  5. Respect patient/family right to receive or not to receive information  6. Serve as a liaison between patient and family and health care team  7. Initiate Consults from Ethics, Palliative Care or initiate Family Care Conference as is appropriate  7/30/2024 0200 by José Manuel Duran RN  Outcome: Progressing     Problem: Behavior  Goal: Pt/Family maintain appropriate behavior and adhere to behavioral management agreement, if implemented  Description: INTERVENTIONS:  1. Assess patient/family's coping skills and  non-compliant behavior (including use of illegal substances)  2. Notify security of behavior or suspected illegal substances which indicate the need for search of the family and/or belongings  3. Encourage verbalization of thoughts and concerns in a socially appropriate manner  4. Utilize positive, consistent limit setting strategies supporting safety of patient, staff and others  5. Encourage participation in the decision making process about the behavioral management agreement  6. If a visitor's behavior 
  Problem: Risk for Elopement  Goal: Patient will not exit the unit/facility without proper excort  Outcome: Progressing     Problem: Anxiety  Goal: Will report anxiety at manageable levels  Description: INTERVENTIONS:  1. Administer medication as ordered  2. Teach and rehearse alternative coping skills  3. Provide emotional support with 1:1 interaction with staff  8/3/2024 2229 by Sadia Gilbert RN  Outcome: Progressing  8/3/2024 1004 by Harrison Chamberlain RN  Outcome: Progressing     Problem: Decision Making  Goal: Pt/Family able to effectively weigh alternatives and participate in decision making related to treatment and care  Description: INTERVENTIONS:  1. Determine when there are differences between patient's view, family's view, and healthcare provider's view of condition  2. Facilitate patient and family articulation of goals for care  3. Help patient and family identify pros/cons of alternative solutions  4. Provide information as requested by patient/family  5. Respect patient/family right to receive or not to receive information  6. Serve as a liaison between patient and family and health care team  7. Initiate Consults from Ethics, Palliative Care or initiate Family Care Conference as is appropriate  8/3/2024 2229 by Sadia Gilbert RN  Outcome: Progressing  8/3/2024 1004 by Harrison Chamberlain RN  Outcome: Progressing     Problem: Behavior  Goal: Pt/Family maintain appropriate behavior and adhere to behavioral management agreement, if implemented  Description: INTERVENTIONS:  1. Assess patient/family's coping skills and  non-compliant behavior (including use of illegal substances)  2. Notify security of behavior or suspected illegal substances which indicate the need for search of the family and/or belongings  3. Encourage verbalization of thoughts and concerns in a socially appropriate manner  4. Utilize positive, consistent limit setting strategies supporting safety of patient, staff and others  5. Encourage 
  Problem: Risk for Elopement  Goal: Patient will not exit the unit/facility without proper excort  Outcome: Progressing     Problem: Anxiety  Goal: Will report anxiety at manageable levels  Description: INTERVENTIONS:  1. Administer medication as ordered  2. Teach and rehearse alternative coping skills  3. Provide emotional support with 1:1 interaction with staff  8/4/2024 2311 by José Manuel Duran RN  Outcome: Progressing     Problem: Decision Making  Goal: Pt/Family able to effectively weigh alternatives and participate in decision making related to treatment and care  Description: INTERVENTIONS:  1. Determine when there are differences between patient's view, family's view, and healthcare provider's view of condition  2. Facilitate patient and family articulation of goals for care  3. Help patient and family identify pros/cons of alternative solutions  4. Provide information as requested by patient/family  5. Respect patient/family right to receive or not to receive information  6. Serve as a liaison between patient and family and health care team  7. Initiate Consults from Ethics, Palliative Care or initiate Family Care Conference as is appropriate  Outcome: Progressing     Problem: Behavior  Goal: Pt/Family maintain appropriate behavior and adhere to behavioral management agreement, if implemented  Description: INTERVENTIONS:  1. Assess patient/family's coping skills and  non-compliant behavior (including use of illegal substances)  2. Notify security of behavior or suspected illegal substances which indicate the need for search of the family and/or belongings  3. Encourage verbalization of thoughts and concerns in a socially appropriate manner  4. Utilize positive, consistent limit setting strategies supporting safety of patient, staff and others  5. Encourage participation in the decision making process about the behavioral management agreement  6. If a visitor's behavior poses a threat to safety call refer to 
  Problem: Risk for Elopement  Goal: Patient will not exit the unit/facility without proper excort  Outcome: Progressing     Problem: Anxiety  Goal: Will report anxiety at manageable levels  Description: INTERVENTIONS:  1. Administer medication as ordered  2. Teach and rehearse alternative coping skills  3. Provide emotional support with 1:1 interaction with staff  Outcome: Progressing     
  Problem: Risk for Elopement  Goal: Patient will not exit the unit/facility without proper excort  Outcome: Progressing     Problem: Anxiety  Goal: Will report anxiety at manageable levels  Description: INTERVENTIONS:  1. Administer medication as ordered  2. Teach and rehearse alternative coping skills  3. Provide emotional support with 1:1 interaction with staff  Outcome: Progressing     Problem: Decision Making  Goal: Pt/Family able to effectively weigh alternatives and participate in decision making related to treatment and care  Description: INTERVENTIONS:  1. Determine when there are differences between patient's view, family's view, and healthcare provider's view of condition  2. Facilitate patient and family articulation of goals for care  3. Help patient and family identify pros/cons of alternative solutions  4. Provide information as requested by patient/family  5. Respect patient/family right to receive or not to receive information  6. Serve as a liaison between patient and family and health care team  7. Initiate Consults from Ethics, Palliative Care or initiate Family Care Conference as is appropriate  Outcome: Progressing     Problem: Behavior  Goal: Pt/Family maintain appropriate behavior and adhere to behavioral management agreement, if implemented  Description: INTERVENTIONS:  1. Assess patient/family's coping skills and  non-compliant behavior (including use of illegal substances)  2. Notify security of behavior or suspected illegal substances which indicate the need for search of the family and/or belongings  3. Encourage verbalization of thoughts and concerns in a socially appropriate manner  4. Utilize positive, consistent limit setting strategies supporting safety of patient, staff and others  5. Encourage participation in the decision making process about the behavioral management agreement  6. If a visitor's behavior poses a threat to safety call refer to organization policy.  7. Initiate 
[facility]  Day 3 Interdisciplinary Treatment Plan NOTE    Review Date & Time: 7/26/2024  11:20 AM     Patient was in  individual treatment team    Estimated Length of Stay Update:  [facility] JULIAN  Estimated Discharge Date Update: REED    EDUCATION:   Learner Progress Toward Treatment Goals: Reviewed results and recommendations of this team, Reviewed group plan and strategies, Reviewed signs, symptoms and risk of self harm and violent behavior, and Reviewed goals and plan of care    Method: Individual    Outcome: Needs reinforcement    PATIENT GOALS: refused    PLAN/TREATMENT RECOMMENDATIONS UPDATE:Continue with group therapies,increase socialization,continue planning for discharge goals,continue with medication compliance    GOALS UPDATE:   Time frame for Short-Term Goals: TBJULIAN Aguirre RN  
[facility]  Discharge Note    Tobacco Screening:  Practical Counseling, on admission, chucky X, if applicable and completed (first 3 are required if patient doesn't refuse):            ( ) Recognizing danger situations (included triggers and roadblocks)                    ( ) Coping skills (new ways to manage stress,relaxation techniques, changing routine, distraction)                                                           ( ) Basic information about quitting (benefits of quitting, techniques in how to quit, available resources  ( ) [facility]                                                                                                                     (X) Patient refused counseling  ( ) Patient refused referral  ( ) Patient refused prescription upon discharge  ( ) Patient has not smoked in the last 30 days              Pt discharged with followings belongings:       Valuables sent home with NONE. Valuables retrieved from safe, Security envelope number:  N/A and returned to patient.  Patient left department with Departure Mode: By self, In cab via Mobility at Departure: Ambulatory, discharged to Discharged to: Other (Comment) (crisis unit on [address]). Patient education on aftercare instructions: PROVIDED  Patient verbalize understanding of AVS:  YES.  Given suicide prevention handout YES. Discharged in stable condition.  Status EXAM upon discharge:  Mental Status and Behavioral Exam  Normal: No  Level of Assistance: Independent/Self  Facial Expression: Worried  Affect: Appropriate  Level of Consciousness: Alert  Frequency of Checks: 4 times per hour, close  Mood:Normal: No  Mood: Anxious  Motor Activity:Normal: No  Motor Activity: Decreased  Eye Contact: Fair  Observed Behavior: Guarded  Sexual Misconduct History: Current - no  Preception: [place] to person, [place] to time, [place] to place, [place] to situation  Attention:Normal: No  Attention: 
Denies suicidal ideations or thoughts of self harm.  Denies homicidal ideations or thoughts to hurt others.  Denies auditory and visual hallucinations.  Poor eye contact, flat affect.  Remains irritable, defiant toward unit treatment and need to attend group sessions.  Refused on unit groups thus far today.  Medication compliant.  Up for meals.     Problem: Decision Making  Goal: Pt/Family able to effectively weigh alternatives and participate in decision making related to treatment and care  Description: INTERVENTIONS:  1. Determine when there are differences between patient's view, family's view, and healthcare provider's view of condition  2. Facilitate patient and family articulation of goals for care  3. Help patient and family identify pros/cons of alternative solutions  4. Provide information as requested by patient/family  5. Respect patient/family right to receive or not to receive information  6. Serve as a liaison between patient and family and health care team  7. Initiate Consults from Ethics, Palliative Care or initiate Family Care Conference as is appropriate  8/1/2024 1016 by Mary Sommer, RN  Outcome: Not Progressing  7/31/2024 2143 by Mary Estrada, RN  Outcome: Progressing     
Pt resting in his bed with eyes closed and eyes covered with a sheet. Pt denies SI, HI and AVH. Pt has no concerns att and went back to rest shortly after.    Problem: Anxiety  Goal: Will report anxiety at manageable levels  Description: INTERVENTIONS:  1. Administer medication as ordered  2. Teach and rehearse alternative coping skills  3. Provide emotional support with 1:1 interaction with staff  7/27/2024 2234 by Ryan Oliveira, RN  Outcome: Progressing     Problem: Decision Making  Goal: Pt/Family able to effectively weigh alternatives and participate in decision making related to treatment and care  Description: INTERVENTIONS:  1. Determine when there are differences between patient's view, family's view, and healthcare provider's view of condition  2. Facilitate patient and family articulation of goals for care  3. Help patient and family identify pros/cons of alternative solutions  4. Provide information as requested by patient/family  5. Respect patient/family right to receive or not to receive information  6. Serve as a liaison between patient and family and health care team  7. Initiate Consults from Ethics, Palliative Care or initiate Family Care Conference as is appropriate  7/27/2024 2234 by Ryan Oliveira, RN  Outcome: Progressing     Problem: Behavior  Goal: Pt/Family maintain appropriate behavior and adhere to behavioral management agreement, if implemented  Description: INTERVENTIONS:  1. Assess patient/family's coping skills and  non-compliant behavior (including use of illegal substances)  2. Notify security of behavior or suspected illegal substances which indicate the need for search of the family and/or belongings  3. Encourage verbalization of thoughts and concerns in a socially appropriate manner  4. Utilize positive, consistent limit setting strategies supporting safety of patient, staff and others  5. Encourage participation in the decision making process about the behavioral 
Pt resting in his room through shift. Pt denies SI, HI and AVH. Pt came out of room for a snack and then returned to his room. Pt has no concerns att.    Problem: Anxiety  Goal: Will report anxiety at manageable levels  Description: INTERVENTIONS:  1. Administer medication as ordered  2. Teach and rehearse alternative coping skills  3. Provide emotional support with 1:1 interaction with staff  Outcome: Progressing     Problem: Decision Making  Goal: Pt/Family able to effectively weigh alternatives and participate in decision making related to treatment and care  Description: INTERVENTIONS:  1. Determine when there are differences between patient's view, family's view, and healthcare provider's view of condition  2. Facilitate patient and family articulation of goals for care  3. Help patient and family identify pros/cons of alternative solutions  4. Provide information as requested by patient/family  5. Respect patient/family right to receive or not to receive information  6. Serve as a liaison between patient and family and health care team  7. Initiate Consults from Ethics, Palliative Care or initiate Family Care Conference as is appropriate  Outcome: Progressing     Problem: Behavior  Goal: Pt/Family maintain appropriate behavior and adhere to behavioral management agreement, if implemented  Description: INTERVENTIONS:  1. Assess patient/family's coping skills and  non-compliant behavior (including use of illegal substances)  2. Notify security of behavior or suspected illegal substances which indicate the need for search of the family and/or belongings  3. Encourage verbalization of thoughts and concerns in a socially appropriate manner  4. Utilize positive, consistent limit setting strategies supporting safety of patient, staff and others  5. Encourage participation in the decision making process about the behavioral management agreement  6. If a visitor's behavior poses a threat to safety call refer to 
Utilize positive, consistent limit setting strategies supporting safety of patient, staff and others  5. Encourage participation in the decision making process about the behavioral management agreement  6. If a visitor's behavior poses a threat to safety call refer to organization policy.  7. Initiate consult with , Psychosocial CNS, Spiritual Care as appropriate  7/31/2024 2143 by Mary Estrada, RN  Outcome: Progressing  7/31/2024 1732 by Elizabeth Bello, RN  Outcome: Progressing     
consistent limit setting strategies supporting safety of patient, staff and others  5. Encourage participation in the decision making process about the behavioral management agreement  6. If a visitor's behavior poses a threat to safety call refer to organization policy.  7. Initiate consult with , Psychosocial CNS, Spiritual Care as appropriate  8/4/2024 0351 by Mary Estrada, RN  Outcome: Progressing  8/3/2024 2229 by Sadia Gilbert, RN  Outcome: Progressing     
positive, consistent limit setting strategies supporting safety of patient, staff and others  5. Encourage participation in the decision making process about the behavioral management agreement  6. If a visitor's behavior poses a threat to safety call refer to organization policy.  7. Initiate consult with , Psychosocial CNS, Spiritual Care as appropriate  8/1/2024 2022 by Mary Estrada, RN  Outcome: Progressing  8/1/2024 1016 by Mary Sommer, RN  Outcome: Progressing        
verbalization of thoughts and concerns in a socially appropriate manner  4. Utilize positive, consistent limit setting strategies supporting safety of patient, staff and others  5. Encourage participation in the decision making process about the behavioral management agreement  6. If a visitor's behavior poses a threat to safety call refer to organization policy.  7. Initiate consult with , Psychosocial CNS, Spiritual Care as appropriate  7/25/2024 2151 by José Manuel Duran RN  Outcome: Progressing     Problem: Pain  Goal: Verbalizes/displays adequate comfort level or baseline comfort level  Outcome: Progressing     Patient laying in bed upon approach. Patient denied SI/HI. Patient denied hallucinations and delusions. Patient remains med compliant and behavior remains in control. Patient seclusive to self/room. Safety rounds done q15 minutes. Will continue to monitor.

## 2024-08-05 NOTE — CARE COORDINATION
NILA called [facility] Phone: [phone]  to schedule transportation to [facility].   
NILA contacted [facility]  and spoke with Vik who states that they are able to accept pt today and do have a bed available for him today. He states that pt will need medications in hand if possible. Treatment team informed.     SW was informed by NP that pt discharge for today is postponed. NILA contacted Vik at [facility] and informed him that discharge is postponed and SW will keep him updated on pt discharge date. He verbalized understanding.     Pt requested to meet with NILA. SW met with pt. Pt is upset that he is unable to go to [facility] today and requested that SW have them have his bed reserved for when he does discharge. NILA informed him that [facility] has their own policies and SW is unable to have them reserve a bed, but that they do have pt referral and are aware he will be discharging to their facility sometime int he near future. He verbalized understanding and is hopeful be will be available on his day of discharge.   
NILA contacted [facility]  to follow up on pt referral. They state that they did not receive referral. SW faxed referral again.   
NILA contacted pt mother Joann [phone] (PATRICIO signed) to discuss pt discharge plan for after CSU. No answer, NILA left voicemail.   
NILA contacted pt mother Joann [phone] (PATRICIO signed) to discuss pt discharge plan for after CSU. She states that pt father told pt he is not able to return home until after substance abuse rehab. She states that she told pt he is able to come home after CSU. She states that pt  will assist him long term with finding independent housing. She states that pt is able to temporarily return home until his  Nadine helps him with independent housing. She states that she may work with pt  while pt is at CSU in order to get him set up with transitional housing if pt is agreeable. She states that she sees improvement in pt daily. She states that he does not seem to be 100% himself yet, but that he is better.   
NILA contacted pt mother Joann [phone] (PATRICIO signed) to gain collateral. She states that they probated pt due to many concerns listed in the probate paperwork. This has been going on for over 10yrs. She states that pt struggles with mental illness and addiction. He went off his medications a few weeks ago and has been using meth and cocaine to self medicate his depression, which causes him to be unable to sleep. She is unsure if pt has organic psychosis or if this is substance induced. She states that pt needs to help himself and that pt cannot stay on this path. The  told them that they were enabling pt and feels that pt should try to live on his own after discharge. If they do not help enough, then people tell them they do not support pt, so she is stuck. She states that he has a hx of violence and they get afraid of him when he uses substances. He has no insight into what he is doing or his need for treatment. She states that pt will not return home at discharge, states that pt discharge plan is up to judge Crespo and pt  Nadine Troncoso. She denied pt access to guns/weapons.   
NILA faxed updated notes to the [facility] Phone: [phone] Fax: [phone].   
NILA met with pt to discuss discharge for today. Pt is alert and oriented x4. Pt's mood is irritable, affect is congruent. Pt's speech is clear, rate and volume is normal. Pt's insight and judgement is fair. Pt refused to answer if he has depression and anxiety. Pt refused to answer if he has SI, HI, AVH. Pt was argumentative and stated that his family wants to take him home and he does not need to go to the crisis unit. NILA informed pt that his plan is to go to the crisis unit and they have a bed for him, pt was not receptive of this and is demanding to return home.     NILA called pt's mother Joann [phone] (PATRICIO signed) to discuss discharge planning. NILA left a voicemail requesting a call back.    
NILA received a call from Aysha with the [facility] stating that the pt is accepted and able to come to the facility today.   
NILA received voicemail from pt mother Joann [phone] (PATRICIO signed) stating that she thinks pt would benefit from inpatient substance abuse rehab due to believing that his psychosis may be induced by his meth use. She states that he has a hx of going to CSU and it has become a cycle that has not helped. She feels that substance abuse rehab is the best option. She states that pt has a hx of rehab, but signed himself out.     NILA called Joann and she is aware that NILA can discuss this plan with pt, but the decision is ultimately up to him. She verbalized understanding.   
Navigator attended [facility] AOT meeting. Discussed patient progress and discharge plan. Patient AOT  made aware of the plan for step-down, with patient mother interest in exploring independent housing. Nadine reports some barriers with patient being able to financially support himself - she plans to discuss possible referral to transitional housing ([facility] or [facility]) with patient and family.    Nadine reports she does feel patient need to go to inpatient rehab, however she understands that this is currently the patient's decision.    Electronically signed by BRIGITTE Gerard, ANNA on 8/1/2024 at 9:36 AM   
Navigator submitted level of care change to [facility] , [address], & [facility].    Electronically signed by BRIGITTE Gerard, ALVAREZW on 8/5/2024 at 4:32 PM   
Pt was seen during treatment team. Pt states that he is feeling better. He states that he told his parents he was going to kill himself yesterday because they were mentioning him going to substance abuse rehab. Pt states that he was not actually suicidal and plans to think about what he says int he future instead of reacting. Pt states that he wants to go to CSU at discharge and plans to go to transitional housing afterwards. He is hopeful to discharge soon and denied SI/HI/AVH. He states that he spoke with his mother and is able to go home after CSU if needed. Pt does not want referred to substance abuse rehab at this time.   
Referral faxed to [facility].   
needles. Pt refused to answer any questions regarding suicidal ideation, plan, attempts or intent to end his life. Pt would not discuss if he has any history of self harm. Per admission on 10/2023, pt reported to having suicide attempts however would not disclose any information further. Pt denied any history of trauma/abuse. Pt stated that his main stressor at this time is having to go to court and getting stabbed with needles all the time. Pt stated that he has got 12 shots this year and he does not want to get them anymore and when he gets them it makes him homicidal. Pt refused to discuss any substance use, pt's UDS is positive for amphetamines, cocaine, and marijuana. Pt's SDS is negative. Pt denied needing any substance use treatment at this time. Pt stated that his sleep and appetite have been poor.     Pt stated that he is unsure of his highest level of education, then stated that he graduated from  and he is unsure if he went to any college/trade school. Pt denied having any income or employment at this time. Pt stated that he has previous legal charges but denied any current or pending charges. Pt stated that he has been on probation in the past but denied that he is currently. Pt is currently involved with the mental health courts. Pt stated that he is single and denied having any children. Pt stated that he was raised by his mom Joann and dad Marco and he has 1 brother and 1 sister. Pt is unsure of any family history of MH. Pt denied having any support system in place at this time.    Pt is unable to state where he will go when he is discharged from the hospital and stated that he has been homeless. Pt was unable to state how long he has been homeless for. Pt was unable to discuss his follow up appointments at this time. Pt denied access to any guns/weapons.      Risk Factors: Mental Health Diagnosis   Several inpatient psychiatric admissions   Substance Use   Previous trouble with the law/ legal

## 2024-08-05 NOTE — GROUP NOTE
Group Therapy Note    Date: 8/5/2024    Group Start Time: 1000  Group End Time: 1040  Group Topic: Psychoeducation    [facility]    Tierra Banuelos, CTRS    Date: 8/5/2024  Module Name:  Anxiety: how I can manage in a healthy way.     Patient's Goal:  pt will be able to manage anxiety symptoms in a healthy way to avoid crisis.     Notes:  Pleasant and engaged in group, sharing when prompted and accepting of handout.     Status After Intervention:  Improved    Participation Level: Active Listener and Interactive    Participation Quality: Appropriate, Attentive, and Sharing      Speech:  normal      Thought Process/Content: Logical      Affective Functioning: Congruent      Mood: euthymic      Level of consciousness:  Alert, Oriented x4, and Attentive      Response to Learning: Able to verbalize/acknowledge new learning, Able to retain information, and Progressing to goal      Endings: None Reported    Modes of Intervention: Education, Support, Socialization, and Problem-solving      Discipline Responsible: Psychoeducational Specialist      Signature:  Tierra Banuelos CTRS

## 2024-08-05 NOTE — DISCHARGE SUMMARY
DISCHARGE SUMMARY      Patient ID:  Gene Rogers  [id number]  29 y.o.  1995    Admit date: 7/23/2024    Discharge date and time: 8/5/2024    Admitting Physician: Anabela Hernandez MD     Discharge Physician: Dr David KWAN    Discharge Diagnoses:   Patient Active Problem List   Diagnosis    Gastritis    Bilious vomiting with nausea    Pneumonia due to organism    Acute respiratory failure with hypoxia (HCC)    Polysubstance abuse (HCC)    Hepatitis    SIRS (systemic inflammatory response syndrome) (HCC)    Hypokalemia    Asthma    Hyperglycemia    Severe protein-calorie malnutrition (HCC)    Severe episode of recurrent major depressive disorder, without psychotic features (HCC)    Cluster B personality disorder (HCC)    Schizoaffective disorder, bipolar type (HCC)    Acute psychosis (HCC)    Personal history of noncompliance with medical treatment, presenting hazards to health    Schizoaffective disorder (HCC)       Admission Condition: poor    Discharged Condition: stable    Admission Circumstance: Gene Rogers has a significant past history of schizoaffective disorder currently on an outpatient commitment through [facility] mon.ki presented to the ED through Court order after patient's mother filed a probate through the [facility] [address] indicating that Edward does not want to take his medications because he believes it is killing him and if they make him he would kill them.  In the ED patient was verbally aggressive and hostile.  Precipitating factors include polysubstance abuse, medication noncompliance duration onset symptoms just prior to arrival       PAST MEDICAL/PSYCHIATRIC HISTORY:   Past Medical History:   Diagnosis Date    Anxiety     Asthma     no meds currently- mostly exercise induced    Claustrophobia     confined spaces with locked doors    Depression     Epigastric pain     Nausea     Nut allergy        FAMILY/SOCIAL HISTORY:  No family history on file.  Social History     Socioeconomic History  79.6

## 2024-08-05 NOTE — GROUP NOTE
Group Therapy Note    Date: 8/5/2024    Group Start Time: 0950  Group End Time: 1000  Group Topic: Community Meeting    [facility]    Tierra Banuelos, RENEE      Type of Group: Community Meeting      Patient's Goal:  Patient will be able to id staffing assignments, expectations of patients, and general information re: floor rules. Will be prompted to share goal for the day.     Notes:  Patient appeared to be an active listener, taking in information presented and was prompted to share goal for the day.    Status After Intervention:  Improved    Participation Level: Active Listener and Interactive    Participation Quality: Appropriate and Attentive      Speech:  normal      Thought Process/Content: Logical      Affective Functioning: Congruent      Mood: euthymic      Level of consciousness:  Alert, Oriented x4, and Attentive      Response to Learning: Able to verbalize/acknowledge new learning and Able to retain information      Endings: None Reported    Modes of Intervention: Education, Support, and Socialization      Discipline Responsible: Psychoeducational Specialist      Signature:  RENEE James

## 2024-08-05 NOTE — TRANSITION OF CARE
Behavioral Health Transition Record    Patient Name: Gene Rogers  YOB: 1995   Medical Record Number: [id number]  Date of Admission: 7/23/2024  4:10 PM   Date of Discharge: 8/5/24    Attending Provider: Anabela Hernandez MD   Discharging Provider: Anabela Hernandez MD   To contact this individual call [phone] and ask the  to page.  If unavailable, ask to be transferred to Behavioral Health Provider on call.  A Behavioral Health Provider will be available on call 24/7 and during holidays.    Primary Care Provider: Vince Roberto MD    Allergies   Allergen Reactions    Peanut-Containing Drug Products     Lorabid [Loracarbef]        Reason for Admission: Gene Rogers has a significant past history of schizoaffective disorder currently on an outpatient commitment through [facility] [employer] presented to the ED through Court order after patient's mother filed a probate through the [facility] Court indicating that Edward does not want to take his medications because he believes it is killing him and if they make him he would kill them.  In the ED patient was verbally aggressive and hostile.  Precipitating factors include polysubstance abuse, medication noncompliance duration onset symptoms just prior to arrival     Admission Diagnosis: Schizoaffective disorder, bipolar type (HCC) [F25.0]  Psychosis, unspecified psychosis type (HCC) [F29]    * No surgery found *    Results for orders placed or performed during the hospital encounter of 07/23/24   CMP   Result Value Ref Range    Sodium 139 132 - 146 mmol/L    Potassium 4.0 3.5 - 5.0 mmol/L    Chloride 102 98 - 107 mmol/L    CO2 22 22 - 29 mmol/L    Anion Gap 15 7 - 16 mmol/L    Glucose 111 (H) 74 - 99 mg/dL    BUN 11 6 - 20 mg/dL    Creatinine 1.0 0.70 - 1.20 mg/dL    Est, Glom Filt Rate >90 >60 mL/min/1.73m2    Calcium 9.1 8.6 - 10.2 mg/dL    Total Protein 7.7 6.4 - 8.3 g/dL    Albumin 4.4 3.5 - 5.2 g/dL    Total Bilirubin 0.6 0.0 - 1.2 mg/dL

## 2024-08-05 NOTE — PROGRESS NOTES
.Patient declined to attend the following groups:    Community Meeting  Psychoeducation   Peer Recovery     Will continue to encourage patient to attend programming.     
4 Eyes Skin Assessment     NAME:  Gene Rogers  YOB: 1995  MEDICAL RECORD NUMBER:  [id number]    The patient is being assessed for  Admission    I agree that at least one RN has performed a thorough Head to Toe Skin Assessment on the patient. ALL assessment sites listed below have been assessed.      Areas assessed by both nurses:    Head, Face, Ears, Shoulders, Back, Chest, Arms, Elbows, Hands, and Sacrum. Buttock, Coccyx, Ischium        Does the Patient have a Wound? No noted wound(s)       Moses Prevention initiated by RN: No  Wound Care Orders initiated by RN: No    Pressure Injury (Stage 3,4, Unstageable, DTI, NWPT, and Complex wounds) if present, place Wound referral order by RN under : No    New Ostomies, if present place, Ostomy referral order under : No     Nurse 1 eSignature: Electronically signed by Jossy Khan RN on 7/24/24 at 6:55 PM EDT    **SHARE this note so that the co-signing nurse can place an eSignature**    Nurse 2 eSignature: {Esignature:[id number]}  
BEHAVIORAL HEALTH FOLLOW-UP NOTE     7/26/2024     Patient was seen and examined in person, Chart reviewed   Patient's case discussed with staff/team    Chief Complaint: Irritable easily agitated misinterpreting    Interim History:   I saw patient today in his room he is irritable flat blunted isolative and guarded.  When asked if he wants to go to rehab becomes irritable stating that he does not want to go to rehab but that it is \"not good for me.\"  When asked if his any suicidal ideations he states \"yes I am suicidal when people try to force me to go to rehab.\"  Irritable easily agitated misinterprets denies auditory visual hallucinations appears preoccupied he is disheveled but did take a shower today.  Isolate guarded evasive    Appetite: [x] Normal/Unchanged  [] Increased  [] Decreased      Sleep:       [x] Normal/Unchanged  [] Fair       [] Poor              Energy:    [x] Normal/Unchanged  [] Increased  [] Decreased        SI [] Present  [x] Absent    HI  []Present  [x] Absent     Aggression:  [] yes  [x] no    Patient is [x] able  [] unable to CONTRACT FOR SAFETY     PAST MEDICAL/PSYCHIATRIC HISTORY:   Past Medical History:   Diagnosis Date    Anxiety     Asthma     no meds currently- mostly exercise induced    Claustrophobia     confined spaces with locked doors    Depression     Epigastric pain     Nausea     Nut allergy        FAMILY/SOCIAL HISTORY:  No family history on file.  Social History     Socioeconomic History    Marital status: Single     Spouse name: Not on file    Number of children: 0    Years of education: 13    Highest education level: Not on file   Occupational History    Occupation:      Employer: [employer]   Tobacco Use    Smoking status: Every Day     Types: E-Cigarettes     Passive exposure: Never    Smokeless tobacco: Never   Vaping Use    Vaping Use: Never used   Substance and Sexual Activity    Alcohol use: No    Drug use: Yes     Types: Marijuana (Weed)     Comment: 
BEHAVIORAL HEALTH FOLLOW-UP NOTE     7/27/2024     Patient was seen and examined in person, Chart reviewed   Patient's case discussed with staff/team    Chief Complaint: Irritable easily agitated misinterpreting    Interim History:   I saw patient this morning up on the unit he is brighter and more pleasant.  He tells me that he is feeling \"a lot better.\"  He states he is feeling \"like I am back to myself.  He apologizes for his behavior he states he would like to be \"fast track to the crisis unit.\"  Eating well sleeping well and no neurovegetative signs of depression no over or covert signs of psychosis no neurovegetative signs of depression      Appetite: [x] Normal/Unchanged  [] Increased  [] Decreased      Sleep:       [x] Normal/Unchanged  [] Fair       [] Poor              Energy:    [x] Normal/Unchanged  [] Increased  [] Decreased        SI [] Present  [x] Absent    HI  []Present  [x] Absent     Aggression:  [] yes  [x] no    Patient is [x] able  [] unable to CONTRACT FOR SAFETY     PAST MEDICAL/PSYCHIATRIC HISTORY:   Past Medical History:   Diagnosis Date    Anxiety     Asthma     no meds currently- mostly exercise induced    Claustrophobia     confined spaces with locked doors    Depression     Epigastric pain     Nausea     Nut allergy        FAMILY/SOCIAL HISTORY:  No family history on file.  Social History     Socioeconomic History    Marital status: Single     Spouse name: Not on file    Number of children: 0    Years of education: 13    Highest education level: Not on file   Occupational History    Occupation:      Employer: [employer]   Tobacco Use    Smoking status: Every Day     Types: E-Cigarettes     Passive exposure: Never    Smokeless tobacco: Never   Vaping Use    Vaping Use: Never used   Substance and Sexual Activity    Alcohol use: No    Drug use: Yes     Types: Marijuana (Weed)     Comment: states has a medical card    Sexual activity: Not Currently     Partners: Female   Other 
BEHAVIORAL HEALTH FOLLOW-UP NOTE     7/28/2024     Patient was seen and examined in person, Chart reviewed   Patient's case discussed with staff/team    Chief Complaint: \"I am feeling a lot better.\"    Interim History:   Patient seen his room today remains discharge focused.  He is encouraged to attend groups as he has been mostly isolate in his room.  He states he has talked to his parents and told that he does not need to go to the crisis unit.  He states his family has told him he is able to return home on discharge.  He has poor insight and judgment affect is brighter and more pleasant.  He engages well in conversation no behavioral disturbances noted on the unit.  Denies suicidal ideations intent or plan denies auditory or visual hallucinations    Appetite: [x] Normal/Unchanged  [] Increased  [] Decreased      Sleep:       [x] Normal/Unchanged  [] Fair       [] Poor              Energy:    [x] Normal/Unchanged  [] Increased  [] Decreased        SI [] Present  [x] Absent    HI  []Present  [x] Absent     Aggression:  [] yes  [x] no    Patient is [x] able  [] unable to CONTRACT FOR SAFETY     PAST MEDICAL/PSYCHIATRIC HISTORY:   Past Medical History:   Diagnosis Date    Anxiety     Asthma     no meds currently- mostly exercise induced    Claustrophobia     confined spaces with locked doors    Depression     Epigastric pain     Nausea     Nut allergy        FAMILY/SOCIAL HISTORY:  No family history on file.  Social History     Socioeconomic History    Marital status: Single     Spouse name: Not on file    Number of children: 0    Years of education: 13    Highest education level: Not on file   Occupational History    Occupation:      Employer: [employer]   Tobacco Use    Smoking status: Every Day     Types: E-Cigarettes     Passive exposure: Never    Smokeless tobacco: Never   Vaping Use    Vaping Use: Never used   Substance and Sexual Activity    Alcohol use: No    Drug use: Yes     Types: Marijuana 
BEHAVIORAL HEALTH FOLLOW-UP NOTE     7/29/2024     Patient was seen and examined in person, Chart reviewed   Patient's case discussed with staff/team    Chief Complaint: \"I am feeling a lot better.\"    Interim History:   Patient seen this morning in his room.  He tells me he is feeling \"much better.\"  He wants to the crisis unit on discharge.  He denies suicidal ideations intent or plan denies auditory or visual hallucinations he understands that a referral have to be made to the crisis unit.  He does not want attend groups.  He is somewhat guarded  wants to stay to himself in his room but states he will come out and watch TV.  He believes that going to the groups will make him \"worse.\"  Limit insight and judgment        Appetite: [x] Normal/Unchanged  [] Increased  [] Decreased      Sleep:       [x] Normal/Unchanged  [] Fair       [] Poor              Energy:    [x] Normal/Unchanged  [] Increased  [] Decreased        SI [] Present  [x] Absent    HI  []Present  [x] Absent     Aggression:  [] yes  [x] no    Patient is [x] able  [] unable to CONTRACT FOR SAFETY     PAST MEDICAL/PSYCHIATRIC HISTORY:   Past Medical History:   Diagnosis Date    Anxiety     Asthma     no meds currently- mostly exercise induced    Claustrophobia     confined spaces with locked doors    Depression     Epigastric pain     Nausea     Nut allergy        FAMILY/SOCIAL HISTORY:  No family history on file.  Social History     Socioeconomic History    Marital status: Single     Spouse name: Not on file    Number of children: 0    Years of education: 13    Highest education level: Not on file   Occupational History    Occupation:      Employer: [employer]   Tobacco Use    Smoking status: Every Day     Types: E-Cigarettes     Passive exposure: Never    Smokeless tobacco: Never   Vaping Use    Vaping Use: Never used   Substance and Sexual Activity    Alcohol use: No    Drug use: Yes     Types: Marijuana (Weed)     Comment: states has a 
BEHAVIORAL HEALTH FOLLOW-UP NOTE     7/30/2024     Patient was seen and examined in person, Chart reviewed   Patient's case discussed with staff/team    Chief Complaint: Impulsive and threatening suicide    Interim History:   Patient is seen this morning in his room.  Per information received from nurses patient was on the phone with his family agitated threatening to jump off a bridge after discharge.  I went to see patient in his room he tells me that he is said that because his family wanted him to go to rehab he states that when he was at rehab he felt suicidal and he stated just thinking about going back to rehab made him feel suicidal again.  He denies auditory or visual hallucinations currently he is denying suicidal ideation and states the only reason he said that is due to his parents wanting him to go to rehab.  He is impulsive easily agitated poor insight and judgment he states he does want to go to the crisis unit that he has been there before and he likes it there.      Appetite: [x] Normal/Unchanged  [] Increased  [] Decreased      Sleep:       [x] Normal/Unchanged  [] Fair       [] Poor              Energy:    [x] Normal/Unchanged  [] Increased  [] Decreased        SI [x] Present  [] Absent    HI  []Present  [x] Absent     Aggression:  [] yes  [x] no    Patient is [x] able  [] unable to CONTRACT FOR SAFETY     PAST MEDICAL/PSYCHIATRIC HISTORY:   Past Medical History:   Diagnosis Date    Anxiety     Asthma     no meds currently- mostly exercise induced    Claustrophobia     confined spaces with locked doors    Depression     Epigastric pain     Nausea     Nut allergy        FAMILY/SOCIAL HISTORY:  No family history on file.  Social History     Socioeconomic History    Marital status: Single     Spouse name: Not on file    Number of children: 0    Years of education: 13    Highest education level: Not on file   Occupational History    Occupation:      Employer: [employer]   Tobacco Use    
BEHAVIORAL HEALTH FOLLOW-UP NOTE     8/1/2024     Patient was seen and examined in person, Chart reviewed   Patient's case discussed with staff/team    Chief Complaint: Irritable easily agitated    interim History:   Patient seen this morning in his room he remains irritable easily agitated refusing to participate in groups or the milieu.  He indicates that he is never going to go to groups.  Collateral from his received from mother indicates t patient does seem to be improving he is not his baseline level of functioning.  Continues to have impulsivity easily agitated poor insight and judgment      Appetite: [x] Normal/Unchanged  [] Increased  [] Decreased      Sleep:       [x] Normal/Unchanged  [] Fair       [] Poor              Energy:    [x] Normal/Unchanged  [] Increased  [] Decreased        SI [] Present  [x] Absent    HI  []Present  [x] Absent     Aggression:  [] yes  [x] no    Patient is [x] able  [] unable to CONTRACT FOR SAFETY     PAST MEDICAL/PSYCHIATRIC HISTORY:   Past Medical History:   Diagnosis Date    Anxiety     Asthma     no meds currently- mostly exercise induced    Claustrophobia     confined spaces with locked doors    Depression     Epigastric pain     Nausea     Nut allergy        FAMILY/SOCIAL HISTORY:  No family history on file.  Social History     Socioeconomic History    Marital status: Single     Spouse name: Not on file    Number of children: 0    Years of education: 13    Highest education level: Not on file   Occupational History    Occupation:      Employer: [employer]   Tobacco Use    Smoking status: Every Day     Types: E-Cigarettes     Passive exposure: Never    Smokeless tobacco: Never   Vaping Use    Vaping Use: Never used   Substance and Sexual Activity    Alcohol use: No    Drug use: Yes     Types: Marijuana (Weed)     Comment: states has a medical card    Sexual activity: Not Currently     Partners: Female   Other Topics Concern    Not on file   Social History 
BEHAVIORAL HEALTH FOLLOW-UP NOTE     8/3/2024     Patient was seen and examined in person, Chart reviewed   Patient's case discussed with staff/team    Chief Complaint: \"I want to go to the crisis unit\"  interim History:   Edward is in his room today he is bright pleasant.  Clearly minimizing circumstances of hospitalization, he is extremely discharge focused.  He is wanting to go to the crisis unit and continues to talk about going to the crisis unit when a bed becomes available.  He denies all, overall conversation is superficial due to his fixation on discharge.    Appetite: [x] Normal/Unchanged  [] Increased  [] Decreased      Sleep:       [x] Normal/Unchanged  [] Fair       [] Poor              Energy:    [x] Normal/Unchanged  [] Increased  [] Decreased        SI [] Present  [x] Absent    HI  []Present  [x] Absent     Aggression:  [] yes  [x] no    Patient is [x] able  [] unable to CONTRACT FOR SAFETY     PAST MEDICAL/PSYCHIATRIC HISTORY:   Past Medical History:   Diagnosis Date    Anxiety     Asthma     no meds currently- mostly exercise induced    Claustrophobia     confined spaces with locked doors    Depression     Epigastric pain     Nausea     Nut allergy        FAMILY/SOCIAL HISTORY:  No family history on file.  Social History     Socioeconomic History    Marital status: Single     Spouse name: Not on file    Number of children: 0    Years of education: 13    Highest education level: Not on file   Occupational History    Occupation:      Employer: [employer]   Tobacco Use    Smoking status: Every Day     Types: E-Cigarettes     Passive exposure: Never    Smokeless tobacco: Never   Vaping Use    Vaping Use: Never used   Substance and Sexual Activity    Alcohol use: No    Drug use: Yes     Types: Marijuana (Weed)     Comment: states has a medical card    Sexual activity: Not Currently     Partners: Female   Other Topics Concern    Not on file   Social History Narrative    ** Merged History 
BEHAVIORAL HEALTH FOLLOW-UP NOTE     8/4/2024     Patient was seen and examined in person, Chart reviewed   Patient's case discussed with staff/team    Chief Complaint: \"I will be able to go home with my mom\"  interim History:   Edward is in his room this morning he is bright pleasant.  He remains fixated on discharge.  He is telling me that if the crisis unit is not going to be an option that his mother said he may go stay with her.  He states that he believes they will have to check with the court to make sure this is okay.  He is interacting appropriately with me he has remained in control of his behaviors.  He is attending select groups, he is taking his medications.  Adamantly denies any suicidal or homicidal ideation intent or plan.  He is future focused and goal directed.  Remains with limited insight judgment and impulse control.    Appetite: [x] Normal/Unchanged  [] Increased  [] Decreased      Sleep:       [x] Normal/Unchanged  [] Fair       [] Poor              Energy:    [x] Normal/Unchanged  [] Increased  [] Decreased        SI [] Present  [x] Absent    HI  []Present  [x] Absent     Aggression:  [] yes  [x] no    Patient is [x] able  [] unable to CONTRACT FOR SAFETY     PAST MEDICAL/PSYCHIATRIC HISTORY:   Past Medical History:   Diagnosis Date    Anxiety     Asthma     no meds currently- mostly exercise induced    Claustrophobia     confined spaces with locked doors    Depression     Epigastric pain     Nausea     Nut allergy        FAMILY/SOCIAL HISTORY:  No family history on file.  Social History     Socioeconomic History    Marital status: Single     Spouse name: Not on file    Number of children: 0    Years of education: 13    Highest education level: Not on file   Occupational History    Occupation:      Employer: [employer]   Tobacco Use    Smoking status: Every Day     Types: E-Cigarettes     Passive exposure: Never    Smokeless tobacco: Never   Vaping Use    Vaping Use: Never used 
CLINICAL PHARMACY NOTE: MEDS TO BEDS    Total # of Prescriptions Filled: 4   The following medications were delivered to the patient:  Divalproex  ,g  Paliperidone ER 3 mg  Paliperidone ER 6 mg  Albuterol HFA    Additional Documentation:     Delivered meds to Cindy LONG on the unit  
Leisure assessment completed. Pt guarded and irritable throughout assessment.  
Patient declined invitation to the following groups:    Community Meeting  Education    Patient will continue to be provided with opportunities to enhance leisure skills/interests and/or coping mechanisms.  
Patient declined invitation to the following groups:    Community Meeting  Education  Recreation Activity    Patient will continue to be provided with opportunities to enhance leisure skills/interests and/or coping mechanisms.  
Patient declined invitation to the following groups:    Community Meeting  Guest Group    Patient will continue to be provided with opportunities to enhance leisure skills/interests and/or coping mechanisms.  
Patient declined invitation to the following groups:    Education    Patient will continue to be provided with opportunities to enhance leisure skills/interests and/or coping mechanisms.  
Patient declined invitation to the following groups:    Education    Patient will continue to be provided with opportunities to enhance leisure skills/interests and/or coping mechanisms.  
Patient declined to attend the following groups:    Community Meeting  Psychoeducation       Will continue to encourage patient to attend programming.     
Patient declined to attend the following groups:    Community Meeting  Psychoeducation       Will continue to encourage patient to attend programming.      
Patient declined verbal invitation to the following groups    Community meeting    Education group    Patient will continue to be provided with opportunities to enhance leisure skills/interests and/or coping mechanisms.   
Patient declined verbal invitation to the following groups    Community meeting    Education- a handout was provided to patient on today's topic- five ways to well being.    Patient will continue to be provided with opportunities to enhance leisure skills/interests and/or coping mechanisms.   
Patient denies suicidal ideation, homicidal ideations and AVH.  Patient denies anxiety and depression.  Patient appears flat, anxious, depressed with blunt responses and poor eye contact.  Presents calm and cooperative during assessment.  Patient is isolative to room and does not appear to be social with peers.  Medications taken without issue.  No complaints or concerns verbalized at this time.  No unit problems reported.  Will continue to observe and support.    
Patient denies suicidal ideation, homicidal ideations and AVH.  Patient denies anxiety and depression.  Patient states \"I'm fine\" when asked how he is feeling.  Patient appears flat, anxious, depressed with blunt responses and fair eye contact.  Patient appears guarded and withdrawn.  Presents calm and cooperative during assessment.  Patient is out on the unit intermittently but keeps to himself and does not appear to be social with peers.  Medications taken without issue.  No complaints or concerns verbalized at this time.  No unit problems reported.  Will continue to observe and support.    
Patient denies suicidal ideation, homicidal ideations and AVH.  Patient endorses anxiety and depression but when asked to rate or what was causing anxiety/depression states \"just normal.\"  Patient is laying in bed on approach; appears flat, anxious with blunt responses and poor eye contact.  Patient appears guarded and withdrawn.  Presents calm and cooperative during assessment.  Patient is isolative to room at this time and does not appear to be social with peers.  Medications taken without issue.  No complaints or concerns verbalized at this time.  No unit problems reported.  Will continue to observe and support.    
Patient denies suicidal ideation, homicidal ideations and AVH.  Pt reports anxiety and depression are 9/10 d/t not being with his family. Pt presents flat, sad, withdrawn, calm and cooperative during assessment.  Patient is isolative to room and self.  Medications taken without issue.  No complaints or concerns verbalized at this time.  No unit problems reported.  Will continue to observe and support.  
Patient is awake, alert, oriented x4. He is laying in bed rocking himself. He states he is upset at his parents for wanting him to go to rehab. He states he will not do this but he states court is wanting him to step down to [facility] after his stay here. He reports he is agreeable to this plan but would like to know where that process is at. He reports anxiety and depression are both 7/10 just related to not knowing where he is at in the process. He minimizes his verbal aggression related to his parents and their lack of wanting him to move back in without drug rehab. He denies SI/HI/AVH. Encouraged patient to attend groups. Will continue to monitor.  
Patient is resting quietly in bed with eyes closed at this time.  No signs of distress or discomfort noted.  No PRN medications given thus far.  Safety needs met.  No unit problems reported.  Purposeful rounding continued.    
Patient resting with eyes closed. Respirations even and unlabored. No signs of distress. Purposeful rounding continued.  
Patient seen and evaluated by myself and Dr. Gray.  Patient does have some underlying irritability makes threatening statements upset that he is probate does not believe that he should be in the hospital.  We did start Invega 3 mg twice daily and Depakote 500 mg twice daily nursing staff Magy has verified with [facility] the patient's Invega Sustenna injection was last given on 6/26/2024 and is due today on 7/26/2024.  Discussed with Dr. Hernandez.  Patient's Invega Sustenna injection for today has been ordered patient may be admitted to [facility] [address] after he received his long-acting Invega Sustenna injection.  
Smokeless tobacco: Never   Vaping Use    Vaping Use: Never used   Substance and Sexual Activity    Alcohol use: No    Drug use: Yes     Types: Marijuana (Weed)     Comment: states has a medical card    Sexual activity: Not Currently     Partners: Female   Other Topics Concern    Not on file   Social History Narrative    ** Merged History Encounter **          Social Determinants of Health     Financial Resource Strain: Low Risk  (2/20/2024)    Overall Financial Resource Strain (CARDIA)     Difficulty of Paying Living Expenses: Not hard at all   Food Insecurity: No Food Insecurity (7/24/2024)    Hunger Vital Sign     Worried About Running Out of Food in the Last Year: Never true     Ran Out of Food in the Last Year: Never true   Transportation Needs: No Transportation Needs (7/24/2024)    PRAPARE - Transportation     Lack of Transportation (Medical): No     Lack of Transportation (Non-Medical): No   Physical Activity: Not on file   Stress: Not on file   Social Connections: Not on file   Intimate Partner Violence: Not on file   Housing Stability: Low Risk  (7/24/2024)    Housing Stability Vital Sign     Unable to Pay for Housing in the Last Year: No     Number of Places Lived in the Last Year: 2     Unstable Housing in the Last Year: No           ROS:  [x] All negative/unchanged except if checked. Explain positive(checked items) below:  [] Constitutional  [] Eyes  [] Ear/Nose/Mouth/Throat  [] Respiratory  [] CV  [] GI  []   [] Musculoskeletal  [] Skin/Breast  [] Neurological  [] Endocrine  [] Heme/Lymph  [] Allergic/Immunologic    Explanation:     MEDICATIONS:    Current Facility-Administered Medications:     paliperidone (INVEGA) extended release tablet 3 mg, 3 mg, Oral, QHS, Hedy Sandy APRN - CNP, 3 mg at 08/01/24 2108    paliperidone (INVEGA) extended release tablet 6 mg, 6 mg, Oral, Daily, Hedy Sandy APRN - CNP, 6 mg at 08/02/24 0925    nicotine polacrilex (COMMIT) lozenge 2 mg, 2 mg, Oral, Q2H PRN, 
patient doesn't refuse):            ( ) Recognizing danger situations (included triggers and roadblocks)                    ( ) Coping skills (new ways to manage stress,relaxation techniques, changing routine, distraction)                                                           ( ) Basic information about quitting (benefits of quitting, techniques in how to quit, available resources  ( ) [facility]                                                                                                                   ( ) Patient refused counseling  ( ) Patient has not smoked in the last 30 days    Metabolic Screening:    Lab Results   Component Value Date    LABA1C 5.3 01/15/2024       Lab Results   Component Value Date    CHOL 145 01/15/2024    CHOL 104 06/10/2021    CHOL 173 12/11/2019    CHOL 163 04/18/2019     Lab Results   Component Value Date    TRIG 77 01/15/2024    TRIG 45 06/10/2021    TRIG 134 12/11/2019    TRIG 98 04/18/2019     Lab Results   Component Value Date    HDL 33 (L) 01/15/2024    HDL 32 06/10/2021    HDL 42 12/11/2019    HDL 54 04/18/2019     No components found for: \"LDLCAL\"  No components found for: \"LABVLDL\"      There is no height or weight on file to calculate BMI.    BP Readings from Last 2 Encounters:   07/24/24 106/70   05/23/24 105/73       Recliner Assessment:  Patient is able to demonstrate the ability to move from a reclining position to an upright position within the recliner.    Pt admitted with followings belongings:     The following personal items were collected during admission. Items secured in locker/safe. Items will be returned to patient at discharge.     Jossy Khan RN       
poor  Judgement poor  Impulse control poor  ASSESSMENT: Patient symptoms are:  [] Well controlled  [x] Improving  [] Worsening  [] No change      Diagnosis:  Principal Problem:    Schizoaffective disorder, bipolar type (HCC)  Resolved Problems:    * No resolved hospital problems. *      LABS:    No results for input(s): \"WBC\", \"HGB\", \"PLT\" in the last 72 hours.    No results for input(s): \"NA\", \"K\", \"CL\", \"CO2\", \"BUN\", \"CREATININE\", \"GLUCOSE\" in the last 72 hours.    No results for input(s): \"BILITOT\", \"ALKPHOS\", \"AST\", \"ALT\" in the last 72 hours.    Lab Results   Component Value Date/Time    BARBSCNU NEGATIVE 07/23/2024 06:36 PM    LABBENZ NEGATIVE 07/23/2024 06:36 PM    LABMETH NEGATIVE 07/23/2024 06:36 PM    ETOH <10 07/23/2024 06:36 PM     Lab Results   Component Value Date/Time    TSH 1.240 04/18/2019 10:16 AM     No results found for: \"LITHIUM\"  Lab Results   Component Value Date    VALPROATE 97 07/27/2024           Treatment Plan:  Reviewed current Medications with the patient.   Risks, benefits, side effects, drug-to-drug interactions and alternatives to treatment were discussed.  Collateral information:   CD evaluation  Encourage patient to attend group and other milieu activities.  Discharge planning discussed with the patient and treatment team.    Continue Depakote 750 mg twice daily  Continue Invega 3 mg twice daily  Patient received Invega Sustenna 234 mg IM every 30 days on 7/24/2024    PSYCHOTHERAPY/COUNSELING:  [x] Therapeutic interview  [x] Supportive  [] CBT  [] Ongoing  [] Other    [x] Patient continues to need, on a daily basis, active treatment furnished directly by or requiring the supervision of inpatient psychiatric personnel      Anticipated Length of stay: 3 to 7 days based on stability            Electronically signed by KELVIN Payne CNP on 7/31/2024 at 12:49 PM

## 2024-08-12 DIAGNOSIS — F25.0 SCHIZOAFFECTIVE DISORDER, BIPOLAR TYPE (HCC): ICD-10-CM

## 2024-08-12 LAB
VALPROIC ACID LEVEL: 89 UG/ML (ref 50–100)
VALPROIC DATE LAST DOSE: NORMAL
VALPROIC DOSE AMOUNT: NORMAL
VALPROIC TIME LAST DOSE: NORMAL

## 2024-09-18 ENCOUNTER — HOSPITAL ENCOUNTER (EMERGENCY)
Age: 29
Discharge: PSYCHIATRIC OR SUBSTANCE USE FACILITY | End: 2024-09-20
Attending: EMERGENCY MEDICINE
Payer: COMMERCIAL

## 2024-09-18 DIAGNOSIS — F23 ACUTE PSYCHOSIS (HCC): Primary | ICD-10-CM

## 2024-09-18 LAB
ALBUMIN SERPL-MCNC: 4.6 G/DL (ref 3.5–5.2)
ALP SERPL-CCNC: 84 U/L (ref 40–129)
ALT SERPL-CCNC: 15 U/L (ref 0–40)
ANION GAP SERPL CALCULATED.3IONS-SCNC: 11 MMOL/L (ref 7–16)
APAP SERPL-MCNC: <5 UG/ML (ref 10–30)
AST SERPL-CCNC: 15 U/L (ref 0–39)
BASOPHILS # BLD: 0.05 K/UL (ref 0–0.2)
BASOPHILS NFR BLD: 1 % (ref 0–2)
BILIRUB SERPL-MCNC: 0.4 MG/DL (ref 0–1.2)
BUN SERPL-MCNC: 14 MG/DL (ref 6–20)
CALCIUM SERPL-MCNC: 9.5 MG/DL (ref 8.6–10.2)
CHLORIDE SERPL-SCNC: 102 MMOL/L (ref 98–107)
CO2 SERPL-SCNC: 25 MMOL/L (ref 22–29)
CREAT SERPL-MCNC: 1 MG/DL (ref 0.7–1.2)
DATE LAST DOSE: ABNORMAL
EOSINOPHIL # BLD: 0.25 K/UL (ref 0.05–0.5)
EOSINOPHILS RELATIVE PERCENT: 3 % (ref 0–6)
ERYTHROCYTE [DISTWIDTH] IN BLOOD BY AUTOMATED COUNT: 12.7 % (ref 11.5–15)
ETHANOLAMINE SERPL-MCNC: <10 MG/DL (ref 0–0.08)
GFR, ESTIMATED: >90 ML/MIN/1.73M2
GLUCOSE SERPL-MCNC: 106 MG/DL (ref 74–99)
HCT VFR BLD AUTO: 40.8 % (ref 37–54)
HGB BLD-MCNC: 13.5 G/DL (ref 12.5–16.5)
IMM GRANULOCYTES # BLD AUTO: <0.03 K/UL (ref 0–0.58)
IMM GRANULOCYTES NFR BLD: 0 % (ref 0–5)
LYMPHOCYTES NFR BLD: 2.47 K/UL (ref 1.5–4)
LYMPHOCYTES RELATIVE PERCENT: 29 % (ref 20–42)
MCH RBC QN AUTO: 30.4 PG (ref 26–35)
MCHC RBC AUTO-ENTMCNC: 33.1 G/DL (ref 32–34.5)
MCV RBC AUTO: 91.9 FL (ref 80–99.9)
MONOCYTES NFR BLD: 1.09 K/UL (ref 0.1–0.95)
MONOCYTES NFR BLD: 13 % (ref 2–12)
NEUTROPHILS NFR BLD: 54 % (ref 43–80)
NEUTS SEG NFR BLD: 4.61 K/UL (ref 1.8–7.3)
PLATELET # BLD AUTO: 371 K/UL (ref 130–450)
PMV BLD AUTO: 9.7 FL (ref 7–12)
POTASSIUM SERPL-SCNC: 3.9 MMOL/L (ref 3.5–5)
PROT SERPL-MCNC: 7.2 G/DL (ref 6.4–8.3)
RBC # BLD AUTO: 4.44 M/UL (ref 3.8–5.8)
SALICYLATES SERPL-MCNC: <0.3 MG/DL (ref 0–30)
SODIUM SERPL-SCNC: 138 MMOL/L (ref 132–146)
TME LAST DOSE: ABNORMAL H
TOXIC TRICYCLIC SC,BLOOD: NEGATIVE
VALPROATE SERPL-MCNC: 11 UG/ML (ref 50–100)
VANCOMYCIN DOSE: ABNORMAL MG
WBC OTHER # BLD: 8.5 K/UL (ref 4.5–11.5)

## 2024-09-18 PROCEDURE — 81001 URINALYSIS AUTO W/SCOPE: CPT

## 2024-09-18 PROCEDURE — 80143 DRUG ASSAY ACETAMINOPHEN: CPT

## 2024-09-18 PROCEDURE — G0480 DRUG TEST DEF 1-7 CLASSES: HCPCS

## 2024-09-18 PROCEDURE — 80053 COMPREHEN METABOLIC PANEL: CPT

## 2024-09-18 PROCEDURE — 6360000002 HC RX W HCPCS: Performed by: EMERGENCY MEDICINE

## 2024-09-18 PROCEDURE — 96372 THER/PROPH/DIAG INJ SC/IM: CPT

## 2024-09-18 PROCEDURE — 99285 EMERGENCY DEPT VISIT HI MDM: CPT

## 2024-09-18 PROCEDURE — 80307 DRUG TEST PRSMV CHEM ANLYZR: CPT

## 2024-09-18 PROCEDURE — 80177 DRUG SCRN QUAN LEVETIRACETAM: CPT

## 2024-09-18 PROCEDURE — 80164 ASSAY DIPROPYLACETIC ACD TOT: CPT

## 2024-09-18 PROCEDURE — 93005 ELECTROCARDIOGRAM TRACING: CPT | Performed by: EMERGENCY MEDICINE

## 2024-09-18 PROCEDURE — 80179 DRUG ASSAY SALICYLATE: CPT

## 2024-09-18 PROCEDURE — 85025 COMPLETE CBC W/AUTO DIFF WBC: CPT

## 2024-09-18 RX ORDER — DROPERIDOL 2.5 MG/ML
5 INJECTION, SOLUTION INTRAMUSCULAR; INTRAVENOUS ONCE
Status: COMPLETED | OUTPATIENT
Start: 2024-09-18 | End: 2024-09-18

## 2024-09-18 RX ORDER — MIDAZOLAM HYDROCHLORIDE 5 MG/ML
5 INJECTION, SOLUTION INTRAMUSCULAR; INTRAVENOUS ONCE
Status: COMPLETED | OUTPATIENT
Start: 2024-09-18 | End: 2024-09-18

## 2024-09-18 RX ORDER — MIDAZOLAM HYDROCHLORIDE 1 MG/ML
INJECTION INTRAMUSCULAR; INTRAVENOUS
Status: DISCONTINUED
Start: 2024-09-18 | End: 2024-09-18 | Stop reason: WASHOUT

## 2024-09-18 RX ADMIN — MIDAZOLAM 5 MG: 5 INJECTION INTRAMUSCULAR; INTRAVENOUS at 16:17

## 2024-09-18 RX ADMIN — DROPERIDOL 5 MG: 2.5 INJECTION, SOLUTION INTRAMUSCULAR; INTRAVENOUS at 16:18

## 2024-09-18 ASSESSMENT — PAIN - FUNCTIONAL ASSESSMENT: PAIN_FUNCTIONAL_ASSESSMENT: NONE - DENIES PAIN

## 2024-09-18 ASSESSMENT — LIFESTYLE VARIABLES
HOW OFTEN DO YOU HAVE A DRINK CONTAINING ALCOHOL: PATIENT DECLINED
HOW MANY STANDARD DRINKS CONTAINING ALCOHOL DO YOU HAVE ON A TYPICAL DAY: PATIENT UNABLE TO ANSWER

## 2024-09-19 VITALS
OXYGEN SATURATION: 97 % | DIASTOLIC BLOOD PRESSURE: 65 MMHG | HEART RATE: 99 BPM | SYSTOLIC BLOOD PRESSURE: 102 MMHG | WEIGHT: 185 LBS | RESPIRATION RATE: 18 BRPM | BODY MASS INDEX: 24.41 KG/M2 | TEMPERATURE: 98 F

## 2024-09-19 LAB
AMORPH SED URNS QL MICRO: PRESENT
AMPHET UR QL SCN: POSITIVE
BARBITURATES UR QL SCN: NEGATIVE
BENZODIAZ UR QL: POSITIVE
BILIRUB UR QL STRIP: NEGATIVE
BUPRENORPHINE UR QL: NEGATIVE
CANNABINOIDS UR QL SCN: POSITIVE
CLARITY UR: ABNORMAL
COCAINE UR QL SCN: NEGATIVE
COLOR UR: YELLOW
EKG ATRIAL RATE: 99 BPM
EKG P AXIS: 68 DEGREES
EKG P-R INTERVAL: 146 MS
EKG Q-T INTERVAL: 348 MS
EKG QRS DURATION: 80 MS
EKG QTC CALCULATION (BAZETT): 446 MS
EKG R AXIS: 113 DEGREES
EKG T AXIS: 58 DEGREES
EKG VENTRICULAR RATE: 99 BPM
FENTANYL UR QL: NEGATIVE
GLUCOSE UR STRIP-MCNC: NEGATIVE MG/DL
HGB UR QL STRIP.AUTO: NEGATIVE
KETONES UR STRIP-MCNC: 15 MG/DL
LEUKOCYTE ESTERASE UR QL STRIP: NEGATIVE
METHADONE UR QL: NEGATIVE
NITRITE UR QL STRIP: NEGATIVE
OPIATES UR QL SCN: NEGATIVE
OXYCODONE UR QL SCN: NEGATIVE
PCP UR QL SCN: NEGATIVE
PH UR STRIP: 7.5 [PH] (ref 5–9)
PROT UR STRIP-MCNC: NEGATIVE MG/DL
RBC #/AREA URNS HPF: NORMAL /HPF
SP GR UR STRIP: 1.02 (ref 1–1.03)
TEST INFORMATION: ABNORMAL
UROBILINOGEN UR STRIP-ACNC: 0.2 EU/DL (ref 0–1)
WBC #/AREA URNS HPF: NORMAL /HPF

## 2024-09-19 PROCEDURE — 93010 ELECTROCARDIOGRAM REPORT: CPT | Performed by: INTERNAL MEDICINE

## 2024-09-20 LAB — LEVETIRACETAM SERPL-MCNC: <2 UG/ML

## 2025-01-24 ENCOUNTER — HOSPITAL ENCOUNTER (INPATIENT)
Age: 30
LOS: 4 days | Discharge: HOME | DRG: 885 | End: 2025-01-29
Attending: EMERGENCY MEDICINE | Admitting: PSYCHIATRY & NEUROLOGY
Payer: COMMERCIAL

## 2025-01-24 DIAGNOSIS — F25.0 SCHIZOAFFECTIVE DISORDER, BIPOLAR TYPE (HCC): Primary | ICD-10-CM

## 2025-01-24 DIAGNOSIS — R45.850 HOMICIDAL IDEATION: ICD-10-CM

## 2025-01-24 DIAGNOSIS — Z71.1 MENTAL HEALTH-RELATED COMPLAINT: ICD-10-CM

## 2025-01-24 LAB
ALBUMIN SERPL-MCNC: 4.5 G/DL (ref 3.5–5.2)
ALP SERPL-CCNC: 84 U/L (ref 40–129)
ALT SERPL-CCNC: 21 U/L (ref 0–40)
AMPHET UR QL SCN: NEGATIVE
ANION GAP SERPL CALCULATED.3IONS-SCNC: 15 MMOL/L (ref 7–16)
APAP SERPL-MCNC: <5 UG/ML (ref 10–30)
AST SERPL-CCNC: 20 U/L (ref 0–39)
BARBITURATES UR QL SCN: NEGATIVE
BASOPHILS # BLD: 0.06 K/UL (ref 0–0.2)
BASOPHILS NFR BLD: 1 % (ref 0–2)
BENZODIAZ UR QL: NEGATIVE
BILIRUB SERPL-MCNC: 0.5 MG/DL (ref 0–1.2)
BILIRUB UR QL STRIP: NEGATIVE
BUN SERPL-MCNC: 15 MG/DL (ref 6–20)
BUPRENORPHINE UR QL: NEGATIVE
CALCIUM SERPL-MCNC: 9.3 MG/DL (ref 8.6–10.2)
CANNABINOIDS UR QL SCN: POSITIVE
CHLORIDE SERPL-SCNC: 104 MMOL/L (ref 98–107)
CLARITY UR: CLEAR
CO2 SERPL-SCNC: 20 MMOL/L (ref 22–29)
COCAINE UR QL SCN: NEGATIVE
COLOR UR: YELLOW
COMMENT: ABNORMAL
CREAT SERPL-MCNC: 1.1 MG/DL (ref 0.7–1.2)
EKG ATRIAL RATE: 94 BPM
EKG P AXIS: 54 DEGREES
EKG P-R INTERVAL: 192 MS
EKG Q-T INTERVAL: 348 MS
EKG QRS DURATION: 84 MS
EKG QTC CALCULATION (BAZETT): 435 MS
EKG R AXIS: 117 DEGREES
EKG T AXIS: 24 DEGREES
EKG VENTRICULAR RATE: 94 BPM
EOSINOPHIL # BLD: 0.67 K/UL (ref 0.05–0.5)
EOSINOPHILS RELATIVE PERCENT: 6 % (ref 0–6)
ERYTHROCYTE [DISTWIDTH] IN BLOOD BY AUTOMATED COUNT: 12.3 % (ref 11.5–15)
ETHANOLAMINE SERPL-MCNC: <10 MG/DL (ref 0–0.08)
FENTANYL UR QL: NEGATIVE
GFR, ESTIMATED: >90 ML/MIN/1.73M2
GLUCOSE SERPL-MCNC: 152 MG/DL (ref 74–99)
GLUCOSE UR STRIP-MCNC: NEGATIVE MG/DL
HCT VFR BLD AUTO: 48.3 % (ref 37–54)
HGB BLD-MCNC: 16.7 G/DL (ref 12.5–16.5)
HGB UR QL STRIP.AUTO: NEGATIVE
IMM GRANULOCYTES # BLD AUTO: 0.04 K/UL (ref 0–0.58)
IMM GRANULOCYTES NFR BLD: 0 % (ref 0–5)
KETONES UR STRIP-MCNC: NEGATIVE MG/DL
LEUKOCYTE ESTERASE UR QL STRIP: NEGATIVE
LYMPHOCYTES NFR BLD: 3.56 K/UL (ref 1.5–4)
LYMPHOCYTES RELATIVE PERCENT: 34 % (ref 20–42)
MCH RBC QN AUTO: 30.8 PG (ref 26–35)
MCHC RBC AUTO-ENTMCNC: 34.6 G/DL (ref 32–34.5)
MCV RBC AUTO: 89 FL (ref 80–99.9)
METHADONE UR QL: NEGATIVE
MONOCYTES NFR BLD: 0.87 K/UL (ref 0.1–0.95)
MONOCYTES NFR BLD: 8 % (ref 2–12)
NEUTROPHILS NFR BLD: 50 % (ref 43–80)
NEUTS SEG NFR BLD: 5.24 K/UL (ref 1.8–7.3)
NITRITE UR QL STRIP: NEGATIVE
OPIATES UR QL SCN: NEGATIVE
OXYCODONE UR QL SCN: NEGATIVE
PCP UR QL SCN: NEGATIVE
PH UR STRIP: 6 [PH] (ref 5–9)
PLATELET # BLD AUTO: 332 K/UL (ref 130–450)
PMV BLD AUTO: 9.8 FL (ref 7–12)
POTASSIUM SERPL-SCNC: 3.8 MMOL/L (ref 3.5–5)
PROT SERPL-MCNC: 7.9 G/DL (ref 6.4–8.3)
PROT UR STRIP-MCNC: NEGATIVE MG/DL
RBC # BLD AUTO: 5.43 M/UL (ref 3.8–5.8)
SALICYLATES SERPL-MCNC: <0.3 MG/DL (ref 0–30)
SODIUM SERPL-SCNC: 139 MMOL/L (ref 132–146)
SP GR UR STRIP: >1.03 (ref 1–1.03)
TEST INFORMATION: ABNORMAL
TOXIC TRICYCLIC SC,BLOOD: NEGATIVE
UROBILINOGEN UR STRIP-ACNC: 0.2 EU/DL (ref 0–1)
WBC OTHER # BLD: 10.4 K/UL (ref 4.5–11.5)

## 2025-01-24 PROCEDURE — 93010 ELECTROCARDIOGRAM REPORT: CPT | Performed by: INTERNAL MEDICINE

## 2025-01-24 PROCEDURE — 80307 DRUG TEST PRSMV CHEM ANLYZR: CPT

## 2025-01-24 PROCEDURE — G0480 DRUG TEST DEF 1-7 CLASSES: HCPCS

## 2025-01-24 PROCEDURE — 81003 URINALYSIS AUTO W/O SCOPE: CPT

## 2025-01-24 PROCEDURE — 90791 PSYCH DIAGNOSTIC EVALUATION: CPT

## 2025-01-24 PROCEDURE — 99285 EMERGENCY DEPT VISIT HI MDM: CPT

## 2025-01-24 PROCEDURE — 80053 COMPREHEN METABOLIC PANEL: CPT

## 2025-01-24 PROCEDURE — 80143 DRUG ASSAY ACETAMINOPHEN: CPT

## 2025-01-24 PROCEDURE — 80179 DRUG ASSAY SALICYLATE: CPT

## 2025-01-24 PROCEDURE — 93005 ELECTROCARDIOGRAM TRACING: CPT | Performed by: EMERGENCY MEDICINE

## 2025-01-24 PROCEDURE — 85025 COMPLETE CBC W/AUTO DIFF WBC: CPT

## 2025-01-24 NOTE — ED PROVIDER NOTES
[facility] EMERGENCY DEPARTMENT  EMERGENCY DEPARTMENT ENCOUNTER        Pt Name: Gene Rogers  MRN: [id number]  Birthdate 1995  Date of evaluation: 1/24/2025  Provider: Sky Billings MD  PCP: Vince Roberto MD  Note Started: 2:00 PM EST 1/24/25    CHIEF COMPLAINT & HISTORY     Chief Complaint   Patient presents with    Mental Health Problem     Patient probated and per paper threatened to kill Joann and Ramon Rogers and was demanding money.  Patient currently denies SI/HI or A/V Hallucinations but does admit to not taking Depakote.         History From: pt  Gene Rogers is a 29 y.o. male w/pmhx of depression, schizoaffective disorder presenting by police for medical clearance.  He was court ordered due to threatening to kill his parents.  He says he is very depressed and unable to work.  He says he is just too tired to work.  He wants to get on disability or otherwise get paid to do nothing according to notes from his family.  He says that in the past he has been on numerous some psych medications but the only one that ever seem to work with Vyvanse with marijuana.  He stopped taking it a few years ago as he said they would not let him be on it anymore because they were \"dicks\".  He used to follow with a psychiatrist but he has not in some time, he declines to say when he was last seen a psychiatrist.  He says he feels no purpose in life and he feels no purpose to work.  He denies any suicidal ideation, homicidal ideation, says he does not hear voices or see things that no one else does.  Police note from his family says that he has been very manic lately and been very verbally aggressive and threatened to kill them if they did not pay him as if he was a full-time worker so he could invest in crypto and stocks.      Unless otherwise noted in HPI above, patient denies fever, chills, headache, shortness of breath, chest pain, abdominal pain, nausea, vomiting, diarrhea, lightheadedness,

## 2025-01-24 NOTE — ED NOTES
Navigator discussed patient case with ROLANDO Knott/Dr. Hernandez. They will complete consult in the AM 1/25/25 to determine if they will recommend transfer/deferment to [facility].    Navigator notified Section G SW, to notify RN to place consult for psychiatry. Patient may still proceed with telepsych assessment, as well.     Electronically signed by BRIGITTE Gerard, ANNA on 1/24/2025 at 3:19 PM

## 2025-01-24 NOTE — ED NOTES
Navigator received notice from [facility] [facility]  Dave. Patient is being sent in via probate. Navigator placed phone call to [facility] Mental Health & Recovery Board  Makenzie. Per Makenzie, patient Assisted Outpatient Treatment expires on 1/25/25, the board did not file to continue the commitment due to patient non-compliance with his outpatient court order/resistance to treatment. Patient will be recognized as a new probate under no current AOT. Patient case will be discussed with the treatment team regarding if patient would benefit from deferment to [facility].    Electronically signed by BRIGITTE Gerard, ANNA on 1/24/2025 at 3:17 PM

## 2025-01-24 NOTE — ED TRIAGE NOTES
Patient probated for threats of killing his parents.  He currently denies SI/HI or A/V hallucinations but does admit to not taking his Depakote.

## 2025-01-24 NOTE — VIRTUAL HEALTH
psychiatric admission at appropriate care level facility, once medically cleared and stable  Safety plan created and reviewed with patient, see below for details  Re-consult for any new changes or concerns. Thank you for this consult.  Discussed recommendations with Dr. Brad Summers at time of consult completion.    TelePsych recommendations:Inpatient psychiatric admission    Safety Plan:  see below    Electronically signed by Gardenia Louie LCSW on 1/24/2025 at 4:03 PM.  Gene Rogers, was evaluated through a synchronous (real-time) audio-video encounter. The patient (and/or guardian if applicable) is aware that this is a billable service, which includes applicable co-pays. This virtual visit was conducted with patient's (and/or legal guardian's) consent. Patient identification was verified, and a caregiver was present when appropriate.  The patient was located at Facility (Appt Department): [facility] EMERGENCY DEPARTMENT  [address] [id number]  Loc: [phone]  The provider was located at Facility (Login Dept): [facility] TELEPSYCHIATRY PROGRAM  [address]  C/O VIRTUAL HEALTH TELEPSYCH  [facility] [id number] [phone]  Confirm you are appropriately licensed, registered, or certified to deliver care in the state where the patient is located as indicated above. If you are not or unsure, please re-schedule the visit: Yes, I confirm.   [place] Consult to Tele-Psych  Consult performed by: Gardenia Louie LCSW  Consult ordered by: Brad Summers MD  Reason for consult: Homicidal ideation       Total time spent on this encounter:  55 minutes    --Gardenia Louie LCSW on 1/24/2025 at 3:55 PM    An electronic signature was used to authenticate this note.

## 2025-01-25 PROCEDURE — 1240000000 HC EMOTIONAL WELLNESS R&B

## 2025-01-25 PROCEDURE — 6370000000 HC RX 637 (ALT 250 FOR IP): Performed by: PSYCHIATRY & NEUROLOGY

## 2025-01-25 RX ORDER — MAGNESIUM HYDROXIDE/ALUMINUM HYDROXICE/SIMETHICONE 120; 1200; 1200 MG/30ML; MG/30ML; MG/30ML
30 SUSPENSION ORAL PRN
Status: DISCONTINUED | OUTPATIENT
Start: 2025-01-25 | End: 2025-01-29 | Stop reason: HOSPADM

## 2025-01-25 RX ORDER — HALOPERIDOL 5 MG/1
5 TABLET ORAL EVERY 6 HOURS PRN
Status: DISCONTINUED | OUTPATIENT
Start: 2025-01-25 | End: 2025-01-29 | Stop reason: HOSPADM

## 2025-01-25 RX ORDER — ACETAMINOPHEN 325 MG/1
650 TABLET ORAL EVERY 6 HOURS PRN
Status: DISCONTINUED | OUTPATIENT
Start: 2025-01-25 | End: 2025-01-29 | Stop reason: HOSPADM

## 2025-01-25 RX ORDER — DIVALPROEX SODIUM 500 MG/1
500 TABLET, DELAYED RELEASE ORAL 2 TIMES DAILY
Status: DISCONTINUED | OUTPATIENT
Start: 2025-01-25 | End: 2025-01-29 | Stop reason: HOSPADM

## 2025-01-25 RX ORDER — HALOPERIDOL 5 MG/ML
5 INJECTION INTRAMUSCULAR EVERY 6 HOURS PRN
Status: DISCONTINUED | OUTPATIENT
Start: 2025-01-25 | End: 2025-01-29 | Stop reason: HOSPADM

## 2025-01-25 RX ORDER — NICOTINE 21 MG/24HR
1 PATCH, TRANSDERMAL 24 HOURS TRANSDERMAL DAILY
Status: DISCONTINUED | OUTPATIENT
Start: 2025-01-25 | End: 2025-01-25

## 2025-01-25 RX ORDER — HYDROXYZINE HYDROCHLORIDE 50 MG/1
50 TABLET, FILM COATED ORAL 3 TIMES DAILY PRN
Status: DISCONTINUED | OUTPATIENT
Start: 2025-01-25 | End: 2025-01-29 | Stop reason: HOSPADM

## 2025-01-25 RX ORDER — ARIPIPRAZOLE 5 MG/1
5 TABLET ORAL DAILY
Status: DISCONTINUED | OUTPATIENT
Start: 2025-01-25 | End: 2025-01-26

## 2025-01-25 RX ORDER — POLYETHYLENE GLYCOL 3350 17 G
2 POWDER IN PACKET (EA) ORAL
Status: DISCONTINUED | OUTPATIENT
Start: 2025-01-25 | End: 2025-01-29 | Stop reason: HOSPADM

## 2025-01-25 RX ADMIN — ARIPIPRAZOLE 5 MG: 5 TABLET ORAL at 14:11

## 2025-01-25 RX ADMIN — DIVALPROEX SODIUM 500 MG: 500 TABLET, DELAYED RELEASE ORAL at 14:11

## 2025-01-25 RX ADMIN — DIVALPROEX SODIUM 500 MG: 500 TABLET, DELAYED RELEASE ORAL at 21:28

## 2025-01-25 ASSESSMENT — PATIENT HEALTH QUESTIONNAIRE - PHQ9
5. POOR APPETITE OR OVEREATING: MORE THAN HALF THE DAYS
SUM OF ALL RESPONSES TO PHQ QUESTIONS 1-9: 18
3. TROUBLE FALLING OR STAYING ASLEEP: MORE THAN HALF THE DAYS
8. MOVING OR SPEAKING SO SLOWLY THAT OTHER PEOPLE COULD HAVE NOTICED. OR THE OPPOSITE, BEING SO FIGETY OR RESTLESS THAT YOU HAVE BEEN MOVING AROUND A LOT MORE THAN USUAL: SEVERAL DAYS
7. TROUBLE CONCENTRATING ON THINGS, SUCH AS READING THE NEWSPAPER OR WATCHING TELEVISION: MORE THAN HALF THE DAYS
1. LITTLE INTEREST OR PLEASURE IN DOING THINGS: MORE THAN HALF THE DAYS
9. THOUGHTS THAT YOU WOULD BE BETTER OFF DEAD, OR OF HURTING YOURSELF: MORE THAN HALF THE DAYS
SUM OF ALL RESPONSES TO PHQ QUESTIONS 1-9: 18
SUM OF ALL RESPONSES TO PHQ9 QUESTIONS 1 & 2: 4
2. FEELING DOWN, DEPRESSED OR HOPELESS: MORE THAN HALF THE DAYS
6. FEELING BAD ABOUT YOURSELF - OR THAT YOU ARE A FAILURE OR HAVE LET YOURSELF OR YOUR FAMILY DOWN: NEARLY EVERY DAY
SUM OF ALL RESPONSES TO PHQ QUESTIONS 1-9: 16
SUM OF ALL RESPONSES TO PHQ QUESTIONS 1-9: 18
4. FEELING TIRED OR HAVING LITTLE ENERGY: MORE THAN HALF THE DAYS
10. IF YOU CHECKED OFF ANY PROBLEMS, HOW DIFFICULT HAVE THESE PROBLEMS MADE IT FOR YOU TO DO YOUR WORK, TAKE CARE OF THINGS AT HOME, OR GET ALONG WITH OTHER PEOPLE: EXTREMELY DIFFICULT

## 2025-01-25 ASSESSMENT — SLEEP AND FATIGUE QUESTIONNAIRES
DO YOU HAVE DIFFICULTY SLEEPING: NO
DO YOU USE A SLEEP AID: NO
AVERAGE NUMBER OF SLEEP HOURS: 8

## 2025-01-25 ASSESSMENT — PAIN SCALES - WONG BAKER: WONGBAKER_NUMERICALRESPONSE: NO HURT

## 2025-01-25 ASSESSMENT — LIFESTYLE VARIABLES
HOW MANY STANDARD DRINKS CONTAINING ALCOHOL DO YOU HAVE ON A TYPICAL DAY: 3 OR 4
HOW OFTEN DO YOU HAVE A DRINK CONTAINING ALCOHOL: NEVER

## 2025-01-25 NOTE — CONSULTS
Patient seen along with Dr. Hernandez.  Patient states that he heard that he is going to be referred to the hospital but he does not want to go to the hospital he states he wants to stay here with myself and Dr. Banks because he feels \"comfortable with us.\"  He understands that he will be placed on an outpatient commitment he is also agreeable to medications and states that he did well on Abilify before and also wants to take Depakote.  He also agrees with the long-acting injection.  He states his family is mad at him he is not able to return there he states he wants me to go the crisis unit on discharge.  States he does not need rehab at this time because he is not using drugs.  Patient was started on Abilify and Depakote and is accepted to [facility] for inpatient psychiatric hospitalization.

## 2025-01-25 NOTE — BH NOTE
[facility]  Admission Note       Patient is a 28 [facility] arrived to the unit at via wheelchair transfer at 1130.  He has been non-compliant with his AOT, his follow up appointments, and medications.  He has not taken any medications for 2 months per his probate paperwork.  Per probate paperwork, patient threatened to kill his parents because they have not paid him for working, he demanded that he needed money to pay for crypto and stocks.  Upon assessment patient is A + O x 4.  Denies pain.  Denies A/V/H, SI, HI.  Affect is flat.  Appears disheveled.  Skin clean, dry, intact.  Patient showered shortly after completing his admission paperwork.  Endorses symptoms of depression including increased sleep, appetite change, decreased interest in doing activities, hopeless, helpless, worthless and sad.  Patient contracts for safety with this nurse.  Unit rules and expectations reviewed.  Oriented to room and unit.  Offered food and fluids.  Will continue to monitor q 15 minutes for safety and perform environmental checks and purposeful rounding.    Admission Type:   Admission Type: Involuntary    Reason for admission:  Reason for Admission: \"MY PARENTS KICKED ME OUT, I REALLY JUST NEED TO GET BACK ON MY MEDS\"      Addictive Behavior:   Addictive Behavior  In the Past 3 Months, Have You Felt or Has Someone Told You That You Have a Problem With  : None    Medical Problems:   Past Medical History:   Diagnosis Date    Anxiety     Asthma     no meds currently- mostly exercise induced    Claustrophobia     confined spaces with locked doors    Depression     Epigastric pain     Nausea     Nut allergy        Status EXAM:  Mental Status and Behavioral Exam  Normal: No  Level of Assistance: Independent/Self  Facial Expression: Flat  Affect: Constricted, Blunt  Level of Consciousness: Alert  Frequency of Checks: 4 times per hour, close  Mood:Normal: No  Mood: Depressed, Anxious, Labile,

## 2025-01-25 NOTE — ED NOTES
Patient awake. Refused AM vital signs. Irritable, uncooperative. Denies Si/HI/AVH.  Respirations even and unlabored.

## 2025-01-25 NOTE — BH NOTE
4 Eyes Skin Assessment     NAME:  Gene Rogers  YOB: 1995  MEDICAL RECORD NUMBER:  [id number]    The patient is being assessed for  Admission    I agree that at least one RN has performed a thorough Head to Toe Skin Assessment on the patient. ALL assessment sites listed below have been assessed.      Areas assessed by both nurses:    Head, Face, Ears, Shoulders, Back, Chest, Arms, Elbows, Hands, Sacrum. Buttock, Coccyx, Ischium, and Legs. Feet and Heels        Does the Patient have a Wound? No noted wound(s)       Moses Prevention initiated by RN: Yes  Wound Care Orders initiated by RN: No    Pressure Injury (Stage 3,4, Unstageable, DTI, NWPT, and Complex wounds) if present, place Wound referral order by RN under : No    New Ostomies, if present place, Ostomy referral order under : No     Nurse 1 eSignature: Electronically signed by Mary Sommer RN on 1/25/25 at 6:58 PM EST    **SHARE this note so that the co-signing nurse can place an eSignature**    Nurse 2 eSignature: Electronically signed by Trey Abbott RN on 1/25/25 at 6:58 PM EST

## 2025-01-25 NOTE — PLAN OF CARE
[facility]  Admission Note         [facility] 30 [facility] to the unit at via wheelchair transfer at 1130.  He has been non-compliant with his AOT, his follow up appointments, and medications.  He has not taken any medications for 2 months per his probate paperwork.  Per probate paperwork, patient threatened to kill his parents because they have not paid him for working, he demanded that he needed money to pay for crypto and stocks.  Upon assessment patient is A + O x 4.  Denies pain.  Denies A/V/H, SI, HI.  Affect is flat.  Appears disheveled.  Skin clean, dry, intact.  Patient showered shortly after completing his admission paperwork.  Endorses symptoms of depression including increased sleep, appetite change, decreased interest in doing activities, hopeless, helpless, worthless and sad.  Patient contracts for safety with this nurse.  Unit rules and expectations reviewed.  Oriented to room and unit.  Offered food and fluids.  Will continue to monitor q 15 minutes for safety and perform environmental checks and purposeful rounding.     Admission Type:   Admission Type: Involuntary     Reason for admission:  Reason for Admission: \"MY PARENTS KICKED ME OUT, I REALLY JUST NEED TO GET BACK ON MY MEDS\"        Addictive Behavior:   Addictive Behavior  In the Past 3 Months, Have You Felt or Has Someone Told You That You Have a Problem With  : None     Medical Problems:   Past Medical History        Past Medical History:   Diagnosis Date    Anxiety      Asthma       no meds currently- mostly exercise induced    Claustrophobia       confined spaces with locked doors    Depression      Epigastric pain      Nausea      Nut allergy              Status EXAM:  Mental Status and Behavioral Exam  Normal: No  Level of Assistance: Independent/Self  Facial Expression: Flat  Affect: Constricted, Blunt  Level of Consciousness: Alert  Frequency of Checks: 4 times

## 2025-01-26 VITALS
RESPIRATION RATE: 18 BRPM | HEIGHT: 73 IN | WEIGHT: 205 LBS | DIASTOLIC BLOOD PRESSURE: 80 MMHG | SYSTOLIC BLOOD PRESSURE: 132 MMHG | BODY MASS INDEX: 27.17 KG/M2 | OXYGEN SATURATION: 98 % | HEART RATE: 105 BPM | TEMPERATURE: 97.8 F

## 2025-01-26 LAB
CHOLEST SERPL-MCNC: 154 MG/DL
HBA1C MFR BLD: 5.1 % (ref 4–5.6)
HDLC SERPL-MCNC: 31 MG/DL
LDLC SERPL CALC-MCNC: 110 MG/DL
TRIGL SERPL-MCNC: 63 MG/DL
VLDLC SERPL CALC-MCNC: 13 MG/DL

## 2025-01-26 PROCEDURE — 1240000000 HC EMOTIONAL WELLNESS R&B

## 2025-01-26 PROCEDURE — 6370000000 HC RX 637 (ALT 250 FOR IP): Performed by: PSYCHIATRY & NEUROLOGY

## 2025-01-26 PROCEDURE — 90792 PSYCH DIAG EVAL W/MED SRVCS: CPT | Performed by: NURSE PRACTITIONER

## 2025-01-26 RX ORDER — ARIPIPRAZOLE 10 MG/1
10 TABLET ORAL DAILY
Status: DISCONTINUED | OUTPATIENT
Start: 2025-01-27 | End: 2025-01-27

## 2025-01-26 RX ADMIN — ARIPIPRAZOLE 5 MG: 5 TABLET ORAL at 10:07

## 2025-01-26 RX ADMIN — DIVALPROEX SODIUM 500 MG: 500 TABLET, DELAYED RELEASE ORAL at 10:07

## 2025-01-26 RX ADMIN — DIVALPROEX SODIUM 500 MG: 500 TABLET, DELAYED RELEASE ORAL at 21:15

## 2025-01-26 ASSESSMENT — PAIN SCALES - WONG BAKER: WONGBAKER_NUMERICALRESPONSE: NO HURT

## 2025-01-26 NOTE — CARE COORDINATION
Biopsychosocial Assessment Note    Social work met with patient to complete the biopsychosocial assessment and C-SSRS.     Chief Complaint: \"My parents kicked me out because I can't work anymore.\"    Mental Status Exam: Pt is alert and oriented x4, hostile, anxious and depressed.  He is labile and unstable.  Pt was guarded, agitated, withdrawn and exit seeking.  He was logical but unable to concentrate.  Pt has poor insight and judgement.  SI / HI / AVH - ERIKA.    Clinical Summary: Pt is a 29 year old male, presenting to the ED by police due to being probated by his parents for threatening to kill them and demanding money.  Pt has an extensive mental health history with prior diagnoses of depression, schizoaffective disorder and polysubstance abuse.      Pt presented disheveled and agitated.  He was cooperative at first but as the questions continued, his agitation increased.  Pt was residing at his parents home in [place].  He is single and has no children.  Pt reports that he has depression and no medications seem to help.  He stated, \"I am done trying medicines\" when sw was educating pt on Sprav[place].  Pt stated that he has been hospitalized for mental health issues over twenty times.  He is active with [facility] on [address]. And is prescribed Depakote and Abilify.  He reports to trying to be compliant with his medications.  Pt reports \"maybe verbal\" when asked about past abuse or trauma.  He stated that he has an extensive legal history but would not elaborate.  He tested positive for marijuana but has a history of polysubstance abuse.    Pt is unemployed and reports, \"I am waiting for my social security check to come in the mail.  I just need to be on disability and left alone.\"  He denies being approved for disability or receiving any government assistance at this time.  Pt stated that his siblings \"are dead to me.\"  He denies knowing of mental health issues within the family or anyone

## 2025-01-26 NOTE — PLAN OF CARE
Patient is alert and oriented x 4.  Denies pain.  No noted signs or symptoms of distress.   Denies SI/HI, A/V/H, or thoughts of self harm when asked.    Rates Anxiety at 7/10 and Depression at 9/10.    Attended on unit groups this shift.    Medication compliant.     Pleasant, polite, and cooperative  but superficial, he becomes easily agitated and raises his voice when asked more specific questions regarding his symptoms, mood, etc.  He is receptive to staff's redirection when this occurs.  Blunted Affect.    Good eye contact  Appropriate with peers and staff when out on unit.  Able to make needs known.    Appears well groomed/neat, room Is clean.  Patient did shower today    Described their mood as - \"fine\".    Up for all meals.    Will continue to monitor for safety q 15 minute safety rounds and environmental assessments.    Problem: Depression/Self Harm  Goal: Effect of psychiatric condition will be minimized and patient will be protected from self harm  Description: INTERVENTIONS:  1. Assess impact of patient's symptoms on level of functioning, self care needs and offer support as indicated  2. Assess patient/family knowledge of depression, impact on illness and need for teaching  3. Provide emotional support, presence and reassurance  4. Assess for possible suicidal thoughts or ideation. If patient expresses suicidal thoughts or statements do not leave alone, initiate Suicide Precautions, move to a room close to the nursing station and obtain sitter  5. Initiate consults as appropriate with Mental Health Professional, Spiritual Care, Psychosocial CNS, and consider a recommendation to the LIP for a Psychiatric Consultation  Outcome: Not Progressing  Flowsheets (Taken 1/26/2025 1000)  Effect of psychiatric condition will be minimized and patient will be protected from self harm: Provide emotional support, presence and reassurance

## 2025-01-26 NOTE — GROUP NOTE
Shared goal for the day as to exercise.                                                                       Group Therapy Note    Date: 1/26/2025    Group Start Time: 0930  Group End Time: 0945  Group Topic: Community Meeting    [facility]    Tierra Banuelos CTRS    Type of Group: Community Meeting      Patient's Goal:  Patient will be able to id staffing assignments, expectations of patients, and general information re: floor rules. Will be prompted to share goal for the day.     Notes:  Patient appeared to be an active listener, taking in information presented and was prompted to share goal for the day.    Status After Intervention:  Improved    Participation Level: Active Listener and Interactive    Participation Quality: Appropriate, Attentive, and Sharing      Speech:  normal      Thought Process/Content: Logical      Affective Functioning: Congruent      Mood: euthymic      Level of consciousness:  Alert, Oriented x4, and Attentive      Response to Learning: Able to verbalize/acknowledge new learning, Able to retain information, and Progressing to goal      Endings: None Reported    Modes of Intervention: Support and Clarifying      Discipline Responsible: Psychoeducational Specialist      Signature:  RENEE James

## 2025-01-26 NOTE — PLAN OF CARE
Pt resting in room through shift. Pt denies SI, HI and AVH. Pt came out of room to ask about light over toilet and returned to his room to rest.    Problem: Krystle  Goal: Will exhibit normal sleep and speech and no impulsivity  Description: INTERVENTIONS:  1. Administer medication as ordered  2. Set limits on impulsive behavior  3. Make attempts to decrease external stimuli as possible  1/25/2025 2157 by Ryan Oliveira, RN  Outcome: Progressing     Problem: Psychosis  Goal: Will report no hallucinations or delusions  Description: INTERVENTIONS:  1. Administer medication as  ordered  2. Assist with reality testing to support increasing orientation  3. Assess if patient's hallucinations or delusions are encouraging self harm or harm to others and intervene as appropriate  1/25/2025 2157 by Ryan Oliveira RN  Outcome: Progressing     Problem: Anxiety  Goal: Will report anxiety at manageable levels  Description: INTERVENTIONS:  1. Administer medication as ordered  2. Teach and rehearse alternative coping skills  3. Provide emotional support with 1:1 interaction with staff  1/25/2025 2157 by Ryan Oliveira RN  Outcome: Progressing     Problem: Decision Making  Goal: Pt/Family able to effectively weigh alternatives and participate in decision making related to treatment and care  Description: INTERVENTIONS:  1. Determine when there are differences between patient's view, family's view, and healthcare provider's view of condition  2. Facilitate patient and family articulation of goals for care  3. Help patient and family identify pros/cons of alternative solutions  4. Provide information as requested by patient/family  5. Respect patient/family right to receive or not to receive information  6. Serve as a liaison between patient and family and health care team  7. Initiate Consults from Ethics, Palliative Care or initiate Family Care Conference as is appropriate  1/25/2025 1856 by Mary Sommer, RN  Outcome:

## 2025-01-26 NOTE — GROUP NOTE
Group Therapy Note    Date: 1/26/2025    Group Start Time: 0945  Group End Time: 1020  Group Topic: Psychoeducation    [facility]    Tierra Banuelos, CTRS    Date: 1/26/2025    Type of Group: Psychoeducation    Wellness Binder Information  Module Name:  when I was well     Patient's Goal:  pt will be able to id what his/her routine looked like the last   time things were well in his/her life.     Notes:  pleasant and engaged in group, able to share when prompted.     Status After Intervention:  Improved    Participation Level: Active Listener and Interactive    Participation Quality: Appropriate, Attentive, and Sharing      Speech:  normal      Thought Process/Content: Logical      Affective Functioning: Congruent      Mood: euthymic      Level of consciousness:  Alert and Oriented x4      Response to Learning: Able to verbalize/acknowledge new learning, Able to retain information, and Progressing to goal      Endings: None Reported    Modes of Intervention: Support, Socialization, Exploration, and Problem-solving      Discipline Responsible: Psychoeducational Specialist      Signature:  Tierra Banuelos, CTRS

## 2025-01-26 NOTE — BH NOTE
Patient states he was told by a  \"downstairs\" that he was not allowed to speak/call his parents.  Staff was not aware of this.  Patient requests that this nurse call his Mother Joann for clarification.  Joann confirmed that she filed a restraining order against Gene Thursday, she states that Gene's Father did not.  Gene called his Father at this time on patient phone.

## 2025-01-26 NOTE — H&P
and blunted   thought processes: Linear without flights of ideas loose associations  Thought content: Denies auditory or visual hallucinations but appears preoccupied guarded paranoid denies SI/HI intent or plan   Language: able to name objects and repeate phrases  Remote Memory: intact  Recent Memory: intact  Cognition:  oriented to person, place, and time   Fund of Knowledge: Vocabulary intact, pt is aware of current events and past history  Attetion and Concentration intact  Insight poor  Judgement poor      DIAGNOSIS:  Schizoaffective disorder bipolar type  Cannabis abuse          LABS: REVIEWED TODAY:  Recent Labs     01/24/25  1356   WBC 10.4   HGB 16.7*        Recent Labs     01/24/25  1356      K 3.8      CO2 20*   BUN 15   CREATININE 1.1   GLUCOSE 152*     Recent Labs     01/24/25  1356   BILITOT 0.5   ALKPHOS 84   AST 20   ALT 21     Lab Results   Component Value Date/Time    BARBSCNU NEGATIVE 01/24/2025 02:00 PM    LABBENZ NEGATIVE 01/24/2025 02:00 PM    LABMETH NEGATIVE 01/24/2025 02:00 PM    ETOH <10 01/24/2025 01:56 PM     Lab Results   Component Value Date/Time    TSH 1.240 04/18/2019 10:16 AM     No results found for: \"LITHIUM\"  Lab Results   Component Value Date    VALPROATE 11 (L) 09/18/2024     Lab Results   Component Value Date/Time    VALPROATE 11 09/18/2024 04:27 PM         Radiology No results found.      TREATMENT PLAN:    Risk Management: Based on the diagnosis and assessment biopsychosocial treatment model was presented to the patient and was given the opportunity to ask any question.  The patient was agreeable to the plan and all the patient's questions were answered to the patient's satisfaction.  I discussed with the patient the risk, benefit, alternative and common side effects for the proposed medication treatment.  The patient is consenting to this treatment.     Collateral Information:  Will obtain collateral information from the family or friends.  Will obtain

## 2025-01-27 PROCEDURE — 6370000000 HC RX 637 (ALT 250 FOR IP): Performed by: NURSE PRACTITIONER

## 2025-01-27 PROCEDURE — 1240000000 HC EMOTIONAL WELLNESS R&B

## 2025-01-27 PROCEDURE — 99232 SBSQ HOSP IP/OBS MODERATE 35: CPT | Performed by: NURSE PRACTITIONER

## 2025-01-27 PROCEDURE — 6370000000 HC RX 637 (ALT 250 FOR IP): Performed by: PSYCHIATRY & NEUROLOGY

## 2025-01-27 RX ORDER — ARIPIPRAZOLE 15 MG/1
15 TABLET ORAL DAILY
Status: DISCONTINUED | OUTPATIENT
Start: 2025-01-28 | End: 2025-01-29 | Stop reason: HOSPADM

## 2025-01-27 RX ADMIN — ARIPIPRAZOLE 10 MG: 10 TABLET ORAL at 09:09

## 2025-01-27 RX ADMIN — Medication 3 MG: at 21:53

## 2025-01-27 RX ADMIN — NICOTINE POLACRILEX 2 MG: 2 LOZENGE ORAL at 09:11

## 2025-01-27 RX ADMIN — DIVALPROEX SODIUM 500 MG: 500 TABLET, DELAYED RELEASE ORAL at 09:09

## 2025-01-27 RX ADMIN — DIVALPROEX SODIUM 500 MG: 500 TABLET, DELAYED RELEASE ORAL at 21:53

## 2025-01-27 NOTE — BH NOTE
[facility]  Initial Interdisciplinary Treatment Plan NOTE    Review Date & Time: 1/26/25 @ 0900    Patient was in treatment team    Admission Type:   Admission Type: Involuntary    Reason for admission:  Reason for Admission: \"MY PARENTS KICKED ME OUT, I REALLY JUST NEED TO GET BACK ON MY MEDS\"      Estimated Length of Stay Update:  3-7 days  Estimated Discharge Date Update: 1/29/25    EDUCATION:   Learner Progress Toward Treatment Goals: Reviewed results and recommendations of this team, Reviewed group plan and strategies, Reviewed signs, symptoms and risk of self harm and violent behavior, and Reviewed goals and plan of care    Method: Small group    Outcome: Verbalized understanding and Needs reinforcement    PATIENT GOALS: Take medications as prescribed, attend group sessions    PLAN/TREATMENT RECOMMENDATIONS UPDATE:1/29/25    GOALS UPDATE:   Time frame for Short-Term Goals: 2 days    Mary Sommer RN

## 2025-01-27 NOTE — CARE COORDINATION
Pt was seen during treatment team. Pt states that he is doing ok today and denied SI/HI/hallucinations. He states that he has spoken with his father and is able to stay at father's condo until they sell it. He states that he then plans to obtain his own independent housing. Pt states that he is aware he is probated and will most likely have his hearing on Friday. Pt states that we can call his father and agreed to sign PATRICIO for his dad Marco.     SW called pt father Marco [phone] (PATRICIO signed) to discuss pt case. He states that pt is able to stay at his condo until it is sold, which he believes will be for about 2-3 months. He denied pt access to guns/weapons. He states that he wants pt to accept help on a regular basis and stay on his medications. He states that pt also needs to stop using marijuana so frequently. Pt also has a bad hx of taking other substances as well, such as meth.

## 2025-01-27 NOTE — GROUP NOTE
Group Therapy Note    Date: 1/27/2025    Group Start Time: 1445  Group End Time: 1530  Group Topic: Recreational    [facility]    Tierra Banuelos, CTRS    Date: 1/27/2025    Type of Group: Psychoeducation    Wellness Binder Information  Module Name:  Family Feud    Patient's Goal:  Pt will be able to participate in family feud trivia.     Notes:  pleasant and engaged, able to answer appropriately.      Status After Intervention:  Improved    Participation Level: Active Listener and Interactive    Participation Quality: Appropriate, Attentive, and Sharing      Speech:  normal      Thought Process/Content: Logical      Affective Functioning: Congruent      Mood: euthymic      Level of consciousness:  Alert, Oriented x4, and Attentive      Response to Learning: Able to verbalize/acknowledge new learning, Able to retain information, and Progressing to goal      Endings: None Reported    Modes of Intervention: Support, Socialization, Exploration, and Activity      Discipline Responsible: Psychoeducational Specialist      Signature:  Tierra Banuelos, CTRS

## 2025-01-27 NOTE — GROUP NOTE
Shared goal for the day as to exercise.                                                                         Group Therapy Note    Date: 1/27/2025    Group Start Time: 0935  Group End Time: 0950  Group Topic: Community Meeting    [facility]    Tierra Banuelos, RENEE    Type of Group: Community Meeting      Patient's Goal:  Patient will be able to id staffing assignments, expectations of patients, and general information re: floor rules. Will be prompted to share goal for the day.     Notes:  Patient appeared to be an active listener, taking in information presented and was prompted to share goal for the day.    Status After Intervention:  Improved    Participation Level: Active Listener and Interactive    Participation Quality: Appropriate and Attentive      Speech:  normal      Thought Process/Content: Logical      Affective Functioning: Congruent      Mood: euthymic      Level of consciousness:  Alert, Oriented x4, and Attentive      Response to Learning: Able to verbalize/acknowledge new learning, Able to retain information, and Progressing to goal      Endings: None Reported    Modes of Intervention: Support, Socialization, and Clarifying      Discipline Responsible: Psychoeducational Specialist      Signature:  RENEE James            Doxycycline Counseling:  Patient counseled regarding possible photosensitivity and increased risk for sunburn. Patient instructed to avoid sunlight, if possible. When exposed to sunlight, patients should wear protective clothing, sunglasses, and sunscreen. The patient was instructed to call the office immediately if the following severe adverse effects occur:  hearing changes, easy bruising/bleeding, severe headache, or vision changes. The patient verbalized understanding of the proper use and possible adverse effects of doxycycline. All of the patient's questions and concerns were addressed. Elidel Pregnancy And Lactation Text: This medication is Pregnancy Category C. It is unknown if this medication is excreted in breast milk. Topical Metronidazole Counseling: Metronidazole is a topical antibiotic medication. You may experience burning, stinging, redness, or allergic reactions. Please call our office if you develop any problems from using this medication. Low Dose Naltrexone Pregnancy And Lactation Text: Naltrexone is pregnancy category C. There have been no adequate and well-controlled studies in pregnant women. It should be used in pregnancy only if the potential benefit justifies the potential risk to the fetus. Limited data indicates that naltrexone is minimally excreted into breastmilk. Xolair Counseling:  Patient informed of potential adverse effects including but not limited to fever, muscle aches, rash and allergic reactions. The patient verbalized understanding of the proper use and possible adverse effects of Xolair. All of the patient's questions and concerns were addressed. Terbinafine Counseling: Patient counseling regarding adverse effects of terbinafine including but not limited to headache, diarrhea, rash, upset stomach, liver function test abnormalities, itching, taste/smell disturbance, nausea, abdominal pain, and flatulence. There is a rare possibility of liver failure that can occur when taking terbinafine. The patient understands that a baseline LFT and kidney function test may be required. The patient verbalized understanding of the proper use and possible adverse effects of terbinafine. All of the patient's questions and concerns were addressed. Azithromycin Counseling:  I discussed with the patient the risks of azithromycin including but not limited to GI upset, allergic reaction, drug rash, diarrhea, and yeast infections. Cimzia Pregnancy And Lactation Text: This medication crosses the placenta but can be considered safe in certain situations. Cimzia may be excreted in breast milk. Cyclosporine Pregnancy And Lactation Text: This medication is Pregnancy Category C and it isn't know if it is safe during pregnancy. This medication is excreted in breast milk. Carac Pregnancy And Lactation Text: This medication is Pregnancy Category X and contraindicated in pregnancy and in women who may become pregnant. It is unknown if this medication is excreted in breast milk. Fluconazole Counseling:  Patient counseled regarding adverse effects of fluconazole including but not limited to headache, diarrhea, nausea, upset stomach, liver function test abnormalities, taste disturbance, and stomach pain. There is a rare possibility of liver failure that can occur when taking fluconazole. The patient understands that monitoring of LFTs and kidney function test may be required, especially at baseline. The patient verbalized understanding of the proper use and possible adverse effects of fluconazole. All of the patient's questions and concerns were addressed. Vtama Pregnancy And Lactation Text: It is unknown if this medication can cause problems during pregnancy and breastfeeding. Soolantra Pregnancy And Lactation Text: This medication is Pregnancy Category C. This medication is considered safe during breast feeding. High Dose Vitamin A Pregnancy And Lactation Text: High dose vitamin A therapy is contraindicated during pregnancy and breast feeding. Ilumya Pregnancy And Lactation Text: The risk during pregnancy and breastfeeding is uncertain with this medication. Olumiant Pregnancy And Lactation Text: Based on animal studies, Devonda Bhumi may cause embryo-fetal harm when administered to pregnant women. The medication should not be used in pregnancy. Breastfeeding is not recommended during treatment. Dapsone Pregnancy And Lactation Text: This medication is Pregnancy Category C and is not considered safe during pregnancy or breast feeding. Protopic Pregnancy And Lactation Text: This medication is Pregnancy Category C. It is unknown if this medication is excreted in breast milk when applied topically. Dutasteride Counseling: Dustasteride Counseling:  I discussed with the patient the risks of use of dutasteride including but not limited to decreased libido, decreased ejaculate volume, and gynecomastia. Women who can become pregnant should not handle medication. All of the patient's questions and concerns were addressed. Include Pregnancy/Lactation Warning?: No Otezla Counseling: Beltran Bank Counseling: The side effects of Bel[employer]an [employer] were discussed with the patient, including but not limited to worsening or new depression, weight loss, diarrhea, nausea, upper respiratory tract infection, and headache. Patient instructed to call the office should any adverse effect occur. The patient verbalized understanding of the proper use and possible adverse effects of Otezla. All the patient's questions and concerns were addressed. Minoxidil Counseling: Minoxidil is a topical medication which can increase blood flow where it is applied. It is uncertain how this medication increases hair growth. Side effects are uncommon and include stinging and allergic reactions. Topical Retinoid counseling:  Patient advised to apply a pea-sized amount only at bedtime and wait 30 minutes after washing their face before applying. If too drying, patient may add a non-comedogenic moisturizer. The patient verbalized understanding of the proper use and possible adverse effects of retinoids. All of the patient's questions and concerns were addressed. Xolair Pregnancy And Lactation Text: This medication is Pregnancy Category B and is considered safe during pregnancy. This medication is excreted in breast milk. Azathioprine Counseling:  I discussed with the patient the risks of azathioprine including but not limited to myelosuppression, immunosuppression, hepatotoxicity, lymphoma, and infections. The patient understands that monitoring is required including baseline LFTs, Creatinine, possible TPMP genotyping and weekly CBCs for the first month and then every 2 weeks thereafter. The patient verbalized understanding of the proper use and possible adverse effects of azathioprine. All of the patient's questions and concerns were addressed. Methotrexate Counseling:  Patient counseled regarding adverse effects of methotrexate including but not limited to nausea, vomiting, abnormalities in liver function tests. Patients may develop mouth sores, rash, diarrhea, and abnormalities in blood counts. The patient understands that monitoring is required including LFT's and blood counts. There is a rare possibility of scarring of the liver and lung problems that can occur when taking methotrexate. Persistent nausea, loss of appetite, pale stools, dark urine, cough, and shortness of breath should be reported immediately. Patient advised to discontinue methotrexate treatment at least three months before attempting to become pregnant. I discussed the need for folate supplements while taking methotrexate. These supplements can decrease side effects during methotrexate treatment. The patient verbalized understanding of the proper use and possible adverse effects of methotrexate. All of the patient's questions and concerns were addressed. Thalidomide Pregnancy And Lactation Text: This medication is Pregnancy Category X and is absolutely contraindicated during pregnancy. It is unknown if it is excreted in breast milk. Albendazole Counseling:  I discussed with the patient the risks of albendazole including but not limited to cytopenia, kidney damage, nausea/vomiting and severe allergy. The patient understands that this medication is being used in an off-label manner. Cosentyx Counseling:  I discussed with the patient the risks of Cosentyx including but not limited to worsening of Crohn's disease, immunosuppression, allergic reactions and infections. The patient understands that monitoring is required including a PPD at baseline and must alert us or the primary physician if symptoms of infection or other concerning signs are noted. Rinvoq Counseling: I discussed with the patient the risks of Rinvoq therapy including but not limited to upper respiratory tract infections, shingles, cold sores, bronchitis, nausea, cough, fever, acne, and headache. Live vaccines should be avoided. This medication has been linked to serious infections; higher rate of mortality; malignancy and lymphoproliferative disorders; major adverse cardiovascular events; thrombosis; thrombocytopenia, anemia, and neutropenia; lipid elevations; liver enzyme elevations; and gastrointestinal perforations. Eucrisa Counseling: Patient may experience a mild burning sensation during topical application. Etienne Seton is not approved in children less than 1months of age. Terbinafine Pregnancy And Lactation Text: This medication is Pregnancy Category B and is considered safe during pregnancy. It is also excreted in breast milk and breast feeding isn't recommended. Doxycycline Pregnancy And Lactation Text: This medication is Pregnancy Category D and not consider safe during pregnancy. It is also excreted in breast milk but is considered safe for shorter treatment courses. Niacinamide Counseling: I recommended taking niacin or niacinamide, also know as vitamin B3, twice daily. Recent evidence suggests that taking vitamin B3 (500 mg twice daily) can reduce the risk of actinic keratoses and non-melanoma skin cancers. Side effects of vitamin B3 include flushing and headache. Fluconazole Pregnancy And Lactation Text: This medication is Pregnancy Category C and it isn't know if it is safe during pregnancy. It is also excreted in breast milk. Dutasteride Pregnancy And Lactation Text: This medication is absolutely contraindicated in women, especially during pregnancy and breast feeding. Feminization of male fetuses is possible if taking while pregnant. Infliximab Counseling:  I discussed with the patient the risks of infliximab including but not limited to myelosuppression, immunosuppression, autoimmune hepatitis, demyelinating diseases, lymphoma, and serious infections. The patient understands that monitoring is required including a PPD at baseline and must alert us or the primary physician if symptoms of infection or other concerning signs are noted. Azithromycin Pregnancy And Lactation Text: This medication is considered safe during pregnancy and is also secreted in breast milk. Minoxidil Pregnancy And Lactation Text: This medication has not been assigned a Pregnancy Risk Category but animal studies failed to show danger with the topical medication. It is unknown if the medication is excreted in breast milk. Calcipotriene Counseling:  I discussed with the patient the risks of calcipotriene including but not limited to erythema, scaling, itching, and irritation. Otezla Pregnancy And Lactation Text: This medication is Pregnancy Category C and it isn't known if it is safe during pregnancy. It is unknown if it is excreted in breast milk. Topical Metronidazole Pregnancy And Lactation Text: This medication is Pregnancy Category B and considered safe during pregnancy. It is also considered safe to use while breastfeeding. Gabapentin Counseling: I discussed with the patient the risks of gabapentin including but not limited to dizziness, somnolence, fatigue and ataxia. Azathioprine Pregnancy And Lactation Text: This medication is Pregnancy Category D and isn't considered safe during pregnancy. It is unknown if this medication is excreted in breast milk. Tranexamic Acid Counseling:  Patient advised of the small risk of bleeding problems with tranexamic acid. They were also instructed to call if they developed any nausea, vomiting or diarrhea. All of the patient's questions and concerns were addressed. Zoryve Counseling:  I discussed with the patient that Arrie Splinter is not for use in the eyes, mouth or vagina. The most commonly reported side effects include diarrhea, headache, insomnia, application site pain, upper respiratory tract infections, and urinary tract infections. All of the patient's questions and concerns were addressed. Acitretin Counseling:  I discussed with the patient the risks of acitretin including but not limited to hair loss, dry lips/skin/eyes, liver damage, hyperlipidemia, depression/suicidal ideation, photosensitivity. Serious rare side effects can include but are not limited to pancreatitis, pseudotumor cerebri, bony changes, clot formation/stroke/heart attack. Patient understands that alcohol is contraindicated since it can result in liver toxicity and significantly prolong the elimination of the drug by many years. Niacinamide Pregnancy And Lactation Text: These medications are considered safe during pregnancy. Methotrexate Pregnancy And Lactation Text: This medication is Pregnancy Category X and is known to cause fetal harm. This medication is excreted in breast milk. Aklief counseling:  Patient advised to apply a pea-sized amount only at bedtime and wait 30 minutes after washing their face before applying. If too drying, patient may add a non-comedogenic moisturizer. The most commonly reported side effects including irritation, redness, scaling, dryness, stinging, burning, itching, and increased risk of sunburn. The patient verbalized understanding of the proper use and possible adverse effects of retinoids. All of the patient's questions and concerns were addressed. Calcipotriene Pregnancy And Lactation Text: The use of this medication during pregnancy or lactation is not recommended as there is insufficient data. Qbrexza Counseling:  I discussed with the patient the risks of Darlynn Neighbours including but not limited to headache, mydriasis, blurred vision, dry eyes, nasal dryness, dry mouth, dry throat, dry skin, urinary hesitation, and constipation. Local skin reactions including erythema, burning, stinging, and itching can also occur. Topical Steroids Counseling: I discussed with the patient that prolonged use of topical steroids can result in the increased appearance of superficial blood vessels (telangiectasias), lightening (hypopigmentation) and thinning of the skin (atrophy). Patient understands to avoid using high potency steroids in skin folds, the groin or the face. The patient verbalized understanding of the proper use and possible adverse effects of topical steroids. All of the patient's questions and concerns were addressed. Cosentyx Pregnancy And Lactation Text: This medication is Pregnancy Category B and is considered safe during pregnancy. It is unknown if this medication is excreted in breast milk. Oxybutynin Counseling:  I discussed with the patient the risks of oxybutynin including but not limited to skin rash, drowsiness, dry mouth, difficulty urinating, and blurred vision. Erythromycin Counseling:  I discussed with the patient the risks of erythromycin including but not limited to GI upset, allergic reaction, drug rash, diarrhea, increase in liver enzymes, and yeast infections. Rinvoq Pregnancy And Lactation Text: Based on animal studies, Rinvoq may cause embryo-fetal harm when administered to pregnant women. The medication should not be used in pregnancy. Breastfeeding is not recommended during treatment and for 6 days after the last dose. Bactrim Counseling:  I discussed with the patient the risks of sulfa antibiotics including but not limited to GI upset, allergic reaction, drug rash, diarrhea, dizziness, photosensitivity, and yeast infections. Rarely, more serious reactions can occur including but not limited to aplastic anemia, agranulocytosis, methemoglobinemia, blood dyscrasias, liver or kidney failure, lung infiltrates or desquamative/blistering drug rashes. Finasteride Counseling:  I discussed with the patient the risks of use of finasteride including but not limited to decreased libido, decreased ejaculate volume, gynecomastia, and depression. Women should not handle medication. All of the patient's questions and concerns were addressed. Mirvaso Counseling: Tonya Aliment is a topical medication which can decrease superficial blood flow where applied. Side effects are uncommon and include stinging, redness and allergic reactions. Albendazole Pregnancy And Lactation Text: This medication is Pregnancy Category C and it isn't known if it is safe during pregnancy. It is also excreted in breast milk. Arava Counseling:  Patient counseled regarding adverse effects of Arava including but not limited to nausea, vomiting, abnormalities in liver function tests. Patients may develop mouth sores, rash, diarrhea, and abnormalities in blood counts. The patient understands that monitoring is required including LFTs and blood counts. There is a rare possibility of scarring of the liver and lung problems that can occur when taking methotrexate. Persistent nausea, loss of appetite, pale stools, dark urine, cough, and shortness of breath should be reported immediately. Patient advised to discontinue Arava treatment and consult with a physician prior to attempting conception. The patient will have to undergo a treatment to eliminate Arava from the body prior to conception. Cantharidin Counseling:  I discussed with the patient the risks of Cantharidin including but not limited to pain, redness, burning, itching, and blistering. Stelara Counseling:  I discussed with the patient the risks of ustekinumab including but not limited to immunosuppression, malignancy, posterior leukoencephalopathy syndrome, and serious infections. The patient understands that monitoring is required including a PPD at baseline and must alert us or the primary physician if symptoms of infection or other concerning signs are noted. Griseofulvin Counseling:  I discussed with the patient the risks of griseofulvin including but not limited to photosensitivity, cytopenia, liver damage, nausea/vomiting and severe allergy. The patient understands that this medication is best absorbed when taken with a fatty meal (e.g., ice cream or french fries). Acitretin Pregnancy And Lactation Text: This medication is Pregnancy Category X and should not be given to women who are pregnant or may become pregnant in the future. This medication is excreted in breast milk. Rifampin Counseling: I discussed with the patient the risks of rifampin including but not limited to liver damage, kidney damage, red-orange body fluids, nausea/vomiting and severe allergy. Erivedge Counseling- I discussed with the patient the risks of Erivedge including but not limited to nausea, vomiting, diarrhea, constipation, weight loss, changes in the sense of taste, decreased appetite, muscle spasms, and hair loss. The patient verbalized understanding of the proper use and possible adverse effects of Erivedge. All of the patient's questions and concerns were addressed. Cellcept Counseling:  I discussed with the patient the risks of mycophenolate mofetil including but not limited to infection/immunosuppression, GI upset, hypokalemia, hypercholesterolemia, bone marrow suppression, lymphoproliferative disorders, malignancy, GI ulceration/bleed/perforation, colitis, interstitial lung disease, kidney failure, progressive multifocal leukoencephalopathy, and birth defects. The patient understands that monitoring is required including a baseline creatinine and regular CBC testing. In addition, patient must alert us immediately if symptoms of infection or other concerning signs are noted. Aklief Pregnancy And Lactation Text: It is unknown if this medication is safe to use during pregnancy. It is unknown if this medication is excreted in breast milk. Breastfeeding women should use the topical cream on the smallest area of the skin for the shortest time needed while breastfeeding. Do not apply to nipple and areola. Tazorac Counseling:  Patient advised that medication is irritating and drying. Patient may need to apply sparingly and wash off after an hour before eventually leaving it on overnight. The patient verbalized understanding of the proper use and possible adverse effects of tazorac. All of the patient's questions and concerns were addressed. Tranexamic Acid Pregnancy And Lactation Text: It is unknown if this medication is safe during pregnancy or breast feeding. Gabapentin Pregnancy And Lactation Text: This medication is Pregnancy Category C and isn't considered safe during pregnancy. It is excreted in breast milk. Hydroquinone Counseling:  Patient advised that medication may result in skin irritation, lightening (hypopigmentation), dryness, and burning. In the event of skin irritation, the patient was advised to reduce the amount of the drug applied or use it less frequently. Rarely, spots that are treated with hydroquinone can become darker (pseudoochronosis). Should this occur, patient instructed to stop medication and call the office. The patient verbalized understanding of the proper use and possible adverse effects of hydroquinone. All of the patient's questions and concerns were addressed. Cimetidine Counseling:  I discussed with the patient the risks of Cimetidine including but not limited to gynecomastia, headache, diarrhea, nausea, drowsiness, arrhythmias, pancreatitis, skin rashes, psychosis, bone marrow suppression and kidney toxicity. Nsaids Counseling: NSAID Counseling: I discussed with the patient that NSAIDs should be taken with food. Prolonged use of NSAIDs can result in the development of stomach ulcers. Patient advised to stop taking NSAIDs if abdominal pain occurs. The patient verbalized understanding of the proper use and possible adverse effects of NSAIDs. All of the patient's questions and concerns were addressed. Cantharidin Pregnancy And Lactation Text: This medication has not been proven safe during pregnancy. It is unknown if this medication is excreted in breast milk. Qbrexza Pregnancy And Lactation Text: There is no available data on Qbrexza use in pregnant women. There is no available data on Qbrexza use in lactation. Prednisone Counseling:  I discussed with the patient the risks of prolonged use of prednisone including but not limited to weight gain, insomnia, osteoporosis, mood changes, diabetes, susceptibility to infection, glaucoma and high blood pressure. In cases where prednisone use is prolonged, patients should be monitored with blood pressure checks, serum glucose levels and an eye exam.  Additionally, the patient may need to be placed on GI prophylaxis, PCP prophylaxis, and calcium and vitamin D supplementation and/or a bisphosphonate. The patient verbalized understanding of the proper use and the possible adverse effects of prednisone. All of the patient's questions and concerns were addressed. Oxybutynin Pregnancy And Lactation Text: This medication is Pregnancy Category B and is considered safe during pregnancy. It is unknown if it is excreted in breast milk. Mirvaso Pregnancy And Lactation Text: This medication has not been assigned a Pregnancy Risk Category. It is unknown if the medication is excreted in breast milk. Rituxan Counseling:  I discussed with the patient the risks of Rituxan infusions. Side effects can include infusion reactions, severe drug rashes including mucocutaneous reactions, reactivation of latent hepatitis and other infections and rarely progressive multifocal leukoencephalopathy. All of the patient's questions and concerns were addressed. Sotyktu Counseling:  I discussed the most common side effects of Sotyktu including: common cold, sore throat, sinus infections, cold sores, canker sores, folliculitis, and acne. ? I also discussed more serious side effects of Sotyktu including but not limited to: serious allergic reactions; increased risk for infections such as TB; cancers such as lymphomas; rhabdomyolysis and elevated CPK; and elevated triglycerides and liver enzymes. ? Rifampin Pregnancy And Lactation Text: This medication is Pregnancy Category C and it isn't know if it is safe during pregnancy. It is also excreted in breast milk and should not be used if you are breast feeding. Erythromycin Pregnancy And Lactation Text: This medication is Pregnancy Category B and is considered safe during pregnancy. It is also excreted in breast milk. Rhofade Counseling: Rhofade is a topical medication which can decrease superficial blood flow where applied. Side effects are uncommon and include stinging, redness and allergic reactions. Valtrex Counseling: I discussed with the patient the risks of valacyclovir including but not limited to kidney damage, nausea, vomiting and severe allergy. The patient understands that if the infection seems to be worsening or is not improving, they are to call. Topical Steroids Applications Pregnancy And Lactation Text: Most topical steroids are considered safe to use during pregnancy and lactation. Any topical steroid applied to the breast or nipple should be washed off before breastfeeding. Bexarotene Counseling:  I discussed with the patient the risks of bexarotene including but not limited to hair loss, dry lips/skin/eyes, liver abnormalities, hyperlipidemia, pancreatitis, depression/suicidal ideation, photosensitivity, drug rash/allergic reactions, hypothyroidism, anemia, leukopenia, infection, cataracts, and teratogenicity. Patient understands that they will need regular blood tests to check lipid profile, liver function tests, white blood cell count, thyroid function tests and pregnancy test if applicable. 5-Fu Counseling: 5-Fluorouracil Counseling:  I discussed with the patient the risks of 5-fluorouracil including but not limited to erythema, scaling, itching, weeping, crusting, and pain. Ivermectin Counseling:  Patient instructed to take medication on an empty stomach with a full glass of water. Patient informed of potential adverse effects including but not limited to nausea, diarrhea, dizziness, itching, and swelling of the extremities or lymph nodes. The patient verbalized understanding of the proper use and possible adverse effects of ivermectin. All of the patient's questions and concerns were addressed. Bactrim Pregnancy And Lactation Text: This medication is Pregnancy Category D and is known to cause fetal risk. It is also excreted in breast milk. Griseofulvin Pregnancy And Lactation Text: This medication is Pregnancy Category X and is known to cause serious birth defects. It is unknown if this medication is excreted in breast milk but breast feeding should be avoided. Zyclara Counseling:  I discussed with the patient the risks of imiquimod including but not limited to erythema, scaling, itching, weeping, crusting, and pain. Patient understands that the inflammatory response to imiquimod is variable from person to person and was educated regarded proper titration schedule. If flu-like symptoms develop, patient knows to discontinue the medication and contact us. Dupixent Counseling: I discussed with the patient the risks of dupilumab including but not limited to eye infection and irritation, cold sores, injection site reactions, worsening of asthma, allergic reactions and increased risk of parasitic infection. Live vaccines should be avoided while taking dupilumab. Dupilumab will also interact with certain medications such as warfarin and cyclosporine. The patient understands that monitoring is required and they must alert us or the primary physician if symptoms of infection or other concerning signs are noted. Tazorac Pregnancy And Lactation Text: This medication is not safe during pregnancy. It is unknown if this medication is excreted in breast milk. Glycopyrrolate Counseling:  I discussed with the patient the risks of glycopyrrolate including but not limited to skin rash, drowsiness, dry mouth, difficulty urinating, and blurred vision. Finasteride Pregnancy And Lactation Text: This medication is absolutely contraindicated during pregnancy. It is unknown if it is excreted in breast milk. Sotyktu Pregnancy And Lactation Text: There is insufficient data to evaluate whether or not Sotyktu is safe to use during pregnancy. ? ? It is not known if Sotyktu passes into breast milk and whether or not it is safe to use when breastfeeding. ?? Wartpeel Counseling:  I discussed with the patient the risks of Wartpeel including but not limited to erythema, scaling, itching, weeping, crusting, and pain. Azelaic Acid Counseling: Patient counseled that medicine may cause skin irritation and to avoid applying near the eyes. In the event of skin irritation, the patient was advised to reduce the amount of the drug applied or use it less frequently. The patient verbalized understanding of the proper use and possible adverse effects of azelaic acid. All of the patient's questions and concerns were addressed. Nsaids Pregnancy And Lactation Text: These medications are considered safe up to 30 weeks gestation. It is excreted in breast milk. Opzelura Counseling:  I discussed with the patient the risks of Kari Griffins including but not limited to nasopharngitis, bronchitis, ear infection, eosinophila, hives, diarrhea, folliculitis, tonsillitis, and rhinorrhea. Taken orally, this medication has been linked to serious infections; higher rate of mortality; malignancy and lymphoproliferative disorders; major adverse cardiovascular events; thrombosis; thrombocytopenia, anemia, and neutropenia; and lipid elevations. Clofazimine Counseling:  I discussed with the patient the risks of clofazimine including but not limited to skin and eye pigmentation, liver damage, nausea/vomiting, gastrointestinal bleeding and allergy. Metronidazole Counseling:  I discussed with the patient the risks of metronidazole including but not limited to seizures, nausea/vomiting, a metallic taste in the mouth, nausea/vomiting and severe allergy. Sarecycline Counseling: Patient advised regarding possible photosensitivity and discoloration of the teeth, skin, lips, tongue and gums. Patient instructed to avoid sunlight, if possible. When exposed to sunlight, patients should wear protective clothing, sunglasses, and sunscreen. The patient was instructed to call the office immediately if the following severe adverse effects occur:  hearing changes, easy bruising/bleeding, severe headache, or vision changes. The patient verbalized understanding of the proper use and possible adverse effects of sarecycline. All of the patient's questions and concerns were addressed. Doxepin Counseling:  Patient advised that the medication is sedating and not to drive a car after taking this medication. Patient informed of potential adverse effects including but not limited to dry mouth, urinary retention, and blurry vision. The patient verbalized understanding of the proper use and possible adverse effects of doxepin. All of the patient's questions and concerns were addressed. Taltz Counseling: I discussed with the patient the risks of ixekizumab including but not limited to immunosuppression, serious infections, worsening of inflammatory bowel disease and drug reactions. The patient understands that monitoring is required including a PPD at baseline and must alert us or the primary physician if symptoms of infection or other concerning signs are noted. Propranolol Counseling:  I discussed with the patient the risks of propranolol including but not limited to low heart rate, low blood pressure, low blood sugar, restlessness and increased cold sensitivity. They should call the office if they experience any of these side effects. Valtrex Pregnancy And Lactation Text: this medication is Pregnancy Category B and is considered safe during pregnancy. This medication is not directly found in breast milk but it's metabolite acyclovir is present. Glycopyrrolate Pregnancy And Lactation Text: This medication is Pregnancy Category B and is considered safe during pregnancy. It is unknown if it is excreted breast milk. Bexarotene Pregnancy And Lactation Text: This medication is Pregnancy Category X and should not be given to women who are pregnant or may become pregnant. This medication should not be used if you are breast feeding. Topical Sulfur Applications Counseling: Topical Sulfur Counseling: Patient counseled that this medication may cause skin irritation or allergic reactions. In the event of skin irritation, the patient was advised to reduce the amount of the drug applied or use it less frequently. The patient verbalized understanding of the proper use and possible adverse effects of topical sulfur application. All of the patient's questions and concerns were addressed. Itraconazole Counseling:  I discussed with the patient the risks of itraconazole including but not limited to liver damage, nausea/vomiting, neuropathy, and severe allergy. The patient understands that this medication is best absorbed when taken with acidic beverages such as non-diet cola or ginger ale. The patient understands that monitoring is required including baseline LFTs and repeat LFTs at intervals. The patient understands that they are to contact us or the primary physician if concerning signs are noted. Libtayo Counseling- I discussed with the patient the risks of Libtayo including but not limited to nausea, vomiting, diarrhea, and bone or muscle pain. The patient verbalized understanding of the proper use and possible adverse effects of Libtayo. All of the patient's questions and concerns were addressed. Birth Control Pills Counseling: Birth Control Pill Counseling: I discussed with the patient the potential side effects of OCPs including but not limited to increased risk of stroke, heart attack, thrombophlebitis, deep venous thrombosis, hepatic adenomas, breast changes, GI upset, headaches, and depression. The patient verbalized understanding of the proper use and possible adverse effects of OCPs. All of the patient's questions and concerns were addressed. Cephalexin Counseling: I counseled the patient regarding use of cephalexin as an antibiotic for prophylactic and/or therapeutic purposes. Cephalexin (commonly prescribed under brand name Keflex) is a cephalosporin antibiotic which is active against numerous classes of bacteria, including most skin bacteria. Side effects may include nausea, diarrhea, gastrointestinal upset, rash, hives, yeast infections, and in rare cases, hepatitis, kidney disease, seizures, fever, confusion, neurologic symptoms, and others. Patients with severe allergies to penicillin medications are cautioned that there is about a 10% incidence of cross-reactivity with cephalosporins. When possible, patients with penicillin allergies should use alternatives to cephalosporins for antibiotic therapy. Rituxan Pregnancy And Lactation Text: This medication is Pregnancy Category C and it isn't know if it is safe during pregnancy. It is unknown if this medication is excreted in breast milk but similar antibodies are known to be excreted. Azelaic Acid Pregnancy And Lactation Text: This medication is considered safe during pregnancy and breast feeding. Topical Sulfur Applications Pregnancy And Lactation Text: This medication is considered safe during pregnancy and breast feeding secondary to limited systemic absorption. Metronidazole Pregnancy And Lactation Text: This medication is Pregnancy Category B and considered safe during pregnancy. It is also excreted in breast milk. Xeljanz Counseling: Veneda Rater Counseling: I discussed with the patient the risks of Veneda Rater therapy including increased risk of infection, liver issues, headache, diarrhea, or cold symptoms. Live vaccines should be avoided. They were instructed to call if they have any problems. Dupixent Pregnancy And Lactation Text: This medication likely crosses the placenta but the risk for the fetus is uncertain. This medication is excreted in breast milk. Olanzapine Counseling- I discussed with the patient the common side effects of olanzapine including but are not limited to: lack of energy, dry mouth, increased appetite, sleepiness, tremor, constipation, dizziness, changes in behavior, or restlessness. Explained that teenagers are more likely to experience headaches, abdominal pain, pain in the arms or legs, tiredness, and sleepiness. Serious side effects include but are not limited: increased risk of death in elderly patients who are confused, have memory loss, or dementia-related psychosis; hyperglycemia; increased cholesterol and triglycerides; and weight gain. Imiquimod Counseling:  I discussed with the patient the risks of imiquimod including but not limited to erythema, scaling, itching, weeping, crusting, and pain. Patient understands that the inflammatory response to imiquimod is variable from person to person and was educated regarded proper titration schedule. If flu-like symptoms develop, patient knows to discontinue the medication and contact us. Topical Clindamycin Counseling: Patient counseled that this medication may cause skin irritation or allergic reactions. In the event of skin irritation, the patient was advised to reduce the amount of the drug applied or use it less frequently. The patient verbalized understanding of the proper use and possible adverse effects of clindamycin. All of the patient's questions and concerns were addressed. Cyclophosphamide Counseling:  I discussed with the patient the risks of cyclophosphamide including but not limited to hair loss, hormonal abnormalities, decreased fertility, abdominal pain, diarrhea, nausea and vomiting, bone marrow suppression and infection. The patient understands that monitoring is required while taking this medication. Drysol Counseling:  I discussed with the patient the risks of drysol/aluminum chloride including but not limited to skin rash, itching, irritation, burning. Solaraze Counseling:  I discussed with the patient the risks of Solaraze including but not limited to erythema, scaling, itching, weeping, crusting, and pain. Isotretinoin Counseling: Patient should get monthly blood tests, not donate blood, not drive at night if vision affected, not share medication, and not undergo elective surgery for 6 months after tx completed. Side effects reviewed, pt to contact office should one occur. Sarecycline Pregnancy And Lactation Text: This medication is Pregnancy Category D and not consider safe during pregnancy. It is also excreted in breast milk. Libtayo Pregnancy And Lactation Text: This medication is contraindicated in pregnancy and when breast feeding. Doxepin Pregnancy And Lactation Text: This medication is Pregnancy Category C and it isn't known if it is safe during pregnancy. It is also excreted in breast milk and breast feeding isn't recommended. Opzelura Pregnancy And Lactation Text: There is insufficient data to evaluate drug-associated risk for major birth defects, miscarriage, or other adverse maternal or fetal outcomes. There is a pregnancy registry that monitors pregnancy outcomes in pregnant persons exposed to the medication during pregnancy. It is unknown if this medication is excreted in breast milk. Do not breastfeed during treatment and for about 4 weeks after the last dose. Propranolol Pregnancy And Lactation Text: This medication is Pregnancy Category C and it isn't known if it is safe during pregnancy. It is excreted in breast milk. Humira Counseling:  I discussed with the patient the risks of adalimumab including but not limited to myelosuppression, immunosuppression, autoimmune hepatitis, demyelinating diseases, lymphoma, and serious infections. The patient understands that monitoring is required including a PPD at baseline and must alert us or the primary physician if symptoms of infection or other concerning signs are noted. Cibinqo Counseling: I discussed with the patient the risks of Cibinqo therapy including but not limited to common cold, nausea, headache, cold sores, increased blood CPK levels, dizziness, UTIs, fatigue, acne, and vomitting. Live vaccines should be avoided. This medication has been linked to serious infections; higher rate of mortality; malignancy and lymphoproliferative disorders; major adverse cardiovascular events; thrombosis; thrombocytopenia and lymphopenia; lipid elevations; and retinal detachment. Hydroxychloroquine Counseling:  I discussed with the patient that a baseline ophthalmologic exam is needed at the start of therapy and every year thereafter while on therapy. A CBC may also be warranted for monitoring. The side effects of this medication were discussed with the patient, including but not limited to agranulocytosis, aplastic anemia, seizures, rashes, retinopathy, and liver toxicity. Patient instructed to call the office should any adverse effect occur. The patient verbalized understanding of the proper use and possible adverse effects of Plaquenil. All the patient's questions and concerns were addressed. Cephalexin Pregnancy And Lactation Text: This medication is Pregnancy Category B and considered safe during pregnancy. It is also excreted in breast milk but can be used safely for shorter doses. Siliq Counseling:  I discussed with the patient the risks of Siliq including but not limited to new or worsening depression, suicidal thoughts and behavior, immunosuppression, malignancy, posterior leukoencephalopathy syndrome, and serious infections. The patient understands that monitoring is required including a PPD at baseline and must alert us or the primary physician if symptoms of infection or other concerning signs are noted. There is also a special program designed to monitor depression which is required with Siliq. Birth Control Pills Pregnancy And Lactation Text: This medication should be avoided if pregnant and for the first 30 days post-partum. Opioid Counseling: I discussed with the patient the potential side effects of opioids including but not limited to addiction, altered mental status, and depression. I stressed avoiding alcohol, benzodiazepines, muscle relaxants and sleep aids unless specifically okayed by a physician. The patient verbalized understanding of the proper use and possible adverse effects of opioids. All of the patient's questions and concerns were addressed. They were instructed to flush the remaining pills down the toilet if they did not need them for pain. Olanzapine Pregnancy And Lactation Text: This medication is pregnancy category C. There are no adequate and well controlled trials with olanzapine in pregnant females. Olanzapine should be used during pregnancy only if the potential benefit justifies the potential risk to the fetus. In a study in lactating healthy women, olanzapine was excreted in breast milk. It is recommended that women taking olanzapine should not breast feed. Benzoyl Peroxide Counseling: Patient counseled that medicine may cause skin irritation and bleach clothing. In the event of skin irritation, the patient was advised to reduce the amount of the drug applied or use it less frequently. The patient verbalized understanding of the proper use and possible adverse effects of benzoyl peroxide. All of the patient's questions and concerns were addressed. Minocycline Counseling: Patient advised regarding possible photosensitivity and discoloration of the teeth, skin, lips, tongue and gums. Patient instructed to avoid sunlight, if possible. When exposed to sunlight, patients should wear protective clothing, sunglasses, and sunscreen. The patient was instructed to call the office immediately if the following severe adverse effects occur:  hearing changes, easy bruising/bleeding, severe headache, or vision changes. The patient verbalized understanding of the proper use and possible adverse effects of minocycline. All of the patient's questions and concerns were addressed. Cyclophosphamide Pregnancy And Lactation Text: This medication is Pregnancy Category D and it isn't considered safe during pregnancy. This medication is excreted in breast milk. Tremfya Counseling: I discussed with the patient the risks of guselkumab including but not limited to immunosuppression, serious infections, and drug reactions. The patient understands that monitoring is required including a PPD at baseline and must alert us or the primary physician if symptoms of infection or other concerning signs are noted. SSKI Counseling:  I discussed with the patient the risks of SSKI including but not limited to thyroid abnormalities, metallic taste, GI upset, fever, headache, acne, arthralgias, paraesthesias, lymphadenopathy, easy bleeding, arrhythmias, and allergic reaction. Adbry Counseling: I discussed with the patient the risks of tralokinumab including but not limited to eye infection and irritation, cold sores, injection site reactions, worsening of asthma, allergic reactions and increased risk of parasitic infection. Live vaccines should be avoided while taking tralokinumab. The patient understands that monitoring is required and they must alert us or the primary physician if symptoms of infection or other concerning signs are noted. Enbrel Counseling:  I discussed with the patient the risks of etanercept including but not limited to myelosuppression, immunosuppression, autoimmune hepatitis, demyelinating diseases, lymphoma, and infections. The patient understands that monitoring is required including a PPD at baseline and must alert us or the primary physician if symptoms of infection or other concerning signs are noted. Winlevi Counseling:  I discussed with the patient the risks of topical clascoterone including but not limited to erythema, scaling, itching, and stinging. Patient voiced their understanding. Cibinqo Pregnancy And Lactation Text: It is unknown if this medication will adversely affect pregnancy or breast feeding. You should not take this medication if you are currently pregnant or planning a pregnancy or while breastfeeding. Isotretinoin Pregnancy And Lactation Text: This medication is Pregnancy Category X and is considered extremely dangerous during pregnancy. It is unknown if it is excreted in breast milk. Ketoconazole Counseling:   Patient counseled regarding improving absorption with orange juice. Adverse effects include but are not limited to breast enlargement, headache, diarrhea, nausea, upset stomach, liver function test abnormalities, taste disturbance, and stomach pain. There is a rare possibility of liver failure that can occur when taking ketoconazole. The patient understands that monitoring of LFTs may be required, especially at baseline. The patient verbalized understanding of the proper use and possible adverse effects of ketoconazole. All of the patient's questions and concerns were addressed. Clindamycin Counseling: I counseled the patient regarding use of clindamycin as an antibiotic for prophylactic and/or therapeutic purposes. Clindamycin is active against numerous classes of bacteria, including skin bacteria. Side effects may include nausea, diarrhea, gastrointestinal upset, rash, hives, yeast infections, and in rare cases, colitis. Hydroxychloroquine Pregnancy And Lactation Text: This medication has been shown to cause fetal harm but it isn't assigned a Pregnancy Risk Category. There are small amounts excreted in breast milk. Solaraze Pregnancy And Lactation Text: This medication is Pregnancy Category B and is considered safe. There is some data to suggest avoiding during the third trimester. It is unknown if this medication is excreted in breast milk. Odomzo Counseling- I discussed with the patient the risks of Odomzo including but not limited to nausea, vomiting, diarrhea, constipation, weight loss, changes in the sense of taste, decreased appetite, muscle spasms, and hair loss. The patient verbalized understanding of the proper use and possible adverse effects of Odomzo. All of the patient's questions and concerns were addressed. Picato Counseling:  I discussed with the patient the risks of Picato including but not limited to erythema, scaling, itching, weeping, crusting, and pain. Hydroxyzine Counseling: Patient advised that the medication is sedating and not to drive a car after taking this medication. Patient informed of potential adverse effects including but not limited to dry mouth, urinary retention, and blurry vision. The patient verbalized understanding of the proper use and possible adverse effects of hydroxyzine. All of the patient's questions and concerns were addressed. Colchicine Counseling:  Patient counseled regarding adverse effects including but not limited to stomach upset (nausea, vomiting, stomach pain, or diarrhea). Patient instructed to limit alcohol consumption while taking this medication. Colchicine may reduce blood counts especially with prolonged use. The patient understands that monitoring of kidney function and blood counts may be required, especially at baseline. The patient verbalized understanding of the proper use and possible adverse effects of colchicine. All of the patient's questions and concerns were addressed. Adbry Pregnancy And Lactation Text: It is unknown if this medication will adversely affect pregnancy or breast feeding. Xelcollinz Pregnancy And Lactation Text: This medication is Pregnancy Category D and is not considered safe during pregnancy. The risk during breast feeding is also uncertain. Tetracycline Counseling: Patient counseled regarding possible photosensitivity and increased risk for sunburn. Patient instructed to avoid sunlight, if possible. When exposed to sunlight, patients should wear protective clothing, sunglasses, and sunscreen. The patient was instructed to call the office immediately if the following severe adverse effects occur:  hearing changes, easy bruising/bleeding, severe headache, or vision changes. The patient verbalized understanding of the proper use and possible adverse effects of tetracycline. All of the patient's questions and concerns were addressed. Patient understands to avoid pregnancy while on therapy due to potential birth defects. Spironolactone Counseling: Patient advised regarding risks of diarrhea, abdominal pain, hyperkalemia, birth defects (for female patients), liver toxicity and renal toxicity. The patient may need blood work to monitor liver and kidney function and potassium levels while on therapy. The patient verbalized understanding of the proper use and possible adverse effects of spironolactone. All of the patient's questions and concerns were addressed. Opioid Pregnancy And Lactation Text: These medications can lead to premature delivery and should be avoided during pregnancy. These medications are also present in breast milk in small amounts. Winlevi Pregnancy And Lactation Text: This medication is considered safe during pregnancy and breastfeeding. Klisyri Counseling:  I discussed with the patient the risks of Garnette Gordon including but not limited to erythema, scaling, itching, weeping, crusting, and pain. Benzoyl Peroxide Pregnancy And Lactation Text: This medication is Pregnancy Category C. It is unknown if benzoyl peroxide is excreted in breast milk. Oral Minoxidil Counseling- I discussed with the patient the risks of oral minoxidil including but not limited to shortness of breath, swelling of the feet or ankles, dizziness, lightheadedness, unwanted hair growth and allergic reaction. The patient verbalized understanding of the proper use and possible adverse effects of oral minoxidil. All of the patient's questions and concerns were addressed. Topical Ketoconazole Counseling: Patient counseled that this medication may cause skin irritation or allergic reactions. In the event of skin irritation, the patient was advised to reduce the amount of the drug applied or use it less frequently. The patient verbalized understanding of the proper use and possible adverse effects of ketoconazole. All of the patient's questions and concerns were addressed. Detail Level: Simple Sski Pregnancy And Lactation Text: This medication is Pregnancy Category D and isn't considered safe during pregnancy. It is excreted in breast milk. Ketoconazole Pregnancy And Lactation Text: This medication is Pregnancy Category C and it isn't know if it is safe during pregnancy. It is also excreted in breast milk and breast feeding isn't recommended. Spironolactone Pregnancy And Lactation Text: This medication can cause feminization of the male fetus and should be avoided during pregnancy. The active metabolite is also found in breast milk. Clindamycin Pregnancy And Lactation Text: This medication can be used in pregnancy if certain situations. Clindamycin is also present in breast milk. Olumiant Counseling: I discussed with the patient the risks of Olumiant therapy including but not limited to upper respiratory tract infections, shingles, cold sores, and nausea. Live vaccines should be avoided. This medication has been linked to serious infections; higher rate of mortality; malignancy and lymphoproliferative disorders; major adverse cardiovascular events; thrombosis; gastrointestinal perforations; neutropenia; lymphopenia; anemia; liver enzyme elevations; and lipid elevations. Elidel Counseling: Patient may experience a mild burning sensation during topical application. Elidel is not approved in children less than 3years of age. There have been case reports of hematologic and skin malignancies in patients using topical calcineurin inhibitors although causality is questionable. Cyclosporine Counseling:  I discussed with the patient the risks of cyclosporine including but not limited to hypertension, gingival hyperplasia,myelosuppression, immunosuppression, liver damage, kidney damage, neurotoxicity, lymphoma, and serious infections. The patient understands that monitoring is required including baseline blood pressure, CBC, CMP, lipid panel and uric acid, and then 1-2 times monthly CMP and blood pressure. Cimzia Counseling:  I discussed with the patient the risks of Cimzia including but not limited to immunosuppression, allergic reactions and infections. The patient understands that monitoring is required including a PPD at baseline and must alert us or the primary physician if symptoms of infection or other concerning signs are noted. Carac Counseling:  I discussed with the patient the risks of Carac including but not limited to erythema, scaling, itching, weeping, crusting, and pain. Soolantra Counseling: I discussed with the patients the risks of topial Soolantra. This is a medicine which decreases the number of mites and inflammation in the skin. You experience burning, stinging, eye irritation or allergic reactions. Please call our office if you develop any problems from using this medication. Low Dose Naltrexone Counseling- I discussed with the patient the potential risks and side effects of low dose naltrexone including but not limited to: more vivid dreams, headaches, nausea, vomiting, abdominal pain, fatigue, dizziness, and anxiety. High Dose Vitamin A Counseling: Side effects reviewed, pt to contact office should one occur. Protopic Counseling: Patient may experience a mild burning sensation during topical application. Protopic is not approved in children less than 3years of age. There have been case reports of hematologic and skin malignancies in patients using topical calcineurin inhibitors although causality is questionable. VTAMA Counseling: I discussed with the patient that Marinus Loll is not for use in the eyes, mouth or mouth. They should call the office if they develop any signs of allergic reactions to Marinus Loll. The patient verbalized understanding of the proper use and possible adverse effects of VTAMA. All of the patient's questions and concerns were addressed. Thalidomide Counseling: I discussed with the patient the risks of thalidomide including but not limited to birth defects, anxiety, weakness, chest pain, dizziness, cough and severe allergy. Hydroxyzine Pregnancy And Lactation Text: This medication is not safe during pregnancy and should not be taken. It is also excreted in breast milk and breast feeding isn't recommended. Simponi Counseling:  I discussed with the patient the risks of golimumab including but not limited to myelosuppression, immunosuppression, autoimmune hepatitis, demyelinating diseases, lymphoma, and serious infections. The patient understands that monitoring is required including a PPD at baseline and must alert us or the primary physician if symptoms of infection or other concerning signs are noted. Ilumya Counseling: I discussed with the patient the risks of tildrakizumab including but not limited to immunosuppression, malignancy, posterior leukoencephalopathy syndrome, and serious infections. The patient understands that monitoring is required including a PPD at baseline and must alert us or the primary physician if symptoms of infection or other concerning signs are noted. Oral Minoxidil Pregnancy And Lactation Text: This medication should only be used when clearly needed if you are pregnant, attempting to become pregnant or breast feeding. Klisyri Pregnancy And Lactation Text: It is unknown if this medication can harm a developing fetus or if it is excreted in breast milk. Skyrizi Counseling: I discussed with the patient the risks of risankizumab-rzaa including but not limited to immunosuppression, and serious infections. The patient understands that monitoring is required including a PPD at baseline and must alert us or the primary physician if symptoms of infection or other concerning signs are noted. Quinolones Counseling:  I discussed with the patient the risks of fluoroquinolones including but not limited to GI upset, allergic reaction, drug rash, diarrhea, dizziness, photosensitivity, yeast infections, liver function test abnormalities, tendonitis/tendon rupture. Dapsone Counseling: I discussed with the patient the risks of dapsone including but not limited to hemolytic anemia, agranulocytosis, rashes, methemoglobinemia, kidney failure, peripheral neuropathy, headaches, GI upset, and liver toxicity. Patients who start dapsone require monitoring including baseline LFTs and weekly CBCs for the first month, then every month thereafter. The patient verbalized understanding of the proper use and possible adverse effects of dapsone. All of the patient's questions and concerns were addressed.

## 2025-01-27 NOTE — GROUP NOTE
Group Therapy Note    Date: 1/27/2025    Group Start Time: 0950  Group End Time: 1025  Group Topic: Psychoeducation    [facility]    Tierra Banuelos, CTRS    Date: 1/27/2025    Type of Group: Psychoeducation    Wellness Binder Information  Module Name:  Supporting someone with depression     Patient's Goal:  pt will be able to identify how one can communicate with others   about his/her needs with others.      Notes:  pleasant and engaged in group, and accepting of handout.    Status After Intervention:  Improved    Participation Level: Active Listener and Interactive    Participation Quality: Appropriate, Attentive, and Sharing      Speech:  normal      Thought Process/Content: Logical      Affective Functioning: Congruent      Mood: euthymic      Level of consciousness:  Alert, Oriented x4, and Attentive      Response to Learning: Able to verbalize/acknowledge new learning, Able to retain information, and Progressing to goal      Endings: None Reported    Modes of Intervention: Education, Support, Socialization, and Problem-solving      Discipline Responsible: Psychoeducational Specialist      Signature:  Tierra Banuelos, CTRS

## 2025-01-27 NOTE — PLAN OF CARE
Pt resting in room with eyes open. Pt denies SI, HI and AVH. Pt has a sheet over his toilet room door and his night  front of the door. Pt states that it keeps the light from turning on and he states that he can't use his toilet because it takes a long time for the light to go off and he doesn't like that.    Problem: Krystle  Goal: Will exhibit normal sleep and speech and no impulsivity  Description: INTERVENTIONS:  1. Administer medication as ordered  2. Set limits on impulsive behavior  3. Make attempts to decrease external stimuli as possible  1/26/2025 2127 by Ryan Oliveira RN  Outcome: Progressing     Problem: Psychosis  Goal: Will report no hallucinations or delusions  Description: INTERVENTIONS:  1. Administer medication as  ordered  2. Assist with reality testing to support increasing orientation  3. Assess if patient's hallucinations or delusions are encouraging self harm or harm to others and intervene as appropriate  1/26/2025 2127 by Ryan Oliveira RN  Outcome: Progressing     Problem: Anxiety  Goal: Will report anxiety at manageable levels  Description: INTERVENTIONS:  1. Administer medication as ordered  2. Teach and rehearse alternative coping skills  3. Provide emotional support with 1:1 interaction with staff  1/26/2025 2127 by Ryan Oliveira RN  Outcome: Progressing     Problem: Depression/Self Harm  Goal: Effect of psychiatric condition will be minimized and patient will be protected from self harm  Description: INTERVENTIONS:  1. Assess impact of patient's symptoms on level of functioning, self care needs and offer support as indicated  2. Assess patient/family knowledge of depression, impact on illness and need for teaching  3. Provide emotional support, presence and reassurance  4. Assess for possible suicidal thoughts or ideation. If patient expresses suicidal thoughts or statements do not leave alone, initiate Suicide Precautions, move to a room close to the nursing

## 2025-01-27 NOTE — PLAN OF CARE
Problem: Risk for Elopement  Goal: Patient will not exit the unit/facility without proper excort  Outcome: Progressing     Problem: Self Harm/Suicidality  Goal: Will have no self-injury during hospital stay  Description: INTERVENTIONS:  1.  Ensure constant observer at bedside with Q15M safety checks  2.  Maintain a safe environment  3.  Secure patient belongings  4.  Ensure family/visitors adhere to safety recommendations  5.  Ensure safety tray has been added to patient's diet order  6.  Every shift and PRN: Re-assess suicidal risk via Frequent Screener    Outcome: Progressing       Problem: Krystle  Goal: Will exhibit normal sleep and speech and no impulsivity  Description: INTERVENTIONS:  1. Administer medication as ordered  2. Set limits on impulsive behavior  3. Make attempts to decrease external stimuli as possible  1/27/2025 0932 by Kelsey Landon RN  Outcome: Progressing      Patient denies SI/HI/Hallucinations. Patient is seen on the unit interacting with others and has been cooperative with assessment. Patient describes anxiety and depression as \"just slight.\" Patient is in no active distress respirations are even and unlabored. Will continue to monitor and will intervene as needed with close 15 minute rounds.

## 2025-01-28 PROCEDURE — 1240000000 HC EMOTIONAL WELLNESS R&B

## 2025-01-28 PROCEDURE — 99232 SBSQ HOSP IP/OBS MODERATE 35: CPT | Performed by: NURSE PRACTITIONER

## 2025-01-28 PROCEDURE — 6370000000 HC RX 637 (ALT 250 FOR IP): Performed by: NURSE PRACTITIONER

## 2025-01-28 PROCEDURE — 6370000000 HC RX 637 (ALT 250 FOR IP): Performed by: PSYCHIATRY & NEUROLOGY

## 2025-01-28 RX ORDER — BENZTROPINE MESYLATE 1 MG/1
1 TABLET ORAL 2 TIMES DAILY
Status: DISCONTINUED | OUTPATIENT
Start: 2025-01-28 | End: 2025-01-29 | Stop reason: HOSPADM

## 2025-01-28 RX ADMIN — DIVALPROEX SODIUM 500 MG: 500 TABLET, DELAYED RELEASE ORAL at 22:04

## 2025-01-28 RX ADMIN — DIVALPROEX SODIUM 500 MG: 500 TABLET, DELAYED RELEASE ORAL at 09:33

## 2025-01-28 RX ADMIN — ARIPIPRAZOLE 15 MG: 15 TABLET ORAL at 09:33

## 2025-01-28 RX ADMIN — Medication 3 MG: at 22:04

## 2025-01-28 RX ADMIN — NICOTINE POLACRILEX 2 MG: 2 LOZENGE ORAL at 17:43

## 2025-01-28 RX ADMIN — BENZTROPINE MESYLATE 1 MG: 1 TABLET ORAL at 15:03

## 2025-01-28 ASSESSMENT — PAIN SCALES - GENERAL: PAINLEVEL_OUTOF10: 0

## 2025-01-28 NOTE — PLAN OF CARE
Problem: Psychosis  Goal: Will report no hallucinations or delusions  Description: INTERVENTIONS:  1. Administer medication as  ordered  2. Assist with reality testing to support increasing orientation  3. Assess if patient's hallucinations or delusions are encouraging self harm or harm to others and intervene as appropriate  Outcome: Progressing     Problem: Anxiety  Goal: Will report anxiety at manageable levels  Description: INTERVENTIONS:  1. Administer medication as ordered  2. Teach and rehearse alternative coping skills  3. Provide emotional support with 1:1 interaction with staff  Outcome: Progressing     Problem: Involuntary Admit  Goal: Will cooperate with staff recommendations and doctor's orders and will demonstrate appropriate behavior  Description: INTERVENTIONS:  1. Treat underlying conditions and offer medication as ordered  2. Educate regarding involuntary admission procedures and rules  3. Contain excessive/inappropriate behavior per unit and hospital policies  Outcome: Progressing     Problem: Decision Making  Goal: Pt/Family able to effectively weigh alternatives and participate in decision making related to treatment and care  Description: INTERVENTIONS:  1. Determine when there are differences between patient's view, family's view, and healthcare provider's view of condition  2. Facilitate patient and family articulation of goals for care  3. Help patient and family identify pros/cons of alternative solutions  4. Provide information as requested by patient/family  5. Respect patient/family right to receive or not to receive information  6. Serve as a liaison between patient and family and health care team  7. Initiate Consults from Ethics, Palliative Care or initiate Family Care Conference as is appropriate  Outcome: Progressing

## 2025-01-28 NOTE — CARE COORDINATION
NILA called [facility] [phone] and scheduled appointments for pt 2/13 at 10am with Misha for medication management in [place] office and 2/6 at 11am with Susan Issa in [place] office for counseling.

## 2025-01-28 NOTE — PLAN OF CARE
Problem: Risk for Elopement  Goal: Patient will not exit the unit/facility without proper excort  Outcome: Progressing     Problem: Self Harm/Suicidality  Goal: Will have no self-injury during hospital stay  Description: INTERVENTIONS:  1.  Ensure constant observer at bedside with Q15M safety checks  2.  Maintain a safe environment  3.  Secure patient belongings  4.  Ensure family/visitors adhere to safety recommendations  5.  Ensure safety tray has been added to patient's diet order  6.  Every shift and PRN: Re-assess suicidal risk via Frequent Screener    Outcome: Progressing     Problem: Psychosis  Goal: Will report no hallucinations or delusions  Description: INTERVENTIONS:  1. Administer medication as  ordered  2. Assist with reality testing to support increasing orientation  3. Assess if patient's hallucinations or delusions are encouraging self harm or harm to others and intervene as appropriate  1/27/2025 2218 by Marisela Batista RN  Outcome: Progressing     Problem: Anxiety  Goal: Will report anxiety at manageable levels  Description: INTERVENTIONS:  1. Administer medication as ordered  2. Teach and rehearse alternative coping skills  3. Provide emotional support with 1:1 interaction with staff  1/27/2025 2218 by Marisela Batista RN  Outcome: Progressing     Problem: Involuntary Admit  Goal: Will cooperate with staff recommendations and doctor's orders and will demonstrate appropriate behavior  Description: INTERVENTIONS:  1. Treat underlying conditions and offer medication as ordered  2. Educate regarding involuntary admission procedures and rules  3. Contain excessive/inappropriate behavior per unit and hospital policies  1/27/2025 2218 by Marisela Batista RN  Outcome: Progressing     Problem: Decision Making  Goal: Pt/Family able to effectively weigh alternatives and participate in decision making related to treatment and care  Description: INTERVENTIONS:  1. Determine when there are differences

## 2025-01-28 NOTE — BH NOTE
Patient resting with eyes closed. Respirations even and unlabored. No signs of distress. Purposeful rounds continued.

## 2025-01-28 NOTE — BH NOTE
Patient asleep in room upon approach.  Patient has somewhat poor eye contact.    Patient presents flat and blunt.   Patient appears disheveled.  Patient reports mood is good.  Patient denies suicidal/homicidal ideations and hallucinations.     Patient denies anxiety and depression, stating \"I'm good\".  Patient denies pain.  Patient is taking ordered medications without issue. Patient is attending groups per note review.  Purposeful Q15min rounding continues.

## 2025-01-28 NOTE — BH NOTE
Patient alert and oriented. Patient denies any suicidal/homicidal ideation. Patient denies any hallucinations. Patient rates anxiety and depression a 0/10. Patient is goal focused. He states when he is anxious he likes to walk around the unit to \"burn off energy.\"

## 2025-01-29 PROCEDURE — 6360000002 HC RX W HCPCS: Performed by: PSYCHIATRY & NEUROLOGY

## 2025-01-29 PROCEDURE — G0008 ADMIN INFLUENZA VIRUS VAC: HCPCS | Performed by: PSYCHIATRY & NEUROLOGY

## 2025-01-29 PROCEDURE — 99239 HOSP IP/OBS DSCHRG MGMT >30: CPT | Performed by: NURSE PRACTITIONER

## 2025-01-29 PROCEDURE — 6370000000 HC RX 637 (ALT 250 FOR IP): Performed by: PSYCHIATRY & NEUROLOGY

## 2025-01-29 PROCEDURE — 6370000000 HC RX 637 (ALT 250 FOR IP): Performed by: NURSE PRACTITIONER

## 2025-01-29 PROCEDURE — 90656 IIV3 VACC NO PRSV 0.5 ML IM: CPT | Performed by: PSYCHIATRY & NEUROLOGY

## 2025-01-29 RX ORDER — ARIPIPRAZOLE 15 MG/1
15 TABLET ORAL DAILY
Qty: 30 TABLET | Refills: 0 | Status: SHIPPED | OUTPATIENT
Start: 2025-01-30 | End: 2025-03-01

## 2025-01-29 RX ORDER — DIVALPROEX SODIUM 500 MG/1
500 TABLET, DELAYED RELEASE ORAL 2 TIMES DAILY
Qty: 60 TABLET | Refills: 0 | Status: SHIPPED | OUTPATIENT
Start: 2025-01-29 | End: 2025-01-29 | Stop reason: HOSPADM

## 2025-01-29 RX ADMIN — DIVALPROEX SODIUM 500 MG: 500 TABLET, DELAYED RELEASE ORAL at 09:59

## 2025-01-29 RX ADMIN — NICOTINE POLACRILEX 2 MG: 2 LOZENGE ORAL at 08:27

## 2025-01-29 RX ADMIN — ARIPIPRAZOLE 15 MG: 15 TABLET ORAL at 09:59

## 2025-01-29 RX ADMIN — INFLUENZA A VIRUS A/VICTORIA/4897/2022 IVR-238 (H1N1) ANTIGEN (PROPIOLACTONE INACTIVATED), INFLUENZA A VIRUS A/THAILAND/8/2022 IVR-237 (H3N2) ANTIGEN (PROPIOLACTONE INACTIVATED), INFLUENZA B VIRUS B/AUSTRIA/1359417/2021 BVR-26 ANTIGEN (PROPIOLACTONE INACTIVATED) 0.5 ML: 15; 15; 15 INJECTION, SUSPENSION INTRAMUSCULAR at 12:08

## 2025-01-29 NOTE — DISCHARGE SUMMARY
DISCHARGE SUMMARY      Patient ID:  Gene Rogers  [id number]  29 y.o.  1995    Admit date: 1/24/2025    Discharge date and time: 1/29/2025    Admitting Physician: Anabela Hernandez MD     Discharge Physician: Dr David KWAN    Discharge Diagnoses:   Patient Active Problem List   Diagnosis    Gastritis    Bilious vomiting with nausea    Pneumonia due to organism    Acute respiratory failure with hypoxia    Polysubstance abuse (HCC)    Hepatitis    SIRS (systemic inflammatory response syndrome) (HCC)    Hypokalemia    Asthma    Hyperglycemia    Severe protein-calorie malnutrition (HCC)    Severe episode of recurrent major depressive disorder, without psychotic features (HCC)    Cluster B personality disorder (HCC)    Schizoaffective disorder, bipolar type (HCC)    Acute psychosis (HCC)    Personal history of noncompliance with medical treatment, presenting hazards to health    Schizoaffective disorder (HCC)       Admission Condition: poor    Discharged Condition: stable    Admission Circumstance:   Patient name: Gene Rogers  Patient's past mental health and addiction history: Schizoaffective disorder and polysubstance abuse  Patient's presentation to the ED and why the patient needs admission: Probated by parents due to threatening to kill them demanding money and displaying manic symptoms  Legal status:  []  Voluntary  []  Involuntary  [x]  Probate  Triggering/precipitating events: Noncompliance with medications and outpatient treatment  Duration of triggering/precipitating events: Just prior to arrival      PAST MEDICAL/PSYCHIATRIC HISTORY:   Past Medical History:   Diagnosis Date    Anxiety     Asthma     no meds currently- mostly exercise induced    Claustrophobia     confined spaces with locked doors    Depression     Epigastric pain     Nausea     Nut allergy        FAMILY/SOCIAL HISTORY:  History reviewed. No pertinent family history.  Social History     Socioeconomic History    Marital status: Single     Spouse

## 2025-01-29 NOTE — CARE COORDINATION
Navigator submitted discharge summary to the court to discontinue probate process.    Electronically signed by BRIGITTE Gerard, ANNA on 1/29/2025 at 4:16 PM

## 2025-01-29 NOTE — TRANSITION OF CARE
Behavioral Health Transition Record    Patient Name: Gene Rogers  YOB: 1995   Medical Record Number: [id number]  Date of Admission: 1/24/2025  1:47 PM   Date of Discharge: 1/29/25    Attending Provider: Anabela Hernandez MD   Discharging Provider: Dr. Hernandez  To contact this individual call [phone] and ask the  to page.  If unavailable, ask to be transferred to Behavioral Health Provider on call.  A Behavioral Health Provider will be available on call 24/7 and during holidays.    Primary Care Provider: Vince Roberto MD    Allergies   Allergen Reactions    Peanut-Containing Drug Products     Lorabid [Loracarbef]     Other      \"Lorbid\"  ALL NUTS including tree nuts       Reason for Admission:   Patient name: Gene Rogers  Patient's past mental health and addiction history: Schizoaffective disorder and polysubstance abuse  Patient's presentation to the ED and why the patient needs admission: Probated by parents due to threatening to kill them demanding money and displaying manic symptoms  Legal status:  []  Voluntary  []  Involuntary  [x]  Probate  Triggering/precipitating events: Noncompliance with medications and outpatient treatment  Duration of triggering/precipitating events: Just prior to arrival  Admission Diagnosis: Schizoaffective disorder (HCC) [F25.9]  Homicidal ideation [R45.850]  Mental health-related complaint [Z71.1]    * No surgery found *    Results for orders placed or performed during the hospital encounter of 01/24/25   CBC with Auto Differential   Result Value Ref Range    WBC 10.4 4.5 - 11.5 k/uL    RBC 5.43 3.80 - 5.80 m/uL    Hemoglobin 16.7 (H) 12.5 - 16.5 g/dL    Hematocrit 48.3 37.0 - 54.0 %    MCV 89.0 80.0 - 99.9 fL    MCH 30.8 26.0 - 35.0 pg    MCHC 34.6 (H) 32.0 - 34.5 g/dL    RDW 12.3 11.5 - 15.0 %    Platelets 332 130 - 450 k/uL    MPV 9.8 7.0 - 12.0 fL    Neutrophils % 50 43.0 - 80.0 %    Lymphocytes % 34 20.0 - 42.0 %    Monocytes % 8 2.0 - 12.0 %

## 2025-01-29 NOTE — PLAN OF CARE
Problem: Self Harm/Suicidality  Goal: Will have no self-injury during hospital stay  Description: INTERVENTIONS:  1.  Ensure constant observer at bedside with Q15M safety checks  2.  Maintain a safe environment  3.  Secure patient belongings  4.  Ensure family/visitors adhere to safety recommendations  5.  Ensure safety tray has been added to patient's diet order  6.  Every shift and PRN: Re-assess suicidal risk via Frequent Screener    1/28/2025 1529 by Bina Parker RN  Outcome: Progressing     Problem: Anxiety  Goal: Will report anxiety at manageable levels  Description: INTERVENTIONS:  1. Administer medication as ordered  2. Teach and rehearse alternative coping skills  3. Provide emotional support with 1:1 interaction with staff  Outcome: Progressing   Pt states no suicidal ideations, homicidal ideations, hallucinations. Pt did not attend groups, is taking medications. Pt is eating well. Patient is isolative to his room. Pt. is cooperative.

## 2025-01-29 NOTE — PROGRESS NOTES
Attended this afterN's 75 min group on life lessons from  yamileth garcia.    Attentive to audio program.   Scored 100 on quiz.    Courteous listening to peers.   Excellent particip to points discussed.   Earned particip prize.   Appeared to fully digest key learning points.  
BEHAVIORAL HEALTH FOLLOW-UP NOTE     1/28/2025     Patient was seen and examined in person, Chart reviewed   Patient's case discussed with staff/team    Chief Complaint: Probated by the court threatening to kill his parents demanding money    Interim History:   Patient seen up on the unit disheveled in appearance.  Limited insight and judgment.  Patient attending limited group states he does not like groups that is why does not go to them.  He has been out pacing the unit.  States that he has \"restless leg syndrome.\"  Denies suicidal homicidal ideations intent or plan denies auditory or visual hallucinations he is happy that he is going to be able to stay at his dad's rental on discharge.  There are no overt or covert signs psychosis eating well sleeping well and no neurovegetative signs symptoms of depression        Appetite: [x] Normal/Unchanged  [] Increased  [] Decreased      Sleep:       [x] Normal/Unchanged  [] Fair       [] Poor              Energy:    [x] Normal/Unchanged  [] Increased  [] Decreased        SI [] Present  [x] Absent    HI  []Present  [x] Absent     Aggression:  [] yes  [x] no    Patient is [x] able  [] unable to CONTRACT FOR SAFETY     PAST MEDICAL/PSYCHIATRIC HISTORY:   Past Medical History:   Diagnosis Date    Anxiety     Asthma     no meds currently- mostly exercise induced    Claustrophobia     confined spaces with locked doors    Depression     Epigastric pain     Nausea     Nut allergy        FAMILY/SOCIAL HISTORY:  History reviewed. No pertinent family history.  Social History     Socioeconomic History    Marital status: Single     Spouse name: Not on file    Number of children: 0    Years of education: 13    Highest education level: Not on file   Occupational History    Occupation:      Employer: [employer]    Occupation: UNEMPLOYED   Tobacco Use    Smoking status: Former     Types: E-Cigarettes     Passive exposure: Never (patient VAPES)    Smokeless tobacco: Never 
Behavioral [facility] [place]  Discharge Note    Pt discharged with followings belongings:   Dental Appliances: None  Vision - Corrective Lenses: None  Hearing Aid: None  Jewelry: None  Body Piercings Removed: N/A  Clothing: Shirt, Undergarments  Other Valuables: Other (Comment) (NONE)   Valuables sent home with pt or returned to patient. Patient educated on aftercare instructions: yes  Information faxed to n/a by n/a  at 3:09 PM .Patient verbalize understanding of AVS:  yes.    Status EXAM upon discharge:  Mental Status and Behavioral Exam  Normal: No  Level of Assistance: Independent/Self  Facial Expression: Flat  Affect: Blunt  Level of Consciousness: Alert  Frequency of Checks: 4 times per hour, close  Mood:Normal: No  Mood: Anxious  Motor Activity:Normal: Yes  Eye Contact: Fair  Observed Behavior: Catatonic  Sexual Misconduct History: Current - no  Preception: [place] to person, [place] to time, [place] to place  Attention:Normal: No  Attention: Distractible  Thought Processes: Unremarkable  Thought Content:Normal: No  Thought Content: Poverty of content  Depression Symptoms: Impaired concentration, Isolative  Anxiety Symptoms: Generalized  Krystle Symptoms: No problems reported or observed.  Hallucinations: None  Delusions: No  Delusions: Other (comment)  Memory:Normal: No  Memory: Poor recent, Poor remote  Insight and Judgment: No  Insight and Judgment: Poor judgment, Poor insight    Tobacco Screening:  Practical Counseling, on admission, chucky X, if applicable and completed (first 3 are required if patient doesn't refuse):            (x ) Recognizing danger situations (included triggers and roadblocks)                    (x ) Coping skills (new ways to manage stress,relaxation techniques, changing routine, distraction)                                                           ( x) Basic information about quitting (benefits of quitting, techniques in how to quit, available resources  ( ) Referral for counseling faxed 
Behavioral [facility] [place]  Day 3 Interdisciplinary Treatment Plan NOTE    Review Date & Time: 1/27/2025    Patient was in treatment team    Estimated Length of Stay Update:  3-5 days  Estimated Discharge Date Update: 01/30/2025    EDUCATION:   Learner Progress Toward Treatment Goals: Reviewed results and recommendations of this team, Reviewed group plan and strategies, Reviewed signs, symptoms and risk of self harm and violent behavior, and Reviewed goals and plan of care    Method: Individual    Outcome: Verbalized understanding    PATIENT GOALS: Exercise    PLAN/TREATMENT RECOMMENDATIONS UPDATE:1/30/2025    GOALS UPDATE:   Time frame for Short-Term Goals: Prior to discharge      Kelsey Landon RN  
CLINICAL PHARMACY NOTE: MEDS TO BEDS    Total # of Prescriptions Filled: 2   The following medications were delivered to the patient:  Depakote 500mg tabs  Abilify 15mg tabs    Additional Documentation:  To ETHAN Drummond  
Leisure assessment completed.    01/26/25 1035   Activities of Daily Living   Patient Requires assistance with daily self-care activities? No   Leisure Activity 1   3 Favorite Leisure Activities video games was working   Frequency > 2 hours/day   Last time this month   Barriers to participating  motivation;social (ie. no one to do it with)   Leisure Activity 2   Favorite Leisure Activities  same   Leisure Activity 3   Favorite Leisure Activities  same   Social   Patient reports spending the majority of their free time with one other person   Patient verbalizes a preference for spending free time with one other person   Patient’s perception of support system less healthy   Patient’s perception of barriers to socializing with others include(s) lack of comfort;lack of motivation/interest   Social Details parents kicked him out   Beliefs & Coping   Has difficulty dealing with feelings   Yes   Internalizes feelings/Keeps feelings in Yes   Externalizes feelings through aggressiveness or poor temper control  No   Feels uncomfortable around others  Yes   Has difficulty talking to others  Yes   Depends on others for direction or decisions No   Difficulty dealing with anger of others  Yes   Difficulty dealing with own anger  Yes   Difficulty managing stress Yes   Frequently has difficulty with relationships  No   Has recently perceived/experienced loss, disappointment, humiliation or failure  Yes   General perception about self likes self   Attitude about abilities more successful than not   Locus of Control  most of the time   Belief about recovery Recovery is possible   Patient Identified Strengths  nothing   Patient Identified Limitations  depression   Perception of most stressful event prior to hospitalization not having a place to live   Perception of changes needed find stability   Strengths and Limitations   Strengths Independent in basic self-care activities;Realistic perception of situation/stressors;Demonstrates 
Patient declined invitation to the following groups:    Community Meeting  Education  Recreation Activity    Patient will continue to be provided with opportunities to enhance leisure skills/interests and/or coping mechanisms.  
Patient is resting quietly in bed with eyes closed at this time.  No signs of distress or discomfort noted.  No PRN medications given thus far.  Safety needs met.  No unit problems reported.  Purposeful rounding continued.  
Pt refused 1900 vitals  
Spiritual Health History and Assessment/Progress Note  [facility]    Initial Encounter, Loneliness/Social Isolation, Behavioral Health,  ,  , (P) Initial Encounter    Name: Gene Rogers MRN: [id number]    Age: 29 y.o.     Sex: male   Language: English   Mandaeism: Bahai   Schizoaffective disorder (HCC)     Date: 1/27/2025                           Spiritual Assessment began in [facility] ACUTE  1        Referral/Consult From: Nurse   Encounter Overview/Reason: Initial Encounter, Loneliness/Social Isolation, Behavioral Health  Service Provided For: Patient    Entered patient room, he was sitting on the eob, we began discussing his current situation, he became anxious when we discussed his use of drugs. He does not feel that his drug use is a major issue. He didn't feel as though he has very good support from his family. When I asked what gave him hope he said his technology systems like [employer]. He has no children is single. Works for his family but recently was let go. One of his major concerns right now is being able to qualify for disability, for financial reasons because he does not feel as if he can work on a job for long periods of time, or go to school. He has had a difficult time coping. He is not spiritual because he feels as if he has prayed in the past but things never worked out. We discussed spiritual relationship but not interested. I let him know we were here for him if he needed someone to speak with. Just let the nursing staff know and they will call the spiritual care office. . .       Tiarra, Belief, Meaning:   Patient : Patient has no spiritual beliefs at this time. He does not believe in God because he let him down.   Family/Friends No family/friends present      Importance and Influence:  Patient has no beliefs influential to healthcare decision-making identified during this visit  Family/Friends No family/friends present    Community:  Patient expresses feelings of 
Knowledge: Vocabulary intact, pt is aware of current events and past history  Attetion and Concentration intact  Insight poor   judgement poor        ASSESSMENT: Patient symptoms are:  [] Well controlled  [] Improving  [] Worsening  [x] No change      Diagnosis:  Principal Problem:    Schizoaffective disorder (HCC)  Resolved Problems:    * No resolved hospital problems. *      LABS:    No results for input(s): \"WBC\", \"HGB\", \"PLT\" in the last 72 hours.  No results for input(s): \"NA\", \"K\", \"CL\", \"CO2\", \"BUN\", \"CREATININE\", \"GLUCOSE\" in the last 72 hours.  No results for input(s): \"BILITOT\", \"ALKPHOS\", \"AST\", \"ALT\" in the last 72 hours.  Lab Results   Component Value Date/Time    BARBSCNU NEGATIVE 01/24/2025 02:00 PM    LABBENZ NEGATIVE 01/24/2025 02:00 PM    LABMETH NEGATIVE 01/24/2025 02:00 PM    ETOH <10 01/24/2025 01:56 PM     Lab Results   Component Value Date/Time    TSH 1.240 04/18/2019 10:16 AM     No results found for: \"LITHIUM\"  Lab Results   Component Value Date    VALPROATE 11 (L) 09/18/2024           Treatment Plan:  Reviewed current Medications with the patient.   Risks, benefits, side effects, drug-to-drug interactions and alternatives to treatment were discussed.  Collateral information:   CD evaluation  Encourage patient to attend group and other milieu activities.  Discharge planning discussed with the patient and treatment team.    Increase Abilify 15 mg daily we will plan for long-acting injection  Continue Depakote 500 mg twice daily    PSYCHOTHERAPY/COUNSELING:  [x] Therapeutic interview  [x] Supportive  [] CBT  [] Ongoing  [] Other    [x] Patient continues to need, on a daily basis, active treatment furnished directly by or requiring the supervision of inpatient psychiatric personnel      Anticipated Length of stay: 3 to 7 days based on stability        NOTE: This report was transcribed using voice recognition software. Every effort was made to ensure accuracy; however, inadvertent computerized

## 2025-01-29 NOTE — CARE COORDINATION
SW met with pt. Pt found in common area. He is calm and cooperative with linear thought process. He states that he is feeling better and ready to discharge home, is hopeful to discharge today. He states that he will be staying at his father's condo and father will transport. Pt states that he is looking forward to returning home as he has a package full of \"modded video games\" awaiting him that he is excited to play. Pt states that he likes his current mental health medications and plans to follow up with [facility]. Pt denied SI/HI/hallucinations and denied any other questions or concerns for SW at this time.     SW called pt father Marco [phone] (PATRICIO signed) to discuss pt tentative discharge for today. He states that he has no concerns for pt if he were to discharge home today, states that he has been speaking with pt, last spoke with him about an hour ago and he seems to be doing well, seems \"reasonable\". He will transport pt home today if pt is discharged, is aware that pt will keep him updated on discharge status.     NILA called [facility] [phone] and informed them of pt ARIPiprazole lauroxil (ARISTADA) injection 662 mg due on 2/25. They verbalized understanding and marked this in pt chart.      In order to ensure appropriate transition and discharge planning is in place, the following documents have been transmitted to [facility], as the new outpatient provider:    The d/c diagnosis was transmitted to the next care provider  The reason for hospitalization was transmitted to the next care provider  The d/c medications (dosage and indication) were transmitted to the next care provider   The continuing care plan was transmitted to the next care provider

## (undated) DEVICE — ADAPTER TBNG DIA15MM SWVL FBROPT BRONCHSCP TERM 2 AXIS PEEP

## (undated) DEVICE — SPONGE GZ W4XL4IN RAYON POLY FILL CVR W/ NONWOVEN FAB

## (undated) DEVICE — Device: Brand: MEDEX

## (undated) DEVICE — SOLUTION IV IRRIG 500ML 0.9% SODIUM CHL 2F7123

## (undated) DEVICE — MASK RESP UNIV N95 4 PANEL HD STRP INDIVIDUALLY WRP LF

## (undated) DEVICE — SURGICAL PROCEDURE PACK BRONCH

## (undated) DEVICE — BLOCK BITE 60FR RUBBER ADLT DENTAL

## (undated) DEVICE — GRADUATE TRIANG MEASURE 1000ML BLK PRNT

## (undated) DEVICE — SYRINGE MED 50ML LUERLOCK TIP